# Patient Record
Sex: FEMALE | Race: BLACK OR AFRICAN AMERICAN | Employment: FULL TIME | URBAN - METROPOLITAN AREA
[De-identification: names, ages, dates, MRNs, and addresses within clinical notes are randomized per-mention and may not be internally consistent; named-entity substitution may affect disease eponyms.]

---

## 2017-04-19 ENCOUNTER — HOSPITAL ENCOUNTER (EMERGENCY)
Age: 57
Discharge: HOME | End: 2017-04-19
Attending: EMERGENCY MEDICINE | Admitting: EMERGENCY MEDICINE
Payer: SELF-PAY

## 2017-04-19 VITALS
HEART RATE: 86 BPM | OXYGEN SATURATION: 99 % | DIASTOLIC BLOOD PRESSURE: 80 MMHG | RESPIRATION RATE: 14 BRPM | SYSTOLIC BLOOD PRESSURE: 164 MMHG | TEMPERATURE: 98.2 F | BODY MASS INDEX: 29.37 KG/M2 | WEIGHT: 159.6 LBS | HEIGHT: 62 IN

## 2017-04-19 DIAGNOSIS — L03.012 PARONYCHIA, LEFT: Primary | ICD-10-CM

## 2017-04-19 PROCEDURE — 75810000106 HC EVAC SUBUNGUAL HEMATOMA

## 2017-04-19 PROCEDURE — 99283 EMERGENCY DEPT VISIT LOW MDM: CPT

## 2017-04-19 PROCEDURE — 74011000250 HC RX REV CODE- 250: Performed by: NURSE PRACTITIONER

## 2017-04-19 RX ORDER — HYDROCODONE BITARTRATE AND ACETAMINOPHEN 5; 325 MG/1; MG/1
1 TABLET ORAL
Qty: 12 TAB | Refills: 0 | Status: SHIPPED | OUTPATIENT
Start: 2017-04-19 | End: 2019-03-25

## 2017-04-19 RX ORDER — BACITRACIN 500 UNIT/G
1 PACKET (EA) TOPICAL
Status: COMPLETED | OUTPATIENT
Start: 2017-04-19 | End: 2017-04-19

## 2017-04-19 RX ORDER — LIDOCAINE HYDROCHLORIDE 10 MG/ML
60 INJECTION INFILTRATION; PERINEURAL ONCE
Status: COMPLETED | OUTPATIENT
Start: 2017-04-19 | End: 2017-04-19

## 2017-04-19 RX ORDER — SULFAMETHOXAZOLE AND TRIMETHOPRIM 800; 160 MG/1; MG/1
1 TABLET ORAL 2 TIMES DAILY
Qty: 14 TAB | Refills: 0 | Status: SHIPPED | OUTPATIENT
Start: 2017-04-19 | End: 2017-04-26

## 2017-04-19 RX ADMIN — BACITRACIN 1 PACKET: 500 OINTMENT TOPICAL at 16:13

## 2017-04-19 RX ADMIN — LIDOCAINE HYDROCHLORIDE 6 ML: 10 INJECTION, SOLUTION INFILTRATION; PERINEURAL at 16:12

## 2017-04-19 NOTE — ED PROVIDER NOTES
HPI Comments: The patient is a 49-year-old female who presents ambulatory to the emergency room with complaints of left third digit pain and swelling near the nailbed. Pain began 2 days ago. No known injury. History of diabetes blood sugars are well controlled. History of MRSA remotely. 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 She had acrylic nails up until 2 weeks ago. There are no other acute healthcare concerns at this time. Pt denies fevers, chills, night sweats, chest pain, pressure, SOB, TORRES, PND, orthopnea, abdominal pain, n/v/d, melena, hematuria, dysuria, constipation, HA, dizziness, and syncope. Past Medical History:  No date: Arrhythmia  No date: Arthritis      Comment: GENERALIZED TO [address]. No date: Asthma      Comment: INHALERS  No date: Diabetes ([address]      Comment: USES PILLS TO CONTROLL  No date: GERD (gastroesophageal reflux disease)  No date: Gout  No date: Hypertension      Comment: CONTROLLED BY MEDS. No date: Nausea & vomiting  No date: Obesity    Past Surgical History:  No date: HX CHOLECYSTECTOMY  98,2011: HX HEART CATHETERIZATION      Comment: CARDIAC CATH X2  1961: HX HEENT      Comment: CLEFT PALATE REPAIR.  2011 JULY 18: HX HYSTERECTOMY  No date: HX ORTHOPAEDIC      Comment: carpal tunnel, trigger finger, hand surgery  No date: HX OTHER SURGICAL      Comment: CLeft repair, piloneidal cyst, hand surgery,   1983: HX TUBAL LIGATION    PCP:  Ratna Paredes NP          Patient is a 62 y.o. female presenting with hand injury. The history is provided by the patient. Hand Injury    Pertinent negatives include no back pain and no neck pain. Past Medical History:   Diagnosis Date    Arrhythmia     Arthritis     GENERALIZED TO JOINTS-WRISTS & KNEES.  Asthma     INHALERS    Diabetes ([address]     USES PILLS TO CONTROLL    GERD (gastroesophageal reflux disease)     Gout     Hypertension     CONTROLLED BY MEDS.     Nausea & vomiting     Obesity        Past Surgical History:   Procedure Laterality Date    HX CHOLECYSTECTOMY      HX HEART CATHETERIZATION  98,2011    CARDIAC CATH X2    HX HEENT  1961    CLEFT PALATE REPAIR.    HX HYSTERECTOMY  2011 JULY 18    HX ORTHOPAEDIC      carpal tunnel, trigger finger, hand surgery    HX OTHER SURGICAL      CLeft repair, piloneidal cyst, hand surgery,     HX TUBAL LIGATION  1983         Family History:   Problem Relation Age of Onset    Asthma Mother     Hypertension Mother     Diabetes Father     Heart Disease Father      MI    Stroke Brother      CEREBRAL ANEURYSM       Social History     Social History    Marital status:      Spouse name: N/A    Number of children: N/A    Years of education: N/A     Occupational History    Not on file. Social History Main Topics    Smoking status: Former Smoker     Years: 10.00     Quit date: 7/11/2010    Smokeless tobacco: Never Used    Alcohol use No      Comment: RARE ETOH    Drug use: No    Sexual activity: Yes     Partners: Male     Birth control/ protection: None     Other Topics Concern    Not on file     Social History Narrative         ALLERGIES: Latex; Ampicillin; Betadine antiseptic gauze; Contrast dye [iodine]; Fd and c blue no. 1; and Penicillins    Review of Systems   Constitutional: Negative for activity change, appetite change, chills, diaphoresis, fatigue, fever and unexpected weight change. HENT: Negative for congestion, ear pain, rhinorrhea, sinus pressure, sore throat and tinnitus. Eyes: Negative for photophobia, pain, discharge and visual disturbance. Respiratory: Negative for apnea, cough, choking, chest tightness, shortness of breath, wheezing and stridor. Cardiovascular: Negative for chest pain, palpitations and leg swelling. Gastrointestinal: Negative for abdominal pain, constipation, diarrhea, nausea and vomiting. Endocrine: Negative for polydipsia, polyphagia and polyuria. Genitourinary: Negative for decreased urine volume, dyspareunia, dysuria, enuresis, flank pain, frequency, hematuria and urgency. Musculoskeletal: Negative for arthralgias, back pain, gait problem, myalgias and neck pain. Left third digit pain    Skin: Negative for color change, pallor, rash and wound. Allergic/Immunologic: Negative for immunocompromised state. Neurological: Negative for dizziness, seizures, syncope, weakness, light-headedness and headaches. Hematological: Does not bruise/bleed easily. Psychiatric/Behavioral: Negative for agitation and confusion. The patient is not nervous/anxious. Vitals:    04/19/17 1541   BP: 164/80   Pulse: 86   Resp: 14   Temp: 98.2 °F (36.8 °C)   SpO2: 99%   Weight: 72.4 kg (159 lb 9.6 oz)   Height: 5' 2\" (1.575 m)            Physical Exam   Constitutional: She is oriented to person, place, and time. She appears well-developed and well-nourished. No distress. HENT:   Head: Normocephalic. Right Ear: External ear normal.   Left Ear: External ear normal.   Mouth/Throat: Oropharynx is clear and moist. No oropharyngeal exudate. Eyes: Conjunctivae and EOM are normal. Pupils are equal, round, and reactive to light. Right eye exhibits no discharge. Left eye exhibits no discharge. No scleral icterus. Neck: Normal range of motion. Neck supple. No JVD present. No tracheal deviation present. No thyromegaly present. Cardiovascular: Normal rate, regular rhythm, normal heart sounds and intact distal pulses. Exam reveals no gallop and no friction rub. No murmur heard. Pulmonary/Chest: Effort normal and breath sounds normal. No stridor. No respiratory distress. She has no wheezes. She has no rales. She exhibits no tenderness. Abdominal: Soft. Bowel sounds are normal. She exhibits no distension and no mass. There is no tenderness. There is no rebound and no guarding. Musculoskeletal: Normal range of motion. She exhibits no edema.         Left hand: She exhibits tenderness. She exhibits normal range of motion, no bony tenderness, normal two-point discrimination, normal capillary refill, no deformity, no laceration and no swelling. Normal sensation noted. Decreased sensation is not present in the ulnar distribution, is not present in the medial redistribution and is not present in the radial distribution. Normal strength noted. Hands:  Lymphadenopathy:     She has no cervical adenopathy. Neurological: She is alert and oriented to person, place, and time. She displays normal reflexes. No cranial nerve deficit. Coordination normal. GCS eye subscore is 4. GCS verbal subscore is 5. GCS motor subscore is 6. Skin: Skin is warm and dry. No rash noted. She is not diaphoretic. No erythema. No pallor. Psychiatric: She has a normal mood and affect. Her behavior is normal. Judgment and thought content normal.   Nursing note and vitals reviewed. MDM  Number of Diagnoses or Management Options  Paronychia, left:   Diagnosis management comments:    * I & D    Risk of Complications, Morbidity, and/or Mortality  General comments:    - stable, ambulatory pt in NAD    Patient Progress  Patient progress: stable    ED Course       Nerve Block  Date/Time: 4/19/2017 4:41 PM  Performed by: Bernabe Garcia  Authorized by: Bernabe Garcia     Consent:     Consent obtained:  Verbal    Consent given by:  Patient    Risks discussed:  Bleeding, intravenous injection and pain  Indications:     Indications:  Pain relief  Location:     Laterality:  Left  Pre-procedure details:     Skin preparation:  Alcohol  Skin anesthesia (see MAR for exact dosages):     Skin anesthesia method:  None  Procedure details (see MAR for exact dosages): Block needle gauge:  25 G    Anesthetic injected:  Lidocaine 1% w/o epi    Injection procedure:  Anatomic landmarks identified  Post-procedure details:     Outcome:  Anesthesia achieved    Patient tolerance of procedure:   Tolerated well, no immediate complications  I&D Abcess Simple  Date/Time: 4/19/2017 4:41 PM  Performed by: Geraldine Sparks  Authorized by: Geraldine Sparks     Consent:     Consent obtained:  Verbal    Consent given by:  Patient    Risks discussed:  Bleeding, incomplete drainage, pain and infection    Alternatives discussed:  No treatment  Location:     Type:  Abscess    Size:  Paronychia   Procedure details:     Incision type: trifurcation       1641 PM  Pt has been reevaluated. There are no new complaints, changes, or physical findings at this time. Medications have been reviewed w/ pt and/or family. Pt and/or family's questions have been answered. Pt and/or family expressed good understanding of the dx/tx/rx and is in agreement with plan of care. Pt instructed and agreed to f/u w/ PCP and to return to ED upon further deterioration. Pt is ready for discharge. LABORATORY TESTS:  No results found for this or any previous visit (from the past 12 hour(s)). IMAGING RESULTS:  No orders to display     No results found. MEDICATIONS GIVEN:  Medications   lidocaine (XYLOCAINE) 10 mg/mL (1 %) injection 6 mL (6 mL IntraDERMal Given 4/19/17 1612)   bacitracin 500 unit/gram packet 1 Packet (1 Packet Topical Given 4/19/17 1613)       IMPRESSION:  1. Paronychia, left        PLAN:  1. Discharge Medication List as of 4/19/2017  4:27 PM      START taking these medications    Details   trimethoprim-sulfamethoxazole (BACTRIM DS) 160-800 mg per tablet Take 1 Tab by mouth two (2) times a day for 7 days. , Print, Disp-14 Tab, R-0      HYDROcodone-acetaminophen (NORCO) 5-325 mg per tablet Take 1 Tab by mouth every four (4) hours as needed for Pain. Max Daily Amount: 6 Tabs., Print, Disp-12 Tab, R-0         CONTINUE these medications which have NOT CHANGED    Details   meloxicam (MOBIC) 15 mg tablet Take 1 Tab by mouth daily. With breakfast, Print, Disp-30 Tab, R-0      amLODIPine (NORVASC) 5 mg tablet Take 5 mg by mouth daily. , Historical Med      hydrochlorothiazide (HYDRODIURIL) 25 mg tablet Take 25 mg by mouth daily. , Historical Med      albuterol (PROVENTIL, VENTOLIN) 90 mcg/Actuation inhaler Take 1-2 Puffs by inhalation every four (4) hours as needed for Wheezing., Print, Disp-1 g, R-0      sitagliptin-metformin (JANUMET) 50-1,000 mg per tablet Take 1 Tab by mouth two (2) times daily (with meals). , Historical Med      ranitidine (ZANTAC) 15 mg/mL syrup Take  by mouth as needed., Historical Med      fluticasone-salmeterol (ADVAIR DISKUS) 250-50 mcg/Dose diskus inhaler Take 1 Puff by inhalation every twelve (12) hours. , Historical Med         STOP taking these medications       oxyCODONE-acetaminophen (PERCOCET) 5-325 mg per tablet Comments:   Reason for Stopping:         diazepam (VALIUM) 5 mg tablet Comments:   Reason for Stoppin.   Follow-up Information     Follow up With Details Comments Contact Info    Kiersten Eagle NP In 3 days      Julien Ledbetter MD In 1 week As needed, If symptoms worsen [address] [facility] [address] [phone]      [address], [facility] DEP  As needed, If symptoms worsen [address] [facility]  [phone]        3.  Supportive care     Return to ED if worse         Jeannine Donovan NP  7:23 PM

## 2017-04-19 NOTE — DISCHARGE INSTRUCTIONS
Paronychia: Care Instructions  Your Care Instructions  Paronychia (say \"[id number]-leroy-[id number]\") is an infection of the skin around a fingernail or toenail. It happens when germs enter through a break in the skin. The doctor may have made a small cut in the infected area to drain the pus. Most cases of paronychia improve in a few days. But watch your symptoms and follow your doctor's advice. Though rare, a mild case can turn into something more serious and infect your entire finger or toe. Also, it is possible for an infection to return. Follow-up care is a key part of your treatment and safety. Be sure to make and go to all appointments, and call your doctor if you are having problems. It's also a good idea to know your test results and keep a list of the medicines you take. How can you care for yourself at home? · If your doctor told you how to care for your infected nail, follow the doctor's instructions. If you did not get instructions, follow this general advice:  ¨ Wash the area with clean water 2 times a day. Don't use hydrogen peroxide or alcohol, which can slow healing. ¨ You may cover the area with a thin layer of petroleum jelly, such as Vaseline, and a nonstick bandage. ¨ Apply more petroleum jelly and replace the bandage as needed. · If your doctor prescribed antibiotics, take them as directed. Do not stop taking them just because you feel better. You need to take the full course of antibiotics. · Take an over-the-counter pain medicine, such as acetaminophen (Tylenol), ibuprofen (Advil, Motrin), or naproxen (Aleve). Read and follow all instructions on the label. · Do not take two or more pain medicines at the same time unless the doctor told you to. Many pain medicines have acetaminophen, which is Tylenol. Too much acetaminophen (Tylenol) can be harmful. · Prop up the toe or finger so that it is higher than the level of your heart. This will help with pain and swelling. · Apply heat.  Put a warm water bottle, heating pad set on low, or warm cloth on your finger or toe. Do not go to sleep with a heating pad on your skin. · Soak the area in warm water twice a day for 15 minutes each time. After soaking, dry the area well and apply a thin layer of petroleum jelly, such as Vaseline. Put on a new bandage. When should you call for help? Call your doctor now or seek immediate medical care if:  · You have signs of new or worsening infection, such as:  ¨ Increased pain, swelling, warmth, or redness. ¨ Red streaks leading from the infected skin. ¨ Pus draining from the area. ¨ A fever. Watch closely for changes in your health, and be sure to contact your doctor if:  · You develop joint aches with your infection. · Your infection comes back. · You do not get better as expected. Where can you learn more? Go to http://nikita-saji.info/. Enter C435 in the search box to learn more about \"Paronychia: Care Instructions. \"  Current as of: October 13, 2016  Content Version: 11.2  © 6193-2745 [employer]. Care instructions adapted under license by [employer] (which disclaims liability or warranty for this information). If you have questions about a medical condition or this instruction, always ask your healthcare professional. [address] any warranty or liability for your use of this information. We hope that we have addressed all of your medical concerns. The examination and treatment you received in the Emergency Department were for an emergent problem and were not intended as complete care. It is important that you follow up with your healthcare provider(s) for ongoing care. If your symptoms worsen or do not improve as expected, and you are unable to reach your usual health care provider(s), you should return to the Emergency Department.       Today's healthcare is undergoing tremendous change, and patient satisfaction surveys are one of the many tools to assess the quality of medical care. You may receive a survey from the [employer] regarding your experience in the Emergency Department. I hope that your experience has been completely positive, particularly the medical care that I provided. As such, please participate in the survey; anything less than excellent does not meet my expectations or intentions. [address] [facility] and [address] [place] [address] participate in nationally recognized quality of care measures. If your blood pressure is greater than 120/80, as reported below, we urge that you seek medical care to address the potential of high blood pressure, commonly known as hypertension. Hypertension can be hereditary or can be caused by certain medical conditions, pain, stress, or \"white coat syndrome. \"       Please make an appointment with your health care provider(s) for follow up of your Emergency Department visit. VITALS:   Patient Vitals for the past 8 hrs:   Temp Pulse Resp BP SpO2   04/19/17 1541 98.2 °F (36.8 °C) 86 14 164/80 99 %          Thank you for allowing us to provide you with medical care today. We realize that you have many choices for your emergency care needs. Please choose us in the future for any continued health care needs. Dharmesh Grady, [address] [facility] [address]: [phone]            No results found for this or any previous visit (from the past 24 hour(s)). No results found.

## 2017-04-19 NOTE — LETTER
[address] 
[address] [place] [id number] 
[phone] Work/School Note Date: 4/19/2017 To Whom It May concern: 
 
Mars Alcantara was seen and treated today in the emergency room by the following provider(s): 
Nurse Practitioner: Skylar Appiah NP. Mars Alcantara may return to work on Thursday 4/20/2017 Sincerely, Pedro Dukes RN

## 2017-04-19 NOTE — ED NOTES
Bacitracin applied to left 3rd digit. Covered with a non-stick dressing. Wrapped with sterile guaze. Patient verbalizes understanding of discharge instructions. Ambulatory and in no acute distress at discharge.

## 2017-06-05 ENCOUNTER — HOSPITAL ENCOUNTER (EMERGENCY)
Age: 57
Discharge: HOME | End: 2017-06-06
Attending: EMERGENCY MEDICINE
Payer: SELF-PAY

## 2017-06-05 DIAGNOSIS — M54.31 SCIATICA OF RIGHT SIDE: ICD-10-CM

## 2017-06-05 DIAGNOSIS — S16.1XXA NECK STRAIN, INITIAL ENCOUNTER: Primary | ICD-10-CM

## 2017-06-05 PROCEDURE — 96375 TX/PRO/DX INJ NEW DRUG ADDON: CPT

## 2017-06-05 PROCEDURE — 99284 EMERGENCY DEPT VISIT MOD MDM: CPT

## 2017-06-05 PROCEDURE — 96374 THER/PROPH/DIAG INJ IV PUSH: CPT

## 2017-06-05 NOTE — Clinical Note
Take the Ibuprofen every 8 hours with food. Take the Flexeril every 8 hours as needed for spasms. You can take the Norco every 4 hours if needed for more severe pain. Warm heat several times a day. Follow up with your primary care doctor in 2-3 days i f no improvement, return here if symptoms worsen.

## 2017-06-06 ENCOUNTER — APPOINTMENT (OUTPATIENT)
Dept: GENERAL RADIOLOGY | Age: 57
End: 2017-06-06
Attending: EMERGENCY MEDICINE
Payer: SELF-PAY

## 2017-06-06 VITALS
DIASTOLIC BLOOD PRESSURE: 56 MMHG | RESPIRATION RATE: 17 BRPM | TEMPERATURE: 98 F | HEART RATE: 80 BPM | OXYGEN SATURATION: 98 % | SYSTOLIC BLOOD PRESSURE: 117 MMHG

## 2017-06-06 PROCEDURE — 74011250637 HC RX REV CODE- 250/637: Performed by: EMERGENCY MEDICINE

## 2017-06-06 PROCEDURE — 71020 XR CHEST PA LAT: CPT

## 2017-06-06 PROCEDURE — 74011250636 HC RX REV CODE- 250/636: Performed by: EMERGENCY MEDICINE

## 2017-06-06 PROCEDURE — 72100 X-RAY EXAM L-S SPINE 2/3 VWS: CPT

## 2017-06-06 PROCEDURE — 72050 X-RAY EXAM NECK SPINE 4/5VWS: CPT

## 2017-06-06 RX ORDER — CYCLOBENZAPRINE HCL 10 MG
10 TABLET ORAL
Qty: 15 TAB | Refills: 0 | Status: SHIPPED | OUTPATIENT
Start: 2017-06-06 | End: 2019-03-25

## 2017-06-06 RX ORDER — IBUPROFEN 800 MG/1
800 TABLET ORAL
Qty: 20 TAB | Refills: 0 | Status: SHIPPED | OUTPATIENT
Start: 2017-06-06 | End: 2017-06-13

## 2017-06-06 RX ORDER — ONDANSETRON 4 MG/1
8 TABLET, ORALLY DISINTEGRATING ORAL
Status: COMPLETED | OUTPATIENT
Start: 2017-06-06 | End: 2017-06-06

## 2017-06-06 RX ORDER — LORAZEPAM 2 MG/ML
1 INJECTION INTRAMUSCULAR ONCE
Status: COMPLETED | OUTPATIENT
Start: 2017-06-06 | End: 2017-06-06

## 2017-06-06 RX ORDER — KETOROLAC TROMETHAMINE 30 MG/ML
30 INJECTION, SOLUTION INTRAMUSCULAR; INTRAVENOUS
Status: COMPLETED | OUTPATIENT
Start: 2017-06-06 | End: 2017-06-06

## 2017-06-06 RX ORDER — HYDROCODONE BITARTRATE AND ACETAMINOPHEN 10; 325 MG/1; MG/1
1 TABLET ORAL
Qty: 12 TAB | Refills: 0 | Status: SHIPPED | OUTPATIENT
Start: 2017-06-06 | End: 2019-03-25

## 2017-06-06 RX ORDER — HYDROCODONE BITARTRATE AND ACETAMINOPHEN 10; 325 MG/1; MG/1
1 TABLET ORAL
Status: COMPLETED | OUTPATIENT
Start: 2017-06-06 | End: 2017-06-06

## 2017-06-06 RX ADMIN — LORAZEPAM 1 MG: 2 INJECTION INTRAMUSCULAR; INTRAVENOUS at 00:28

## 2017-06-06 RX ADMIN — ONDANSETRON 8 MG: 4 TABLET, ORALLY DISINTEGRATING ORAL at 02:59

## 2017-06-06 RX ADMIN — KETOROLAC TROMETHAMINE 30 MG: 30 INJECTION, SOLUTION INTRAMUSCULAR at 00:26

## 2017-06-06 RX ADMIN — HYDROCODONE BITARTRATE AND ACETAMINOPHEN 1 TABLET: 10; 325 TABLET ORAL at 02:59

## 2017-06-06 NOTE — DISCHARGE INSTRUCTIONS
Neck Strain: Care Instructions  Your Care Instructions  You have strained the muscles and ligaments in your neck. A sudden, awkward movement can strain the neck. This often occurs with falls or car accidents or during certain sports. Everyday activities like working on a computer or sleeping can also cause neck strain if they force you to hold your neck in an awkward position for a long time. It is common for neck pain to get worse for a day or two after an injury, but it should start to feel better after that. You may have more pain and stiffness for several days before it gets better. This is expected. It may take a few weeks or longer for it to heal completely. Good home treatment can help you get better faster and avoid future neck problems. Follow-up care is a key part of your treatment and safety. Be sure to make and go to all appointments, and call your doctor if you are having problems. It's also a good idea to know your test results and keep a list of the medicines you take. How can you care for yourself at home? · If you were given a neck brace (cervical collar) to limit neck motion, wear it as instructed for as many days as your doctor tells you to. Do not wear it longer than you were told to. Wearing a brace for too long can make neck stiffness worse and weaken the neck muscles. · You can try using heat or ice to see if it helps. ¨ Try using a heating pad on a low or medium setting for 15 to 20 minutes every 2 to 3 hours. Try a warm shower in place of one session with the heating pad. You can also buy single-use heat wraps that last up to 8 hours. ¨ You can also try an ice pack for 10 to 15 minutes every 2 to 3 hours. · Take pain medicines exactly as directed. ¨ If the doctor gave you a prescription medicine for pain, take it as prescribed. ¨ If you are not taking a prescription pain medicine, ask your doctor if you can take an over-the-counter medicine.   · Gently rub the area to relieve pain and help with blood flow. Do not massage the area if it hurts to do so. · Do not do anything that makes the pain worse. Take it easy for a couple of days. You can do your usual activities if they do not hurt your neck or put it at risk for more stress or injury. · Try sleeping on a special neck pillow. Place it under your neck, not under your head. Placing a tightly rolled-up towel under your neck while you sleep will also work. If you use a neck pillow or rolled towel, do not use your regular pillow at the same time. · To prevent future neck pain, do exercises to stretch and strengthen your neck and back. Learn how to use good posture, safe lifting techniques, and proper body mechanics. When should you call for help? Call 911 anytime you think you may need emergency care. For example, call if:  · You are unable to move an arm or a leg at all. Call your doctor now or seek immediate medical care if:  · You have new or worse symptoms in your arms, legs, chest, belly, or buttocks. Symptoms may include:  ¨ Numbness or tingling. ¨ Weakness. ¨ Pain. · You lose bladder or bowel control. Watch closely for changes in your health, and be sure to contact your doctor if:  · You are not getting better as expected. Where can you learn more? Go to http://nikita-saji.info/. Enter M253 in the search box to learn more about \"Neck Strain: Care Instructions. \"  Current as of: May 23, 2016  Content Version: 11.2  © 0618-2977 [employer]. Care instructions adapted under license by [employer] (which disclaims liability or warranty for this information). If you have questions about a medical condition or this instruction, always ask your healthcare professional. [place] [address] any warranty or liability for your use of this information.          Sciatica: Care Instructions  Your Care Instructions    Sciatica (say \"uvp-HL-db-kuh\") is an irritation of one of the sciatic nerves, which come from the spinal cord in the lower back. The sciatic nerves and their branches extend down through the buttock to the foot. Sciatica can develop when an injured disc in the back presses against a spinal nerve root. Its main symptom is pain, numbness, or weakness that is often worse in the leg or foot than in the back. Sciatica often will improve and go away with time. Early treatment usually includes medicines and exercises to relieve pain. Follow-up care is a key part of your treatment and safety. Be sure to make and go to all appointments, and call your doctor if you are having problems. It's also a good idea to know your test results and keep a list of the medicines you take. How can you care for yourself at home? · Take pain medicines exactly as directed. ¨ If the doctor gave you a prescription medicine for pain, take it as prescribed. ¨ If you are not taking a prescription pain medicine, ask your doctor if you can take an over-the-counter medicine. · Use heat or ice to relieve pain. ¨ To apply heat, put a warm water bottle, heating pad set on low, or warm cloth on your back. Do not go to sleep with a heating pad on your skin. ¨ To use ice, put ice or a cold pack on the area for 10 to 20 minutes at a time. Put a thin cloth between the ice and your skin. · Avoid sitting if possible, unless it feels better than standing. · Alternate lying down with short walks. Increase your walking distance as you are able to without making your symptoms worse. · Do not do anything that makes your symptoms worse. When should you call for help? Call 911 anytime you think you may need emergency care. For example, call if:  · You are unable to move a leg at all. Call your doctor now or seek immediate medical care if:  · You have new or worse symptoms in your legs or buttocks. Symptoms may include:  ¨ Numbness or tingling. ¨ Weakness. ¨ Pain. · You lose bladder or bowel control.   Watch closely for changes in your health, and be sure to contact your doctor if:  · You are not getting better as expected. Where can you learn more? Go to http://nikita-saji.info/. Enter [phone] in the search box to learn more about \"Sciatica: Care Instructions. \"  Current as of: May 23, 2016  Content Version: 11.2  © 8879-8198 [employer]. Care instructions adapted under license by [employer] (which disclaims liability or warranty for this information). If you have questions about a medical condition or this instruction, always ask your healthcare professional. [place] [address] any warranty or liability for your use of this information. We hope that we have addressed all of your medical concerns. The examination and treatment you received in the Emergency Department were for an emergent problem and were not intended as complete care. It is important that you follow up with your healthcare provider(s) for ongoing care. If your symptoms worsen or do not improve as expected, and you are unable to reach your usual health care provider(s), you should return to the Emergency Department. Today's healthcare is undergoing tremendous change, and patient satisfaction surveys are one of the many tools to assess the quality of medical care. You may receive a survey from the [employer] regarding your experience in the Emergency Department. I hope that your experience has been completely positive, particularly the medical care that I provided. As such, please participate in the survey; anything less than excellent does not meet my expectations or intentions. [facility] and [facility] participate in nationally recognized quality of care measures.   If your blood pressure is greater than 120/80, as reported below, we urge that you seek medical care to address the potential of high blood pressure, commonly known as hypertension. Hypertension can be hereditary or can be caused by certain medical conditions, pain, stress, or \"white coat syndrome. \"       Please make an appointment with your health care provider(s) for follow up of your Emergency Department visit. VITALS:   Patient Vitals for the past 8 hrs:   Temp Pulse Resp BP SpO2   06/05/17 2350 98.7 °F (37.1 °C) 89 16 160/64 98 %          Thank you for allowing us to provide you with medical care today. We realize that you have many choices for your emergency care needs. Please choose us in the future for any continued health care needs. MD ANGELINA Huntsirisha [address]: [phone]            No results found for this or any previous visit (from the past 24 hour(s)). Xr Chest Pa Lat    Result Date: 6/6/2017  EXAM:  XR CHEST PA LAT INDICATION:  Cough and right neck pain since extended driving recently. COMPARISON: Chest views on 2/8/2015 TECHNIQUE: Supine AP and lateral chest views FINDINGS: The cardiomediastinal and hilar contours are within normal limits. The pulmonary vasculature is within normal limits. The lungs and pleural spaces are clear. Elevated right hemidiaphragm is unchanged. Bones are within normal limits. IMPRESSION: No acute process. No change given difference in technique. Xr Spine Cerv 4 Or 5 V    Result Date: 6/6/2017  EXAM:  XR SPINE CERV 4 OR 5 V INDICATION: Neck pain and limited range of motion since extended driving recently. No injury. COMPARISON: Cervical spine views on 12/27/2008. TECHNIQUE: 6 images of 5 views cervical spine. FINDINGS: The skull base through C6 is imaged. C7 and T1 are inadequately imaged. No evidence of fracture. The prevertebral soft tissues are within normal limits. Degenerative disc disease at C4-5 and C5-6 is partially imaged. The C1-C2 relationship is within normal limits.      IMPRESSION:  No evidence of fracture from the skull base through C6. Xr Spine Lumb 2 Or 3 V    Result Date: 6/6/2017  EXAM:  Lumbar spine 3 views INDICATION:   Low back pain and limited range of motion since extended riding recently. COMPARISON: Lumbar spine views on 7/16/2016 FINDINGS: AP, lateral and spot lateral views of the lumbar spine demonstrate normal alignment. There is no acute fracture or compression deformity. Degenerative disc disease at T11-T12 is increased. Facet arthrosis is unchanged. IMPRESSION:  1. No fracture. 2. Increased degenerative disc disease at T11-T12.

## 2017-06-06 NOTE — ED PROVIDER NOTES
HPI Comments: 62 y.o. female with past medical history significant for HTN, DM, Arthritis, Cardiac cath who presents from home via EMS with chief complaint of neck pain. Pt reports driving to [place] today for a graduation which required significant walking. She notes developing a right sided headache which remained constant throughout the day. Upon driving back, while in traffic, she had right neck pain. The neck pain gradually worsened and is now described as severe limiting ROM. Pt notes \"spasms\" of pain. Neck pain exacerbated with any movement. She has not taken any pain medication for symptoms. She further notes recent diagnosis of Bronchitis last week. She was prescribed cough syrup and ZPak but denies significant relief to cough. Pt denies weakness, numbness, back pain, chest pain, SOB, nausea, vomiting, diarrhea, constipation or fever. There are no other acute medical concerns at this time. PCP: Mauricio Boucher NP    Note written by Catarina Hamilton, as dictated by Zenaida Cruz MD 12:43 AM    The history is provided by the patient. No  was used. Past Medical History:   Diagnosis Date    Arrhythmia     Arthritis     GENERALIZED TO JOINTS-WRISTS & KNEES.  Asthma     INHALERS    Diabetes ([facility] [address]     USES PILLS TO CONTROLL    GERD (gastroesophageal reflux disease)     Gout     Hypertension     CONTROLLED BY MEDS.     Nausea & vomiting     Obesity        Past Surgical History:   Procedure Laterality Date    HX CHOLECYSTECTOMY      HX HEART CATHETERIZATION  98,2011    CARDIAC CATH X2    HX HEENT  1961    CLEFT PALATE REPAIR.    HX HYSTERECTOMY  2011 JULY 18    HX ORTHOPAEDIC      carpal tunnel, trigger finger, hand surgery    HX OTHER SURGICAL      CLeft repair, piloneidal cyst, hand surgery,     HX TUBAL LIGATION  1983         Family History:   Problem Relation Age of Onset   Devan Quigley Asthma Mother     Hypertension Mother     Diabetes Father     Heart Disease Father MI    Stroke Brother      CEREBRAL ANEURYSM       Social History     Social History    Marital status:      Spouse name: N/A    Number of children: N/A    Years of education: N/A     Occupational History    Not on file. Social History Main Topics    Smoking status: Former Smoker     Years: 10.00     Quit date: 7/11/2010    Smokeless tobacco: Never Used    Alcohol use No      Comment: RARE ETOH    Drug use: No    Sexual activity: Yes     Partners: Male     Birth control/ protection: None     Other Topics Concern    Not on file     Social History Narrative         ALLERGIES: Latex; Ampicillin; Betadine antiseptic gauze; Contrast dye [iodine]; Fd and c blue no. 1; and Penicillins    Review of Systems   Constitutional: Negative for fever. Respiratory: Positive for cough. Negative for shortness of breath. Cardiovascular: Negative for chest pain. Gastrointestinal: Negative for abdominal pain, constipation, diarrhea, nausea and vomiting. Genitourinary: Negative for difficulty urinating. Musculoskeletal: Positive for neck pain (right). Negative for back pain. Neurological: Positive for headaches. Negative for weakness and numbness. All other systems reviewed and are negative. Vitals:    06/05/17 2350   BP: 160/64   Pulse: 89   Resp: 16   Temp: 98.7 °F (37.1 °C)   SpO2: 98%       Physical Exam   Constitutional: She is oriented to person, place, and time. She appears well-developed and well-nourished. HENT:   Head: Normocephalic and atraumatic. Nose: Nose normal.   Eyes: Conjunctivae and EOM are normal. Pupils are equal, round, and reactive to light. Neck: Neck supple. tenderness to right paraspinal muscle and right occiput   Cardiovascular: Normal rate, regular rhythm, normal heart sounds and intact distal pulses. Pulmonary/Chest: Effort normal and breath sounds normal.   Abdominal: Soft. Bowel sounds are normal.   Musculoskeletal: Normal range of motion. Neurological: She is oriented to person, place, and time. She has normal reflexes. Skin: Skin is warm and dry. Psychiatric: She has a normal mood and affect. Her behavior is normal.   Nursing note and vitals reviewed. Note written by Catarina Beltran, as dictated by Rony Wright MD 12:45 AM    [facility]  ED Course       Procedures    Pain relieved with meds. xrays show DJD. Will d/c with strain precautions, treat with NSAIDs and flexeril, few norco. Follow up with pcp.

## 2017-06-06 NOTE — ED TRIAGE NOTES
Patient arrives to ED with c/o pain to right side of her neck which radiates into her head after driving back from [place], no deformity or swelling noted, limited ROM noted.  Patient states she had same pain 10 yrs ago

## 2017-09-26 ENCOUNTER — HOSPITAL ENCOUNTER (OUTPATIENT)
Dept: LAB | Age: 57
Discharge: HOME | End: 2017-09-26

## 2017-09-26 PROCEDURE — 82043 UR ALBUMIN QUANTITATIVE: CPT | Performed by: INTERNAL MEDICINE

## 2017-09-27 LAB
CREAT UR-MCNC: 122 MG/DL
MICROALBUMIN UR-MCNC: 0.73 MG/DL
MICROALBUMIN/CREAT UR-RTO: 6 MG/G (ref 0–30)

## 2017-11-27 ENCOUNTER — APPOINTMENT (OUTPATIENT)
Dept: GENERAL RADIOLOGY | Age: 57
End: 2017-11-27
Attending: EMERGENCY MEDICINE
Payer: SELF-PAY

## 2017-11-27 ENCOUNTER — HOSPITAL ENCOUNTER (EMERGENCY)
Age: 57
Discharge: HOME | End: 2017-11-27
Attending: EMERGENCY MEDICINE
Payer: SELF-PAY

## 2017-11-27 VITALS
WEIGHT: 177.25 LBS | RESPIRATION RATE: 22 BRPM | HEIGHT: 62 IN | DIASTOLIC BLOOD PRESSURE: 79 MMHG | OXYGEN SATURATION: 97 % | TEMPERATURE: 98.1 F | SYSTOLIC BLOOD PRESSURE: 150 MMHG | HEART RATE: 97 BPM | BODY MASS INDEX: 32.62 KG/M2

## 2017-11-27 DIAGNOSIS — J06.9 VIRAL URI WITH COUGH: ICD-10-CM

## 2017-11-27 DIAGNOSIS — J45.21 MILD INTERMITTENT ASTHMA WITH ACUTE EXACERBATION: Primary | ICD-10-CM

## 2017-11-27 LAB
ANION GAP SERPL CALC-SCNC: 7 MMOL/L (ref 5–15)
ATRIAL RATE: 88 BPM
BASOPHILS # BLD: 0 K/UL (ref 0–0.1)
BASOPHILS NFR BLD: 0 % (ref 0–1)
BUN SERPL-MCNC: 7 MG/DL (ref 6–20)
BUN/CREAT SERPL: 8 (ref 12–20)
CALCIUM SERPL-MCNC: 8.7 MG/DL (ref 8.5–10.1)
CALCULATED P AXIS, ECG09: 45 DEGREES
CALCULATED R AXIS, ECG10: 20 DEGREES
CALCULATED T AXIS, ECG11: 131 DEGREES
CHLORIDE SERPL-SCNC: 98 MMOL/L (ref 97–108)
CO2 SERPL-SCNC: 29 MMOL/L (ref 21–32)
CREAT SERPL-MCNC: 0.91 MG/DL (ref 0.55–1.02)
DIAGNOSIS, 93000: NORMAL
EOSINOPHIL # BLD: 0.1 K/UL (ref 0–0.4)
EOSINOPHIL NFR BLD: 2 % (ref 0–7)
ERYTHROCYTE [DISTWIDTH] IN BLOOD BY AUTOMATED COUNT: 13 % (ref 11.5–14.5)
FLUAV AG NPH QL IA: NEGATIVE
FLUBV AG NOSE QL IA: NEGATIVE
GLUCOSE SERPL-MCNC: 178 MG/DL (ref 65–100)
HCT VFR BLD AUTO: 32.9 % (ref 35–47)
HGB BLD-MCNC: 11 G/DL (ref 11.5–16)
LYMPHOCYTES # BLD: 1.4 K/UL (ref 0.8–3.5)
LYMPHOCYTES NFR BLD: 26 % (ref 12–49)
MCH RBC QN AUTO: 28.8 PG (ref 26–34)
MCHC RBC AUTO-ENTMCNC: 33.4 G/DL (ref 30–36.5)
MCV RBC AUTO: 86.1 FL (ref 80–99)
MONOCYTES # BLD: 0.6 K/UL (ref 0–1)
MONOCYTES NFR BLD: 11 % (ref 5–13)
NEUTS SEG # BLD: 3.2 K/UL (ref 1.8–8)
NEUTS SEG NFR BLD: 61 % (ref 32–75)
P-R INTERVAL, ECG05: 152 MS
PLATELET # BLD AUTO: 284 K/UL (ref 150–400)
POTASSIUM SERPL-SCNC: 3.3 MMOL/L (ref 3.5–5.1)
Q-T INTERVAL, ECG07: 370 MS
QRS DURATION, ECG06: 70 MS
QTC CALCULATION (BEZET), ECG08: 447 MS
RBC # BLD AUTO: 3.82 M/UL (ref 3.8–5.2)
SODIUM SERPL-SCNC: 134 MMOL/L (ref 136–145)
TROPONIN I SERPL-MCNC: <0.04 NG/ML
VENTRICULAR RATE, ECG03: 88 BPM
WBC # BLD AUTO: 5.3 K/UL (ref 3.6–11)

## 2017-11-27 PROCEDURE — 99283 EMERGENCY DEPT VISIT LOW MDM: CPT

## 2017-11-27 PROCEDURE — 80048 BASIC METABOLIC PNL TOTAL CA: CPT

## 2017-11-27 PROCEDURE — 77030029684 HC NEB SM VOL KT MONA -A

## 2017-11-27 PROCEDURE — 94640 AIRWAY INHALATION TREATMENT: CPT

## 2017-11-27 PROCEDURE — 71020 XR CHEST PA LAT: CPT

## 2017-11-27 PROCEDURE — 74011000250 HC RX REV CODE- 250: Performed by: EMERGENCY MEDICINE

## 2017-11-27 PROCEDURE — 74011250637 HC RX REV CODE- 250/637: Performed by: EMERGENCY MEDICINE

## 2017-11-27 PROCEDURE — 74011636637 HC RX REV CODE- 636/637: Performed by: EMERGENCY MEDICINE

## 2017-11-27 PROCEDURE — 87804 INFLUENZA ASSAY W/OPTIC: CPT

## 2017-11-27 PROCEDURE — 84484 ASSAY OF TROPONIN QUANT: CPT

## 2017-11-27 PROCEDURE — 85025 COMPLETE CBC W/AUTO DIFF WBC: CPT

## 2017-11-27 PROCEDURE — 36415 COLL VENOUS BLD VENIPUNCTURE: CPT

## 2017-11-27 PROCEDURE — 93005 ELECTROCARDIOGRAM TRACING: CPT

## 2017-11-27 RX ORDER — FLUTICASONE PROPIONATE 50 MCG
2 SPRAY, SUSPENSION (ML) NASAL DAILY
Qty: 1 BOTTLE | Refills: 0 | Status: SHIPPED | OUTPATIENT
Start: 2017-11-27 | End: 2019-03-25

## 2017-11-27 RX ORDER — ALBUTEROL SULFATE 90 UG/1
2 AEROSOL, METERED RESPIRATORY (INHALATION)
Qty: 1 INHALER | Refills: 0 | Status: SHIPPED | OUTPATIENT
Start: 2017-11-27 | End: 2020-03-24 | Stop reason: SDUPTHER

## 2017-11-27 RX ORDER — POTASSIUM CHLORIDE 20 MEQ/1
40 TABLET, EXTENDED RELEASE ORAL
Status: COMPLETED | OUTPATIENT
Start: 2017-11-27 | End: 2017-11-27

## 2017-11-27 RX ORDER — PREDNISONE 5 MG/1
TABLET ORAL
Qty: 21 TAB | Refills: 0 | Status: SHIPPED | OUTPATIENT
Start: 2017-11-27 | End: 2019-03-25

## 2017-11-27 RX ORDER — PREDNISONE 20 MG/1
60 TABLET ORAL ONCE
Status: COMPLETED | OUTPATIENT
Start: 2017-11-27 | End: 2017-11-27

## 2017-11-27 RX ORDER — IPRATROPIUM BROMIDE AND ALBUTEROL SULFATE 2.5; .5 MG/3ML; MG/3ML
3 SOLUTION RESPIRATORY (INHALATION)
Status: COMPLETED | OUTPATIENT
Start: 2017-11-27 | End: 2017-11-27

## 2017-11-27 RX ORDER — GUAIFENESIN DEXTROMETHORPHAN HYDROBROMIDE ORAL SOLUTION 10; 100 MG/5ML; MG/5ML
10 SOLUTION ORAL
Qty: 100 ML | Refills: 0 | Status: SHIPPED | OUTPATIENT
Start: 2017-11-27 | End: 2019-03-25

## 2017-11-27 RX ADMIN — IPRATROPIUM BROMIDE AND ALBUTEROL SULFATE 3 ML: .5; 3 SOLUTION RESPIRATORY (INHALATION) at 07:33

## 2017-11-27 RX ADMIN — PREDNISONE 60 MG: 20 TABLET ORAL at 07:27

## 2017-11-27 RX ADMIN — IPRATROPIUM BROMIDE AND ALBUTEROL SULFATE 3 ML: .5; 3 SOLUTION RESPIRATORY (INHALATION) at 07:45

## 2017-11-27 RX ADMIN — POTASSIUM CHLORIDE 40 MEQ: 20 TABLET, EXTENDED RELEASE ORAL at 08:49

## 2017-11-27 RX ADMIN — IPRATROPIUM BROMIDE AND ALBUTEROL SULFATE 3 ML: .5; 3 SOLUTION RESPIRATORY (INHALATION) at 07:26

## 2017-11-27 NOTE — LETTER
NOTIFICATION RETURN TO WORK / SCHOOL 
 
11/27/2017 8:39 AM 
 
Ms. German Ovalle [address] [place] [id number] To Whom It May Concern: 
 
German Ovalle is currently under the care of [facility] EMERGENCY DEPT. She will return to work/school on: 11-30-17 If there are questions or concerns please have the patient contact our office. Sincerely, Wicho Carson MD

## 2017-11-27 NOTE — ED PROVIDER NOTES
[address]  EMERGENCY DEPARTMENT HISTORY AND PHYSICAL EXAM       Date of Service: 11/27/2017   Patient Name: Ericka Tolentino   YOB: 1960  Medical Record Number: [id number]    History of Presenting Illness     Chief Complaint   Patient presents with    Wheezing     with cough worsening since friday        History Provided By:  patient    Additional History:   Ericka Tolentino is a 62 y.o. female with PMhx significant for asthma, HTN, diabetes who presents to the ED with cc of chest tightness, wheezing, coughing. She states this is ongoing for 4 days, it started when she was in her boyfriends car with an air freshner and smoke and has not stopped since. She is coughing some stuff up and states it is thinning out. She is feeling sweaty at times but has not checked a temperature. She has been using her advair and albuterol without much relief. She is c/o chest tightness worse with coughing. She denies recent travel, sick contacts, abd pain, nausea, vomiting, diarrhea. Social Hx: past use, none in last several years Tobacco, denies EtOH, denies Illicit Drugs    There are no other complaints, changes or physical findings at this time. Primary Care Provider: Velma Rodriguez NP       Past History     Past Medical History:   Past Medical History:   Diagnosis Date    Arrhythmia     Arthritis     GENERALIZED TO JOINTS-WRISTS & KNEES.  Asthma     INHALERS    Diabetes ([address]     USES PILLS TO CONTROLL    GERD (gastroesophageal reflux disease)     Gout     Hypertension     CONTROLLED BY MEDS.     Nausea & vomiting     Obesity         Past Surgical History:   Past Surgical History:   Procedure Laterality Date    HX CHOLECYSTECTOMY      HX HEART CATHETERIZATION  98,2011    CARDIAC CATH X2    HX HEENT  1961    CLEFT PALATE REPAIR.    HX HYSTERECTOMY  2011 JULY 18    HX ORTHOPAEDIC      carpal tunnel, trigger finger, hand surgery    HX OTHER SURGICAL CLeft repair, piloneidal cyst, hand surgery,     HX TUBAL LIGATION  1983        Family History:   Family History   Problem Relation Age of Onset    Asthma Mother     Hypertension Mother     Diabetes Father     Heart Disease Father      MI    Stroke Brother      CEREBRAL ANEURYSM        Social History:   Social History   Substance Use Topics    Smoking status: Former Smoker     Years: 10.00     Quit date: 7/11/2010    Smokeless tobacco: Never Used    Alcohol use No      Comment: RARE ETOH        Allergies: Allergies   Allergen Reactions    Latex Rash    Ampicillin Anaphylaxis    Betadine Antiseptic Gauze Hives    Contrast Dye [Iodine] Hives and Swelling     Pt. Reports throat swelling      Fd And C Blue No.1 Anaphylaxis    Penicillins Hives         Review of Systems   Review of Systems   Constitutional: Positive for chills and diaphoresis. HENT: Positive for congestion, postnasal drip, rhinorrhea and sneezing. Eyes: Negative. Respiratory: Positive for cough, chest tightness, shortness of breath and wheezing. Cardiovascular: Positive for chest pain. Negative for palpitations and leg swelling. Gastrointestinal: Negative for abdominal pain, constipation, diarrhea, nausea and vomiting. Endocrine: Negative. Genitourinary: Negative for dysuria and hematuria. Musculoskeletal: Negative for arthralgias and myalgias. Skin: Negative for rash. Neurological: Negative for dizziness, weakness, numbness and headaches. Psychiatric/Behavioral: Negative. Physical Exam  Physical Exam   Constitutional: She is oriented to person, place, and time. She appears well-developed and well-nourished. No distress. HENT:   Head: Normocephalic and atraumatic. Eyes: Conjunctivae are normal. Pupils are equal, round, and reactive to light. Neck: Normal range of motion. Neck supple. Cardiovascular: Normal rate, regular rhythm and normal heart sounds. Exam reveals no gallop and no friction rub. No murmur heard. Pulmonary/Chest: Effort normal. No respiratory distress. She has wheezes (RLL). She has no rales. She exhibits no tenderness. Abdominal: Soft. Bowel sounds are normal. She exhibits no distension. There is no tenderness. There is no guarding. Musculoskeletal: Normal range of motion. She exhibits no edema, tenderness or deformity. Neurological: She is alert and oriented to person, place, and time. No cranial nerve deficit. Skin: Skin is warm and dry. Psychiatric: She has a normal mood and affect. Her behavior is normal.       Medical Decision Making   I am the first provider for this patient. I reviewed the vital signs, available nursing notes, past medical history, past surgical history, family history and social history. Asthma exacerbation, viral URI, pneumonia, angina, atypical chest pain  60yo female with asthma, HTN, Diabetes presenting with cough, congestion, wheezing X 4 days also c/o chest tightness with coughing. On exam she is no resp distress she is coughing throughout with wheezing in RLL, will obtain CXR to eval for PNA, ECG given her HTN, diabetes and family history of heart disease as well as POC troponin, will provide duonebs, steroids for asthma flare, advised to use saline nasal spray and flonase as well as mucinex and will provide scripts if d/c. Old Medical Records: Old medical records. Nursing notes. ED Course:  7:11 AM   Initial assessment performed. The patients presenting problems have been discussed, and they are in agreement with the care plan formulated and outlined with them. I have encouraged them to ask questions as they arise throughout their visit.     8:27 AM  Patient feeling better s/p breathing treatments awaiting CXR results and lab results    8:44 AM  Patients results reviewed, no PNA, labs stable, given KCl 40meq orally and d/c patient agreeable with plan        Diagnostic Study Results   Labs -  Recent Results (from the past 12 hour(s))   EKG, 12 LEAD, INITIAL    Collection Time: 11/27/17  7:30 AM   Result Value Ref Range    Ventricular Rate 88 BPM    Atrial Rate 88 BPM    P-R Interval 152 ms    QRS Duration 70 ms    Q-T Interval 370 ms    QTC Calculation (Bezet) 447 ms    Calculated P Axis 45 degrees    Calculated R Axis 20 degrees    Calculated T Axis 131 degrees    Diagnosis       Normal sinus rhythm  T wave abnormality, consider lateral ischemia  Abnormal ECG  When compared with ECG of 08-FEB-2015 16:21,  No significant change was found     INFLUENZA A & B AG (RAPID TEST)    Collection Time: 11/27/17  7:36 AM   Result Value Ref Range    Influenza A Antigen NEGATIVE  NEG      Influenza B Antigen NEGATIVE  NEG     CBC WITH AUTOMATED DIFF    Collection Time: 11/27/17  7:46 AM   Result Value Ref Range    WBC 5.3 3.6 - 11.0 K/uL    RBC 3.82 3.80 - 5.20 M/uL    HGB 11.0 (L) 11.5 - 16.0 g/dL    HCT 32.9 (L) 35.0 - 47.0 %    MCV 86.1 80.0 - 99.0 FL    MCH 28.8 26.0 - 34.0 PG    MCHC 33.4 30.0 - 36.5 g/dL    RDW 13.0 11.5 - 14.5 %    PLATELET 976 985 - 276 K/uL    NEUTROPHILS 61 32 - 75 %    LYMPHOCYTES 26 12 - 49 %    MONOCYTES 11 5 - 13 %    EOSINOPHILS 2 0 - 7 %    BASOPHILS 0 0 - 1 %    ABS. NEUTROPHILS 3.2 1.8 - 8.0 K/UL    ABS. LYMPHOCYTES 1.4 0.8 - 3.5 K/UL    ABS. MONOCYTES 0.6 0.0 - 1.0 K/UL    ABS. EOSINOPHILS 0.1 0.0 - 0.4 K/UL    ABS.  BASOPHILS 0.0 0.0 - 0.1 K/UL   METABOLIC PANEL, BASIC    Collection Time: 11/27/17  7:46 AM   Result Value Ref Range    Sodium 134 (L) 136 - 145 mmol/L    Potassium 3.3 (L) 3.5 - 5.1 mmol/L    Chloride 98 97 - 108 mmol/L    CO2 29 21 - 32 mmol/L    Anion gap 7 5 - 15 mmol/L    Glucose 178 (H) 65 - 100 mg/dL    BUN 7 6 - 20 MG/DL    Creatinine 0.91 0.55 - 1.02 MG/DL    BUN/Creatinine ratio 8 (L) 12 - 20      GFR est AA >60 >60 ml/min/1.73m2    GFR est non-AA >60 >60 ml/min/1.73m2    Calcium 8.7 8.5 - 10.1 MG/DL   TROPONIN I    Collection Time: 11/27/17  7:46 AM   Result Value Ref Range    Troponin-I, Qt. <0.04 <0.05 ng/mL       Radiologic Studies -  The following have been ordered and reviewed:  CXR Results  (Last 48 hours)               11/27/17 0807  XR CHEST PA LAT Final result    Impression:  IMPRESSION:       No acute process. Stable exam.           Narrative:  EXAM:  XR CHEST PA LAT       INDICATION:  Wheezing and cough since Friday. COMPARISON: 6/6/2017       TECHNIQUE: PA and lateral chest views       FINDINGS: Cardiac monitoring wires overlie the thorax. The cardiomediastinal   contours are stable. The pulmonary vasculature is within normal limits. The lungs and pleural spaces are clear. There is no pneumothorax. There is   stable right hemidiaphragm elevation. The bones are stable. Vital Signs-Reviewed the patient's vital signs. Patient Vitals for the past 12 hrs:   Temp Pulse Resp BP SpO2   11/27/17 0646 98.1 °F (36.7 °C) 95 22 (!) 163/100 95 %       Medications Given in the ED:  Medications   albuterol-ipratropium (DUO-NEB) 2.5 MG-0.5 MG/3 ML (3 mL Nebulization Given 11/27/17 0745)   predniSONE (DELTASONE) tablet 60 mg (60 mg Oral Given 11/27/17 0727)       EKG interpretation: (Preliminary)  Rhythm: normal sinus rhythm; and regular . Rate (approx.): 88; Axis: normal; NM interval: normal; QRS interval: normal ; ST/T wave: T wave inverted I and aVL unchanged from 2/2015 ECG; Other findings: abnormal ekg due to t wave inversions, though unchanged from prior    Diagnosis   Clinical Impression:   Asthma exacerbation  Viral URI    Plan:  1: Advised to continue symptomatic mgmt with below medications and return if needing breathing tx every 30min to every 1hour or if vomiting and unable to keep down fluids or chest pain.   2:   Current Discharge Medication List      START taking these medications    Details   predniSONE (STERAPRED) 5 mg dose pack See administration instruction per 5mg dose pack  Indications: Asthma Exacerbation  Qty: 21 Tab, Refills: 0      fluticasone (FLONASE) 50 mcg/actuation nasal spray 2 Sprays by Both Nostrils route daily. Qty: 1 Bottle, Refills: 0      guaiFENesin-dextromethorphan (TUSSI-ORGANIDIN DM)  mg/5 mL liqd Take 10 mL by mouth every four (4) hours as needed. Qty: 100 mL, Refills: 0      albuterol (PROVENTIL HFA, VENTOLIN HFA, PROAIR HFA) 90 mcg/actuation inhaler Take 2 Puffs by inhalation every four (4) hours as needed for Wheezing or Shortness of Breath (cough). Indications: Acute Asthma Attack  Qty: 1 Inhaler, Refills: 0           Return to ED if Worse    Disposition Note:  Discharge Note:  8:36 AM  The patient is ready for discharge. The patient's signs, symptoms, diagnosis, and discharge instruction have been discussed and the patient has conveyed their understanding. The patient is to follow up as recommended or return to the ER should their symptoms worsen. Plan has been discussed and the patient is in agreement. Written by Luma Navarro ED Scribe, as dictated by Earnestine Veras MD     Attestation: This note is prepared by Jose Eduardo Hernández. Ramon, acting as Scribe for Earnestine Veras MD.      Earnestine Veras MD: The scribe's documentation has been prepared under my direction and personally reviewed by me in its entirety.  I confirm that the note above accurately reflects all work, treatment, procedures, and medical decision making performed by me.   _______________________________

## 2017-11-27 NOTE — ED NOTES
Dr. Hernando De La Vega reviewed discharge instructions with the patient. The patient verbalized understanding. Patient ambulatory out of ED with discharge paperwork in hand.

## 2017-11-27 NOTE — DISCHARGE INSTRUCTIONS
Please use albuterol inhaler 2 puffs every 2-4 hours as needed for cough, difficulty breathing, please return to ED if needing to use every 30min to every 1 hour. You may use the cough syrup every 4 hours as needed for cough relief. Your cough may last for 8-10 weeks. Please also use the steroid nasal spray 2 sprays each nostril daily to help prevent runny nose which worsens cough. You should also buy saline nasal spray to help with nasal congestion. Please return if difficulty breathing, chest pain, vomiting and unable to tolerate liquid. Learning About Asthma Triggers  What are triggers? When you have asthma, certain things can make your symptoms worse. These are called triggers. They include:  · Cigarette smoke or air pollution. · Things you are allergic to, such as:  ¨ Pollen, mold, or dust mites. ¨ Pet hair, skin, or saliva. · Illnesses, like colds, flu, or pneumonia. · Exercise. · Dry, cold air. How do triggers affect asthma? Triggers can make it harder for your lungs to work as they should and can lead to sudden difficulty breathing and other symptoms. When you are around a trigger, an asthma attack is more likely. If your symptoms are severe, you may need emergency treatment or have to go to the hospital for treatment. If you know what your triggers are and can avoid them, you may be able to prevent asthma attacks, reduce how often you have them, and make them less severe. What can you do to avoid triggers? The first thing is to know your triggers. When you are having symptoms, note the things around you that might be causing them. Then look for patterns in what may be triggering your symptoms. Record your triggers on a piece of paper or in an asthma diary. When you have your list of possible triggers, work with your doctor to find ways to avoid them. You also can check how well your lungs are working by measuring your peak expiratory flow (PEF) throughout the day.  Your PEF may drop when you are near things that trigger symptoms. Here are some ways to avoid a few common triggers. · Do not smoke or allow others to smoke around you. If you need help quitting, talk to your doctor about stop-smoking programs and medicines. These can increase your chances of quitting for good. · If there is a lot of pollution, pollen, or dust outside, stay at home and keep your windows closed. Use an air conditioner or air filter in your home. Check your local weather report or newspaper for air quality and pollen reports. · Get a flu shot every year. Talk to your doctor about getting a pneumococcal shot. Wash your hands often to prevent infections. · Avoid exercising outdoors in cold weather. If you are outdoors in cold weather, wear a scarf around your face and breathe through your nose. How can you manage an asthma attack? · If you have an asthma action plan, follow the plan. In general:  ¨ Use your quick-relief inhaler as directed by your doctor. If your symptoms do not get better after you use your medicine, have someone take you to the emergency room. Call an ambulance if needed. ¨ If your doctor has given you other inhaled medicines or steroid pills, take them as directed. Where can you learn more? Go to [employer]  Enter M564 in the search box to learn more about \"Learning About Asthma Triggers. \"   © 0628-5685 [employer]. Care instructions adapted under license by [employer] (which disclaims liability or warranty for this information). This care instruction is for use with your licensed healthcare professional. If you have questions about a medical condition or this instruction, always ask your healthcare professional. [place] [address] any warranty or liability for your use of this information. Content Version: 21.2.159808;  Last Revised: February 23, 2012

## 2017-11-27 NOTE — ED NOTES
Patient drove herself to the ED today with c/o productive cough that started on Friday. Has a history of asthma for which she takes proventil and advair with no relief.   99% on RA,

## 2017-12-05 ENCOUNTER — HOSPITAL ENCOUNTER (OUTPATIENT)
Dept: LAB | Age: 57
Discharge: HOME | End: 2017-12-05

## 2017-12-05 PROCEDURE — 80048 BASIC METABOLIC PNL TOTAL CA: CPT | Performed by: INTERNAL MEDICINE

## 2017-12-06 LAB
ANION GAP SERPL CALC-SCNC: 9 MMOL/L (ref 5–15)
BUN SERPL-MCNC: 15 MG/DL (ref 6–20)
BUN/CREAT SERPL: 16 (ref 12–20)
CALCIUM SERPL-MCNC: 9.2 MG/DL (ref 8.5–10.1)
CHLORIDE SERPL-SCNC: 99 MMOL/L (ref 97–108)
CO2 SERPL-SCNC: 27 MMOL/L (ref 21–32)
CREAT SERPL-MCNC: 0.93 MG/DL (ref 0.55–1.02)
GLUCOSE SERPL-MCNC: 200 MG/DL (ref 65–100)
POTASSIUM SERPL-SCNC: 3.9 MMOL/L (ref 3.5–5.1)
SODIUM SERPL-SCNC: 135 MMOL/L (ref 136–145)

## 2018-03-08 ENCOUNTER — HOSPITAL ENCOUNTER (OUTPATIENT)
Dept: NON INVASIVE DIAGNOSTICS | Age: 58
Discharge: HOME | End: 2018-03-08
Attending: INTERNAL MEDICINE
Payer: SELF-PAY

## 2018-03-08 ENCOUNTER — HOSPITAL ENCOUNTER (OUTPATIENT)
Dept: NUCLEAR MEDICINE | Age: 58
Discharge: HOME | End: 2018-03-08
Attending: INTERNAL MEDICINE
Payer: SELF-PAY

## 2018-03-08 DIAGNOSIS — E11.9 DIABETES MELLITUS (HCC): ICD-10-CM

## 2018-03-08 DIAGNOSIS — R07.9 CHEST PAIN: ICD-10-CM

## 2018-03-08 LAB
ATTENDING PHYSICIAN, CST07: NORMAL
DIAGNOSIS, 93000: NORMAL
DUKE TM SCORE RESULT, CST14: NORMAL
DUKE TREADMILL SCORE, CST13: NORMAL
ECG INTERP BEFORE EX, CST11: NORMAL
ECG INTERP DURING EX, CST12: NORMAL
FUNCTIONAL CAPACITY, CST17: NORMAL
KNOWN CARDIAC CONDITION, CST08: NORMAL
MAX. DIASTOLIC BP, CST04: 72 MMHG
MAX. HEART RATE, CST05: 94 BPM
MAX. SYSTOLIC BP, CST03: 156 MMHG
OVERALL BP RESPONSE TO EXERCISE, CST16: NORMAL
OVERALL HR RESPONSE TO EXERCISE, CST15: NORMAL
PEAK EX METS, CST10: 1 METS
PROTOCOL NAME, CST01: NORMAL
TEST INDICATION, CST09: NORMAL

## 2018-03-08 PROCEDURE — 93017 CV STRESS TEST TRACING ONLY: CPT

## 2018-03-08 PROCEDURE — 74011250636 HC RX REV CODE- 250/636: Performed by: INTERNAL MEDICINE

## 2018-03-08 PROCEDURE — 78452 HT MUSCLE IMAGE SPECT MULT: CPT

## 2018-03-08 RX ORDER — SODIUM CHLORIDE 0.9 % (FLUSH) 0.9 %
20 SYRINGE (ML) INJECTION
Status: COMPLETED | OUTPATIENT
Start: 2018-03-08 | End: 2018-03-08

## 2018-03-08 RX ADMIN — Medication 20 ML: at 10:14

## 2018-03-08 RX ADMIN — REGADENOSON 0.4 MG: 0.08 INJECTION, SOLUTION INTRAVENOUS at 10:14

## 2019-03-06 ENCOUNTER — APPOINTMENT (OUTPATIENT)
Dept: GENERAL RADIOLOGY | Age: 59
End: 2019-03-06
Attending: PHYSICIAN ASSISTANT
Payer: COMMERCIAL

## 2019-03-06 ENCOUNTER — HOSPITAL ENCOUNTER (EMERGENCY)
Age: 59
Discharge: HOME | End: 2019-03-06
Attending: EMERGENCY MEDICINE
Payer: COMMERCIAL

## 2019-03-06 VITALS
RESPIRATION RATE: 18 BRPM | TEMPERATURE: 97.9 F | HEIGHT: 62 IN | HEART RATE: 100 BPM | BODY MASS INDEX: 31.65 KG/M2 | WEIGHT: 172 LBS | OXYGEN SATURATION: 96 % | SYSTOLIC BLOOD PRESSURE: 149 MMHG | DIASTOLIC BLOOD PRESSURE: 75 MMHG

## 2019-03-06 DIAGNOSIS — L25.9 CONTACT DERMATITIS, UNSPECIFIED CONTACT DERMATITIS TYPE, UNSPECIFIED TRIGGER: Primary | ICD-10-CM

## 2019-03-06 LAB
ANION GAP SERPL CALC-SCNC: 11 MMOL/L (ref 5–15)
BASOPHILS # BLD: 0 K/UL (ref 0–0.1)
BASOPHILS NFR BLD: 1 % (ref 0–1)
BUN SERPL-MCNC: 13 MG/DL (ref 6–20)
BUN/CREAT SERPL: 12 (ref 12–20)
CALCIUM SERPL-MCNC: 9.5 MG/DL (ref 8.5–10.1)
CHLORIDE SERPL-SCNC: 101 MMOL/L (ref 97–108)
CO2 SERPL-SCNC: 26 MMOL/L (ref 21–32)
COMMENT, HOLDF: NORMAL
CREAT SERPL-MCNC: 1.13 MG/DL (ref 0.55–1.02)
DIFFERENTIAL METHOD BLD: ABNORMAL
EOSINOPHIL # BLD: 0.2 K/UL (ref 0–0.4)
EOSINOPHIL NFR BLD: 3 % (ref 0–7)
ERYTHROCYTE [DISTWIDTH] IN BLOOD BY AUTOMATED COUNT: 12.7 % (ref 11.5–14.5)
GLUCOSE SERPL-MCNC: 152 MG/DL (ref 65–100)
HCT VFR BLD AUTO: 35.4 % (ref 35–47)
HGB BLD-MCNC: 11.3 G/DL (ref 11.5–16)
IMM GRANULOCYTES # BLD AUTO: 0 K/UL (ref 0–0.04)
IMM GRANULOCYTES NFR BLD AUTO: 0 % (ref 0–0.5)
LYMPHOCYTES # BLD: 2.4 K/UL (ref 0.8–3.5)
LYMPHOCYTES NFR BLD: 31 % (ref 12–49)
MCH RBC QN AUTO: 28.3 PG (ref 26–34)
MCHC RBC AUTO-ENTMCNC: 31.9 G/DL (ref 30–36.5)
MCV RBC AUTO: 88.7 FL (ref 80–99)
MONOCYTES # BLD: 0.5 K/UL (ref 0–1)
MONOCYTES NFR BLD: 6 % (ref 5–13)
NEUTS SEG # BLD: 4.8 K/UL (ref 1.8–8)
NEUTS SEG NFR BLD: 59 % (ref 32–75)
NRBC # BLD: 0 K/UL (ref 0–0.01)
NRBC BLD-RTO: 0 PER 100 WBC
PLATELET # BLD AUTO: 395 K/UL (ref 150–400)
PMV BLD AUTO: 9.5 FL (ref 8.9–12.9)
POTASSIUM SERPL-SCNC: 3.6 MMOL/L (ref 3.5–5.1)
RBC # BLD AUTO: 3.99 M/UL (ref 3.8–5.2)
SAMPLES BEING HELD,HOLD: NORMAL
SODIUM SERPL-SCNC: 138 MMOL/L (ref 136–145)
TROPONIN I SERPL-MCNC: <0.05 NG/ML
WBC # BLD AUTO: 7.9 K/UL (ref 3.6–11)

## 2019-03-06 PROCEDURE — 74011000250 HC RX REV CODE- 250: Performed by: PHYSICIAN ASSISTANT

## 2019-03-06 PROCEDURE — 77030029684 HC NEB SM VOL KT MONA -A

## 2019-03-06 PROCEDURE — 36415 COLL VENOUS BLD VENIPUNCTURE: CPT

## 2019-03-06 PROCEDURE — 99283 EMERGENCY DEPT VISIT LOW MDM: CPT

## 2019-03-06 PROCEDURE — 85025 COMPLETE CBC W/AUTO DIFF WBC: CPT

## 2019-03-06 PROCEDURE — 84484 ASSAY OF TROPONIN QUANT: CPT

## 2019-03-06 PROCEDURE — 80048 BASIC METABOLIC PNL TOTAL CA: CPT

## 2019-03-06 PROCEDURE — 74011250637 HC RX REV CODE- 250/637: Performed by: PHYSICIAN ASSISTANT

## 2019-03-06 PROCEDURE — 94640 AIRWAY INHALATION TREATMENT: CPT

## 2019-03-06 PROCEDURE — 71046 X-RAY EXAM CHEST 2 VIEWS: CPT

## 2019-03-06 PROCEDURE — 93005 ELECTROCARDIOGRAM TRACING: CPT

## 2019-03-06 RX ORDER — FAMOTIDINE 20 MG/1
20 TABLET, FILM COATED ORAL
Status: COMPLETED | OUTPATIENT
Start: 2019-03-06 | End: 2019-03-06

## 2019-03-06 RX ORDER — DEXAMETHASONE SODIUM PHOSPHATE 10 MG/ML
9 INJECTION INTRAMUSCULAR; INTRAVENOUS
Status: COMPLETED | OUTPATIENT
Start: 2019-03-06 | End: 2019-03-06

## 2019-03-06 RX ADMIN — DEXAMETHASONE SODIUM PHOSPHATE 9 MG: 10 INJECTION INTRAMUSCULAR; INTRAVENOUS at 16:01

## 2019-03-06 RX ADMIN — ALBUTEROL SULFATE 1 DOSE: 2.5 SOLUTION RESPIRATORY (INHALATION) at 16:16

## 2019-03-06 RX ADMIN — FAMOTIDINE 20 MG: 20 TABLET ORAL at 16:00

## 2019-03-06 NOTE — DISCHARGE INSTRUCTIONS
Patient Education        Dermatitis: Care Instructions  Your Care Instructions  Dermatitis is the general name used for any rash or inflammation of the skin. Different kinds of dermatitis cause different kinds of rashes. Common causes of a rash include new medicines, plants (such as poison oak or poison ivy), heat, and stress. Certain illnesses can also cause a rash. An allergic reaction to something that touches your skin, such as latex, nickel, or poison ivy, is called contact dermatitis. Contact dermatitis may also be caused by something that irritates the skin, such as bleach, a chemical, or soap. These types of rashes cannot be spread from person to person. How long your rash will last depends on what caused it. Rashes may last a few days or months. Follow-up care is a key part of your treatment and safety. Be sure to make and go to all appointments, and call your doctor if you are having problems. It's also a good idea to know your test results and keep a list of the medicines you take. How can you care for yourself at home? · Do not scratch the rash. Cut your nails short, and file them smooth. Or wear gloves if this helps keep you from scratching. · Wash the area with water only. Pat dry. · Put cold, wet cloths on the rash to reduce itching. · Keep cool, and stay out of the sun. · Leave the rash open to the air as much as possible. · If the rash itches, use hydrocortisone cream. Follow the directions on the label. Calamine lotion may help for plant rashes. · Take an over-the-counter antihistamine, such as diphenhydramine (Benadryl) or loratadine (Claritin), to help calm the itching. Read and follow all instructions on the label. · If your doctor prescribed a cream, use it as directed. If your doctor prescribed medicine, take it exactly as directed. When should you call for help?   Call your doctor now or seek immediate medical care if:    · You have symptoms of infection, such as:  ? Increased pain, swelling, warmth, or redness. ? Red streaks leading from the area. ? Pus draining from the area. ? A fever.     · You have joint pain along with the rash.    Watch closely for changes in your health, and be sure to contact your doctor if:    · Your rash is changing or getting worse.     · You are not getting better as expected. Where can you learn more? Go to http://nikita-saji.info/. Enter (22) [phone] in the search box to learn more about \"Dermatitis: Care Instructions. \"  Current as of: April 17, 2018  Content Version: 11.9  © 5657-5944 [employer]. Care instructions adapted under license by [employer] (which disclaims liability or warranty for this information). If you have questions about a medical condition or this instruction, always ask your healthcare professional. [place] [address] any warranty or liability for your use of this information. We hope that we have addressed all of your medical concerns. The examination and treatment you received in the Emergency Department were for an emergent problem and were not intended as complete care. It is important that you follow up with your healthcare provider(s) for ongoing care. If your symptoms worsen or do not improve as expected, and you are unable to reach your usual health care provider(s), you should return to the Emergency Department. Today's healthcare is undergoing tremendous change, and patient satisfaction surveys are one of the many tools to assess the quality of medical care. You may receive a survey from the [employer] organization regarding your experience in the Emergency Department. I hope that your experience has been completely positive, particularly the medical care that I provided. As such, please participate in the survey; anything less than excellent does not meet my expectations or intentions.         [facility] and [facility] participate in nationally recognized quality of care measures. If your blood pressure is greater than 120/80, as reported below, we urge that you seek medical care to address the potential of high blood pressure, commonly known as hypertension. Hypertension can be hereditary or can be caused by certain medical conditions, pain, stress, or \"white coat syndrome. \"       Please make an appointment with your health care provider(s) for follow up of your Emergency Department visit. VITALS:   Patient Vitals for the past 8 hrs:   Temp Pulse Resp BP SpO2   03/06/19 1615 -- -- -- -- 100 %   03/06/19 1533 98.1 °F (36.7 °C) 96 16 121/90 98 %   03/06/19 1404 -- (!) 104 -- -- 98 %          Thank you for allowing us to provide you with medical care today. We realize that you have many choices for your emergency care needs. Please choose us in the future for any continued health care needs. Duane Griffes Elicia Limb, [email or web address].   Office: [phone]            Recent Results (from the past 24 hour(s))   EKG, 12 LEAD, INITIAL    Collection Time: 03/06/19  3:38 PM   Result Value Ref Range    Ventricular Rate 93 BPM    Atrial Rate 93 BPM    P-R Interval 138 ms    QRS Duration 68 ms    Q-T Interval 364 ms    QTC Calculation (Bezet) 452 ms    Calculated P Axis 54 degrees    Calculated R Axis 19 degrees    Calculated T Axis 137 degrees    Diagnosis       Normal sinus rhythm  T wave abnormality, consider lateral ischemia  When compared with ECG of 27-NOV-2017 07:30,  No significant change was found     CBC WITH AUTOMATED DIFF    Collection Time: 03/06/19  3:49 PM   Result Value Ref Range    WBC 7.9 3.6 - 11.0 K/uL    RBC 3.99 3.80 - 5.20 M/uL    HGB 11.3 (L) 11.5 - 16.0 g/dL    HCT 35.4 35.0 - 47.0 %    MCV 88.7 80.0 - 99.0 FL    MCH 28.3 26.0 - 34.0 PG    MCHC 31.9 30.0 - 36.5 g/dL    RDW 12.7 11.5 - 14.5 %    PLATELET 274 797 - 614 K/uL    MPV 9.5 8.9 - 12.9 FL NRBC 0.0 0  WBC    ABSOLUTE NRBC 0.00 0.00 - 0.01 K/uL    NEUTROPHILS 59 32 - 75 %    LYMPHOCYTES 31 12 - 49 %    MONOCYTES 6 5 - 13 %    EOSINOPHILS 3 0 - 7 %    BASOPHILS 1 0 - 1 %    IMMATURE GRANULOCYTES 0 0.0 - 0.5 %    ABS. NEUTROPHILS 4.8 1.8 - 8.0 K/UL    ABS. LYMPHOCYTES 2.4 0.8 - 3.5 K/UL    ABS. MONOCYTES 0.5 0.0 - 1.0 K/UL    ABS. EOSINOPHILS 0.2 0.0 - 0.4 K/UL    ABS. BASOPHILS 0.0 0.0 - 0.1 K/UL    ABS. IMM. GRANS. 0.0 0.00 - 0.04 K/UL    DF AUTOMATED     METABOLIC PANEL, BASIC    Collection Time: 03/06/19  3:49 PM   Result Value Ref Range    Sodium 138 136 - 145 mmol/L    Potassium 3.6 3.5 - 5.1 mmol/L    Chloride 101 97 - 108 mmol/L    CO2 26 21 - 32 mmol/L    Anion gap 11 5 - 15 mmol/L    Glucose 152 (H) 65 - 100 mg/dL    BUN 13 6 - 20 MG/DL    Creatinine 1.13 (H) 0.55 - 1.02 MG/DL    BUN/Creatinine ratio 12 12 - 20      GFR est AA 60 (L) >60 ml/min/1.73m2    GFR est non-AA 49 (L) >60 ml/min/1.73m2    Calcium 9.5 8.5 - 10.1 MG/DL   TROPONIN I    Collection Time: 03/06/19  3:49 PM   Result Value Ref Range    Troponin-I, Qt. <0.05 <0.05 ng/mL   SAMPLES BEING HELD    Collection Time: 03/06/19  3:49 PM   Result Value Ref Range    SAMPLES BEING HELD 1RED 1BLU     COMMENT        Add-on orders for these samples will be processed based on acceptable specimen integrity and analyte stability, which may vary by analyte. Xr Chest Pa Lat    Result Date: 3/6/2019  Indication:  wheezing Exam: PA and lateral views of the chest. Direct comparison is made to prior CXR dated November 2017. Findings: Cardiomediastinal silhouette is within normal limits. Lungs are clear bilaterally. Pleural spaces are normal. Osseous structures are intact. IMPRESSION: No acute cardiopulmonary disease.

## 2019-03-06 NOTE — ED NOTES
Discharge instructions given to patient by PA and nurse. Pt has been given counseling on follow-up and verbalizes understanding. IV D/C. Pt ambulated off of unit in no signs of distress.

## 2019-03-06 NOTE — LETTER
[address] 
[address] [place] [id number] 
[phone] Work/School Note Date: 3/6/2019 To Whom It May concern: 
 
Sri Ward was seen and treated today in the emergency room by the following provider(s): 
Attending Provider: Solomon Salcedo MD 
Physician Assistant: Matt Martinez PA-C. Sri Ward may return to work on 03/09/19.  
 
Sincerely, 
 
 
 
 
Tabby Hoffman PA-C

## 2019-03-06 NOTE — ED TRIAGE NOTES
Pt states that while cleaning someone's house and states that they had cats and dogs and her asthma flared up. Pt states that she has had a rash to her lower back. Pt states that she developed stabbing pain to her chest  At 11 am when her asthma flared up.

## 2019-03-07 LAB
ATRIAL RATE: 93 BPM
CALCULATED P AXIS, ECG09: 54 DEGREES
CALCULATED R AXIS, ECG10: 19 DEGREES
CALCULATED T AXIS, ECG11: 137 DEGREES
DIAGNOSIS, 93000: NORMAL
P-R INTERVAL, ECG05: 138 MS
Q-T INTERVAL, ECG07: 364 MS
QRS DURATION, ECG06: 68 MS
QTC CALCULATION (BEZET), ECG08: 452 MS
VENTRICULAR RATE, ECG03: 93 BPM

## 2019-03-07 NOTE — ED PROVIDER NOTES
61 y.o. female with past medical history significant for HTN, DM, GERD, and gout presents with complaints of asthma exacerbation. 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 My allergies are acting up. \"  The pt denies trouble breathing, trouble swallowing, fever, chills, or chest pain. There are no other acute medical complaints at this time. PCP: CALVIN Rubio PA-C             Past Medical History:   Diagnosis Date    Arrhythmia     Arthritis     GENERALIZED TO JOINTS-WRISTS & KNEES.  Asthma     INHALERS    Diabetes ([address]     USES PILLS TO CONTROLL    GERD (gastroesophageal reflux disease)     Gout     Hypertension     CONTROLLED BY MEDS.     Nausea & vomiting     Obesity        Past Surgical History:   Procedure Laterality Date    HX CHOLECYSTECTOMY      HX HEART CATHETERIZATION  98,2011    CARDIAC CATH X2    HX HEENT  1961    CLEFT PALATE REPAIR.    HX HYSTERECTOMY  2011 JULY 25    HX ORTHOPAEDIC      carpal tunnel, trigger finger, hand surgery    HX OTHER SURGICAL      CLeft repair, piloneidal cyst, hand surgery,     HX TUBAL LIGATION  1983         Family History:   Problem Relation Age of Onset    Asthma Mother     Hypertension Mother     Diabetes Father     Heart Disease Father         MI    Stroke Brother         CEREBRAL ANEURYSM       Social History     Socioeconomic History    Marital status:      Spouse name: Not on file    Number of children: Not on file    Years of education: Not on file    Highest education level: Not on file   Social Needs    Financial resource strain: Not on file    Food insecurity - worry: Not on file    Food insecurity - inability: Not on file   [place] needs - medical: Not on file   [place] needs - non-medical: Not on file   Occupational History    Not on file   Tobacco Use    Smoking status: Former Smoker     Years: 10.00     Last attempt to quit: 2010     Years since quittin.6    Smokeless tobacco: Never Used   Substance and Sexual Activity    Alcohol use: No     Comment: RARE ETOH    Drug use: No    Sexual activity: Yes     Partners: Male     Birth control/protection: None   Other Topics Concern    Not on file   Social History Narrative    Not on file         ALLERGIES: Latex; Ampicillin; Betadine antiseptic gauze; Contrast dye [iodine]; Fd and c blue no. 1; and Penicillins    Review of Systems   Constitutional: Negative for chills, diaphoresis and fever. HENT: Negative for congestion, postnasal drip, rhinorrhea and sore throat. Eyes: Negative for photophobia, discharge, redness and visual disturbance. Respiratory: Negative for cough, chest tightness, shortness of breath and wheezing. Cardiovascular: Negative for chest pain, palpitations and leg swelling. Gastrointestinal: Negative for abdominal distention, abdominal pain, blood in stool, constipation, diarrhea, nausea and vomiting. Genitourinary: Negative for difficulty urinating, dysuria, frequency, hematuria and urgency. Musculoskeletal: Negative for arthralgias, back pain, joint swelling and myalgias. Skin: Positive for rash. Negative for color change. Neurological: Negative for dizziness, speech difficulty, weakness, light-headedness, numbness and headaches. Psychiatric/Behavioral: Negative for confusion. The patient is not nervous/anxious. All other systems reviewed and are negative. Vitals:    19 1404 19 1533 19 1615 19 1734   BP:  121/90  149/75   Pulse: (!) 104 96  100   Resp:  16  18   Temp:  98.1 °F (36.7 °C)  97.9 °F (36.6 °C)   SpO2: 98% 98% 100% 96%   Weight:  78 kg (172 lb)     Height:  5' 2\" (1.575 m)              Physical Exam   Constitutional: She is oriented to person, place, and time. She appears well-developed and well-nourished. No distress. HENT:   Head: Normocephalic and atraumatic.  Head is without raccoon's eyes, without Abraham's sign and without laceration. Right Ear: Hearing, tympanic membrane, external ear and ear canal normal. No foreign bodies. Tympanic membrane is not bulging. No hemotympanum. Left Ear: Hearing, tympanic membrane, external ear and ear canal normal. No foreign bodies. Tympanic membrane is not bulging. No hemotympanum. Nose: Nose normal. No mucosal edema or rhinorrhea. Right sinus exhibits no maxillary sinus tenderness and no frontal sinus tenderness. Left sinus exhibits no maxillary sinus tenderness and no frontal sinus tenderness. Mouth/Throat: Uvula is midline, oropharynx is clear and moist and mucous membranes are normal. No tonsillar abscesses. Eyes: Conjunctivae and EOM are normal. Pupils are equal, round, and reactive to light. Right eye exhibits no discharge. Left eye exhibits no discharge. Neck: Normal range of motion. Neck supple. Cardiovascular: Normal rate, regular rhythm and normal heart sounds. Exam reveals no gallop and no friction rub. No murmur heard. Regular rate and rhythm. No murmurs, gallops, rubs, or clicks. Pulmonary/Chest: Effort normal and breath sounds normal. No respiratory distress. She has no wheezes. She has no rales. No stridor or wheezes. No accessory muscle usage. No nasal flaring. Breath Sounds equal bilaterally. Abdominal: Soft. Bowel sounds are normal. She exhibits no distension. There is no tenderness. There is no rebound and no guarding. No abdominal Bruits. No pulsatile mass. No abdominal scars. Active bowel sounds. Musculoskeletal: Normal range of motion. She exhibits no edema, tenderness or deformity. Neurological: She is alert and oriented to person, place, and time. Skin: She is not diaphoretic. Nursing note and vitals reviewed.        MDM  Number of Diagnoses or Management Options  Contact dermatitis, unspecified contact dermatitis type, unspecified trigger:   Diagnosis management comments: Pt afebrile and nontoxic appearing. Vitals, labs, physical exam, and imaging studies all unremarkable. No signs of PE, PTX, ACS, esophageal rupture, dissection, pancreatitis, appendicitis, cholecystitis/cholagnitis, bowel obstruction/perforation, sepsis or any other acute life threatening condition. Will treat symptomatically and advise close follow up with family doctor.   Vic Adorno PA-C         Procedures

## 2019-03-15 RX ORDER — AMLODIPINE BESYLATE 10 MG/1
TABLET ORAL DAILY
COMMUNITY
End: 2019-09-23 | Stop reason: SDUPTHER

## 2019-03-15 RX ORDER — TRIAMTERENE/HYDROCHLOROTHIAZID 37.5-25 MG
TABLET ORAL DAILY
COMMUNITY
End: 2019-08-28 | Stop reason: SDUPTHER

## 2019-03-25 ENCOUNTER — OFFICE VISIT (OUTPATIENT)
Dept: INTERNAL MEDICINE CLINIC | Facility: CLINIC | Age: 59
End: 2019-03-25

## 2019-03-25 VITALS
RESPIRATION RATE: 18 BRPM | HEART RATE: 92 BPM | SYSTOLIC BLOOD PRESSURE: 116 MMHG | DIASTOLIC BLOOD PRESSURE: 69 MMHG | WEIGHT: 171 LBS | BODY MASS INDEX: 31.47 KG/M2 | HEIGHT: 62 IN | TEMPERATURE: 98 F | OXYGEN SATURATION: 98 %

## 2019-03-25 DIAGNOSIS — L85.3 DRY SKIN: ICD-10-CM

## 2019-03-25 DIAGNOSIS — E66.9 OBESITY (BMI 30-39.9): ICD-10-CM

## 2019-03-25 DIAGNOSIS — L03.221 CELLULITIS OF NECK: ICD-10-CM

## 2019-03-25 DIAGNOSIS — Z76.89 ENCOUNTER TO ESTABLISH CARE: Primary | ICD-10-CM

## 2019-03-25 DIAGNOSIS — M17.0 PRIMARY OSTEOARTHRITIS OF BOTH KNEES: ICD-10-CM

## 2019-03-25 DIAGNOSIS — E55.9 VITAMIN D DEFICIENCY: ICD-10-CM

## 2019-03-25 DIAGNOSIS — J45.40 MODERATE PERSISTENT CHRONIC ASTHMA WITHOUT COMPLICATION: ICD-10-CM

## 2019-03-25 DIAGNOSIS — Z11.59 NEED FOR HEPATITIS C SCREENING TEST: ICD-10-CM

## 2019-03-25 DIAGNOSIS — I10 ESSENTIAL HYPERTENSION: ICD-10-CM

## 2019-03-25 DIAGNOSIS — E11.42 TYPE 2 DIABETES MELLITUS WITH DIABETIC POLYNEUROPATHY, WITHOUT LONG-TERM CURRENT USE OF INSULIN (HCC): ICD-10-CM

## 2019-03-25 DIAGNOSIS — Z13.220 SCREENING, LIPID: ICD-10-CM

## 2019-03-25 DIAGNOSIS — L23.9 ALLERGIC DERMATITIS: ICD-10-CM

## 2019-03-25 DIAGNOSIS — Z12.11 SCREENING FOR COLON CANCER: ICD-10-CM

## 2019-03-25 PROBLEM — E11.21 TYPE 2 DIABETES WITH NEPHROPATHY (HCC): Status: ACTIVE | Noted: 2019-03-25

## 2019-03-25 RX ORDER — INSULIN PUMP SYRINGE, 3 ML
EACH MISCELLANEOUS
Qty: 1 KIT | Refills: 0 | Status: SHIPPED | OUTPATIENT
Start: 2019-03-25 | End: 2020-01-09

## 2019-03-25 RX ORDER — ASPIRIN 81 MG/1
TABLET ORAL DAILY
COMMUNITY

## 2019-03-25 RX ORDER — LANCETS
EACH MISCELLANEOUS
Qty: 30 EACH | Refills: 11 | Status: SHIPPED | OUTPATIENT
Start: 2019-03-25 | End: 2021-01-08

## 2019-03-25 RX ORDER — ISOPROPYL ALCOHOL 70 ML/100ML
SWAB TOPICAL
Qty: 40 PAD | Refills: 11 | Status: SHIPPED | OUTPATIENT
Start: 2019-03-25 | End: 2021-02-22

## 2019-03-25 NOTE — PROGRESS NOTES
CC: 
Chief Complaint Patient presents with [facility] Establish Care [address] Other  
  neck/burn from perm HISTORY OF PRESENT ILLNESS Milton Jhaveri is a 61 y.o. female. Presents to establish care and for hospital follow up. Was following at the [facility] previously. She has type 2 DM, HTN, and asthma. Was seen at [facility] ED on 3/13/19 with a rash that occurred after having a chemical burn from a perm and trimming of the back hairline. Was previously seen at same ED on 3/11/19 and started on Keflex and a topical antibiotic. Diagnosed with cellulitis and allergic reaction. Had oozing bumps. Went to ED for a second time due to new chills and fatigue. CBC showed elevated WBC count at 12.0 K with left shift. Was told she should not have received Keflex since she is allergy to PCN. Received IV clindamycin and prednisone 40 mg orally in the ED. Discharged on clindamycin and Medrol dose pack. Instructed to stop topical bacitracin due to possible allergy to it. Today she reports that the rash at the back of her neck is feeling a little better, but yesterday she put on a necklace that irritated the area. Had itching, using Benadryl cream that is helping. Also still having diffuse itching since stopping Keflex. Has allergies to several topical agents, including neosporin, betadine, and latex. Also allergic to contrast dye. Last saw an allergist when she was a teenager. Has chronic dry skin; uses only Dove soap. Diagnosed with type 2 DM in 1982. Does not have a glucometer. Last A1c was 6.7% about 4 months ago. Denies polydipsia, polyuria, or hypoglycemia. Reports compliance with Janumet and diabetic diet. Also reports compliance to HTN medications and low-salt diet. Last asthma exacerbation was about 1 month ago. Soc Hx . She has 2 biological sons and 3 step-daughters. .  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 Former smoker; quit in 2010. Denies alcohol or recreational drug use. Does not get regular exercise outside of work. Health Maintenance Flu vaccine: 11/7/18 Pneumonia vaccine: 2017 Tetanus vaccine: 11/15/13 Zoster vaccine: never had, declined Pap smear: had TORO 6 yrs ago for fibroids. No longer indicated. Colonoscopy: due for this Mammogram: July 2018, [facility] 
Eye exam: 12/18, [facility] Foot exam: 3/25/19 Lipids: will check today A1c: will check today Advanced Directives: given information End of Life: given information ROS A complete review of systems was performed and is negative except for those mentioned in the HPI. Patient Active Problem List  
Diagnosis Code  DM (diabetes mellitus) (HCC) E11.9  
 HTN (hypertension) I10  
 Asthma, chronic J45.909  Arthritis M19.90  GERD (gastroesophageal reflux disease) K21.9  Pelvic mass in female R19.00  Pelvic pain in female R10.2 Past Medical History:  
Diagnosis Date  Arrhythmia  Arthritis GENERALIZED TO [facility].  Asthma INHALERS  
 Diabetes ([facility] [address] USES PILLS TO CONTROLL  
 GERD (gastroesophageal reflux disease)  Gout  Hypertension CONTROLLED BY MEDS.  Nausea & vomiting  Obesity Past Surgical History:  
Procedure Laterality Date  HX CHOLECYSTECTOMY [address] CARDIAC CATH X2  
 HX HEENT  1961 CLEFT PALATE REPAIR.  
 HX HYSTERECTOMY  2011 JULY 18  
 HX ORTHOPAEDIC    
 carpal tunnel, trigger finger, hand surgery  HX OTHER SURGICAL CLeft repair, piloneidal cyst, hand surgery,   
[address] Social History Socioeconomic History  Marital status:  Spouse name: Not on file  Number of children: Not on file  Years of education: Not on file  Highest education level: Not on file Tobacco Use  
  Smoking status: Former Smoker Years: 10.00 Last attempt to quit: 2010 Years since quittin.7  Smokeless tobacco: Never Used Substance and Sexual Activity  Alcohol use: No  
  Comment: RARE ETOH  Drug use: No  
 Sexual activity: Yes  
  Partners: Male Birth control/protection: None Family History Problem Relation Age of Onset  Asthma Mother  Hypertension Mother  Diabetes Father  Heart Disease Father MI  
 Stroke Brother CEREBRAL ANEURYSM  
 Heart Attack Sister  Hypertension Sister  No Known Problems Sister Allergies Allergen Reactions  Latex Rash Severe rash  Ampicillin Anaphylaxis  Betadine Antiseptic Gauze Hives  Contrast Dye [Iodine] Hives and Swelling Pt. Reports throat swelling  Other Medication Rash Betadine soap/blisters  Fd And C Blue No.1 Anaphylaxis  Penicillins Hives Current Outpatient Medications Medication Sig Dispense Refill  aspirin delayed-release 81 mg tablet Take  by mouth daily.  SITagliptin-metFORMIN (JANUMET) 50-1,000 mg per tablet Take 1 Tab by mouth daily (with breakfast). 30 Tab 3  Blood-Glucose Meter monitoring kit Use once daily to test blood sugar 1 Kit 0  
 lancets misc Test blood sugar once a day 30 Each 11  
 glucose blood VI test strips (BLOOD GLUCOSE TEST) strip Use to test blood sugar once a day 30 Strip 11  
 alcohol swabs (ALCOHOL PREP PADS) padm Use to test blood sugar once a day 40 Pad 11  
 triamterene-hydroCHLOROthiazide (MAXZIDE) 37.5-25 mg per tablet Take  by mouth daily.  amLODIPine (NORVASC) 10 mg tablet Take  by mouth daily.  albuterol (PROVENTIL HFA, VENTOLIN HFA, PROAIR HFA) 90 mcg/actuation inhaler Take 2 Puffs by inhalation every four (4) hours as needed for Wheezing or Shortness of Breath (cough).  Indications: Acute Asthma Attack 1 Inhaler 0  
 fluticasone-salmeterol (ADVAIR DISKUS) 250-50 mcg/Dose diskus inhaler Take 1 Puff by inhalation every twelve (12) hours. PHYSICAL EXAM 
Visit Vitals /69 (BP 1 Location: Right arm, BP Patient Position: Sitting) Pulse 92 Temp 98 °F (36.7 °C) (Oral) Resp 18 Ht 5' 2\" (1.575 m) Wt 171 lb (77.6 kg) LMP 06/19/2011 SpO2 98% BMI 31.28 kg/m² General: Obese, no distress. HEENT:  Head normocephalic/atraumatic, no scleral icterus. PERRLA, EOMI, oropharynx benign. Neck: Supple. No carotid bruits, JVD, lymphadenopathy, or thyromegaly. Lungs:  Clear to ausculation bilaterally. Good air movement. Heart:  Regular rate and rhythm, normal S1 and S2, no murmur, gallop, or rub; non-displaced PMI. Abdomen: Soft, non-distended, normal bowel sounds, no tenderness, no guarding, masses, rebound tenderness, or HSM. Extremities: No clubbing, cyanosis, or edema. Normal ROM with mild crepitus at both knees. 2+ pedal pulses. Skin: Hyperpigmentation with lichenification at posterior neck; no active papules. Neurological: Alert and oriented. Non-focal exam. 
Psychiatric: Normal mood and affect. Behavior is normal.  
Diabetic foot exam:  
  
Left Foot: 
 Visual Exam: normal  
 Pulse DP: 2+ (normal) Filament test: reduced sensation Vibratory sensation: normal 
   
Right Foot: 
 Visual Exam: normal  
 Pulse DP: 2+ (normal) Filament test: reduced sensation Vibratory sensation: normal 
 
   
 
ASSESSMENT AND PLAN 
  ICD-10-CM ICD-9-CM 1. Encounter to establish care Z76.89 V65.8 2. Type 2 diabetes mellitus with diabetic polyneuropathy, without long-term current use of insulin ([facility]) E11.42 250.60 MICROALBUMIN, UR, RAND W/ MICROALB/CREAT RATIO  
  357.2 HEMOGLOBIN A1C WITH EAG  
   TSH RFX ON ABNORMAL TO FREE T4  
   HM DIABETES FOOT EXAM  
   SITagliptin-metFORMIN (JANUMET) 50-1,000 mg per tablet Blood-Glucose Meter monitoring kit  
   lancets misc  
   glucose blood VI test strips (BLOOD GLUCOSE TEST) strip  
   alcohol swabs (ALCOHOL PREP PADS) padm 3. Essential hypertension Z43 287.1 METABOLIC PANEL, COMPREHENSIVE  
   CBC WITH AUTOMATED DIFF 4. Moderate persistent chronic asthma without complication Q99.96 238.33 5. Primary osteoarthritis of both knees M17.0 715.16   
6. Obesity (BMI 30-39. 9) E66.9 278.00   
7. Cellulitis of neck L03.221 682.1 8. Allergic dermatitis L23.9 692.9 9. Dry skin L85.3 701.1 10. Vitamin D deficiency E55.9 268.9 VITAMIN D, 25 HYDROXY 11. Screening, lipid Z13.220 V77.91 LIPID PANEL 12. Need for hepatitis C screening test Z11.59 V73.89 HEPATITIS C AB  
13. Screening for colon cancer Z12.11 V76.51 REFERRAL TO GASTROENTEROLOGY Diagnoses and all orders for this visit: 1. Encounter to establish care 2. Type 2 diabetes mellitus with diabetic polyneuropathy, without long-term current use of insulin ([address] Asymptomatic. Continue present dose of Janumet pending lab results. -     MICROALBUMIN, UR, RAND W/ MICROALB/CREAT RATIO 
-     HEMOGLOBIN A1C WITH EAG 
-     TSH RFX ON ABNORMAL TO FREE T4 
-     HM DIABETES FOOT EXAM 
-     Refill SITagliptin-metFORMIN (JANUMET) 50-1,000 mg per tablet; Take 1 Tab by mouth daily (with breakfast). -     Blood-Glucose Meter monitoring kit; Use once daily to test blood sugar 
-     lancets misc; Test blood sugar once a day 
-     glucose blood VI test strips (BLOOD GLUCOSE TEST) strip; Use to test blood sugar once a day 
-     alcohol swabs (ALCOHOL PREP PADS) padm; Use to test blood sugar once a day 3. Essential hypertension -     METABOLIC PANEL, COMPREHENSIVE 
-     CBC WITH AUTOMATED DIFF 4. Moderate persistent chronic asthma without complication Controlled on present management. 5. Primary osteoarthritis of both knees Controlled. 6. Obesity (BMI 30-39. 9) Discussed the patient's BMI with her.   The BMI follow up plan is as follows: dietary management education, guidance, and counseling; encourage exercise; monitor weight; prescribed dietary intake. Follow up BMI in 3 months. 7. Cellulitis of neck Improving. 8. Allergic dermatitis Plan to refer to an allergist at next clinic visit. 9. Dry skin 10. Vitamin D deficiency 
-     VITAMIN D, 25 HYDROXY 11. Screening, lipid -     LIPID PANEL 12. Need for hepatitis C screening test 
-     HEPATITIS C AB 
 
13. Screening for colon cancer 
-     REFERRAL TO GASTROENTEROLOGY Greater than 60 mins direct face-to-face time spent with patient. Greater than 50% of time spent on counseling and coordination of care. Follow-up and Dispositions · Return in about 3 months (around 6/25/2019), or if symptoms worsen or fail to improve, for DM, asthma, allergies. I have discussed the diagnosis with the patient and the intended plan as seen in the above orders. Patient is in agreement. The patient has received an after-visit summary and questions were answered concerning future plans. I have discussed medication side effects and warnings with the patient as well.

## 2019-03-25 NOTE — PROGRESS NOTES
Amy Ayala  Identified pt with two pt identifiers(name and ). Chief Complaint Patient presents with [address] [place] [address] [address] Other  
  neck/burn from perm 1. Have you been to the ER, urgent care clinic since your last visit? Hospitalized since your last visit? [id number] Have you seen or consulted any other health care providers outside of the [address] [place] [address] since your last visit? Include any pap smears or colon screening. NO Today's provider has been notified of reason for visit, vitals and flowsheets obtained on patients. Patient received paperwork for advance directives today. Reviewed record In preparation for visit, huddled with provider and have obtained necessary documentation Health Maintenance Due Topic  Hepatitis C Screening  Pneumococcal 0-64 years (1 of 1 - PPSV23)  FOOT EXAM Q1   
 EYE EXAM RETINAL OR DILATED  Shingrix Vaccine Age 50> (1 of 2)  BREAST CANCER SCRN MAMMOGRAM   
 FOBT Q 1 YEAR AGE 50-75  HEMOGLOBIN A1C Q6M   
 PAP AKA CERVICAL CYTOLOGY  LIPID PANEL Q1   
 MICROALBUMIN Q1 Wt Readings from Last 3 Encounters:  
19 171 lb (77.6 kg) 19 172 lb (78 kg)  
17 177 lb 4 oz (80.4 kg) Temp Readings from Last 3 Encounters:  
19 98 °F (36.7 °C) (Oral) 19 97.9 °F (36.6 °C)  
17 98.1 °F (36.7 °C) BP Readings from Last 3 Encounters:  
19 116/69  
19 149/75  
17 150/79 Pulse Readings from Last 3 Encounters:  
19 92  
19 100  
17 97 Vitals:  
 19 1055 BP: 116/69 Pulse: 92 Resp: 18 Temp: 98 °F (36.7 °C) TempSrc: Oral  
SpO2: 98% Weight: 171 lb (77.6 kg) Height: 5' 2\" (1.575 m) PainSc:   5 PainLoc: Neck LMP: 2011 Learning Assessment: 
:  
 
Learning Assessment 3/25/2019 PRIMARY LEARNER Patient PRIMARY LANGUAGE ENGLISH  
LEARNER PREFERENCE PRIMARY READING  
 ANSWERED BY patient RELATIONSHIP SELF Depression Screening: 
:  
 
3 most recent PHQ Screens 3/25/2019 Little interest or pleasure in doing things Not at all Feeling down, depressed, irritable, or hopeless Several days Total Score PHQ 2 1 Fall Risk Assessment: 
:  
 
Fall Risk Assessment, last 12 mths 3/25/2019 Able to walk? Yes Fall in past 12 months? No  
 
 
Abuse Screening: 
:  
 
Abuse Screening Questionnaire 3/25/2019 Do you ever feel afraid of your partner? [place] Aid Are you in a relationship with someone who physically or mentally threatens you? [place] Aid Is it safe for you to go home? Y  
 
 
ADL Screening: 
:  
 
ADL Assessment 3/25/2019 Feeding yourself No Help Needed Getting from bed to chair No Help Needed Getting dressed No Help Needed Bathing or showering No Help Needed Walk across the room (includes cane/walker) No Help Needed Using the telphone No Help Needed Taking your medications No Help Needed Preparing meals No Help Needed Managing money (expenses/bills) No Help Needed Moderately strenuous housework (laundry) No Help Needed Shopping for personal items (toiletries/medicines) No Help Needed Shopping for groceries No Help Needed Driving No Help Needed Climbing a flight of stairs No Help Needed Getting to places beyond walking distances No Help Needed Medication reconciliation up to date and corrected with patient at this time.

## 2019-03-25 NOTE — PATIENT INSTRUCTIONS
Learning About Tests When You Have Diabetes Why do you need regular diabetes tests? Diabetes can be hard on your body if it's not well controlled. But having tests on a regular schedule can help you and your doctor find problems early, when it's easier to start managing them. What tests do you need? The tests you may have, how often you should have them, and the goals of the tests are: 
A1c blood test. This test shows the average level of blood sugar over the past 2 to 3 months. It helps your doctor see whether blood sugar levels have been staying within your target range. · How often: Every 3 to 6 months · Goal: A blood sugar level in your target range Blood pressure test: This test measures the pressure of blood flow in the arteries. Controlling blood pressure can help prevent damage to nerves and blood vessels. · How often: Every 3 to 6 months · Goal: A blood pressure level in your target range Cholesterol test: This test measures the amount of a type of fat in the blood. It is common for people with diabetes to also have high cholesterol. Too much cholesterol in the blood can build up inside the blood vessels and raise the risk for heart attack and stroke. · How often: At the time of your diabetes diagnosis, and as often as your doctor recommends after that · Goal: A cholesterol level in your target range Albumin-creatinine ratio test: This test checks for kidney damage by looking for the protein albumin (say \"al-BYOO-olya\") in the urine. Albumin is normally found in the blood. Kidney damage can let small amounts of it (microalbumin) leak into the urine. · How often: Once a year · Goal: No protein in the urine Blood creatinine test/estimated glomerular filtration (eGFR): The blood creatinine (say \"tvir-DY-ko-neen\") level shows how well your kidneys are working. Creatinine is a waste product that muscles release into the blood.  Blood creatinine is used to estimate the glomerular filtration rate. A high level of creatinine and/or a low eGFR may mean your kidneys are not working as well as they should. · How often: Once a year · Goal: Normal level of creatinine in the blood. The eGFR goal is greater than 60 mL/min/1.73 m². Complete foot exam: The doctor checks for foot sores and whether any sensation has been lost. 
· How often: Once a year · Goal: Healthy feet with no foot ulcers or loss of feeling Dental exam and cleaning: The dentist checks for gum disease and tooth decay. People with high blood sugar are more likely to have these problems. · How often: Every 6 months · Goal: Healthy teeth and gums Complete eye exam: High blood sugar levels can damage the eyes. This exam is done by an ophthalmologist or optometrist. It includes a dilated eye exam. The exam shows whether there's damage to the back of the eye (diabetic retinopathy). · How often: Once a year. If you don't have any signs of diabetic retinopathy, your doctor may recommend an exam every 2 years. · Goal: No damage to the back of the eye Thyroid-stimulating hormone (TSH) blood test: This test checks for thyroid disease. Too little thyroid hormone can cause some medicines (like insulin) to stay in the body longer. This can cause low blood sugar. You may be tested if you have high cholesterol or are a woman over 48years old. · How often: As part of your diabetes diagnosis, and as often as your doctor recommends after that · Goal: Normal level of TSH in the blood Follow-up care is a key part of your treatment and safety. Be sure to make and go to all appointments, and call your doctor if you are having problems. It's also a good idea to know your test results and keep a list of the medicines you take. Where can you learn more? Go to http://nikita-saji.info/. Enter [id number] in the search box to learn more about \"Learning About Tests When You Have Diabetes. \" Current as of: July 25, 2018 Content Version: 11.9 © 5703-2415 [employer]. Care instructions adapted under license by [employer] (which disclaims liability or warranty for this information). If you have questions about a medical condition or this instruction, always ask your healthcare professional. [address] any warranty or liability for your use of this information.

## 2019-03-26 LAB
25(OH)D3+25(OH)D2 SERPL-MCNC: 13.1 NG/ML (ref 30–100)
ALBUMIN SERPL-MCNC: 4 G/DL (ref 3.5–5.5)
ALBUMIN/CREAT UR: <2.3 MG/G CREAT (ref 0–30)
ALBUMIN/GLOB SERPL: 1.3 {RATIO} (ref 1.2–2.2)
ALP SERPL-CCNC: 86 IU/L (ref 39–117)
ALT SERPL-CCNC: 10 IU/L (ref 0–32)
AST SERPL-CCNC: 12 IU/L (ref 0–40)
BASOPHILS # BLD AUTO: 0 X10E3/UL (ref 0–0.2)
BASOPHILS NFR BLD AUTO: 0 %
BILIRUB SERPL-MCNC: 0.3 MG/DL (ref 0–1.2)
BUN SERPL-MCNC: 8 MG/DL (ref 6–24)
BUN/CREAT SERPL: 10 (ref 9–23)
CALCIUM SERPL-MCNC: 9.5 MG/DL (ref 8.7–10.2)
CHLORIDE SERPL-SCNC: 99 MMOL/L (ref 96–106)
CHOLEST SERPL-MCNC: 189 MG/DL (ref 100–199)
CO2 SERPL-SCNC: 24 MMOL/L (ref 20–29)
CREAT SERPL-MCNC: 0.82 MG/DL (ref 0.57–1)
CREAT UR-MCNC: 130.7 MG/DL
EOSINOPHIL # BLD AUTO: 0.2 X10E3/UL (ref 0–0.4)
EOSINOPHIL NFR BLD AUTO: 3 %
ERYTHROCYTE [DISTWIDTH] IN BLOOD BY AUTOMATED COUNT: 13.5 % (ref 12.3–15.4)
EST. AVERAGE GLUCOSE BLD GHB EST-MCNC: 174 MG/DL
GLOBULIN SER CALC-MCNC: 3.2 G/DL (ref 1.5–4.5)
GLUCOSE SERPL-MCNC: 205 MG/DL (ref 65–99)
HBA1C MFR BLD: 7.7 % (ref 4.8–5.6)
HCT VFR BLD AUTO: 34.8 % (ref 34–46.6)
HCV AB S/CO SERPL IA: <0.1 S/CO RATIO (ref 0–0.9)
HDLC SERPL-MCNC: 56 MG/DL
HGB BLD-MCNC: 11 G/DL (ref 11.1–15.9)
IMM GRANULOCYTES # BLD AUTO: 0 X10E3/UL (ref 0–0.1)
IMM GRANULOCYTES NFR BLD AUTO: 0 %
LDLC SERPL CALC-MCNC: 98 MG/DL (ref 0–99)
LYMPHOCYTES # BLD AUTO: 1.9 X10E3/UL (ref 0.7–3.1)
LYMPHOCYTES NFR BLD AUTO: 29 %
MCH RBC QN AUTO: 27.8 PG (ref 26.6–33)
MCHC RBC AUTO-ENTMCNC: 31.6 G/DL (ref 31.5–35.7)
MCV RBC AUTO: 88 FL (ref 79–97)
MICROALBUMIN UR-MCNC: <3 UG/ML
MONOCYTES # BLD AUTO: 0.4 X10E3/UL (ref 0.1–0.9)
MONOCYTES NFR BLD AUTO: 6 %
NEUTROPHILS # BLD AUTO: 4.2 X10E3/UL (ref 1.4–7)
NEUTROPHILS NFR BLD AUTO: 62 %
PLATELET # BLD AUTO: 371 X10E3/UL (ref 150–379)
POTASSIUM SERPL-SCNC: 4.3 MMOL/L (ref 3.5–5.2)
PROT SERPL-MCNC: 7.2 G/DL (ref 6–8.5)
RBC # BLD AUTO: 3.96 X10E6/UL (ref 3.77–5.28)
SODIUM SERPL-SCNC: 140 MMOL/L (ref 134–144)
TRIGL SERPL-MCNC: 174 MG/DL (ref 0–149)
TSH SERPL DL<=0.005 MIU/L-ACNC: 1.19 UIU/ML (ref 0.45–4.5)
VLDLC SERPL CALC-MCNC: 35 MG/DL (ref 5–40)
WBC # BLD AUTO: 6.7 X10E3/UL (ref 3.4–10.8)

## 2019-03-27 DIAGNOSIS — E11.42 TYPE 2 DIABETES MELLITUS WITH DIABETIC POLYNEUROPATHY, WITHOUT LONG-TERM CURRENT USE OF INSULIN (HCC): ICD-10-CM

## 2019-03-27 DIAGNOSIS — E55.9 VITAMIN D DEFICIENCY: Primary | ICD-10-CM

## 2019-03-27 PROBLEM — E11.21 TYPE 2 DIABETES WITH NEPHROPATHY (HCC): Status: RESOLVED | Noted: 2019-03-25 | Resolved: 2019-03-27

## 2019-03-27 RX ORDER — ERGOCALCIFEROL 1.25 MG/1
50000 CAPSULE ORAL
Qty: 12 CAP | Refills: 2 | Status: SHIPPED | OUTPATIENT
Start: 2019-03-27 | End: 2019-03-29 | Stop reason: SDUPTHER

## 2019-03-27 NOTE — PROGRESS NOTES
Your recent labs showed normal kidney and liver tests, thyroid, cholesterol, urine protein test, and hepatitis C screening test. Your blood counts showed mild anemia. Your BS was high at 205. Your A1c was also high at 7.7%, meaning your average BS over the past 3 months was 174. A1c should be less than 7.0%. Dr. Rudi Fung recommends you increase Janumet from 1 tab a day to 1 tab twice a day. Let us know when you start getting low and a new prescription will be sent to your pharmacy. Lastly, your vitamin D level was very low. Vitamin D is important for the bones, muscles, and heart. Dr. Rudi Fung has sent a prescription to your pharmacy for once weekly vitamin D tablets.

## 2019-03-29 DIAGNOSIS — E55.9 VITAMIN D DEFICIENCY: ICD-10-CM

## 2019-03-29 NOTE — PROGRESS NOTES
Spoke with patient and after verifying name and date of birth of patient gave patient test results and any instructions in note per provider. Patient stated understanding. She wants to use [facility] pharmacy.

## 2019-03-31 RX ORDER — ERGOCALCIFEROL 1.25 MG/1
50000 CAPSULE ORAL
Qty: 12 CAP | Refills: 3 | Status: SHIPPED | OUTPATIENT
Start: 2019-03-31 | End: 2020-03-16

## 2019-04-04 ENCOUNTER — TELEPHONE (OUTPATIENT)
Dept: INTERNAL MEDICINE CLINIC | Facility: CLINIC | Age: 59
End: 2019-04-04

## 2019-04-04 NOTE — TELEPHONE ENCOUNTER
Patient called stating her blood sugar was 244 when she checked it and she wanted to know if she should take her second janumet. Advised she should take janumet at breakfast and at dinner meal. Also advised her to keep track of blood sugars and call on 4/8/19. Patient stated understanding.

## 2019-04-18 ENCOUNTER — HOSPITAL ENCOUNTER (EMERGENCY)
Age: 59
Discharge: HOME | End: 2019-04-18
Attending: EMERGENCY MEDICINE | Admitting: EMERGENCY MEDICINE
Payer: COMMERCIAL

## 2019-04-18 ENCOUNTER — APPOINTMENT (OUTPATIENT)
Dept: GENERAL RADIOLOGY | Age: 59
End: 2019-04-18
Attending: EMERGENCY MEDICINE
Payer: COMMERCIAL

## 2019-04-18 VITALS
HEART RATE: 99 BPM | WEIGHT: 168.87 LBS | RESPIRATION RATE: 16 BRPM | BODY MASS INDEX: 30.89 KG/M2 | TEMPERATURE: 98.8 F | OXYGEN SATURATION: 98 % | SYSTOLIC BLOOD PRESSURE: 159 MMHG | DIASTOLIC BLOOD PRESSURE: 78 MMHG

## 2019-04-18 DIAGNOSIS — M54.50 ACUTE BILATERAL LOW BACK PAIN WITHOUT SCIATICA: Primary | ICD-10-CM

## 2019-04-18 PROCEDURE — 99283 EMERGENCY DEPT VISIT LOW MDM: CPT

## 2019-04-18 PROCEDURE — 96372 THER/PROPH/DIAG INJ SC/IM: CPT

## 2019-04-18 PROCEDURE — 74011250636 HC RX REV CODE- 250/636: Performed by: EMERGENCY MEDICINE

## 2019-04-18 PROCEDURE — 74011250637 HC RX REV CODE- 250/637: Performed by: EMERGENCY MEDICINE

## 2019-04-18 PROCEDURE — 72100 X-RAY EXAM L-S SPINE 2/3 VWS: CPT

## 2019-04-18 RX ORDER — DICLOFENAC SODIUM 75 MG/1
75 TABLET, DELAYED RELEASE ORAL 2 TIMES DAILY
Qty: 20 TAB | Refills: 0 | Status: SHIPPED | OUTPATIENT
Start: 2019-04-18 | End: 2019-04-28

## 2019-04-18 RX ORDER — KETOROLAC TROMETHAMINE 30 MG/ML
60 INJECTION, SOLUTION INTRAMUSCULAR; INTRAVENOUS
Status: COMPLETED | OUTPATIENT
Start: 2019-04-18 | End: 2019-04-18

## 2019-04-18 RX ORDER — HYDROCODONE BITARTRATE AND ACETAMINOPHEN 5; 325 MG/1; MG/1
1 TABLET ORAL
Qty: 10 TAB | Refills: 0 | Status: SHIPPED | OUTPATIENT
Start: 2019-04-18 | End: 2019-04-23

## 2019-04-18 RX ORDER — CYCLOBENZAPRINE HCL 10 MG
10 TABLET ORAL
Qty: 20 TAB | Refills: 0 | Status: SHIPPED | OUTPATIENT
Start: 2019-04-18 | End: 2019-05-08

## 2019-04-18 RX ORDER — HYDROCODONE BITARTRATE AND ACETAMINOPHEN 5; 325 MG/1; MG/1
1 TABLET ORAL
Status: COMPLETED | OUTPATIENT
Start: 2019-04-18 | End: 2019-04-18

## 2019-04-18 RX ADMIN — KETOROLAC TROMETHAMINE 60 MG: 30 INJECTION, SOLUTION INTRAMUSCULAR at 11:30

## 2019-04-18 RX ADMIN — HYDROCODONE BITARTRATE AND ACETAMINOPHEN 1 TABLET: 5; 325 TABLET ORAL at 11:30

## 2019-04-18 NOTE — ED NOTES
Patient reports being seen recently for sciatica and given robaxin. Reports medication made her sick and is not working. Reports pain to lower back and states her \"spine is hurting\"

## 2019-04-18 NOTE — ED TRIAGE NOTES
Patient reports she has chronic sciatica pain on the right side, but last night the pain moved to the middle. Patient reports she feels like \"something is moving in my spine down low\". Patient reports she sat on bleachers at a baseball game Saturday and she thinks that might have aggravated it.

## 2019-04-18 NOTE — DISCHARGE INSTRUCTIONS
We hope that we have addressed all of your medical concerns. The examination and treatment you received in the Emergency Department were for an emergent problem and were not intended as complete care. It is important that you follow up with your healthcare provider(s) for ongoing care. If your symptoms worsen or do not improve as expected, and you are unable to reach your usual health care provider(s), you should return to the Emergency Department. Today's healthcare is undergoing tremendous change, and patient satisfaction surveys are one of the many tools to assess the quality of medical care. You may receive a survey from the [facility] regarding your experience in the Emergency Department. I hope that your experience has been completely positive, particularly the medical care that I provided. As such, please participate in the survey; anything less than excellent does not meet my expectations or intentions. Former[facility], [facility] and [facility] participate in nationally recognized quality of care measures. If your blood pressure is greater than 120/80, as reported below, we urge that you seek medical care to address the potential of high blood pressure, commonly known as hypertension. Hypertension can be hereditary or can be caused by certain medical conditions, pain, stress, or \"white coat syndrome. \"       Please make an appointment with your health care provider(s) for follow up of your Emergency Department visit. VITALS:   Patient Vitals for the past 8 hrs:   Temp Pulse Resp BP SpO2   04/18/19 1035 -- -- -- -- 98 %   04/18/19 1028 98.8 °F (37.1 °C) 99 16 159/78 99 %   04/18/19 1010 -- 95 -- -- 99 %          Thank you for allowing us to provide you with medical care today. We realize that you have many choices for your emergency care needs. Please choose us in the future for any continued health care needs.       Regards, Rosalia Lewis [facility] 12 Shani Kirk: [phone]            No results found for this or any previous visit (from the past 24 hour(s)). Xr Spine Lumb 2 Or 3 V    Result Date: 4/18/2019  INDICATION:  back pain Exam: AP, lateral and cone-down views of the lumbar spine. FINDINGS: There is no acute fracture. There is a 2 mm grade 1 anterolisthesis of L5 with respect to S1. Alignment is otherwise normal. Bone is well mineralized. Bilateral facet hypertrophy is noted at the L4-L5 and L5-S1 levels. IMPRESSION:  2 mm grade 1 anterolisthesis of L5 with respect to S1. Patient Education        Back Pain: Care Instructions  Your Care Instructions    Back pain has many possible causes. It is often related to problems with muscles and ligaments of the back. It may also be related to problems with the nerves, discs, or bones of the back. Moving, lifting, standing, sitting, or sleeping in an awkward way can strain the back. Sometimes you don't notice the injury until later. Arthritis is another common cause of back pain. Although it may hurt a lot, back pain usually improves on its own within several weeks. Most people recover in 12 weeks or less. Using good home treatment and being careful not to stress your back can help you feel better sooner. Follow-up care is a key part of your treatment and safety. Be sure to make and go to all appointments, and call your doctor if you are having problems. It's also a good idea to know your test results and keep a list of the medicines you take. How can you care for yourself at home? · Sit or lie in positions that are most comfortable and reduce your pain. Try one of these positions when you lie down:  ? Lie on your back with your knees bent and supported by large pillows. ? Lie on the floor with your legs on the seat of a sofa or chair. ? Lie on your side with your knees and hips bent and a pillow between your legs. ?  Sandra yL on your stomach if it does not make pain worse. · Do not sit up in bed, and avoid soft couches and twisted positions. Bed rest can help relieve pain at first, but it delays healing. Avoid bed rest after the first day of back pain. · Change positions every 30 minutes. If you must sit for long periods of time, take breaks from sitting. Get up and walk around, or lie in a comfortable position. · Try using a heating pad on a low or medium setting for 15 to 20 minutes every 2 or 3 hours. Try a warm shower in place of one session with the heating pad. · You can also try an ice pack for 10 to 15 minutes every 2 to 3 hours. Put a thin cloth between the ice pack and your skin. · Take pain medicines exactly as directed. ? If the doctor gave you a prescription medicine for pain, take it as prescribed. ? If you are not taking a prescription pain medicine, ask your doctor if you can take an over-the-counter medicine. · Take short walks several times a day. You can start with 5 to 10 minutes, 3 or 4 times a day, and work up to longer walks. Walk on level surfaces and avoid hills and stairs until your back is better. · Return to work and other activities as soon as you can. Continued rest without activity is usually not good for your back. · To prevent future back pain, do exercises to stretch and strengthen your back and stomach. Learn how to use good posture, safe lifting techniques, and proper body mechanics. When should you call for help? Call your doctor now or seek immediate medical care if:    · You have new or worsening numbness in your legs.     · You have new or worsening weakness in your legs. (This could make it hard to stand up.)     · You lose control of your bladder or bowels.    Watch closely for changes in your health, and be sure to contact your doctor if:    · You have a fever, lose weight, or don't feel well.     · You do not get better as expected. Where can you learn more?   Go to http://nikita-saji.info/. Enter T878 in the search box to learn more about \"Back Pain: Care Instructions. \"  Current as of: September 20, 2018  Content Version: 11.9  © 5958-0477 [employer]. Care instructions adapted under license by [employer] (which disclaims liability or warranty for this information). If you have questions about a medical condition or this instruction, always ask your healthcare professional. [place] [address] any warranty or liability for your use of this information. Patient Education        Learning About Relief for Back Pain  What is back tension and strain? Back strain happens when you overstretch, or pull, a muscle in your back. You may hurt your back in an accident or when you exercise or lift something. Most back pain will get better with rest and time. You can take care of yourself at home to help your back heal.  What can you do first to relieve back pain? When you first feel back pain, try these steps:  · Walk. Take a short walk (10 to 20 minutes) on a level surface (no slopes, hills, or stairs) every 2 to 3 hours. Walk only distances you can manage without pain, especially leg pain. · Relax. Find a comfortable position for rest. Some people are comfortable on the floor or a medium-firm bed with a small pillow under their head and another under their knees. Some people prefer to lie on their side with a pillow between their knees. Don't stay in one position for too long. · Try heat or ice. Try using a heating pad on a low or medium setting, or take a warm shower, for 15 to 20 minutes every 2 to 3 hours. Or you can buy single-use heat wraps that last up to 8 hours. You can also try an ice pack for 10 to 15 minutes every 2 to 3 hours. You can use an ice pack or a bag of frozen vegetables wrapped in a thin towel.  There is not strong evidence that either heat or ice will help, but you can try them to see if they help. You may also want to try switching between heat and cold. · Take pain medicine exactly as directed. ¨ If the doctor gave you a prescription medicine for pain, take it as prescribed. ¨ If you are not taking a prescription pain medicine, ask your doctor if you can take an over-the-counter medicine. What else can you do? · Stretch and exercise. Exercises that increase flexibility may relieve your pain and make it easier for your muscles to keep your spine in a good, neutral position. And don't forget to keep walking. · Do self-massage. You can use self-massage to unwind after work or school or to energize yourself in the morning. You can easily massage your feet, hands, or neck. Self-massage works best if you are in comfortable clothes and are sitting or lying in a comfortable position. Use oil or lotion to massage bare skin. · Reduce stress. Back pain can lead to a vicious [place]: Distress about the pain tenses the muscles in your back, which in turn causes more pain. Learn how to relax your mind and your muscles to lower your stress. Where can you learn more? Go to http://nikita-saji.info/. Enter V759 in the search box to learn more about \"Learning About Relief for Back Pain. \"  Current as of: March 21, 2017  Content Version: 11.5  © 5210-8916 [employer]. Care instructions adapted under license by [employer] (which disclaims liability or warranty for this information). If you have questions about a medical condition or this instruction, always ask your healthcare professional. [place] [address] any warranty or liability for your use of this information. Patient Education        Low Back Pain: Exercises  Your Care Instructions  Here are some examples of typical rehabilitation exercises for your condition. Start each exercise slowly. Ease off the exercise if you start to have pain.   Your doctor or physical therapist will tell you when you can start these exercises and which ones will work best for you. How to do the exercises  Press-up    1. Lie on your stomach, supporting your body with your forearms. 2. Press your elbows down into the floor to raise your upper back. As you do this, relax your stomach muscles and allow your back to arch without using your back muscles. As your press up, do not let your hips or pelvis come off the floor. 3. Hold for 15 to 30 seconds, then relax. 4. Repeat 2 to 4 times. Alternate arm and leg (bird dog) exercise    1. Start on the floor, on your hands and knees. 2. Tighten your belly muscles. 3. Raise one leg off the floor, and hold it straight out behind you. Be careful not to let your hip drop down, because that will twist your trunk. 4. Hold for about 6 seconds, then lower your leg and switch to the other leg. 5. Repeat 8 to 12 times on each leg. 6. Over time, work up to holding for 10 to 30 seconds each time. 7. If you feel stable and secure with your leg raised, try raising the opposite arm straight out in front of you at the same time. Knee-to-chest exercise    1. Lie on your back with your knees bent and your feet flat on the floor. 2. Bring one knee to your chest, keeping the other foot flat on the floor (or keeping the other leg straight, whichever feels better on your lower back). 3. Keep your lower back pressed to the floor. Hold for at least 15 to 30 seconds. 4. Relax, and lower the knee to the starting position. 5. Repeat with the other leg. Repeat 2 to 4 times with each leg. 6. To get more stretch, put your other leg flat on the floor while pulling your knee to your chest.    Curl-ups    1. Lie on the floor on your back with your knees bent at a 90-degree angle. Your feet should be flat on the floor, about 12 inches from your buttocks.   2. Cross your arms over your chest. If this bothers your neck, try putting your hands behind your neck (not your head), with your elbows spread apart.  3. Slowly tighten your belly muscles and raise your shoulder blades off the floor. 4. Keep your head in line with your body, and do not press your chin to your chest.  5. Hold this position for 1 or 2 seconds, then slowly lower yourself back down to the floor. 6. Repeat 8 to 12 times. Pelvic tilt exercise    1. Lie on your back with your knees bent. 2. \"Brace\" your stomach. This means to tighten your muscles by pulling in and imagining your belly button moving toward your spine. You should feel like your back is pressing to the floor and your hips and pelvis are rocking back. 3. Hold for about 6 seconds while you breathe smoothly. 4. Repeat 8 to 12 times. Heel dig bridging    1. Lie on your back with both knees bent and your ankles bent so that only your heels are digging into the floor. Your knees should be bent about 90 degrees. 2. Then push your heels into the floor, squeeze your buttocks, and lift your hips off the floor until your shoulders, hips, and knees are all in a straight line. 3. Hold for about 6 seconds as you continue to breathe normally, and then slowly lower your hips back down to the floor and rest for up to 10 seconds. 4. Do 8 to 12 repetitions. Hamstring stretch in doorway    1. Lie on your back in a doorway, with one leg through the open door. 2. Slide your leg up the wall to straighten your knee. You should feel a gentle stretch down the back of your leg. 3. Hold the stretch for at least 15 to 30 seconds. Do not arch your back, point your toes, or bend either knee. Keep one heel touching the floor and the other heel touching the wall. 4. Repeat with your other leg. 5. Do 2 to 4 times for each leg. Hip flexor stretch    1. Kneel on the floor with one knee bent and one leg behind you. Place your forward knee over your foot. Keep your other knee touching the floor.   2. Slowly push your hips forward until you feel a stretch in the upper thigh of your rear leg.  3. Hold the stretch for at least 15 to 30 seconds. Repeat with your other leg. 4. Do 2 to 4 times on each side. Wall sit    1. Stand with your back 10 to 12 inches away from a wall. 2. Lean into the wall until your back is flat against it. 3. Slowly slide down until your knees are slightly bent, pressing your lower back into the wall. 4. Hold for about 6 seconds, then slide back up the wall. 5. Repeat 8 to 12 times. Follow-up care is a key part of your treatment and safety. Be sure to make and go to all appointments, and call your doctor if you are having problems. It's also a good idea to know your test results and keep a list of the medicines you take. Where can you learn more? Go to http://nikita-saji.info/. Enter J391 in the search box to learn more about \"Low Back Pain: Exercises. \"  Current as of: September 20, 2018  Content Version: 11.9  © 7676-4527 [employer]. Care instructions adapted under license by [employer] (which disclaims liability or warranty for this information). If you have questions about a medical condition or this instruction, always ask your healthcare professional. [address] any warranty or liability for your use of this information.

## 2019-04-18 NOTE — ED PROVIDER NOTES
61 y.o. female with past medical history significant for HTN, diabetes, asthma, and GERD who presents from home via private vehicle with chief complaint of back pain. Patient reports 4 days of diffuse lower back pain. She states 3 days ago, she had some radiation down her LLE, was seen at an outside facility, and given robaxin and tramadol. She states she tried taking the robaxin, but it made her very nauseous. She presents today with continued pain. She states the pain is more localized to her lower back now, without radiation down her legs. Patient reports a h/o DM and HTN. Patient denies any weakness or numbness. There are no other acute medical concerns at this time. Old Chart Review: Per Young Zarco, the patient has 5 local ED visits in the last 12 months. Patient was seen 3 days ago at [facility]. Social hx: Former smoker (Quit 2010); Rare EtOH use PCP: Radha Brown MD 
 
Note written by Catarina Lyn, as dictated by Ny Olvera, DO 10:41 AM 
 
The history is provided by the patient and medical records. No  was used. Past Medical History:  
Diagnosis Date  Arrhythmia  Arthritis GENERALIZED TO [facility].  Asthma INHALERS  Asthma, chronic 10/8/2009  Diabetes ([address] USES PILLS TO CONTROLL  
 GERD (gastroesophageal reflux disease)  Gout  Hypertension CONTROLLED BY MEDS.  Nausea & vomiting  Obesity Past Surgical History:  
Procedure Laterality Date  HX CHOLECYSTECTOMY [address] CARDIAC CATH X2  
 HX HEENT  1961 CLEFT PALATE REPAIR.  
 HX HYSTERECTOMY  2011 JULY 18  
 HX ORTHOPAEDIC    
 carpal tunnel, trigger finger, hand surgery  HX OTHER SURGICAL CLeft repair, piloneidal cyst, hand surgery,   
[address] Family History:  
Problem Relation Age of Onset  Asthma Mother  Hypertension Mother  Diabetes Father  Heart Disease Father MI  
 Stroke Brother CEREBRAL ANEURYSM  
 Heart Attack Sister  Hypertension Sister  No Known Problems Sister Social History Socioeconomic History  Marital status:  Spouse name: Not on file  Number of children: Not on file  Years of education: Not on file  Highest education level: Not on file Occupational History  Not on file Social Needs  Financial resource strain: Not on file  Food insecurity:  
  Worry: Not on file Inability: Not on file  Transportation needs:  
  Medical: Not on file Non-medical: Not on file Tobacco Use  Smoking status: Former Smoker Years: 10.00 Last attempt to quit: 2010 Years since quittin.7  Smokeless tobacco: Never Used Substance and Sexual Activity  Alcohol use: No  
  Comment: RARE ETOH  Drug use: No  
 Sexual activity: Yes  
  Partners: Male Birth control/protection: None Lifestyle  Physical activity:  
  Days per week: Not on file Minutes per session: Not on file  Stress: Not on file Relationships  Social connections:  
  Talks on phone: Not on file Gets together: Not on file Attends Congregational service: Not on file Active member of club or organization: Not on file Attends meetings of clubs or organizations: Not on file Relationship status: Not on file  Intimate partner violence:  
  Fear of current or ex partner: Not on file Emotionally abused: Not on file Physically abused: Not on file Forced sexual activity: Not on file Other Topics Concern  Not on file Social History Narrative  Not on file ALLERGIES: Latex; Ampicillin; Betadine antiseptic gauze; Contrast dye [iodine]; Other medication; Fd and c blue no. 1; and Penicillins Review of Systems Constitutional: Negative for appetite change, chills, fever and unexpected weight change. HENT: Negative for ear pain, hearing loss, rhinorrhea and trouble swallowing. Eyes: Negative for pain and visual disturbance. Respiratory: Negative for cough, chest tightness and shortness of breath. Cardiovascular: Negative for chest pain and palpitations. Gastrointestinal: Negative for abdominal distention, abdominal pain, blood in stool and vomiting. Genitourinary: Negative for dysuria, hematuria and urgency. Musculoskeletal: Positive for back pain. Negative for arthralgias and myalgias. Skin: Negative for rash. Neurological: Negative for dizziness, syncope, weakness and numbness. Psychiatric/Behavioral: Negative for confusion and suicidal ideas. All other systems reviewed and are negative. Vitals:  
 04/18/19 1010 Pulse: 95 SpO2: 99% Physical Exam  
Constitutional: She is oriented to person, place, and time. She appears well-developed and well-nourished. No distress. HENT:  
Head: Normocephalic and atraumatic. Right Ear: External ear normal.  
Left Ear: External ear normal.  
Nose: Nose normal.  
Mouth/Throat: Oropharynx is clear and moist. No oropharyngeal exudate. Eyes: Pupils are equal, round, and reactive to light. Conjunctivae and EOM are normal. Right eye exhibits no discharge. Left eye exhibits no discharge. No scleral icterus. Neck: Normal range of motion. Neck supple. No JVD present. No tracheal deviation present. Cardiovascular: Normal rate, regular rhythm, normal heart sounds and intact distal pulses. Exam reveals no gallop and no friction rub. No murmur heard. Pulmonary/Chest: Effort normal and breath sounds normal. No stridor. No respiratory distress. She has no decreased breath sounds. She has no wheezes. She has no rhonchi. She has no rales. She exhibits no tenderness. Abdominal: Soft. Bowel sounds are normal. She exhibits no distension. There is no tenderness. There is no rebound and no guarding. Musculoskeletal: She exhibits no edema. Lumbar back: She exhibits decreased range of motion and tenderness. Diffuse lumbar spinal tenderness. NVI distally. FROM lower extremities. Neurological: She is alert and oriented to person, place, and time. She has normal strength and normal reflexes. She displays normal reflexes. No cranial nerve deficit or sensory deficit. She exhibits normal muscle tone. Coordination normal. GCS eye subscore is 4. GCS verbal subscore is 5. GCS motor subscore is 6. Skin: Skin is warm and dry. No rash noted. She is not diaphoretic. No erythema. No pallor. Psychiatric: She has a normal mood and affect. Her behavior is normal. Judgment and thought content normal.  
Nursing note and vitals reviewed. Note written by Catarina Almodovar, as dictated by Teena Navarro,  10:41 AM 
 
MDM Procedures 11:31 AM 
Patient instructed to stop her robaxin and tramadol, will switch to flexeril. Discussed results with the patient, No acute fx on X-Ray, who is agreeable for discharge. Chief Complaint Patient presents with  Back Pain The patient's presenting problems have been discussed, and they are in agreement with the care plan formulated and outlined with them. I have encouraged them to ask questions as they arise throughout their visit. MEDICATIONS GIVEN: 
Medications  
ketorolac (TORADOL) injection 60 mg (60 mg IntraMUSCular Given 4/18/19 1130) HYDROcodone-acetaminophen (NORCO) 5-325 mg per tablet 1 Tab (1 Tab Oral Given 4/18/19 1130) LABS REVIEWED: 
No results found for this or any previous visit (from the past 24 hour(s)). VITAL SIGNS: 
No data found. RADIOLOGY RESULTS: 
The following have been ordered and reviewed: 
No results found. PROGRESS NOTES: 
Discussed results and plan with patient. Patient will be discharged home with PCP follow up. Patient instructed to return to the emergency room for any worsening symptoms or any other concerns.  
 
DIAGNOSIS: 
 
 1. Acute bilateral low back pain without sciatica PLAN: 
Follow-up Information Follow up With Specialties Details Why Contact Info Caryl Rowland MD Internal Medicine Schedule an appointment as soon as possible for a visit  [address] [address] [phone] [facility] EMERGENCY DEP Emergency Medicine  If symptoms worsen [address] [facility] [address] 
[phone] Discharge Medication List as of 4/18/2019 11:32 AM  
  
START taking these medications Details  
cyclobenzaprine (FLEXERIL) 10 mg tablet Take 1 Tab by mouth three (3) times daily as needed for Muscle Spasm(s). , Print, Disp-20 Tab, R-0  
  
diclofenac EC (VOLTAREN) 75 mg EC tablet Take 1 Tab by mouth two (2) times a day for 10 days. , Print, Disp-20 Tab, R-0  
  
HYDROcodone-acetaminophen (NORCO) 5-325 mg per tablet Take 1 Tab by mouth every six (6) hours as needed for Pain for up to 5 days. Max Daily Amount: 4 Tabs., Print, Disp-10 Tab, R-0  
  
  
CONTINUE these medications which have NOT CHANGED Details SITagliptin-metFORMIN (JANUMET) 50-1,000 mg per tablet Take 1 Tab by mouth two (2) times daily (with meals). Dx: E11.42, Normal, Disp-180 Tab, R-3  
  
glucose blood VI test strips (BLOOD GLUCOSE TEST) strip Use to test blood sugar once a day. Dx: E11.42, Normal, Disp-90 Strip, R-3  
  
ergocalciferol (ERGOCALCIFEROL) 50,000 unit capsule Take 1 Cap by mouth every seven (7) days. Indications: vitamin D deficiency, Normal, Disp-12 Cap, R-3  
  
aspirin delayed-release 81 mg tablet Take  by mouth daily. , Historical Med Blood-Glucose Meter monitoring kit Use once daily to test blood sugar, Normal, Disp-1 Kit, R-0  
  
lancets misc Test blood sugar once a day, Normal, Disp-30 Each, R-11  
  
alcohol swabs (ALCOHOL PREP PADS) padm Use to test blood sugar once a day, Normal, Disp-40 Pad, R-11  
  
triamterene-hydroCHLOROthiazide (MAXZIDE) 37.5-25 mg per tablet Take  by mouth daily. , Historical Med  
  
 amLODIPine (NORVASC) 10 mg tablet Take  by mouth daily. , Historical Med  
  
albuterol (PROVENTIL HFA, VENTOLIN HFA, PROAIR HFA) 90 mcg/actuation inhaler Take 2 Puffs by inhalation every four (4) hours as needed for Wheezing or Shortness of Breath (cough). Indications: Acute Asthma Attack, Print, Disp-1 Inhaler, R-0  
  
fluticasone-salmeterol (ADVAIR DISKUS) 250-50 mcg/Dose diskus inhaler Take 1 Puff by inhalation every twelve (12) hours. , Historical Med  
  
  
 
 
ED COURSE: The patient's hospital course has been uncomplicated.

## 2019-04-18 NOTE — ED NOTES
Discharge instructions given to patient by MD and nurse. Pt has been given counseling on medication use and verbalizes understanding Pt ambulated off of unit in no signs of distress.

## 2019-04-19 ENCOUNTER — OFFICE VISIT (OUTPATIENT)
Dept: INTERNAL MEDICINE CLINIC | Facility: CLINIC | Age: 59
End: 2019-04-19

## 2019-04-19 VITALS
BODY MASS INDEX: 31.1 KG/M2 | HEIGHT: 62 IN | RESPIRATION RATE: 18 BRPM | TEMPERATURE: 98.2 F | HEART RATE: 91 BPM | OXYGEN SATURATION: 99 % | WEIGHT: 169 LBS | DIASTOLIC BLOOD PRESSURE: 74 MMHG | SYSTOLIC BLOOD PRESSURE: 125 MMHG

## 2019-04-19 DIAGNOSIS — M54.50 ACUTE BILATERAL LOW BACK PAIN WITHOUT SCIATICA: Primary | ICD-10-CM

## 2019-04-19 DIAGNOSIS — R11.0 NAUSEA: ICD-10-CM

## 2019-04-19 RX ORDER — PROMETHAZINE HYDROCHLORIDE 25 MG/1
25 TABLET ORAL
Qty: 20 TAB | Refills: 0 | Status: SHIPPED | OUTPATIENT
Start: 2019-04-19 | End: 2019-06-24

## 2019-04-19 RX ORDER — METHOCARBAMOL 750 MG/1
TABLET, FILM COATED ORAL
Refills: 0 | COMMUNITY
Start: 2019-04-15 | End: 2019-04-19

## 2019-04-19 RX ORDER — KETOROLAC TROMETHAMINE 30 MG/ML
60 INJECTION, SOLUTION INTRAMUSCULAR; INTRAVENOUS ONCE
Qty: 1 VIAL | Refills: 0
Start: 2019-04-19 | End: 2019-04-19

## 2019-04-19 NOTE — PROGRESS NOTES
After obtaining consent, and per orders of Dr. Katalina Bentley, injection of toradol 60 mg/2 ml given right gluteal IM. Post injection pain score:8.

## 2019-04-19 NOTE — LETTER
NOTIFICATION RETURN TO WORK / SCHOOL 
 
4/19/2019 2:52 PM 
 
Ms. Jose Rodriguez [address] [place] [address] To Whom It May Concern: 
 
Jose Rodriguez is currently under the care of [address] [place] [address]. She will return to work/school on: Sunday, 4/21/19. If there are questions or concerns please have the patient contact our office. Sincerely, Peyman Bryant MD

## 2019-04-19 NOTE — PATIENT INSTRUCTIONS
Patient Instructions  1. Take diclofenac 75 mg twice a day with food for back pain. Start tonight. 2.  Take cyclobenzaprine 10 mg once daily at bedtime. This is a muscle relaxant. It can make you sleepy. If you are at home all day tomorrow and do not mind sleeping, take cyclobenzaprine 10 mg 3 times a day tomorrow. 3.  It is okay to take diclofenac and cyclobenzaprine at the same time. 4.  Use a heating pad as needed for your back pain.

## 2019-04-19 NOTE — PROGRESS NOTES
CC:   Chief Complaint   Patient presents with    Nausea     last 2 days with vomiting    Hip Pain     left       HISTORY OF PRESENT ILLNESS  Miguel Denise is a 61 y.o. female. Patient complains of 2 day history of nausea and vomiting. Was seen at [facility] ED on 4/15/19 for right low back pain. Was discharged on Tramadol and methocarbamol 750 mg 4 times a day. Nausea and vomiting started after taking methocarbamol. Was seen at [facility] ED on 4/18/19 with acute bilateral low back pain without sciatica. Was given Toradol 60  Mg IM and one hydrocodone-APAP 5-325 mg tab in ED. Pain improved and was discharged on cyclobenzaprine 10 mg TID, diclofenac 75 mg BID, and hydrocodone-APAP 5-325 mg every 6 hrs prn pain (#10, NR). Took hydrocodone-APAP last night. Helped her sleep but awoke with nausea, vomiting, and upset stomach. Low back pain is 10/10 presently. Is confused about which she medications she should take for back pain. She has type 2 DM, HTN, asthma, chronic low back pain, and OA of both knees. 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 Works for [employer] Patient Active Problem List   Diagnosis Code    Type 2 diabetes mellitus without complication, without long-term current use of insulin ([facility]) E11.9    HTN (hypertension) I10    Arthritis M19.90    GERD (gastroesophageal reflux disease) K21.9    Vitamin D deficiency E55.9     Past Medical History:   Diagnosis Date    Arrhythmia     Arthritis     GENERALIZED TO JOINTS-WRISTS & KNEES.  Asthma     INHALERS    Asthma, chronic 10/8/2009    Diabetes ([facility] [address]     USES PILLS TO CONTROLL    GERD (gastroesophageal reflux disease)     Gout     Hypertension     CONTROLLED BY MEDS.     Nausea & vomiting     Obesity      Allergies   Allergen Reactions    Latex Rash     Severe rash    Ampicillin Anaphylaxis    Betadine Antiseptic Gauze Hives    Contrast Dye [Iodine] Hives and Swelling Pt. Reports throat swelling      Other Medication Rash     Betadine soap/blisters    Fd And C Blue No.1 Anaphylaxis    Penicillins Hives       Current Outpatient Medications   Medication Sig Dispense Refill    cyclobenzaprine (FLEXERIL) 10 mg tablet Take 1 Tab by mouth three (3) times daily as needed for Muscle Spasm(s). 20 Tab 0    diclofenac EC (VOLTAREN) 75 mg EC tablet Take 1 Tab by mouth two (2) times a day for 10 days. 20 Tab 0    HYDROcodone-acetaminophen (NORCO) 5-325 mg per tablet Take 1 Tab by mouth every six (6) hours as needed for Pain for up to 5 days. Max Daily Amount: 4 Tabs. 10 Tab 0    SITagliptin-metFORMIN (JANUMET) 50-1,000 mg per tablet Take 1 Tab by mouth two (2) times daily (with meals). Dx: E11.42 180 Tab 3    glucose blood VI test strips (BLOOD GLUCOSE TEST) strip Use to test blood sugar once a day. Dx: E11.42 90 Strip 3    ergocalciferol (ERGOCALCIFEROL) 50,000 unit capsule Take 1 Cap by mouth every seven (7) days. Indications: vitamin D deficiency 12 Cap 3    aspirin delayed-release 81 mg tablet Take  by mouth daily.  Blood-Glucose Meter monitoring kit Use once daily to test blood sugar 1 Kit 0    lancets misc Test blood sugar once a day 30 Each 11    alcohol swabs (ALCOHOL PREP PADS) padm Use to test blood sugar once a day 40 Pad 11    triamterene-hydroCHLOROthiazide (MAXZIDE) 37.5-25 mg per tablet Take  by mouth daily.  amLODIPine (NORVASC) 10 mg tablet Take  by mouth daily.  albuterol (PROVENTIL HFA, VENTOLIN HFA, PROAIR HFA) 90 mcg/actuation inhaler Take 2 Puffs by inhalation every four (4) hours as needed for Wheezing or Shortness of Breath (cough). Indications: Acute Asthma Attack 1 Inhaler 0    fluticasone-salmeterol (ADVAIR DISKUS) 250-50 mcg/Dose diskus inhaler Take 1 Puff by inhalation every twelve (12) hours.            PHYSICAL EXAM  Visit Vitals  /74 (BP 1 Location: Left arm, BP Patient Position: Sitting)   Pulse 91   Temp 98.2 °F (36.8 °C) (Oral)   Resp 18   Ht 5' 2\" (1.575 m)   Wt 169 lb (76.7 kg)   LMP 06/19/2011   SpO2 99%   BMI 30.91 kg/m²       General: Obese, no distress. HEENT:  Head normocephalic/atraumatic, no scleral icterus  Lungs:  Clear to ausculation bilaterally. Good air movement. Heart:  Regular rate and rhythm, normal S1 and S2, no murmur, gallop, or rub  Back: Normal ROM. Tenderness with moderate pressure at lumbar vertebral and paravertebral muscles. Extremities: No clubbing, cyanosis, or edema. Neurological: Alert and oriented. Psychiatric: Normal mood and affect. Behavior is normal.         ASSESSMENT AND PLAN    ICD-10-CM ICD-9-CM    1. Acute bilateral low back pain without sciatica M54.5 724.2 ketorolac tromethamine (TORADOL) 60 mg/2 mL soln     338.19 KETOROLAC TROMETHAMINE INJ      CT THER/PROPH/DIAG INJECTION, SUBCUT/IM   2. Nausea R11.0 787.02 promethazine (PHENERGAN) 25 mg tablet     Diagnoses and all orders for this visit:    1. Acute bilateral low back pain without sciatica  Recommended she take Diclofenac 75 mg BID and cyclobenzaprine 10 mg hs.        -     Given Toradol 60 mg IM in clinic:   -     ketorolac tromethamine (TORADOL) 60 mg/2 mL soln; 2 mL by IntraMUSCular route once for 1 dose. -     KETOROLAC TROMETHAMINE INJ   -     CT THER/PROPH/DIAG INJECTION, SUBCUT/IM  Patient Instructions  1. Take diclofenac 75 mg twice a day with food for back pain. Start tonight. 2.  Take cyclobenzaprine 10 mg once daily at bedtime. This is a muscle relaxant. It can make you sleepy. If you are at home all day tomorrow and do not mind sleeping, take cyclobenzaprine 10 mg 3 times a day tomorrow. 3.  It is okay to take diclofenac and cyclobenzaprine at the same time. 4.  Use a heating pad as needed for your back pain. 2. Nausea  -     Start promethazine (PHENERGAN) 25 mg tablet; Take 1 Tab by mouth every six (6) hours as needed for Nausea.       Follow-up and Dispositions    · Return if symptoms worsen or fail to improve, for Scheduled appointment on 5/8/19. I have discussed the diagnosis with the patient and the intended plan as seen in the above orders. Patient is in agreement. The patient has received an after-visit summary and questions were answered concerning future plans. I have discussed medication side effects and warnings with the patient as well.

## 2019-04-19 NOTE — PROGRESS NOTES
Jes Carlitos  Identified pt with two pt identifiers(name and ). Chief Complaint   Patient presents with    Nausea    Hip Pain   Has colonoscopy scheduled on May 31st.  Had eye exam last at [facility]. 1. Have you been to the ER, urgent care clinic since your last visit? Hospitalized since your last visit? [address] on Monday and [facility] yesterday    2. Have you seen or consulted any other health care providers outside of the [address] [place] [address] since your last visit? Include any pap smears or colon screening. NO    Today's provider has been notified of reason for visit, vitals and flowsheets obtained on patients. Patient received paperwork for advance directive during previous visit but has not completed at this time     Reviewed record In preparation for visit, huddled with provider and have obtained necessary documentation      Health Maintenance Due   Topic    Pneumococcal 0-64 years (1 of 1 - PPSV23)    EYE EXAM RETINAL OR DILATED     BREAST CANCER SCRN MAMMOGRAM     FOBT Q 1 YEAR AGE 50-75        Wt Readings from Last 3 Encounters:   19 168 lb 14 oz (76.6 kg)   19 171 lb (77.6 kg)   19 172 lb (78 kg)     Temp Readings from Last 3 Encounters:   19 98.8 °F (37.1 °C)   19 98 °F (36.7 °C) (Oral)   19 97.9 °F (36.6 °C)     BP Readings from Last 3 Encounters:   19 159/78   19 116/69   19 149/75     Pulse Readings from Last 3 Encounters:   19 99   19 92   19 100     There were no vitals filed for this visit.       Learning Assessment:  :     Learning Assessment 3/25/2019   PRIMARY LEARNER Patient   PRIMARY LANGUAGE ENGLISH   LEARNER PREFERENCE PRIMARY READING   ANSWERED BY patient   RELATIONSHIP SELF       Depression Screening:  :     3 most recent PHQ Screens 3/25/2019   Little interest or pleasure in doing things Not at all   Feeling down, depressed, irritable, or hopeless Several days   Total Score PHQ 2 1       Fall Risk Assessment:  :     Fall Risk Assessment, last 12 mths 3/25/2019   Able to walk? Yes   Fall in past 12 months? No       Abuse Screening:  :     Abuse Screening Questionnaire 3/25/2019   Do you ever feel afraid of your partner? N   Are you in a relationship with someone who physically or mentally threatens you? N   Is it safe for you to go home? Y       ADL Screening:  :     ADL Assessment 3/25/2019   Feeding yourself No Help Needed   Getting from bed to chair No Help Needed   Getting dressed No Help Needed   Bathing or showering No Help Needed   Walk across the room (includes cane/walker) No Help Needed   Using the telphone No Help Needed   Taking your medications No Help Needed   Preparing meals No Help Needed   Managing money (expenses/bills) No Help Needed   Moderately strenuous housework (laundry) No Help Needed   Shopping for personal items (toiletries/medicines) No Help Needed   Shopping for groceries No Help Needed   Driving No Help Needed   Climbing a flight of stairs No Help Needed   Getting to places beyond walking distances No Help Needed                 Medication reconciliation up to date and corrected with patient at this time.

## 2019-05-08 ENCOUNTER — OFFICE VISIT (OUTPATIENT)
Dept: INTERNAL MEDICINE CLINIC | Facility: CLINIC | Age: 59
End: 2019-05-08

## 2019-05-08 VITALS
OXYGEN SATURATION: 99 % | BODY MASS INDEX: 31.47 KG/M2 | RESPIRATION RATE: 18 BRPM | HEIGHT: 62 IN | DIASTOLIC BLOOD PRESSURE: 80 MMHG | HEART RATE: 90 BPM | WEIGHT: 171 LBS | SYSTOLIC BLOOD PRESSURE: 128 MMHG | TEMPERATURE: 98 F

## 2019-05-08 DIAGNOSIS — M54.50 ACUTE BILATERAL LOW BACK PAIN WITHOUT SCIATICA: Primary | ICD-10-CM

## 2019-05-08 DIAGNOSIS — E11.42 TYPE 2 DIABETES MELLITUS WITH DIABETIC POLYNEUROPATHY, WITHOUT LONG-TERM CURRENT USE OF INSULIN (HCC): ICD-10-CM

## 2019-05-08 DIAGNOSIS — E55.9 VITAMIN D DEFICIENCY: ICD-10-CM

## 2019-05-08 DIAGNOSIS — Z12.39 SCREENING FOR BREAST CANCER: ICD-10-CM

## 2019-05-08 DIAGNOSIS — I10 ESSENTIAL HYPERTENSION: ICD-10-CM

## 2019-05-08 RX ORDER — DICLOFENAC SODIUM 75 MG/1
TABLET, DELAYED RELEASE ORAL
Refills: 1 | COMMUNITY
Start: 2019-05-02 | End: 2020-03-31 | Stop reason: SDUPTHER

## 2019-05-08 RX ORDER — CYCLOBENZAPRINE HCL 5 MG
TABLET ORAL
Refills: 1 | COMMUNITY
Start: 2019-05-02 | End: 2020-12-29 | Stop reason: ALTCHOICE

## 2019-05-08 NOTE — PROGRESS NOTES
Cathleen Heredia  Identified pt with two pt identifiers(name and ). Chief Complaint   Patient presents with    Back Pain     follow up    Diabetes    Hypertension       1. Have you been to the ER, urgent care clinic since your last visit? Hospitalized since your last visit? NO    2. Have you seen or consulted any other health care providers outside of the [address] [place] [address] since your last visit? Include any pap smears or colon screening. Dr Hoang brown    Today's provider has been notified of reason for visit, vitals and flowsheets obtained on patients.      Patient received paperwork for advance directive during previous visit but has not completed at this time     Reviewed record In preparation for visit, huddled with provider and have obtained necessary documentation      Health Maintenance Due   Topic    Pneumococcal 0-64 years (1 of 1 - PPSV23)    EYE EXAM RETINAL OR DILATED     BREAST CANCER SCRN MAMMOGRAM     FOBT Q 1 YEAR AGE 50-75        Wt Readings from Last 3 Encounters:   19 171 lb (77.6 kg)   19 169 lb (76.7 kg)   19 168 lb 14 oz (76.6 kg)     Temp Readings from Last 3 Encounters:   19 98 °F (36.7 °C) (Oral)   19 98.2 °F (36.8 °C) (Oral)   19 98.8 °F (37.1 °C)     BP Readings from Last 3 Encounters:   19 128/80   19 125/74   19 159/78     Pulse Readings from Last 3 Encounters:   19 90   19 91   19 99     Vitals:    19 0851   BP: 128/80   Pulse: 90   Resp: 18   Temp: 98 °F (36.7 °C)   TempSrc: Oral   SpO2: 99%   Weight: 171 lb (77.6 kg)   Height: 5' 2\" (1.575 m)   PainSc:   3   PainLoc: Back   LMP: 2011         Learning Assessment:  :     Learning Assessment 3/25/2019   PRIMARY LEARNER Patient   PRIMARY LANGUAGE ENGLISH   LEARNER PREFERENCE PRIMARY READING   ANSWERED BY patient   RELATIONSHIP SELF       Depression Screening:  :     3 most recent PHQ Screens 3/25/2019   Little interest or pleasure in doing things Not at all   Feeling down, depressed, irritable, or hopeless Several days   Total Score PHQ 2 1       Fall Risk Assessment:  :     Fall Risk Assessment, last 12 mths 3/25/2019   Able to walk? Yes   Fall in past 12 months? No       Abuse Screening:  :     Abuse Screening Questionnaire 3/25/2019   Do you ever feel afraid of your partner? N   Are you in a relationship with someone who physically or mentally threatens you? N   Is it safe for you to go home? Y       ADL Screening:  :     ADL Assessment 3/25/2019   Feeding yourself No Help Needed   Getting from bed to chair No Help Needed   Getting dressed No Help Needed   Bathing or showering No Help Needed   Walk across the room (includes cane/walker) No Help Needed   Using the telphone No Help Needed   Taking your medications No Help Needed   Preparing meals No Help Needed   Managing money (expenses/bills) No Help Needed   Moderately strenuous housework (laundry) No Help Needed   Shopping for personal items (toiletries/medicines) No Help Needed   Shopping for groceries No Help Needed   Driving No Help Needed   Climbing a flight of stairs No Help Needed   Getting to places beyond walking distances No Help Needed                 Medication reconciliation up to date and corrected with patient at this time.

## 2019-05-08 NOTE — PROGRESS NOTES
CC:   Chief Complaint   Patient presents with    Back Pain     follow up    Diabetes    Hypertension       HISTORY OF PRESENT ILLNESS  Judy Bruno is a 61 y.o. female. Presents for 1 week follow up evaluation. She has type 2 DM, HTN, and asthma. At last clinic visit, was seen with severe acute bilateral low back pain without sciatica. Saw an orthopedist, Dr. Real Fired who [facility] Plan is for her to have physical therapy at [address] [place] [address] twice a week. Today she reports that she still has the back pain but it is better.     Endocrine Review  She is seen for diabetes. Since last visit she reports no polyuria or polydipsia, no hypoglycemia. Home glucose monitoring: is performed regularly. Home -151 over past few days. She reports medication compliance: compliant all of the time. Medication side effects: none. Diabetic diet compliance: compliant most of the time. Lab review: last A1c today was 7.7% on 3/25/19. Eye exam: overdue.       Cardiovascular Review  The patient has hypertension and hyperlipidemia. She reports taking medications as instructed, no medication side effects noted. Diet and Lifestyle: generally follows a low fat low cholesterol diet, generally follows a low sodium diet, no formal exercise but active during the day. Lab review: labs reviewed and discussed with patient. Soc Hx  . She has 2 biological sons and 3 step-daughters. 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 Former smoker; quit in 2010. Denies alcohol or recreational drug use. Does not get regular exercise outside of work.     Health Maintenance  Flu vaccine: 11/7/18      Pneumonia vaccine: 2017                                            Tetanus vaccine: 11/15/13                               Zoster vaccine: never had, declined  Pap smear: had TORO 6 yrs ago for fibroids. No longer indicated.   Colonoscopy: due for this  Mammogram: July 2018, [facility] (unable to get report)  Eye exam: 12/18, [facility] (unable to get report)  Foot exam: 3/25/19  Lipids: 3/25/19 (tot chol 189, LDL 98)  A1c:3/25/19 (7.7%)  Advanced Directives: given information  End of Life: given information                 ROS  A complete review of systems was performed and is negative except for those mentioned in the HPI.      Patient Active Problem List   Diagnosis Code    Type 2 diabetes mellitus without complication, without long-term current use of insulin ([facility] [address] E11.9    HTN (hypertension) I10    Arthritis M19.90    GERD (gastroesophageal reflux disease) K21.9    Vitamin D deficiency E55.9     Past Medical History:   Diagnosis Date    Arrhythmia     Arthritis     GENERALIZED TO JOINTS-WRISTS & KNEES.  Asthma     INHALERS    Asthma, chronic 10/8/2009    Diabetes ([facility] [address]     USES PILLS TO CONTROLL    GERD (gastroesophageal reflux disease)     Gout     Hypertension     CONTROLLED BY MEDS.  Nausea & vomiting     Obesity      Allergies   Allergen Reactions    Latex Rash     Severe rash    Ampicillin Anaphylaxis    Betadine Antiseptic Gauze Hives    Contrast Dye [Iodine] Hives and Swelling     Pt. Reports throat swelling      Other Medication Rash     Betadine soap/blisters    Fd And C Blue No.1 Anaphylaxis    Penicillins Hives       Current Outpatient Medications   Medication Sig Dispense Refill    cyclobenzaprine (FLEXERIL) 5 mg tablet TAKE 1-2 TABLETS BY MOUTH AT BEDTIME  1    diclofenac EC (VOLTAREN) 75 mg EC tablet TAKE 1 TABLET BY MOUTH TWICE A DAY  1    promethazine (PHENERGAN) 25 mg tablet Take 1 Tab by mouth every six (6) hours as needed for Nausea. 20 Tab 0    SITagliptin-metFORMIN (JANUMET) 50-1,000 mg per tablet Take 1 Tab by mouth two (2) times daily (with meals). Dx: E11.42 180 Tab 3    glucose blood VI test strips (BLOOD GLUCOSE TEST) strip Use to test blood sugar once a day.  Dx: E11.42 90 Strip 3    ergocalciferol (ERGOCALCIFEROL) 50,000 unit capsule Take 1 Cap by mouth every seven (7) days. Indications: vitamin D deficiency 12 Cap 3    aspirin delayed-release 81 mg tablet Take  by mouth daily.  Blood-Glucose Meter monitoring kit Use once daily to test blood sugar 1 Kit 0    lancets misc Test blood sugar once a day 30 Each 11    alcohol swabs (ALCOHOL PREP PADS) padm Use to test blood sugar once a day 40 Pad 11    triamterene-hydroCHLOROthiazide (MAXZIDE) 37.5-25 mg per tablet Take  by mouth daily.  amLODIPine (NORVASC) 10 mg tablet Take  by mouth daily.  albuterol (PROVENTIL HFA, VENTOLIN HFA, PROAIR HFA) 90 mcg/actuation inhaler Take 2 Puffs by inhalation every four (4) hours as needed for Wheezing or Shortness of Breath (cough). Indications: Acute Asthma Attack 1 Inhaler 0    fluticasone-salmeterol (ADVAIR DISKUS) 250-50 mcg/Dose diskus inhaler Take 1 Puff by inhalation every twelve (12) hours. PHYSICAL EXAM  Visit Vitals  /80 (BP 1 Location: Left arm, BP Patient Position: Sitting)   Pulse 90   Temp 98 °F (36.7 °C) (Oral)   Resp 18   Ht 5' 2\" (1.575 m)   Wt 171 lb (77.6 kg)   LMP 06/19/2011   SpO2 99%   BMI 31.28 kg/m²       General: Obese, no distress. HEENT:  Head normocephalic/atraumatic, no scleral icterus  Lungs:  Clear to ausculation bilaterally. Good air movement. Heart:  Regular rate and rhythm, normal S1 and S2, no murmur, gallop, or rub  Back: Normal ROM. Mild tenderness at lumbar vertebral and paravertebral muscles. Extremities: No clubbing, cyanosis, or edema. Neurological: Alert and oriented. Psychiatric: Normal mood and affect.  Behavior is normal.     Results for orders placed or performed in visit on 03/25/19   LIPID PANEL   Result Value Ref Range    Cholesterol, total 189 100 - 199 mg/dL    Triglyceride 174 (H) 0 - 149 mg/dL    HDL Cholesterol 56 >39 mg/dL    VLDL, calculated 35 5 - 40 mg/dL    LDL, calculated 98 0 - 99 mg/dL   METABOLIC PANEL, COMPREHENSIVE   Result Value Ref Range Glucose 205 (H) 65 - 99 mg/dL    BUN 8 6 - 24 mg/dL    Creatinine 0.82 0.57 - 1.00 mg/dL    GFR est non-AA 79 >59 mL/min/1.73    GFR est AA 91 >59 mL/min/1.73    BUN/Creatinine ratio 10 9 - 23    Sodium 140 134 - 144 mmol/L    Potassium 4.3 3.5 - 5.2 mmol/L    Chloride 99 96 - 106 mmol/L    CO2 24 20 - 29 mmol/L    Calcium 9.5 8.7 - 10.2 mg/dL    Protein, total 7.2 6.0 - 8.5 g/dL    Albumin 4.0 3.5 - 5.5 g/dL    GLOBULIN, TOTAL 3.2 1.5 - 4.5 g/dL    A-G Ratio 1.3 1.2 - 2.2    Bilirubin, total 0.3 0.0 - 1.2 mg/dL    Alk. phosphatase 86 39 - 117 IU/L    AST (SGOT) 12 0 - 40 IU/L    ALT (SGPT) 10 0 - 32 IU/L   MICROALBUMIN, UR, RAND W/ MICROALB/CREAT RATIO   Result Value Ref Range    Creatinine, urine 130.7 Not Estab. mg/dL    Microalbumin, urine <3.0 Not Estab. ug/mL    Microalb/Creat ratio (ug/mg creat.) <2.3 0.0 - 30.0 mg/g creat   HEPATITIS C AB   Result Value Ref Range    Hep C Virus Ab <0.1 0.0 - 0.9 s/co ratio   HEMOGLOBIN A1C WITH EAG   Result Value Ref Range    Hemoglobin A1c 7.7 (H) 4.8 - 5.6 %    Estimated average glucose 174 mg/dL   TSH RFX ON ABNORMAL TO FREE T4   Result Value Ref Range    TSH 1.190 0.450 - 4.500 uIU/mL   VITAMIN D, 25 HYDROXY   Result Value Ref Range    VITAMIN D, 25-HYDROXY 13.1 (L) 30.0 - 100.0 ng/mL   CBC WITH AUTOMATED DIFF   Result Value Ref Range    WBC 6.7 3.4 - 10.8 x10E3/uL    RBC 3.96 3.77 - 5.28 x10E6/uL    HGB 11.0 (L) 11.1 - 15.9 g/dL    HCT 34.8 34.0 - 46.6 %    MCV 88 79 - 97 fL    MCH 27.8 26.6 - 33.0 pg    MCHC 31.6 31.5 - 35.7 g/dL    RDW 13.5 12.3 - 15.4 %    PLATELET 671 717 - 784 x10E3/uL    NEUTROPHILS 62 Not Estab. %    Lymphocytes 29 Not Estab. %    MONOCYTES 6 Not Estab. %    EOSINOPHILS 3 Not Estab. %    BASOPHILS 0 Not Estab. %    ABS. NEUTROPHILS 4.2 1.4 - 7.0 x10E3/uL    Abs Lymphocytes 1.9 0.7 - 3.1 x10E3/uL    ABS. MONOCYTES 0.4 0.1 - 0.9 x10E3/uL    ABS. EOSINOPHILS 0.2 0.0 - 0.4 x10E3/uL    ABS.  BASOPHILS 0.0 0.0 - 0.2 x10E3/uL    IMMATURE GRANULOCYTES 0 Not Estab. %    ABS. IMM. GRANS. 0.0 0.0 - 0.1 x10E3/uL         ASSESSMENT AND PLAN    ICD-10-CM ICD-9-CM    1. Acute bilateral low back pain without sciatica M54.5 724.2      338.19    2. Type 2 diabetes mellitus with diabetic polyneuropathy, without long-term current use of insulin (HCC) E11.42 250.60 REFERRAL TO OPHTHALMOLOGY     357.2    3. Essential hypertension I10 401.9    4. Vitamin D deficiency E55.9 268.9    5. Screening for breast cancer Z12.31 V76.10 [facility] MAMMO BI SCREENING INCL CAD     Diagnoses and all orders for this visit:    1. Acute bilateral low back pain without sciatica  Continue diclofenac 75 mg BID and cyclobenzaprine 5 mg 1-2 tabs TID. To have PT. Follow up with Dr. Bharti Kramer. 2. Type 2 diabetes mellitus with diabetic polyneuropathy, without long-term current use of insulin (HCC)  Last A1c 7.7.% in 3/19. Not at goal of less than 7.0%. Continue Janumet  BID and recheck at next clinic visit. -     REFERRAL TO OPHTHALMOLOGY (Dr. Alexy Ruvalcaba)    3. Essential hypertension  Controlled. Continue amlodipine and triamterene-HCTZ 37.5-25 mg daily. 4. Vitamin D deficiency  Continue ergocalciferol 50,000 units once weekly. 5. Screening for breast cancer  -     [facility] MAMMO BI SCREENING INCL CAD; Future      Follow-up and Dispositions    · Return if symptoms worsen or fail to improve, for Scheduled appointment on 6/24/19. I have discussed the diagnosis with the patient and the intended plan as seen in the above orders. Patient is in agreement. The patient has received an after-visit summary and questions were answered concerning future plans. I have discussed medication side effects and warnings with the patient as well.

## 2019-05-20 ENCOUNTER — HOSPITAL ENCOUNTER (OUTPATIENT)
Dept: MAMMOGRAPHY | Age: 59
Discharge: HOME | End: 2019-05-20
Attending: INTERNAL MEDICINE
Payer: COMMERCIAL

## 2019-05-20 DIAGNOSIS — Z12.39 SCREENING FOR BREAST CANCER: ICD-10-CM

## 2019-05-20 PROCEDURE — 77067 SCR MAMMO BI INCL CAD: CPT

## 2019-05-31 ENCOUNTER — ANESTHESIA (OUTPATIENT)
Dept: ENDOSCOPY | Age: 59
End: 2019-05-31
Payer: COMMERCIAL

## 2019-05-31 ENCOUNTER — ANESTHESIA EVENT (OUTPATIENT)
Dept: ENDOSCOPY | Age: 59
End: 2019-05-31
Payer: COMMERCIAL

## 2019-05-31 ENCOUNTER — HOSPITAL ENCOUNTER (OUTPATIENT)
Age: 59
Setting detail: OUTPATIENT SURGERY
Discharge: HOME | End: 2019-05-31
Attending: INTERNAL MEDICINE | Admitting: INTERNAL MEDICINE
Payer: COMMERCIAL

## 2019-05-31 VITALS
BODY MASS INDEX: 30.73 KG/M2 | HEIGHT: 62 IN | SYSTOLIC BLOOD PRESSURE: 142 MMHG | OXYGEN SATURATION: 99 % | RESPIRATION RATE: 20 BRPM | TEMPERATURE: 98 F | HEART RATE: 86 BPM | DIASTOLIC BLOOD PRESSURE: 72 MMHG | WEIGHT: 167 LBS

## 2019-05-31 PROCEDURE — 88305 TISSUE EXAM BY PATHOLOGIST: CPT

## 2019-05-31 PROCEDURE — 74011250636 HC RX REV CODE- 250/636: Performed by: INTERNAL MEDICINE

## 2019-05-31 PROCEDURE — 76040000007: Performed by: INTERNAL MEDICINE

## 2019-05-31 PROCEDURE — 77030013992 HC SNR POLYP ENDOSC BSC -B: Performed by: INTERNAL MEDICINE

## 2019-05-31 PROCEDURE — 74011250636 HC RX REV CODE- 250/636

## 2019-05-31 PROCEDURE — 76060000032 HC ANESTHESIA 0.5 TO 1 HR: Performed by: INTERNAL MEDICINE

## 2019-05-31 RX ORDER — SODIUM CHLORIDE 0.9 % (FLUSH) 0.9 %
5-40 SYRINGE (ML) INJECTION EVERY 8 HOURS
Status: CANCELLED | OUTPATIENT
Start: 2019-05-31

## 2019-05-31 RX ORDER — SODIUM CHLORIDE 9 MG/ML
75 INJECTION, SOLUTION INTRAVENOUS CONTINUOUS
Status: DISCONTINUED | OUTPATIENT
Start: 2019-05-31 | End: 2019-05-31 | Stop reason: HOSPADM

## 2019-05-31 RX ORDER — SODIUM CHLORIDE 0.9 % (FLUSH) 0.9 %
5-40 SYRINGE (ML) INJECTION EVERY 8 HOURS
Status: DISCONTINUED | OUTPATIENT
Start: 2019-05-31 | End: 2019-06-05 | Stop reason: HOSPADM

## 2019-05-31 RX ORDER — LIDOCAINE HYDROCHLORIDE 20 MG/ML
INJECTION, SOLUTION EPIDURAL; INFILTRATION; INTRACAUDAL; PERINEURAL AS NEEDED
Status: DISCONTINUED | OUTPATIENT
Start: 2019-05-31 | End: 2019-05-31 | Stop reason: HOSPADM

## 2019-05-31 RX ORDER — DEXTROMETHORPHAN/PSEUDOEPHED 2.5-7.5/.8
1.2 DROPS ORAL
Status: DISCONTINUED | OUTPATIENT
Start: 2019-05-31 | End: 2019-06-05 | Stop reason: HOSPADM

## 2019-05-31 RX ORDER — PROPOFOL 10 MG/ML
INJECTION, EMULSION INTRAVENOUS AS NEEDED
Status: DISCONTINUED | OUTPATIENT
Start: 2019-05-31 | End: 2019-05-31 | Stop reason: HOSPADM

## 2019-05-31 RX ORDER — EPINEPHRINE 0.1 MG/ML
1 INJECTION INTRACARDIAC; INTRAVENOUS
Status: DISCONTINUED | OUTPATIENT
Start: 2019-05-31 | End: 2019-06-01 | Stop reason: HOSPADM

## 2019-05-31 RX ORDER — SODIUM CHLORIDE 0.9 % (FLUSH) 0.9 %
5-40 SYRINGE (ML) INJECTION AS NEEDED
Status: CANCELLED | OUTPATIENT
Start: 2019-05-31

## 2019-05-31 RX ORDER — SODIUM CHLORIDE 0.9 % (FLUSH) 0.9 %
5-40 SYRINGE (ML) INJECTION AS NEEDED
Status: DISCONTINUED | OUTPATIENT
Start: 2019-05-31 | End: 2019-06-05 | Stop reason: HOSPADM

## 2019-05-31 RX ORDER — NALOXONE HYDROCHLORIDE 0.4 MG/ML
0.4 INJECTION, SOLUTION INTRAMUSCULAR; INTRAVENOUS; SUBCUTANEOUS
Status: CANCELLED | OUTPATIENT
Start: 2019-05-31 | End: 2019-05-31

## 2019-05-31 RX ORDER — NALOXONE HYDROCHLORIDE 0.4 MG/ML
0.4 INJECTION, SOLUTION INTRAMUSCULAR; INTRAVENOUS; SUBCUTANEOUS
Status: DISCONTINUED | OUTPATIENT
Start: 2019-05-31 | End: 2019-05-31 | Stop reason: HOSPADM

## 2019-05-31 RX ORDER — ATROPINE SULFATE 0.1 MG/ML
0.5 INJECTION INTRAVENOUS
Status: DISCONTINUED | OUTPATIENT
Start: 2019-05-31 | End: 2019-06-01 | Stop reason: HOSPADM

## 2019-05-31 RX ORDER — FLUMAZENIL 0.1 MG/ML
0.2 INJECTION INTRAVENOUS
Status: DISCONTINUED | OUTPATIENT
Start: 2019-05-31 | End: 2019-05-31 | Stop reason: HOSPADM

## 2019-05-31 RX ORDER — MIDAZOLAM HYDROCHLORIDE 1 MG/ML
.25-5 INJECTION, SOLUTION INTRAMUSCULAR; INTRAVENOUS
Status: DISCONTINUED | OUTPATIENT
Start: 2019-05-31 | End: 2019-05-31 | Stop reason: HOSPADM

## 2019-05-31 RX ADMIN — SODIUM CHLORIDE 75 ML/HR: 900 INJECTION, SOLUTION INTRAVENOUS at 15:27

## 2019-05-31 RX ADMIN — PROPOFOL 200 MG: 10 INJECTION, EMULSION INTRAVENOUS at 15:46

## 2019-05-31 RX ADMIN — LIDOCAINE HYDROCHLORIDE 40 MG: 20 INJECTION, SOLUTION EPIDURAL; INFILTRATION; INTRACAUDAL; PERINEURAL at 15:34

## 2019-05-31 NOTE — PROGRESS NOTES
Anesthesia reports 200mg Propofol, 40mg Lidocaine and 250mL NS given during procedure. Received report from anesthesia staff on vital signs and status of patient.     Endoscope was pre-cleaned at the bedside immediately following procedure by Gerald Browning

## 2019-05-31 NOTE — PROCEDURES
Colonoscopy Procedure Note    Jacqueline Shi  1960  [id number]    Indications:  Please see below. Pre-operative Diagnosis: FAM HX COLON POLYPS    Post-operative Diagnosis: Colon polyp, internal hemorrhoids, Diverticulosis      : Christopher Arauz MD    Referring Provider: Fito Minaya MD    Sedation:  MAC anesthesia Propofol        Procedure Details:    After detailed informed consent was obtained with all risks and benefits of procedure explained and preoperative exam completed, the patient was taken to the endoscopy suite and placed in the left lateral decubitus position. Upon sequential sedation as per above, a digital rectal exam was performed  And was normal.  The Olympus videocolonoscope  was inserted in the rectum and carefully advanced to the cecum, which was identified by the ileocecal valve. The quality of preparation was inadequate. The colonoscope was slowly withdrawn with careful evaluation between folds. Retroflexion in the rectum was performed. Findings:   · A single, 8 mm, sessile sigmoid colon polyp was removed with a cold snare. · There is poor cecal and left colon prep which cannot be cleared up entirely. · Mild sigmoid diverticulosis. · Medium internal hemorrhoids. Therapies:  1 complete polypectomy was performed using cold snare  and the polyp was retrieved    Specimen:   Specimen were collected as described above and sent to pathology. Complications: None were encountered during the procedure. EBL:  None. Recommendations:    -Await pathology. -Repeat colonoscopy in 6 months with a better prep.    -Naturally, for new bleeding, unexplained weight loss,change in bowel habits and anemia, an earlier colonoscopy should be considered. Adam Arauz MD  5/31/2019  3:47 PM

## 2019-05-31 NOTE — H&P
Pre-endoscopy H and P    The patient was seen and examined in the room/pre-op holding area. The airway was assessed and documented. The problem list, past medical history, and medications were reviewed.      Patient Active Problem List   Diagnosis Code    Type 2 diabetes mellitus without complication, without long-term current use of insulin (HCC) E11.9    HTN (hypertension) I10    Arthritis M19.90    GERD (gastroesophageal reflux disease) K21.9    Vitamin D deficiency E55.9     Social History     Socioeconomic History    Marital status:      Spouse name: Not on file    Number of children: Not on file    Years of education: Not on file    Highest education level: Not on file   Occupational History    Not on file   Social Needs    Financial resource strain: Not on file    Food insecurity:     Worry: Not on file     Inability: Not on file    Transportation needs:     Medical: Not on file     Non-medical: Not on file   Tobacco Use    Smoking status: Former Smoker     Years: 10.00     Last attempt to quit: 2010     Years since quittin.8    Smokeless tobacco: Never Used   Substance and Sexual Activity    Alcohol use: No    Drug use: No    Sexual activity: Yes     Partners: Male     Birth control/protection: None   Lifestyle    Physical activity:     Days per week: Not on file     Minutes per session: Not on file    Stress: Not on file   Relationships    Social connections:     Talks on phone: Not on file     Gets together: Not on file     Attends Islam service: Not on file     Active member of club or organization: Not on file     Attends meetings of clubs or organizations: Not on file     Relationship status: Not on file    Intimate partner violence:     Fear of current or ex partner: Not on file     Emotionally abused: Not on file     Physically abused: Not on file     Forced sexual activity: Not on file   Other Topics Concern    Not on file   Social History Narrative    Not on file     Past Medical History:   Diagnosis Date    Arrhythmia     Arthritis     GENERALIZED TO JOINTS-WRISTS & KNEES.  Asthma     INHALERS    Asthma, chronic 10/8/2009    Diabetes ([address]     USES PILLS TO CONTROLL    GERD (gastroesophageal reflux disease)     Gout     Hypertension     CONTROLLED BY MEDS.  Nausea & vomiting     Obesity          Prior to Admission Medications   Prescriptions Last Dose Informant Patient Reported? Taking? Blood-Glucose Meter monitoring kit 5/30/2019 at Unknown time  No Yes   Sig: Use once daily to test blood sugar   SITagliptin-metFORMIN (JANUMET) 50-1,000 mg per tablet 5/30/2019 at Unknown time  No Yes   Sig: Take 1 Tab by mouth two (2) times daily (with meals). Dx: E11.42   albuterol (PROVENTIL HFA, VENTOLIN HFA, PROAIR HFA) 90 mcg/actuation inhaler 5/30/2019 at Unknown time  No Yes   Sig: Take 2 Puffs by inhalation every four (4) hours as needed for Wheezing or Shortness of Breath (cough). Indications: Acute Asthma Attack   alcohol swabs (ALCOHOL PREP PADS) padm 5/30/2019 at Unknown time  No Yes   Sig: Use to test blood sugar once a day   amLODIPine (NORVASC) 10 mg tablet   Yes No   Sig: Take  by mouth daily. aspirin delayed-release 81 mg tablet 5/29/2019  Yes No   Sig: Take  by mouth daily. cyclobenzaprine (FLEXERIL) 5 mg tablet Unknown at Unknown time  Yes No   Sig: TAKE 1-2 TABLETS BY MOUTH AT BEDTIME   diclofenac EC (VOLTAREN) 75 mg EC tablet 5/28/2019  Yes No   Sig: TAKE 1 TABLET BY MOUTH TWICE A DAY   ergocalciferol (ERGOCALCIFEROL) 50,000 unit capsule 5/29/2019  No No   Sig: Take 1 Cap by mouth every seven (7) days. Indications: vitamin D deficiency   fluticasone-salmeterol (ADVAIR DISKUS) 250-50 mcg/Dose diskus inhaler 5/30/2019 at Unknown time  Yes Yes   Sig: Take 1 Puff by inhalation every twelve (12) hours. glucose blood VI test strips (BLOOD GLUCOSE TEST) strip 5/30/2019 at Unknown time  No Yes   Sig: Use to test blood sugar once a day.  Dx: H24.61 lancets misc 5/30/2019 at Unknown time  No Yes   Sig: Test blood sugar once a day   promethazine (PHENERGAN) 25 mg tablet 5/24/2019 at Unknown time  No Yes   Sig: Take 1 Tab by mouth every six (6) hours as needed for Nausea. triamterene-hydroCHLOROthiazide (MAXZIDE) 37.5-25 mg per tablet 5/30/2019 at Unknown time  Yes Yes   Sig: Take  by mouth daily. Facility-Administered Medications: None           The review of systems is:  Negative  for shortness of breath or chest pain      The heart, lungs, and mental status were satisfactory for the administration of deep sedation and for the procedure. I discussed with the patient the objectives, risks, consequences and alternatives to the procedure.       Valeria Uribe MD  5/31/2019  3:29 PM

## 2019-05-31 NOTE — ANESTHESIA PREPROCEDURE EVALUATION
Relevant Problems   No relevant active problems       Anesthetic History     PONV          Review of Systems / Medical History  Patient summary reviewed, nursing notes reviewed and pertinent labs reviewed    Pulmonary          Smoker (EX 10 yr smoker, quit in 2010)  Asthma        Neuro/Psych              Cardiovascular    Hypertension        Dysrhythmias       Exercise tolerance: >4 METS  Comments: 3-2019 EKG: NSR, T wave abnormality ( also noted from 2017)    3-2018 NORMAL Cardiac Stress Test,  68% EF   GI/Hepatic/Renal     GERD          Comments: fam hx of colon polyps Endo/Other    Diabetes: type 2    Obesity and arthritis     Other Findings   Comments: Gout    Vit D defic           Physical Exam    Airway  Mallampati: II  TM Distance: > 6 cm  Neck ROM: normal range of motion   Mouth opening: Normal     Cardiovascular  Regular rate and rhythm,  S1 and S2 normal,  no murmur, click, rub, or gallop             Dental    Dentition: Full upper dentures     Pulmonary  Breath sounds clear to auscultation               Abdominal  GI exam deferred       Other Findings            Anesthetic Plan    ASA: 3  Anesthesia type: total IV anesthesia          Induction: Intravenous  Anesthetic plan and risks discussed with: Patient

## 2019-05-31 NOTE — ANESTHESIA POSTPROCEDURE EVALUATION
Procedure(s):  COLONOSCOPY  ENDOSCOPIC POLYPECTOMY. total IV anesthesia    Anesthesia Post Evaluation        Patient location during evaluation: PACU  Note status: Adequate. Level of consciousness: responsive to verbal stimuli and sleepy but conscious  Pain management: satisfactory to patient  Airway patency: patent  Anesthetic complications: no  Cardiovascular status: acceptable  Respiratory status: acceptable  Hydration status: acceptable  Comments: +Post-Anesthesia Evaluation and Assessment    Patient: Richy Mosley MRN: [id number]  SSN: [id number]   YOB: 1960  Age: 61 y.o. Sex: female      Cardiovascular Function/Vital Signs    /74   Pulse 96   Temp 36.7 °C (98 °F)   Resp 21   Ht 5' 2\" (1.575 m)   Wt 75.8 kg (167 lb)   SpO2 99%   Breastfeeding? No   BMI 30.54 kg/m²     Patient is status post Procedure(s):  COLONOSCOPY  ENDOSCOPIC POLYPECTOMY. Nausea/Vomiting: Controlled. Postoperative hydration reviewed and adequate. Pain:  Pain Scale 1: Numeric (0 - 10) (05/31/19 1555)  Pain Intensity 1: 0 (05/31/19 1555)   Managed. Neurological Status: At baseline. Mental Status and Level of Consciousness: Arousable. Pulmonary Status:   O2 Device: Room air (05/31/19 1555)   Adequate oxygenation and airway patent. Complications related to anesthesia: None    Post-anesthesia assessment completed. No concerns. Signed By: Michael Patterson DO    5/31/2019  Post anesthesia nausea and vomiting:  controlled      No vitals data found for the desired time range.

## 2019-06-05 ENCOUNTER — OFFICE VISIT (OUTPATIENT)
Dept: INTERNAL MEDICINE CLINIC | Facility: CLINIC | Age: 59
End: 2019-06-05

## 2019-06-05 VITALS
OXYGEN SATURATION: 99 % | SYSTOLIC BLOOD PRESSURE: 122 MMHG | HEART RATE: 102 BPM | BODY MASS INDEX: 30.91 KG/M2 | TEMPERATURE: 98.3 F | WEIGHT: 168 LBS | RESPIRATION RATE: 16 BRPM | HEIGHT: 62 IN | DIASTOLIC BLOOD PRESSURE: 77 MMHG

## 2019-06-05 DIAGNOSIS — M79.671 RIGHT FOOT PAIN: Primary | ICD-10-CM

## 2019-06-05 RX ORDER — METHYLPREDNISOLONE 4 MG/1
TABLET ORAL
Qty: 1 DOSE PACK | Refills: 0 | Status: SHIPPED | OUTPATIENT
Start: 2019-06-05 | End: 2019-06-24

## 2019-06-05 NOTE — PROGRESS NOTES
Miguel Denise  Identified pt with two pt identifiers(name and ). Chief Complaint   Patient presents with    Foot Pain     top of right foot/foot injury from about a month ago   Saw Dr Judith Jewell for colonoscopy and has eye appt scheduled. 1. Have you been to the ER, urgent care clinic since your last visit? Hospitalized since your last visit? NO    2. Have you seen or consulted any other health care providers outside of the [address] [place] [address] since your last visit? Include any pap smears or colon screening. Therapy at [facility] provider has been notified of reason for visit, vitals and flowsheets obtained on patients.      Patient received paperwork for advance directive during previous visit but has not completed at this time     Reviewed record In preparation for visit, huddled with provider and have obtained necessary documentation      Health Maintenance Due   Topic    Pneumococcal 0-64 years (1 of 1 - PPSV23)    EYE EXAM RETINAL OR DILATED     FOBT Q 1 YEAR AGE 50-75        Wt Readings from Last 3 Encounters:   19 168 lb (76.2 kg)   19 167 lb (75.8 kg)   19 171 lb (77.6 kg)     Temp Readings from Last 3 Encounters:   19 98.3 °F (36.8 °C) (Oral)   19 98 °F (36.7 °C)   19 98 °F (36.7 °C) (Oral)     BP Readings from Last 3 Encounters:   19 122/77   19 142/72   19 128/80     Pulse Readings from Last 3 Encounters:   19 (!) 102   19 86   19 90     Vitals:    19 1506   BP: 122/77   Pulse: (!) 102   Resp: 16   Temp: 98.3 °F (36.8 °C)   TempSrc: Oral   SpO2: 99%   Weight: 168 lb (76.2 kg)   Height: 5' 2\" (1.575 m)   PainSc:   9   PainLoc: Foot   LMP: 2011         Learning Assessment:  :     Learning Assessment 3/25/2019   PRIMARY LEARNER Patient   PRIMARY LANGUAGE ENGLISH   LEARNER PREFERENCE PRIMARY READING   ANSWERED BY patient   RELATIONSHIP SELF       Depression Screening:  :     3 most recent PHQ Screens 3/25/2019   Little interest or pleasure in doing things Not at all   Feeling down, depressed, irritable, or hopeless Several days   Total Score PHQ 2 1       Fall Risk Assessment:  :     Fall Risk Assessment, last 12 mths 3/25/2019   Able to walk? Yes   Fall in past 12 months? No       Abuse Screening:  :     Abuse Screening Questionnaire 3/25/2019   Do you ever feel afraid of your partner? N   Are you in a relationship with someone who physically or mentally threatens you? N   Is it safe for you to go home? Y       ADL Screening:  :     ADL Assessment 3/25/2019   Feeding yourself No Help Needed   Getting from bed to chair No Help Needed   Getting dressed No Help Needed   Bathing or showering No Help Needed   Walk across the room (includes cane/walker) No Help Needed   Using the telphone No Help Needed   Taking your medications No Help Needed   Preparing meals No Help Needed   Managing money (expenses/bills) No Help Needed   Moderately strenuous housework (laundry) No Help Needed   Shopping for personal items (toiletries/medicines) No Help Needed   Shopping for groceries No Help Needed   Driving No Help Needed   Climbing a flight of stairs No Help Needed   Getting to places beyond walking distances No Help Needed                 Medication reconciliation up to date and corrected with patient at this time.

## 2019-06-05 NOTE — PATIENT INSTRUCTIONS
Gout: Care Instructions  Your Care Instructions    Gout is a form of arthritis caused by a buildup of uric acid crystals in a joint. It causes sudden attacks of pain, swelling, redness, and stiffness, usually in one joint, especially the big toe. Gout usually comes on without a cause. But it can be brought on by drinking alcohol (especially beer) or eating seafood and red meat. Taking certain medicines, such as diuretics or aspirin, also can bring on an attack of gout. Taking your medicines as prescribed and following up with your doctor regularly can help you avoid gout attacks in the future. Follow-up care is a key part of your treatment and safety. Be sure to make and go to all appointments, and call your doctor if you are having problems. It's also a good idea to know your test results and keep a list of the medicines you take. How can you care for yourself at home? · If the joint is swollen, put ice or a cold pack on the area for 10 to 20 minutes at a time. Put a thin cloth between the ice and your skin. · Prop up the sore limb on a pillow when you ice it or anytime you sit or lie down during the next 3 days. Try to keep it above the level of your heart. This will help reduce swelling. · Rest sore joints. Avoid activities that put weight or strain on the joints for a few days. Take short rest breaks from your regular activities during the day. · Take your medicines exactly as prescribed. Call your doctor if you think you are having a problem with your medicine. · Take pain medicines exactly as directed. ? If the doctor gave you a prescription medicine for pain, take it as prescribed. ? If you are not taking a prescription pain medicine, ask your doctor if you can take an over-the-counter medicine. · Eat less seafood and red meat. · Check with your doctor before drinking alcohol. · Losing weight, if you are overweight, may help reduce attacks of gout. But do not go on a [employer]. \" Losing a lot of weight in a short amount of time can cause a gout attack. When should you call for help? Call your doctor now or seek immediate medical care if:    · You have a fever.     · The joint is so painful you cannot use it.     · You have sudden, unexplained swelling, redness, warmth, or severe pain in one or more joints.    Watch closely for changes in your health, and be sure to contact your doctor if:    · You have joint pain.     · Your symptoms get worse or are not improving after 2 or 3 days. Where can you learn more? Go to http://nikita-saji.info/. Enter I674 in the search box to learn more about \"Gout: Care Instructions. \"  Current as of: Geovanna 10, 2018  Content Version: 11.9  © 4100-9707 [employer]. Care instructions adapted under license by [employer] (which disclaims liability or warranty for this information). If you have questions about a medical condition or this instruction, always ask your healthcare professional. [place] [address] any warranty or liability for your use of this information.

## 2019-06-05 NOTE — PROGRESS NOTES
CC:   Chief Complaint   Patient presents with    Foot Pain     top of right foot/foot injury from about a month ago    Other     Saw Dr Ketan Fierro for colonoscopy and has eye appt scheduled. HISTORY OF PRESENT ILLNESS  Fernanda Arceo is a 61 y.o. female. Presents for evaluation of right foot pain. She has type 2 DM, HTN, and asthma. She complains of 1 month history of 7/10, constant, achy pain located that the latero-dorsal aspect of right foot. Exacerbating factor: walking, especially if walking on concrete. Alleviating factor: resting the foot. Believes foot pain started after injuring her foot when she used it to push a heavy cart holding cleaning products from the wall. Denies problems walking or weight bearing. Patient Active Problem List   Diagnosis Code    Type 2 diabetes mellitus without complication, without long-term current use of insulin (HCC) E11.9    HTN (hypertension) I10    Arthritis M19.90    GERD (gastroesophageal reflux disease) K21.9    Vitamin D deficiency E55.9     Past Medical History:   Diagnosis Date    Arrhythmia     Arthritis     GENERALIZED TO JOINTS-WRISTS & KNEES.  Asthma     INHALERS    Asthma, chronic 10/8/2009    Diabetes ([address]     USES PILLS TO CONTROLL    GERD (gastroesophageal reflux disease)     Gout     Hypertension     CONTROLLED BY MEDS.  Nausea & vomiting     Obesity      Allergies   Allergen Reactions    Latex Rash     Severe rash    Ampicillin Anaphylaxis    Betadine Antiseptic Gauze Hives    Contrast Dye [Iodine] Hives and Swelling     Pt.  Reports throat swelling      Other Medication Rash     Betadine soap/blisters    Fd And C Blue No.1 Anaphylaxis    Penicillins Hives       Current Outpatient Medications   Medication Sig Dispense Refill    cyclobenzaprine (FLEXERIL) 5 mg tablet TAKE 1-2 TABLETS BY MOUTH AT BEDTIME  1    diclofenac EC (VOLTAREN) 75 mg EC tablet TAKE 1 TABLET BY MOUTH TWICE A DAY  1    promethazine (PHENERGAN) 25 mg tablet Take 1 Tab by mouth every six (6) hours as needed for Nausea. 20 Tab 0    SITagliptin-metFORMIN (JANUMET) 50-1,000 mg per tablet Take 1 Tab by mouth two (2) times daily (with meals). Dx: E11.42 180 Tab 3    glucose blood VI test strips (BLOOD GLUCOSE TEST) strip Use to test blood sugar once a day. Dx: E11.42 90 Strip 3    ergocalciferol (ERGOCALCIFEROL) 50,000 unit capsule Take 1 Cap by mouth every seven (7) days. Indications: vitamin D deficiency 12 Cap 3    aspirin delayed-release 81 mg tablet Take  by mouth daily.  Blood-Glucose Meter monitoring kit Use once daily to test blood sugar 1 Kit 0    lancets misc Test blood sugar once a day 30 Each 11    alcohol swabs (ALCOHOL PREP PADS) padm Use to test blood sugar once a day 40 Pad 11    triamterene-hydroCHLOROthiazide (MAXZIDE) 37.5-25 mg per tablet Take  by mouth daily.  amLODIPine (NORVASC) 10 mg tablet Take  by mouth daily.  albuterol (PROVENTIL HFA, VENTOLIN HFA, PROAIR HFA) 90 mcg/actuation inhaler Take 2 Puffs by inhalation every four (4) hours as needed for Wheezing or Shortness of Breath (cough). Indications: Acute Asthma Attack 1 Inhaler 0    fluticasone-salmeterol (ADVAIR DISKUS) 250-50 mcg/Dose diskus inhaler Take 1 Puff by inhalation every twelve (12) hours. PHYSICAL EXAM  Visit Vitals  /77 (BP 1 Location: Left arm, BP Patient Position: Sitting)   Pulse (!) 102   Temp 98.3 °F (36.8 °C) (Oral)   Resp 16   Ht 5' 2\" (1.575 m)   Wt 168 lb (76.2 kg)   LMP 06/19/2011   SpO2 99%   BMI 30.73 kg/m²       General: Obese, no distress. HEENT:  Head normocephalic/atraumatic, no scleral icterus  Lungs:  Clear to ausculation bilaterally. Good air movement. Heart: Tachycardic, regular rhythm, normal S1 and S2, no murmur, gallop, or rub  Extremities: No clubbing, cyanosis, or edema. Tenderness and mild warmth at mid and lateral dorsum of right foot        ASSESSMENT AND PLAN    ICD-10-CM ICD-9-CM    1. Right foot pain M79.671 729.5 methylPREDNISolone (MEDROL DOSEPACK) 4 mg tablet      XR FOOT RT AP/LAT     Diagnoses and all orders for this visit:    1. Right foot pain  Most likely due to gout or pseudogout. Has had gout before in the past.    -     Start methylPREDNISolone (MEDROL DOSEPACK) 4 mg tablet; Use following package instructions. -     XR FOOT RT AP/LAT; Future, rule out fracture      Follow-up and Dispositions    · Return if symptoms worsen or fail to improve, for Sc heduled appointment on 6/24/19. I have discussed the diagnosis with the patient and the intended plan as seen in the above orders. Patient is in agreement. The patient has received an after-visit summary and questions were answered concerning future plans. I have discussed medication side effects and warnings with the patient as well.

## 2019-06-05 NOTE — LETTER
NOTIFICATION RETURN TO WORK / SCHOOL 
 
6/5/2019 3:42 PM 
 
Ms. Lance Torres [address] [place] [address] To Whom It May Concern: 
 
Lance Torres is currently under the care of [address] [place] [address]. Please excuse her for missing work from 6/3 - 6/7/19. She will return to work/school on: 6/8/19. If there are questions or concerns please have the patient contact our office. Sincerely, Miranda Solis MD

## 2019-06-17 PROBLEM — D24.1 FIBROADENOMA OF RIGHT BREAST: Status: ACTIVE | Noted: 2019-06-17

## 2019-06-24 ENCOUNTER — OFFICE VISIT (OUTPATIENT)
Dept: INTERNAL MEDICINE CLINIC | Facility: CLINIC | Age: 59
End: 2019-06-24

## 2019-06-24 VITALS
HEART RATE: 97 BPM | DIASTOLIC BLOOD PRESSURE: 79 MMHG | SYSTOLIC BLOOD PRESSURE: 133 MMHG | TEMPERATURE: 98.1 F | RESPIRATION RATE: 16 BRPM | HEIGHT: 62 IN | WEIGHT: 169 LBS | OXYGEN SATURATION: 97 % | BODY MASS INDEX: 31.1 KG/M2

## 2019-06-24 DIAGNOSIS — E11.42 DIABETIC POLYNEUROPATHY ASSOCIATED WITH TYPE 2 DIABETES MELLITUS (HCC): ICD-10-CM

## 2019-06-24 DIAGNOSIS — E11.3293 TYPE 2 DIABETES MELLITUS WITH BOTH EYES AFFECTED BY MILD NONPROLIFERATIVE RETINOPATHY WITHOUT MACULAR EDEMA, WITHOUT LONG-TERM CURRENT USE OF INSULIN (HCC): Primary | ICD-10-CM

## 2019-06-24 DIAGNOSIS — I10 ESSENTIAL HYPERTENSION: ICD-10-CM

## 2019-06-24 DIAGNOSIS — M79.671 RIGHT FOOT PAIN: ICD-10-CM

## 2019-06-24 LAB — HBA1C MFR BLD HPLC: 7 %

## 2019-06-24 NOTE — PATIENT INSTRUCTIONS

## 2019-06-24 NOTE — PROGRESS NOTES
CC:   Chief Complaint   Patient presents with    Diabetes    Asthma       HISTORY OF PRESENT ILLNESS  Cathleen Heredia is a 61 y.o. female. Presents for 3 month follow up evaluation. She has type 2 DM, HTN, and asthma. Reports back pain is a little better. Right foot pain has improved but still gets occasional stabbing pain at right foot. Endocrine Review  She is seen for diabetes.  Since last visit she reports no polyuria or polydipsia, no hypoglycemia.  Home glucose monitoring: is performed regularly.  She reports medication compliance: compliant all of the time.  Medication side effects: none.  Diabetic diet compliance: compliant most of the time.  Lab review: A1c today is 7.0%, was 7.7% on 3/25/19.  Eye exam: overdue.       Cardiovascular Review  The patient has hypertension and hyperlipidemia.  She reports taking medications as instructed, no medication side effects noted.  Diet and Lifestyle: generally follows a low fat low cholesterol diet, generally follows a low sodium diet, no formal exercise but active during the day.  Lab review: labs reviewed and discussed with patient.       Soc Hx  . She has 2 biological sons and 3 step-daughters. Works doing [employer] and [employer].  Former smoker; quit in 2010. Denies alcohol or recreational drug use. Does not get regular exercise outside of work. Recently completed a diploma program at the [employer] in [place]; plans to continue to get an associates degree.     Health Maintenance  Flu vaccine: 11/7/18      Pneumonia vaccine: 2017                                            Tetanus vaccine: 11/15/13                               Zoster vaccine: never had, declined  Pap smear: had TORO 6 yrs ago for fibroids. No longer indicated.   Colonoscopy: due for this (has FIT at home)  Mammogram: July 2018, [facility] (unable to get report)  Eye exam: 12/18, [facility] (unable to get report)  Foot exam: 3/25/19  Lipids: 3/25/19 (tot chol 189, LDL 98)  A1c: 6/24/19 (7.0%)  Advanced Directives: given information  End of Life: given information                  ROS  A complete review of systems was performed and is negative except for those mentioned in the HPI. Patient Active Problem List   Diagnosis Code    Type 2 diabetes mellitus without complication, without long-term current use of insulin (HCC) E11.9    HTN (hypertension) I10    Arthritis M19.90    GERD (gastroesophageal reflux disease) K21.9    Vitamin D deficiency E55.9    Fibroadenoma of right breast D24.1     Past Medical History:   Diagnosis Date    Arrhythmia     Arthritis     GENERALIZED TO JOINTS-WRISTS & KNEES.  Asthma     INHALERS    Asthma, chronic 10/8/2009    Diabetes ([address]     USES PILLS TO CONTROLL    GERD (gastroesophageal reflux disease)     Gout     Hypertension     CONTROLLED BY MEDS.  Nausea & vomiting     Obesity      Allergies   Allergen Reactions    Latex Rash     Severe rash    Ampicillin Anaphylaxis    Betadine Antiseptic Gauze Hives    Contrast Dye [Iodine] Hives and Swelling     Pt. Reports throat swelling      Other Medication Rash     Betadine soap/blisters    Fd And C Blue No.1 Anaphylaxis    Penicillins Hives       Current Outpatient Medications   Medication Sig Dispense Refill    cyclobenzaprine (FLEXERIL) 5 mg tablet TAKE 1-2 TABLETS BY MOUTH AT BEDTIME  1    diclofenac EC (VOLTAREN) 75 mg EC tablet TAKE 1 TABLET BY MOUTH TWICE A DAY  1    SITagliptin-metFORMIN (JANUMET) 50-1,000 mg per tablet Take 1 Tab by mouth two (2) times daily (with meals). Dx: E11.42 180 Tab 3    glucose blood VI test strips (BLOOD GLUCOSE TEST) strip Use to test blood sugar once a day. Dx: E11.42 90 Strip 3    ergocalciferol (ERGOCALCIFEROL) 50,000 unit capsule Take 1 Cap by mouth every seven (7) days. Indications: vitamin D deficiency 12 Cap 3    aspirin delayed-release 81 mg tablet Take  by mouth daily.       Blood-Glucose Meter monitoring kit Use once daily to test blood sugar 1 Kit 0    lancets misc Test blood sugar once a day 30 Each 11    alcohol swabs (ALCOHOL PREP PADS) padm Use to test blood sugar once a day 40 Pad 11    triamterene-hydroCHLOROthiazide (MAXZIDE) 37.5-25 mg per tablet Take  by mouth daily.  amLODIPine (NORVASC) 10 mg tablet Take  by mouth daily.  albuterol (PROVENTIL HFA, VENTOLIN HFA, PROAIR HFA) 90 mcg/actuation inhaler Take 2 Puffs by inhalation every four (4) hours as needed for Wheezing or Shortness of Breath (cough). Indications: Acute Asthma Attack 1 Inhaler 0    fluticasone-salmeterol (ADVAIR DISKUS) 250-50 mcg/Dose diskus inhaler Take 1 Puff by inhalation every twelve (12) hours. PHYSICAL EXAM  Visit Vitals  /79 (BP 1 Location: Left arm, BP Patient Position: Sitting)   Pulse 97   Temp 98.1 °F (36.7 °C) (Oral)   Resp 16   Ht 5' 2\" (1.575 m)   Wt 169 lb (76.7 kg)   LMP 06/19/2011   SpO2 97%   BMI 30.91 kg/m²   2 lb weight loss over past 3 months    General: Obese, no distress. HEENT:  Head normocephalic/atraumatic, no scleral icterus  Neck: Supple. No carotid bruits, JVD, lymphadenopathy, or thyromegaly. Lungs:  Clear to ausculation bilaterally. Good air movement. Heart:  Regular rate and rhythm, normal S1 and S2, no murmur, gallop, or rub  Extremities: No clubbing, cyanosis, or edema. Neurological: Alert and oriented. Psychiatric: Normal mood and affect. Behavior is normal.     Results for orders placed or performed in visit on 06/24/19   AMB POC HEMOGLOBIN A1C   Result Value Ref Range    Hemoglobin A1c (POC) 7.0 %         ASSESSMENT AND PLAN    ICD-10-CM ICD-9-CM    1. Type 2 diabetes mellitus with both eyes affected by mild nonproliferative retinopathy without macular edema, without long-term current use of insulin ([facility]) E11.3293 250.50 AMB POC HEMOGLOBIN A1C     362.04    2. Essential hypertension I10 401.9    3.  Diabetic polyneuropathy associated with type 2 diabetes mellitus (HCC) E11.42 250.60      357.2    4. Right foot pain M79.671 729.5      Diagnoses and all orders for this visit:    1. Type 2 diabetes mellitus with both eyes affected by mild nonproliferative retinopathy without macular edema, without long-term current use of insulin (HCC)  Improved control. A1c 7.0% today. Continue present management with Janumet. Recently found to have new mild NPDR by Dr. Janey Shni. He strongly recommended she undergo diabetic diet education.  -     AMB POC HEMOGLOBIN A1C    2. Essential hypertension  Controlled. Continue present management. 3. Diabetic polyneuropathy associated with type 2 diabetes mellitus ([facility]    4. Right foot pain  Likely due to #3. Follow-up and Dispositions    · Return in about 3 months (around 9/24/2019), or if symptoms worsen or fail to improve, for DM, HTN. I have discussed the diagnosis with the patient and the intended plan as seen in the above orders. Patient is in agreement. The patient has received an after-visit summary and questions were answered concerning future plans. I have discussed medication side effects and warnings with the patient as well.

## 2019-06-24 NOTE — PROGRESS NOTES
Jose Michael  Identified pt with two pt identifiers(name and ). Chief Complaint   Patient presents with    Diabetes    Asthma       1. Have you been to the ER, urgent care clinic since your last visit? Hospitalized since your last visit? NO    2. Have you seen or consulted any other health care providers outside of the [address] [place] [address] since your last visit? Include any pap smears or colon screening. NO    Today's provider has been notified of reason for visit, vitals and flowsheets obtained on patients.      Patient received paperwork for advance directive during previous visit but has not completed at this time     Reviewed record In preparation for visit, huddled with provider and have obtained necessary documentation      Health Maintenance Due   Topic    Pneumococcal 0-64 years (1 of 1 - PPSV23)       Wt Readings from Last 3 Encounters:   19 169 lb (76.7 kg)   19 168 lb (76.2 kg)   19 167 lb (75.8 kg)     Temp Readings from Last 3 Encounters:   19 98.1 °F (36.7 °C) (Oral)   19 98.3 °F (36.8 °C) (Oral)   19 98 °F (36.7 °C)     BP Readings from Last 3 Encounters:   19 133/79   19 122/77   19 142/72     Pulse Readings from Last 3 Encounters:   19 97   19 (!) 102   19 86     Vitals:    19 1440   BP: 133/79   Pulse: 97   Resp: 16   Temp: 98.1 °F (36.7 °C)   TempSrc: Oral   SpO2: 97%   Weight: 169 lb (76.7 kg)   Height: 5' 2\" (1.575 m)   PainSc:   0 - No pain   LMP: 2011         Learning Assessment:  :     Learning Assessment 3/25/2019   PRIMARY LEARNER Patient   PRIMARY LANGUAGE ENGLISH   LEARNER PREFERENCE PRIMARY READING   ANSWERED BY patient   RELATIONSHIP SELF       Depression Screening:  :     3 most recent PHQ Screens 3/25/2019   Little interest or pleasure in doing things Not at all   Feeling down, depressed, irritable, or hopeless Several days   Total Score PHQ 2 1       Fall Risk Assessment:  :     Fall Risk Assessment, last 12 mths 3/25/2019   Able to walk? Yes   Fall in past 12 months? No       Abuse Screening:  :     Abuse Screening Questionnaire 3/25/2019   Do you ever feel afraid of your partner? N   Are you in a relationship with someone who physically or mentally threatens you? N   Is it safe for you to go home? Y       ADL Screening:  :     ADL Assessment 3/25/2019   Feeding yourself No Help Needed   Getting from bed to chair No Help Needed   Getting dressed No Help Needed   Bathing or showering No Help Needed   Walk across the room (includes cane/walker) No Help Needed   Using the telphone No Help Needed   Taking your medications No Help Needed   Preparing meals No Help Needed   Managing money (expenses/bills) No Help Needed   Moderately strenuous housework (laundry) No Help Needed   Shopping for personal items (toiletries/medicines) No Help Needed   Shopping for groceries No Help Needed   Driving No Help Needed   Climbing a flight of stairs No Help Needed   Getting to places beyond walking distances No Help Needed                 Medication reconciliation up to date and corrected with patient at this time.

## 2019-07-07 ENCOUNTER — APPOINTMENT (OUTPATIENT)
Dept: GENERAL RADIOLOGY | Age: 59
End: 2019-07-07
Attending: EMERGENCY MEDICINE
Payer: COMMERCIAL

## 2019-07-07 ENCOUNTER — HOSPITAL ENCOUNTER (EMERGENCY)
Age: 59
Discharge: HOME | End: 2019-07-07
Attending: EMERGENCY MEDICINE
Payer: COMMERCIAL

## 2019-07-07 VITALS
SYSTOLIC BLOOD PRESSURE: 148 MMHG | RESPIRATION RATE: 15 BRPM | HEIGHT: 62 IN | TEMPERATURE: 98.2 F | DIASTOLIC BLOOD PRESSURE: 79 MMHG | HEART RATE: 94 BPM | BODY MASS INDEX: 31.08 KG/M2 | WEIGHT: 168.87 LBS | OXYGEN SATURATION: 99 %

## 2019-07-07 DIAGNOSIS — J45.41 MODERATE PERSISTENT ASTHMA WITH ACUTE EXACERBATION: Primary | ICD-10-CM

## 2019-07-07 DIAGNOSIS — J20.9 ACUTE BRONCHITIS, UNSPECIFIED ORGANISM: ICD-10-CM

## 2019-07-07 DIAGNOSIS — E87.1 HYPONATREMIA: ICD-10-CM

## 2019-07-07 LAB
ALBUMIN SERPL-MCNC: 4 G/DL (ref 3.5–5)
ALBUMIN/GLOB SERPL: 1 {RATIO} (ref 1.1–2.2)
ALP SERPL-CCNC: 101 U/L (ref 45–117)
ALT SERPL-CCNC: 15 U/L (ref 12–78)
ANION GAP SERPL CALC-SCNC: 6 MMOL/L (ref 5–15)
AST SERPL-CCNC: 5 U/L (ref 15–37)
BASOPHILS # BLD: 0 K/UL (ref 0–0.1)
BASOPHILS NFR BLD: 0 % (ref 0–1)
BILIRUB SERPL-MCNC: 0.2 MG/DL (ref 0.2–1)
BUN SERPL-MCNC: 16 MG/DL (ref 6–20)
BUN/CREAT SERPL: 15 (ref 12–20)
CALCIUM SERPL-MCNC: 10.1 MG/DL (ref 8.5–10.1)
CHLORIDE SERPL-SCNC: 94 MMOL/L (ref 97–108)
CK SERPL-CCNC: 46 U/L (ref 26–192)
CO2 SERPL-SCNC: 29 MMOL/L (ref 21–32)
CREAT SERPL-MCNC: 1.04 MG/DL (ref 0.55–1.02)
DIFFERENTIAL METHOD BLD: ABNORMAL
EOSINOPHIL # BLD: 0 K/UL (ref 0–0.4)
EOSINOPHIL NFR BLD: 0 % (ref 0–7)
ERYTHROCYTE [DISTWIDTH] IN BLOOD BY AUTOMATED COUNT: 12.7 % (ref 11.5–14.5)
GLOBULIN SER CALC-MCNC: 4 G/DL (ref 2–4)
GLUCOSE SERPL-MCNC: 244 MG/DL (ref 65–100)
HCT VFR BLD AUTO: 37.5 % (ref 35–47)
HGB BLD-MCNC: 12.3 G/DL (ref 11.5–16)
IMM GRANULOCYTES # BLD AUTO: 0.2 K/UL (ref 0–0.04)
IMM GRANULOCYTES NFR BLD AUTO: 2 % (ref 0–0.5)
LYMPHOCYTES # BLD: 2.1 K/UL (ref 0.8–3.5)
LYMPHOCYTES NFR BLD: 19 % (ref 12–49)
MCH RBC QN AUTO: 28 PG (ref 26–34)
MCHC RBC AUTO-ENTMCNC: 32.8 G/DL (ref 30–36.5)
MCV RBC AUTO: 85.2 FL (ref 80–99)
MONOCYTES # BLD: 0.6 K/UL (ref 0–1)
MONOCYTES NFR BLD: 5 % (ref 5–13)
NEUTS SEG # BLD: 8.3 K/UL (ref 1.8–8)
NEUTS SEG NFR BLD: 74 % (ref 32–75)
NRBC # BLD: 0 K/UL (ref 0–0.01)
NRBC BLD-RTO: 0 PER 100 WBC
PLATELET # BLD AUTO: 476 K/UL (ref 150–400)
PMV BLD AUTO: 9.5 FL (ref 8.9–12.9)
POTASSIUM SERPL-SCNC: 4.3 MMOL/L (ref 3.5–5.1)
PROT SERPL-MCNC: 8 G/DL (ref 6.4–8.2)
RBC # BLD AUTO: 4.4 M/UL (ref 3.8–5.2)
RBC MORPH BLD: ABNORMAL
SODIUM SERPL-SCNC: 129 MMOL/L (ref 136–145)
TROPONIN I BLD-MCNC: <0.04 NG/ML (ref 0–0.08)
TROPONIN I SERPL-MCNC: <0.05 NG/ML
WBC # BLD AUTO: 11.2 K/UL (ref 3.6–11)

## 2019-07-07 PROCEDURE — 93005 ELECTROCARDIOGRAM TRACING: CPT

## 2019-07-07 PROCEDURE — 96361 HYDRATE IV INFUSION ADD-ON: CPT

## 2019-07-07 PROCEDURE — 77030029684 HC NEB SM VOL KT MONA -A

## 2019-07-07 PROCEDURE — 85025 COMPLETE CBC W/AUTO DIFF WBC: CPT

## 2019-07-07 PROCEDURE — 82550 ASSAY OF CK (CPK): CPT

## 2019-07-07 PROCEDURE — 74011000250 HC RX REV CODE- 250: Performed by: EMERGENCY MEDICINE

## 2019-07-07 PROCEDURE — 94640 AIRWAY INHALATION TREATMENT: CPT

## 2019-07-07 PROCEDURE — 84484 ASSAY OF TROPONIN QUANT: CPT

## 2019-07-07 PROCEDURE — 36415 COLL VENOUS BLD VENIPUNCTURE: CPT

## 2019-07-07 PROCEDURE — 74011250636 HC RX REV CODE- 250/636: Performed by: EMERGENCY MEDICINE

## 2019-07-07 PROCEDURE — 71046 X-RAY EXAM CHEST 2 VIEWS: CPT

## 2019-07-07 PROCEDURE — 99285 EMERGENCY DEPT VISIT HI MDM: CPT

## 2019-07-07 PROCEDURE — 80053 COMPREHEN METABOLIC PANEL: CPT

## 2019-07-07 PROCEDURE — 96374 THER/PROPH/DIAG INJ IV PUSH: CPT

## 2019-07-07 RX ORDER — IPRATROPIUM BROMIDE AND ALBUTEROL SULFATE 2.5; .5 MG/3ML; MG/3ML
9 SOLUTION RESPIRATORY (INHALATION)
Status: COMPLETED | OUTPATIENT
Start: 2019-07-07 | End: 2019-07-07

## 2019-07-07 RX ORDER — AZITHROMYCIN 250 MG/1
TABLET, FILM COATED ORAL
Qty: 6 TAB | Refills: 0 | Status: SHIPPED | OUTPATIENT
Start: 2019-07-07 | End: 2019-07-12

## 2019-07-07 RX ADMIN — IPRATROPIUM BROMIDE AND ALBUTEROL SULFATE 9 ML: .5; 3 SOLUTION RESPIRATORY (INHALATION) at 14:19

## 2019-07-07 RX ADMIN — SODIUM CHLORIDE 500 ML: 900 INJECTION, SOLUTION INTRAVENOUS at 14:20

## 2019-07-07 RX ADMIN — METHYLPREDNISOLONE SODIUM SUCCINATE 125 MG: 125 INJECTION, POWDER, FOR SOLUTION INTRAMUSCULAR; INTRAVENOUS at 14:19

## 2019-07-07 NOTE — ED PROVIDER NOTES
EMERGENCY DEPARTMENT HISTORY AND PHYSICAL EXAM 
 
 
Date: 7/7/2019 Patient Name: Santana Velázquez History of Presenting Illness Chief Complaint Patient presents with  Shortness of Breath  
  started 7/4/19. seen at [address] [place] [address] given rx but no lab work or imaging. no relief with meds or inhaler and chest is very tight History Provided By: Patient HPI: Santana Velázquez, 61 y.o. female with PMHx significant for asthma, hypertension, diabetes, GERD, presents to the ED with cc of moderate to severe persistent cough and shortness of breath over the last 4 days. 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 She believes that dust may have been kicked up and made her feel worse. She was seen at a local ER recently and started on prednisone and Tylenol with codeine. She feels the symptoms have not helped her symptoms significantly and she is still coughing. She is taking her Advair and albuterol as prescribed by her primary care doctor. She denies any fever, productive cough, or significant chest pain, lower extremity edema, recent change in medications. She has no other associated symptoms. No other exacerbating or ameliorating factors. She reports it is been many months since she has had an asthma exacerbation in the past.  She has never been intubated or admitted to the ICU for asthma. There are no other complaints, changes, or physical findings at this time. PCP: Natalie Nolan MD 
 
No current facility-administered medications on file prior to encounter. Current Outpatient Medications on File Prior to Encounter Medication Sig Dispense Refill  cyclobenzaprine (FLEXERIL) 5 mg tablet TAKE 1-2 TABLETS BY MOUTH AT BEDTIME  1  
 diclofenac EC (VOLTAREN) 75 mg EC tablet TAKE 1 TABLET BY MOUTH TWICE A DAY  1  
 SITagliptin-metFORMIN (JANUMET) 50-1,000 mg per tablet Take 1 Tab by mouth two (2) times daily (with meals). Dx: E11.42 180 Tab 3  
 glucose blood VI test strips (BLOOD GLUCOSE TEST) strip Use to test blood sugar once a day. Dx: E11.42 90 Strip 3  ergocalciferol (ERGOCALCIFEROL) 50,000 unit capsule Take 1 Cap by mouth every seven (7) days. Indications: vitamin D deficiency 12 Cap 3  
 aspirin delayed-release 81 mg tablet Take  by mouth daily.  Blood-Glucose Meter monitoring kit Use once daily to test blood sugar 1 Kit 0  
 lancets misc Test blood sugar once a day 30 Each 11  
 alcohol swabs (ALCOHOL PREP PADS) padm Use to test blood sugar once a day 40 Pad 11  
 triamterene-hydroCHLOROthiazide (MAXZIDE) 37.5-25 mg per tablet Take  by mouth daily.  amLODIPine (NORVASC) 10 mg tablet Take  by mouth daily.  albuterol (PROVENTIL HFA, VENTOLIN HFA, PROAIR HFA) 90 mcg/actuation inhaler Take 2 Puffs by inhalation every four (4) hours as needed for Wheezing or Shortness of Breath (cough). Indications: Acute Asthma Attack 1 Inhaler 0  
 fluticasone-salmeterol (ADVAIR DISKUS) 250-50 mcg/Dose diskus inhaler Take 1 Puff by inhalation every twelve (12) hours. Past History Past Medical History: 
Past Medical History:  
Diagnosis Date  Arrhythmia  Arthritis GENERALIZED TO [facility].  Asthma INHALERS  Asthma, chronic 10/8/2009  Diabetes ([facility] [address] USES PILLS TO CONTROLL  
 GERD (gastroesophageal reflux disease)  Gout  Hypertension CONTROLLED BY MEDS.  Nausea & vomiting  Obesity Past Surgical History: 
Past Surgical History:  
Procedure Laterality Date  COLONOSCOPY N/A 5/31/2019 COLONOSCOPY performed by Filemon Mcnair MD at [facility] ENDOSCOPY. 6 mm tubular adenoma in sigmoid polyp. REpeat in 6 months due to poor prep  HX BREAST BIOPSY Right   
 benign  HX CHOLECYSTECTOMY [facility] CARDIAC CATH X2  
 HX HEENT  1961 CLEFT PALATE REPAIR.  HX HYSTERECTOMY  2011  
 HX ORTHOPAEDIC    
 carpal tunnel, trigger finger, hand surgery left arm  HX OTHER SURGICAL CLeft repair, piloneidal cyst, hand surgery,   
[id number] [facility] Family History: 
Family History Problem Relation Age of Onset  Asthma Mother  Hypertension Mother  Diabetes Father  Heart Disease Father MI  
 Stroke Brother CEREBRAL ANEURYSM  
 Heart Attack Sister  Hypertension Sister  No Known Problems Sister Social History: 
Social History Tobacco Use  Smoking status: Former Smoker Years: 10.00 Last attempt to quit: 2010 Years since quittin.9  Smokeless tobacco: Never Used Substance Use Topics  Alcohol use: No  
 Drug use: No  
 
 
Allergies: Allergies Allergen Reactions  Latex Rash Severe rash  Ampicillin Anaphylaxis  Betadine Antiseptic Gauze Hives  Contrast Dye [Iodine] Hives and Swelling Pt. Reports throat swelling  Other Medication Rash Betadine soap/blisters  Fd And C Blue No.1 Anaphylaxis  Penicillins Hives Review of Systems Review of Systems Constitutional: Negative for chills, diaphoresis, fatigue and fever. HENT: Negative for ear pain and sore throat. Eyes: Negative for pain and redness. Respiratory: Positive for cough. Cardiovascular: Negative for chest pain and leg swelling. Gastrointestinal: Negative for abdominal pain, diarrhea, nausea and vomiting. Endocrine: Negative for cold intolerance and heat intolerance. Genitourinary: Negative for flank pain and hematuria. Musculoskeletal: Negative for back pain and neck stiffness. Skin: Negative for rash and wound. Neurological: Negative for dizziness, syncope and headaches. All other systems reviewed and are negative. Physical Exam  
Physical Exam  
Constitutional: She is oriented to person, place, and time.  She appears well-developed and well-nourished. Patient is a healthy-appearing female who appears in no acute distress. She is speaking in full sentences. Intermittent cough. HENT:  
Head: Normocephalic and atraumatic. Mouth/Throat: Oropharynx is clear and moist. No oropharyngeal exudate. Eyes: Pupils are equal, round, and reactive to light. Conjunctivae and EOM are normal.  
Neck: Normal range of motion. Cardiovascular: Normal rate and regular rhythm. No murmur heard. Pulmonary/Chest: Effort normal and breath sounds normal. No respiratory distress. She has no wheezes. Abdominal: Soft. Bowel sounds are normal. She exhibits no distension. There is no tenderness. Musculoskeletal: Normal range of motion. She exhibits no edema or deformity. Neurological: She is alert and oriented to person, place, and time. Coordination normal.  
Skin: Skin is warm and dry. No rash noted. Psychiatric: She has a normal mood and affect. Her behavior is normal.  
Nursing note and vitals reviewed. Diagnostic Study Results Labs - Recent Results (from the past 24 hour(s)) EKG, 12 LEAD, INITIAL Collection Time: 07/07/19 12:55 PM  
Result Value Ref Range Ventricular Rate 102 BPM  
 Atrial Rate 102 BPM  
 P-R Interval 116 ms  
 QRS Duration 68 ms Q-T Interval 338 ms QTC Calculation (Bezet) 440 ms Calculated P Axis 60 degrees Calculated R Axis 18 degrees Calculated T Axis 106 degrees Diagnosis Sinus tachycardia T wave abnormality, consider lateral ischemia When compared with ECG of 06-MAR-2019 15:38, Nonspecific T wave abnormality no longer evident in Inferior leads CBC WITH AUTOMATED DIFF Collection Time: 07/07/19  1:15 PM  
Result Value Ref Range WBC 11.2 (H) 3.6 - 11.0 K/uL  
 RBC 4.40 3.80 - 5.20 M/uL  
 HGB 12.3 11.5 - 16.0 g/dL HCT 37.5 35.0 - 47.0 % MCV 85.2 80.0 - 99.0 FL  
 MCH 28.0 26.0 - 34.0 PG  
 MCHC 32.8 30.0 - 36.5 g/dL  
 RDW 12.7 11.5 - 14.5 % PLATELET 152 (H) 694 - 400 K/uL MPV 9.5 8.9 - 12.9 FL  
 NRBC 0.0 0  WBC ABSOLUTE NRBC 0.00 0.00 - 0.01 K/uL NEUTROPHILS 74 32 - 75 % LYMPHOCYTES 19 12 - 49 % MONOCYTES 5 5 - 13 % EOSINOPHILS 0 0 - 7 % BASOPHILS 0 0 - 1 % IMMATURE GRANULOCYTES 2 (H) 0.0 - 0.5 % ABS. NEUTROPHILS 8.3 (H) 1.8 - 8.0 K/UL  
 ABS. LYMPHOCYTES 2.1 0.8 - 3.5 K/UL  
 ABS. MONOCYTES 0.6 0.0 - 1.0 K/UL  
 ABS. EOSINOPHILS 0.0 0.0 - 0.4 K/UL  
 ABS. BASOPHILS 0.0 0.0 - 0.1 K/UL  
 ABS. IMM. GRANS. 0.2 (H) 0.00 - 0.04 K/UL  
 DF AUTOMATED    
 RBC COMMENTS NORMOCYTIC, NORMOCHROMIC METABOLIC PANEL, COMPREHENSIVE Collection Time: 07/07/19  1:15 PM  
Result Value Ref Range Sodium 129 (L) 136 - 145 mmol/L Potassium 4.3 3.5 - 5.1 mmol/L Chloride 94 (L) 97 - 108 mmol/L  
 CO2 29 21 - 32 mmol/L Anion gap 6 5 - 15 mmol/L Glucose 244 (H) 65 - 100 mg/dL BUN 16 6 - 20 MG/DL Creatinine 1.04 (H) 0.55 - 1.02 MG/DL  
 BUN/Creatinine ratio 15 12 - 20 GFR est AA >60 >60 ml/min/1.73m2 GFR est non-AA 54 (L) >60 ml/min/1.73m2 Calcium 10.1 8.5 - 10.1 MG/DL Bilirubin, total 0.2 0.2 - 1.0 MG/DL  
 ALT (SGPT) 15 12 - 78 U/L  
 AST (SGOT) 5 (L) 15 - 37 U/L Alk. phosphatase 101 45 - 117 U/L Protein, total 8.0 6.4 - 8.2 g/dL Albumin 4.0 3.5 - 5.0 g/dL Globulin 4.0 2.0 - 4.0 g/dL A-G Ratio 1.0 (L) 1.1 - 2.2 CK W/ REFLX CKMB Collection Time: 07/07/19  1:15 PM  
Result Value Ref Range CK 46 26 - 192 U/L  
TROPONIN I Collection Time: 07/07/19  1:15 PM  
Result Value Ref Range Troponin-I, Qt. <0.05 <0.05 ng/mL Radiologic Studies -  
XR CHEST PA LAT Final Result IMPRESSION:  
  
No acute pulmonary process. CT Results  (Last 48 hours) None CXR Results  (Last 48 hours) 07/07/19 1403  XR CHEST PA LAT Final result Impression:  IMPRESSION:  
   
No acute pulmonary process. Narrative:  history: Shortness of breath and chest tightness COMPARISON: 3/6/2019 FINDINGS:  
   
Frontal and lateral chest radiograph submitted for review. The heart is not enlarged. No acute pulmonary process. No pleural effusion. No evidence for pneumothorax. Medical Decision Making I am the first provider for this patient. I reviewed the vital signs, available nursing notes, past medical history, past surgical history, family history and social history. Vital Signs-Reviewed the patient's vital signs. Patient Vitals for the past 24 hrs: 
 Temp Pulse Resp BP SpO2  
07/07/19 1410  99 15  100 % 07/07/19 1404 98.4 °F (36.9 °C) (!) 109 15 (!) 165/92 100 % 07/07/19 1402  (!) 111 27 (!) 165/92   
07/07/19 1357  (!) 102 19    
07/07/19 1356    155/89   
07/07/19 1352     100 % 07/07/19 1250 97.9 °F (36.6 °C) 100 18 157/85 94 % Pulse Oximetry Analysis - ***% on *** Cardiac Monitor:  
Rate: *** bpm 
Rhythm: {Rhythm including paccardio:11289} Records Reviewed: {CDIRECORDSREVIEWED:79236} Differential Diagnosis: 
 
*** Provider Notes (Medical Decision Making):  
*** 
 
ED Course:  
 
Initial assessment performed. The patients presenting problems have been discussed, and they are in agreement with the care plan formulated and outlined with them. I have encouraged them to ask questions as they arise throughout their visit. Critical Care Time:  
 
*** Disposition: 
*** PLAN: 
1. Current Discharge Medication List  
  
 
2. Follow-up Information None Return to ED if worse Diagnosis Clinical Impression: No diagnosis found.

## 2019-07-07 NOTE — DISCHARGE INSTRUCTIONS
Patient Education        Asthma Attack: Care Instructions  Your Care Instructions    During an asthma attack, the airways swell and narrow. This makes it hard to breathe. Severe asthma attacks can be life-threatening, but you can help prevent them by keeping your asthma under control and treating symptoms before they get bad. Symptoms include being short of breath, having chest tightness, coughing, and wheezing. Noting and treating these symptoms can also help you avoid future trips to the emergency room. The doctor has checked you carefully, but problems can develop later. If you notice any problems or new symptoms, get medical treatment right away. Follow-up care is a key part of your treatment and safety. Be sure to make and go to all appointments, and call your doctor if you are having problems. It's also a good idea to know your test results and keep a list of the medicines you take. How can you care for yourself at home? · Follow your asthma action plan to prevent and treat attacks. If you don't have an asthma action plan, work with your doctor to create one. · Take your asthma medicines exactly as prescribed. Talk to your doctor right away if you have any questions about how to take them. ? Use your quick-relief medicine when you have symptoms of an attack. Quick-relief medicine is usually an albuterol inhaler. Some people need to use quick-relief medicine before they exercise. ? Take your controller medicine every day, not just when you have symptoms. Controller medicine is usually an inhaled corticosteroid. The goal is to prevent problems before they occur. Don't use your controller medicine to treat an attack that has already started. It doesn't work fast enough to help. ? If your doctor prescribed corticosteroid pills to use during an attack, take them exactly as prescribed. It may take hours for the pills to work, but they may make the episode shorter and help you breathe better. ?  Keep your quick-relief medicine with you at all times. · Talk to your doctor before using other medicines. Some medicines, such as aspirin, can cause asthma attacks in some people. · If you have a peak flow meter, use it to check how well you are breathing. This can help you predict when an asthma attack is going to occur. Then you can take medicine to prevent the asthma attack or make it less severe. · Do not smoke or allow others to smoke around you. Avoid smoky places. Smoking makes asthma worse. If you need help quitting, talk to your doctor about stop-smoking programs and medicines. These can increase your chances of quitting for good. · Learn what triggers an asthma attack for you, and avoid the triggers when you can. Common triggers include colds, smoke, air pollution, dust, pollen, mold, pets, cockroaches, stress, and cold air. · Avoid colds and the flu. Get a pneumococcal vaccine shot. If you have had one before, ask your doctor if you need a second dose. Get a flu vaccine every fall. If you must be around people with colds or the flu, wash your hands often. When should you call for help? Call 911 anytime you think you may need emergency care. For example, call if:    · You have severe trouble breathing.    Call your doctor now or seek immediate medical care if:    · Your symptoms do not get better after you have followed your asthma action plan.     · You have new or worse trouble breathing.     · Your coughing and wheezing get worse.     · You cough up dark brown or bloody mucus (sputum).     · You have a new or higher fever.    Watch closely for changes in your health, and be sure to contact your doctor if:    · You need to use quick-relief medicine on more than 2 days a week (unless it is just for exercise).     · You cough more deeply or more often, especially if you notice more mucus or a change in the color of your mucus.     · You are not getting better as expected. Where can you learn more?   Go to http://nikita-saji.info/. Enter J477 in the search box to learn more about \"Asthma Attack: Care Instructions. \"  Current as of: September 5, 2018  Content Version: 11.9  © 5575-4172 [employer]. Care instructions adapted under license by [employer] (which disclaims liability or warranty for this information). If you have questions about a medical condition or this instruction, always ask your healthcare professional. [place] [address] any warranty or liability for your use of this information. Patient Education        Bronchitis: Care Instructions  Your Care Instructions    Bronchitis is inflammation of the bronchial tubes, which carry air to the lungs. The tubes swell and produce mucus, or phlegm. The mucus and inflamed bronchial tubes make you cough. You may have trouble breathing. Most cases of bronchitis are caused by viruses like those that cause colds. Antibiotics usually do not help and they may be harmful. Bronchitis usually develops rapidly and lasts about 2 to 3 weeks in otherwise healthy people. Follow-up care is a key part of your treatment and safety. Be sure to make and go to all appointments, and call your doctor if you are having problems. It's also a good idea to know your test results and keep a list of the medicines you take. How can you care for yourself at home? · Take all medicines exactly as prescribed. Call your doctor if you think you are having a problem with your medicine. · Get some extra rest.  · Take an over-the-counter pain medicine, such as acetaminophen (Tylenol), ibuprofen (Advil, Motrin), or naproxen (Aleve) to reduce fever and relieve body aches. Read and follow all instructions on the label. · Do not take two or more pain medicines at the same time unless the doctor told you to. Many pain medicines have acetaminophen, which is Tylenol. Too much acetaminophen (Tylenol) can be harmful.   · Take an over-the-counter cough medicine that contains dextromethorphan to help quiet a dry, hacking cough so that you can sleep. Avoid cough medicines that have more than one active ingredient. Read and follow all instructions on the label. · Breathe moist air from a humidifier, hot shower, or sink filled with hot water. The heat and moisture will thin mucus so you can cough it out. · Do not smoke. Smoking can make bronchitis worse. If you need help quitting, talk to your doctor about stop-smoking programs and medicines. These can increase your chances of quitting for good. When should you call for help? Call 911 anytime you think you may need emergency care. For example, call if:    · You have severe trouble breathing.    Call your doctor now or seek immediate medical care if:    · You have new or worse trouble breathing.     · You cough up dark brown or bloody mucus (sputum).     · You have a new or higher fever.     · You have a new rash.    Watch closely for changes in your health, and be sure to contact your doctor if:    · You cough more deeply or more often, especially if you notice more mucus or a change in the color of your mucus.     · You are not getting better as expected. Where can you learn more? Go to http://nikita-saji.info/. Enter H333 in the search box to learn more about \"Bronchitis: Care Instructions. \"  Current as of: September 5, 2018  Content Version: 11.9  © 7708-4690 [employer]. Care instructions adapted under license by [employer] (which disclaims liability or warranty for this information). If you have questions about a medical condition or this instruction, always ask your healthcare professional. [place] [address] any warranty or liability for your use of this information.

## 2019-07-07 NOTE — ED PROVIDER NOTES
EMERGENCY DEPARTMENT HISTORY AND PHYSICAL EXAM      Date: 7/7/2019  Patient Name: Ashish Ro    History of Presenting Illness     Chief Complaint   Patient presents with    Shortness of Breath     started 7/4/19. seen at [facility] given rx but no lab work or imaging. no relief with meds or inhaler and chest is very tight       History Provided By: Patient    HPI: Ashish Ro, 61 y.o. female with PMHx significant for hypertension, hyperlipidemia, diabetes, asthma, presents to the ED with cc of persistent shortness of breath, dry cough, and weakness over the last 4 to 5 days. 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 She believes that dust may have set off her symptoms. She was seen in outside emergency department on July 4 and started on prednisone and codeine cough syrup with only mild improvement in symptoms. She is concerned that she may have fluid on the lungs with that there may be a pneumonia that is not been diagnosed. She denies any, fevers, chills, night sweats, vomiting, diarrhea, or any other associated symptoms. No exacerbating or ameliorating factors. There are no other complaints, changes, or physical findings at this time. PCP: Pedrito Garcia MD    No current facility-administered medications on file prior to encounter. Current Outpatient Medications on File Prior to Encounter   Medication Sig Dispense Refill    cyclobenzaprine (FLEXERIL) 5 mg tablet TAKE 1-2 TABLETS BY MOUTH AT BEDTIME  1    diclofenac EC (VOLTAREN) 75 mg EC tablet TAKE 1 TABLET BY MOUTH TWICE A DAY  1    SITagliptin-metFORMIN (JANUMET) 50-1,000 mg per tablet Take 1 Tab by mouth two (2) times daily (with meals). Dx: E11.42 180 Tab 3    glucose blood VI test strips (BLOOD GLUCOSE TEST) strip Use to test blood sugar once a day. Dx: E11.42 90 Strip 3    ergocalciferol (ERGOCALCIFEROL) 50,000 unit capsule Take 1 Cap by mouth every seven (7) days.  Indications: vitamin D deficiency 12 Cap 3    aspirin delayed-release 81 mg tablet Take  by mouth daily.  Blood-Glucose Meter monitoring kit Use once daily to test blood sugar 1 Kit 0    lancets misc Test blood sugar once a day 30 Each 11    alcohol swabs (ALCOHOL PREP PADS) padm Use to test blood sugar once a day 40 Pad 11    triamterene-hydroCHLOROthiazide (MAXZIDE) 37.5-25 mg per tablet Take  by mouth daily.  amLODIPine (NORVASC) 10 mg tablet Take  by mouth daily.  albuterol (PROVENTIL HFA, VENTOLIN HFA, PROAIR HFA) 90 mcg/actuation inhaler Take 2 Puffs by inhalation every four (4) hours as needed for Wheezing or Shortness of Breath (cough). Indications: Acute Asthma Attack 1 Inhaler 0    fluticasone-salmeterol (ADVAIR DISKUS) 250-50 mcg/Dose diskus inhaler Take 1 Puff by inhalation every twelve (12) hours. Past History     Past Medical History:  Past Medical History:   Diagnosis Date    Arrhythmia     Arthritis     GENERALIZED TO JOINTS-WRISTS & KNEES.  Asthma     INHALERS    Asthma, chronic 10/8/2009    Diabetes ([address]     USES PILLS TO CONTROLL    GERD (gastroesophageal reflux disease)     Gout     Hypertension     CONTROLLED BY MEDS.  Nausea & vomiting     Obesity        Past Surgical History:  Past Surgical History:   Procedure Laterality Date    COLONOSCOPY N/A 5/31/2019    COLONOSCOPY performed by Abigail Almeida MD at [facility] ENDOSCOPY. 6 mm tubular adenoma in sigmoid polyp.  REpeat in 6 months due to poor prep    HX BREAST BIOPSY Right     benign    HX CHOLECYSTECTOMY      HX HEART CATHETERIZATION  98,2011    CARDIAC CATH X2    HX HEENT  1961    CLEFT PALATE REPAIR.    HX HYSTERECTOMY  2011 JULY 18    HX ORTHOPAEDIC      carpal tunnel, trigger finger, hand surgery left arm    HX OTHER SURGICAL      CLeft repair, piloneidal cyst, hand surgery,     HX TUBAL LIGATION  1983       Family History:  Family History   Problem Relation Age of Onset    Asthma Mother    [address] [facility] Hypertension Mother     Diabetes Father     Heart Disease Father         MI    Stroke Brother         CEREBRAL ANEURYSM    Heart Attack Sister     Hypertension Sister     No Known Problems Sister        Social History:  Social History     Tobacco Use    Smoking status: Former Smoker     Years: 10.00     Last attempt to quit: 2010     Years since quittin.9    Smokeless tobacco: Never Used   Substance Use Topics    Alcohol use: No    Drug use: No       Allergies: Allergies   Allergen Reactions    Latex Rash     Severe rash    Ampicillin Anaphylaxis    Betadine Antiseptic Gauze Hives    Contrast Dye [Iodine] Hives and Swelling     Pt. Reports throat swelling      Other Medication Rash     Betadine soap/blisters    Fd And C Blue No.1 Anaphylaxis    Penicillins Hives         Review of Systems   Review of Systems   Constitutional: Negative for chills, diaphoresis, fatigue and fever. HENT: Negative for ear pain and sore throat. Eyes: Negative for pain and redness. Respiratory: Positive for cough and shortness of breath. Cardiovascular: Negative for chest pain and leg swelling. Gastrointestinal: Negative for abdominal pain, diarrhea, nausea and vomiting. Endocrine: Negative for cold intolerance and heat intolerance. Genitourinary: Negative for flank pain and hematuria. Musculoskeletal: Negative for back pain and neck stiffness. Skin: Negative for rash and wound. Neurological: Negative for dizziness, syncope and headaches. All other systems reviewed and are negative. Physical Exam   Physical Exam   Constitutional: She is oriented to person, place, and time. She appears well-developed and well-nourished. HENT:   Head: Normocephalic and atraumatic. Mouth/Throat: Oropharynx is clear and moist. No oropharyngeal exudate. Eyes: Pupils are equal, round, and reactive to light. Conjunctivae and EOM are normal.   Neck: Normal range of motion.    Cardiovascular: Normal rate and regular rhythm. No murmur heard. Pulmonary/Chest: Effort normal and breath sounds normal. No respiratory distress. She has no wheezes. Patient with some mild expiratory wheezing in all lung fields. No expiratory wheezing or crackles. No accessory muscle use. No tachypnea. Abdominal: Soft. Bowel sounds are normal. She exhibits no distension. There is no tenderness. Musculoskeletal: Normal range of motion. She exhibits no edema or deformity. No lower extremity edema. Neurological: She is alert and oriented to person, place, and time. Coordination normal.   Skin: Skin is warm and dry. No rash noted. Psychiatric: She has a normal mood and affect. Her behavior is normal.   Nursing note and vitals reviewed.       Diagnostic Study Results     Labs -     Recent Results (from the past 24 hour(s))   EKG, 12 LEAD, INITIAL    Collection Time: 07/07/19 12:55 PM   Result Value Ref Range    Ventricular Rate 102 BPM    Atrial Rate 102 BPM    P-R Interval 116 ms    QRS Duration 68 ms    Q-T Interval 338 ms    QTC Calculation (Bezet) 440 ms    Calculated P Axis 60 degrees    Calculated R Axis 18 degrees    Calculated T Axis 106 degrees    Diagnosis       Sinus tachycardia  T wave abnormality, consider lateral ischemia  When compared with ECG of 06-MAR-2019 15:38,  Nonspecific T wave abnormality no longer evident in Inferior leads     CBC WITH AUTOMATED DIFF    Collection Time: 07/07/19  1:15 PM   Result Value Ref Range    WBC 11.2 (H) 3.6 - 11.0 K/uL    RBC 4.40 3.80 - 5.20 M/uL    HGB 12.3 11.5 - 16.0 g/dL    HCT 37.5 35.0 - 47.0 %    MCV 85.2 80.0 - 99.0 FL    MCH 28.0 26.0 - 34.0 PG    MCHC 32.8 30.0 - 36.5 g/dL    RDW 12.7 11.5 - 14.5 %    PLATELET 956 (H) 051 - 400 K/uL    MPV 9.5 8.9 - 12.9 FL    NRBC 0.0 0  WBC    ABSOLUTE NRBC 0.00 0.00 - 0.01 K/uL    NEUTROPHILS 74 32 - 75 %    LYMPHOCYTES 19 12 - 49 %    MONOCYTES 5 5 - 13 %    EOSINOPHILS 0 0 - 7 %    BASOPHILS 0 0 - 1 %    IMMATURE GRANULOCYTES 2 (H) 0.0 - 0.5 %    ABS. NEUTROPHILS 8.3 (H) 1.8 - 8.0 K/UL    ABS. LYMPHOCYTES 2.1 0.8 - 3.5 K/UL    ABS. MONOCYTES 0.6 0.0 - 1.0 K/UL    ABS. EOSINOPHILS 0.0 0.0 - 0.4 K/UL    ABS. BASOPHILS 0.0 0.0 - 0.1 K/UL    ABS. IMM. GRANS. 0.2 (H) 0.00 - 0.04 K/UL    DF AUTOMATED      RBC COMMENTS NORMOCYTIC, NORMOCHROMIC     METABOLIC PANEL, COMPREHENSIVE    Collection Time: 07/07/19  1:15 PM   Result Value Ref Range    Sodium 129 (L) 136 - 145 mmol/L    Potassium 4.3 3.5 - 5.1 mmol/L    Chloride 94 (L) 97 - 108 mmol/L    CO2 29 21 - 32 mmol/L    Anion gap 6 5 - 15 mmol/L    Glucose 244 (H) 65 - 100 mg/dL    BUN 16 6 - 20 MG/DL    Creatinine 1.04 (H) 0.55 - 1.02 MG/DL    BUN/Creatinine ratio 15 12 - 20      GFR est AA >60 >60 ml/min/1.73m2    GFR est non-AA 54 (L) >60 ml/min/1.73m2    Calcium 10.1 8.5 - 10.1 MG/DL    Bilirubin, total 0.2 0.2 - 1.0 MG/DL    ALT (SGPT) 15 12 - 78 U/L    AST (SGOT) 5 (L) 15 - 37 U/L    Alk. phosphatase 101 45 - 117 U/L    Protein, total 8.0 6.4 - 8.2 g/dL    Albumin 4.0 3.5 - 5.0 g/dL    Globulin 4.0 2.0 - 4.0 g/dL    A-G Ratio 1.0 (L) 1.1 - 2.2     CK W/ REFLX CKMB    Collection Time: 07/07/19  1:15 PM   Result Value Ref Range    CK 46 26 - 192 U/L   TROPONIN I    Collection Time: 07/07/19  1:15 PM   Result Value Ref Range    Troponin-I, Qt. <0.05 <0.05 ng/mL   POC TROPONIN-I    Collection Time: 07/07/19  3:09 PM   Result Value Ref Range    Troponin-I (POC) <0.04 0.00 - 0.08 ng/mL       Radiologic Studies -   XR CHEST PA LAT   Final Result   IMPRESSION:      No acute pulmonary process. CT Results  (Last 48 hours)    None        CXR Results  (Last 48 hours)               07/07/19 1403  XR CHEST PA LAT Final result    Impression:  IMPRESSION:       No acute pulmonary process. Narrative:  history: Shortness of breath and chest tightness       COMPARISON: 3/6/2019       FINDINGS:       Frontal and lateral chest radiograph submitted for review. The heart is not enlarged. No acute pulmonary process. No pleural effusion. No evidence for pneumothorax. Medical Decision Making   I am the first provider for this patient. I reviewed the vital signs, available nursing notes, past medical history, past surgical history, family history and social history. Vital Signs-Reviewed the patient's vital signs. Patient Vitals for the past 24 hrs:   Temp Pulse Resp BP SpO2   07/07/19 1634 98.2 °F (36.8 °C) 94 15 148/79 99 %   07/07/19 1606  (!) 107 18  98 %   07/07/19 1600  (!) 111 18 151/74 97 %   07/07/19 1545  (!) 105 18 141/81    07/07/19 1519  97 15  98 %   07/07/19 1515  94 14 148/73 98 %   07/07/19 1508  94 15 (!) 106/95 98 %   07/07/19 1443  87 15  100 %   07/07/19 1441  88 16  100 %   07/07/19 1438  86 15  100 %   07/07/19 1430  91 18 128/73 100 %   07/07/19 1410  99 15  100 %   07/07/19 1404 98.4 °F (36.9 °C) (!) 109 15 (!) 165/92 100 %   07/07/19 1402  (!) 111 27 (!) 165/92    07/07/19 1357  (!) 102 19     07/07/19 1356    155/89    07/07/19 1352     100 %   07/07/19 1250 97.9 °F (36.6 °C) 100 18 157/85 94 %       Pulse Oximetry Analysis -99 % on room air    Cardiac Monitor:   Rate: 84 bpm  Rhythm: Normal Sinus Rhythm        Records Reviewed: Nursing Notes and Old Medical Records    Differential Diagnosis:    Patient presents with acute dyspnea. DDx: asthma, copd, pna, pulmonary edema, acute bronchitis, ACS, ptx, pna. Will obtain EKG, labs, CXR, provide O2 as needed for hypoxia, treat symptomatically and reassess. Will continue to monitor closely in ED. Provider Notes (Medical Decision Making):   Patient symptoms consistent with asthma exacerbation and acute bronchitis. She is feeling much better after nebulizer treatments and IV steroids. At this time I feel there are no findings suggestive of CHF exacerbation acute coronary syndrome.   Given protracted symptoms will start on course of azithromycin for further management and follow-up with primary care doctor. Lastly, patient has some mild hyponatremia and she was given IV fluids to improve this. She will follow-up with her primary care doctor for further evaluation. ED Course:     Initial assessment performed. The patients presenting problems have been discussed, and they are in agreement with the care plan formulated and outlined with them. I have encouraged them to ask questions as they arise throughout their visit. Critical Care Time:     None    Disposition:  4:58 PM  Kimber Manzanares's  results have been reviewed with her. She has been counseled regarding her diagnosis. She verbally conveys understanding and agreement of the signs, symptoms, diagnosis, treatment and prognosis and additionally agrees to follow up as recommended with Dr. Russel Dickinson MD in 24 - 48 hours. She also agrees with the care-plan and conveys that all of her questions have been answered. I have also put together some discharge instructions for her that include: 1) educational information regarding their diagnosis, 2) how to care for their diagnosis at home, as well a 3) list of reasons why they would want to return to the ED prior to their follow-up appointment, should their condition change. PLAN:  1. Discharge Medication List as of 7/7/2019  4:31 PM      START taking these medications    Details   azithromycin (ZITHROMAX Z-PEGGY) 250 mg tablet Per packet instructions, Normal, Disp-6 Tab, R-0         CONTINUE these medications which have NOT CHANGED    Details   cyclobenzaprine (FLEXERIL) 5 mg tablet TAKE 1-2 TABLETS BY MOUTH AT BEDTIME, Historical Med, R-1      diclofenac EC (VOLTAREN) 75 mg EC tablet TAKE 1 TABLET BY MOUTH TWICE A DAY, Historical Med, R-1      SITagliptin-metFORMIN (JANUMET) 50-1,000 mg per tablet Take 1 Tab by mouth two (2) times daily (with meals).  Dx: E11.42, Normal, Disp-180 Tab, R-3      glucose blood VI test strips (BLOOD GLUCOSE TEST) strip Use to test blood sugar once a day. Dx: E11.42, Normal, Disp-90 Strip, R-3      ergocalciferol (ERGOCALCIFEROL) 50,000 unit capsule Take 1 Cap by mouth every seven (7) days. Indications: vitamin D deficiency, Normal, Disp-12 Cap, R-3      aspirin delayed-release 81 mg tablet Take  by mouth daily. , Historical Med      Blood-Glucose Meter monitoring kit Use once daily to test blood sugar, Normal, Disp-1 Kit, R-0      lancets misc Test blood sugar once a day, Normal, Disp-30 Each, R-11      alcohol swabs (ALCOHOL PREP PADS) padm Use to test blood sugar once a day, Normal, Disp-40 Pad, R-11      triamterene-hydroCHLOROthiazide (MAXZIDE) 37.5-25 mg per tablet Take  by mouth daily. , Historical Med      amLODIPine (NORVASC) 10 mg tablet Take  by mouth daily. , Historical Med      albuterol (PROVENTIL HFA, VENTOLIN HFA, PROAIR HFA) 90 mcg/actuation inhaler Take 2 Puffs by inhalation every four (4) hours as needed for Wheezing or Shortness of Breath (cough). Indications: Acute Asthma Attack, Print, Disp-1 Inhaler, R-0      fluticasone-salmeterol (ADVAIR DISKUS) 250-50 mcg/Dose diskus inhaler Take 1 Puff by inhalation every twelve (12) hours. , Historical Med           2. Follow-up Information     Follow up With Specialties Details Why Contact Yolanda Hoyos MD Internal Medicine In 1 week  [address]  [phone]          Return to ED if worse     Diagnosis     Clinical Impression:   1. Moderate persistent asthma with acute exacerbation    2. Acute bronchitis, unspecified organism    3.  Hyponatremia

## 2019-07-08 LAB
ATRIAL RATE: 102 BPM
ATRIAL RATE: 99 BPM
CALCULATED P AXIS, ECG09: 52 DEGREES
CALCULATED P AXIS, ECG09: 60 DEGREES
CALCULATED R AXIS, ECG10: 18 DEGREES
CALCULATED R AXIS, ECG10: 24 DEGREES
CALCULATED T AXIS, ECG11: 106 DEGREES
CALCULATED T AXIS, ECG11: 110 DEGREES
DIAGNOSIS, 93000: NORMAL
DIAGNOSIS, 93000: NORMAL
P-R INTERVAL, ECG05: 116 MS
P-R INTERVAL, ECG05: 120 MS
Q-T INTERVAL, ECG07: 338 MS
Q-T INTERVAL, ECG07: 342 MS
QRS DURATION, ECG06: 68 MS
QRS DURATION, ECG06: 68 MS
QTC CALCULATION (BEZET), ECG08: 438 MS
QTC CALCULATION (BEZET), ECG08: 440 MS
VENTRICULAR RATE, ECG03: 102 BPM
VENTRICULAR RATE, ECG03: 99 BPM

## 2019-07-25 ENCOUNTER — OFFICE VISIT (OUTPATIENT)
Dept: INTERNAL MEDICINE CLINIC | Facility: CLINIC | Age: 59
End: 2019-07-25

## 2019-07-25 VITALS
TEMPERATURE: 97.9 F | DIASTOLIC BLOOD PRESSURE: 74 MMHG | BODY MASS INDEX: 31.21 KG/M2 | RESPIRATION RATE: 18 BRPM | HEIGHT: 62 IN | OXYGEN SATURATION: 99 % | HEART RATE: 89 BPM | SYSTOLIC BLOOD PRESSURE: 117 MMHG | WEIGHT: 169.6 LBS

## 2019-07-25 DIAGNOSIS — J45.40 MODERATE PERSISTENT ASTHMA WITHOUT COMPLICATION: Primary | ICD-10-CM

## 2019-07-25 DIAGNOSIS — J01.90 ACUTE NON-RECURRENT SINUSITIS, UNSPECIFIED LOCATION: ICD-10-CM

## 2019-07-25 DIAGNOSIS — R07.89 MUSCULOSKELETAL CHEST PAIN: ICD-10-CM

## 2019-07-25 DIAGNOSIS — J30.89 NON-SEASONAL ALLERGIC RHINITIS DUE TO OTHER ALLERGIC TRIGGER: ICD-10-CM

## 2019-07-25 RX ORDER — FLUTICASONE PROPIONATE 50 MCG
SPRAY, SUSPENSION (ML) NASAL
Qty: 1 BOTTLE | Refills: 2 | Status: SHIPPED | OUTPATIENT
Start: 2019-07-25 | End: 2020-01-09 | Stop reason: SDUPTHER

## 2019-07-25 RX ORDER — PROMETHAZINE HYDROCHLORIDE 25 MG/1
TABLET ORAL
Refills: 0 | COMMUNITY
Start: 2019-04-19 | End: 2020-10-20

## 2019-07-25 RX ORDER — FLUTICASONE PROPIONATE 50 MCG
SPRAY, SUSPENSION (ML) NASAL
Refills: 0 | COMMUNITY
Start: 2019-07-11 | End: 2019-07-25 | Stop reason: SDUPTHER

## 2019-07-25 NOTE — PROGRESS NOTES
Chrissteffanie Jolynnbrent  Identified pt with two pt identifiers(name and ). Chief Complaint   Patient presents with   [facility]    Chest Pain     [facility]       Reviewed record In preparation for visit and have obtained necessary documentation. Has info on advanced directive but has not filled them out. 1. Have you been to the ER, urgent care clinic or hospitalized since your last visit? No     2. Have you seen or consulted any other health care providers outside of the [address] [place] [address] since your last visit? Include any pap smears or colon screening. No    Vitals reviewed with provider. Health Maintenance reviewed: There are no preventive care reminders to display for this patient. Wt Readings from Last 3 Encounters:   19 169 lb 9.6 oz (76.9 kg)   19 168 lb 14 oz (76.6 kg)   19 169 lb (76.7 kg)        Temp Readings from Last 3 Encounters:   19 97.9 °F (36.6 °C) (Oral)   19 98.2 °F (36.8 °C)   19 98.1 °F (36.7 °C) (Oral)        BP Readings from Last 3 Encounters:   19 117/74   19 148/79   19 133/79        Pulse Readings from Last 3 Encounters:   19 89   19 94   19 97        Vitals:    19 1053   BP: 117/74   Pulse: 89   Resp: 18   Temp: 97.9 °F (36.6 °C)   TempSrc: Oral   SpO2: 99%   Weight: 169 lb 9.6 oz (76.9 kg)   Height: 5' 2\" (1.575 m)   PainSc:   0 - No pain   LMP: 2011          Learning Assessment:   :       Learning Assessment 3/25/2019   PRIMARY LEARNER Patient   PRIMARY LANGUAGE ENGLISH   LEARNER PREFERENCE PRIMARY READING   ANSWERED BY patient   RELATIONSHIP SELF        Depression Screening:   :       3 most recent PHQ Screens 3/25/2019   Little interest or pleasure in doing things Not at all   Feeling down, depressed, irritable, or hopeless Several days   Total Score PHQ 2 1        Fall Risk Assessment:   :       Fall Risk Assessment, last 12 mths 3/25/2019   Able to walk?  Yes Fall in past 12 months? No        Abuse Screening:   :       Abuse Screening Questionnaire 3/25/2019   Do you ever feel afraid of your partner? N   Are you in a relationship with someone who physically or mentally threatens you? N   Is it safe for you to go home?  Y        ADL Screening:   :       ADL Assessment 3/25/2019   Feeding yourself No Help Needed   Getting from bed to chair No Help Needed   Getting dressed No Help Needed   Bathing or showering No Help Needed   Walk across the room (includes cane/walker) No Help Needed   Using the telphone No Help Needed   Taking your medications No Help Needed   Preparing meals No Help Needed   Managing money (expenses/bills) No Help Needed   Moderately strenuous housework (laundry) No Help Needed   Shopping for personal items (toiletries/medicines) No Help Needed   Shopping for groceries No Help Needed   Driving No Help Needed   Climbing a flight of stairs No Help Needed   Getting to places beyond walking distances No Help Needed

## 2019-07-25 NOTE — PROGRESS NOTES
CC:   Chief Complaint   Patient presents with   [facility]    Chest Pain     [facility]       HISTORY OF PRESENT ILLNESS  Juan Gary is a 61 y.o. female. Presents for hospital follow up evaluation. She has type 2 DM (last A1c 7.0% on 6/24/19) , HTN, asthma, and allergic rhinitis.  Hospitalized at [facility] 7/10-7/11/19 with musculoskeletal chest pain and bacterial sinusitis. Ruled out for MI by cardiac enzymes and EKG's. Was evaluated by Cardiology and determined that chest pain was noncardiac and did not warrant further evaluation. She had a negative [place] cardiac stress test at [address] on 3/8/18. Was discharged on levofloxacin and Flonase nasal spray. Today she states that the chest pain has resolved and her sinus symptoms are better. Has occasional runny nose and problems breathing due to environmental allergies like cleaning products. Also reports that she has a lot of roaches in he her kitchen that cause her to have chest tightness and SOB when she enters her apartment. Soc Hx  . She has 2 biological sons and 3 step-daughters.  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  Former smoker; quit in 2010. Denies alcohol or recreational drug use. Does not get regular exercise outside of work. Recently completed a diploma program at the [employer] in [place]; plans to continue to get an associates degree.             ROS  A complete review of systems was performed and is negative except for those mentioned in the HPI.      Patient Active Problem List   Diagnosis Code    Type 2 diabetes mellitus without complication, without long-term current use of insulin (HCC) E11.9    HTN (hypertension) I10    Arthritis M19.90    GERD (gastroesophageal reflux disease) K21.9    Vitamin D deficiency E55.9    Fibroadenoma of right breast D24.1     Past Medical History:   Diagnosis Date    Arrhythmia     Arthritis GENERALIZED TO [facility].  Asthma     INHALERS    Asthma, chronic 10/8/2009    Diabetes ([facility] [address]     USES PILLS TO CONTROLL    GERD (gastroesophageal reflux disease)     Gout     Hypertension     CONTROLLED BY MEDS.  Nausea & vomiting     Obesity      Allergies   Allergen Reactions    Latex Rash     Severe rash    Ampicillin Anaphylaxis    Betadine Antiseptic Gauze Hives    Contrast Dye [Iodine] Hives and Swelling     Pt. Reports throat swelling      Other Medication Rash     Betadine soap/blisters    Fd And C Blue No.1 Anaphylaxis    Penicillins Hives       Current Outpatient Medications   Medication Sig Dispense Refill    cyclobenzaprine (FLEXERIL) 5 mg tablet TAKE 1-2 TABLETS BY MOUTH AT BEDTIME  1    diclofenac EC (VOLTAREN) 75 mg EC tablet TAKE 1 TABLET BY MOUTH TWICE A DAY  1    SITagliptin-metFORMIN (JANUMET) 50-1,000 mg per tablet Take 1 Tab by mouth two (2) times daily (with meals). Dx: E11.42 180 Tab 3    glucose blood VI test strips (BLOOD GLUCOSE TEST) strip Use to test blood sugar once a day. Dx: E11.42 90 Strip 3    ergocalciferol (ERGOCALCIFEROL) 50,000 unit capsule Take 1 Cap by mouth every seven (7) days. Indications: vitamin D deficiency 12 Cap 3    aspirin delayed-release 81 mg tablet Take  by mouth daily.  Blood-Glucose Meter monitoring kit Use once daily to test blood sugar 1 Kit 0    lancets misc Test blood sugar once a day 30 Each 11    alcohol swabs (ALCOHOL PREP PADS) padm Use to test blood sugar once a day 40 Pad 11    triamterene-hydroCHLOROthiazide (MAXZIDE) 37.5-25 mg per tablet Take  by mouth daily.  amLODIPine (NORVASC) 10 mg tablet Take  by mouth daily.  albuterol (PROVENTIL HFA, VENTOLIN HFA, PROAIR HFA) 90 mcg/actuation inhaler Take 2 Puffs by inhalation every four (4) hours as needed for Wheezing or Shortness of Breath (cough).  Indications: Acute Asthma Attack 1 Inhaler 0    fluticasone-salmeterol (ADVAIR DISKUS) 250-50 mcg/Dose diskus inhaler Take 1 Puff by inhalation every twelve (12) hours.  fluticasone propionate (FLONASE) 50 mcg/actuation nasal spray INHALE 2 SPRAYS IN EACH NOSTRIL ONCE A DAY. 0    promethazine (PHENERGAN) 25 mg tablet TAKE 1 TAB BY MOUTH EVERY SIX (6) HOURS AS NEEDED FOR NAUSEA.  0         PHYSICAL EXAM  Visit Vitals  /74 (BP 1 Location: Left arm, BP Patient Position: Sitting)   Pulse 89   Temp 97.9 °F (36.6 °C) (Oral)   Resp 18   Ht 5' 2\" (1.575 m)   Wt 169 lb 9.6 oz (76.9 kg)   LMP 06/19/2011   SpO2 99%   BMI 31.02 kg/m²       General: Obese, no distress. HEENT:  Head normocephalic/atraumatic, no scleral icterus  Lungs:  Clear to ausculation bilaterally. Good air movement. Chest Wall: Mild mid-sternal tenderness. Heart:  Regular rate and rhythm, normal S1 and S2, no murmur, gallop, or rub  Extremities: No clubbing, cyanosis, or edema. Neurological: Alert and oriented. Psychiatric: Normal mood and affect. Behavior is normal.         ASSESSMENT AND PLAN    ICD-10-CM ICD-9-CM    1. Moderate persistent asthma without complication T33.76 655.15    2. Musculoskeletal chest pain R07.89 786.59    3. Acute non-recurrent sinusitis, unspecified location J01.90 461.9    4. Non-seasonal allergic rhinitis due to other allergic trigger J30.89 477.8 fluticasone propionate (FLONASE) 50 mcg/actuation nasal spray     Diagnoses and all orders for this visit:    1. Moderate persistent asthma without complication  Controlled. Continue Advair diskus. 2. Musculoskeletal chest pain  Nearly resolved. 3. Acute non-recurrent sinusitis, unspecified location  Resolved. 4. Non-seasonal allergic rhinitis due to other allergic trigger  -     Refill fluticasone propionate (FLONASE) 50 mcg/actuation nasal spray; INHALE 2 SPRAYS IN EACH NOSTRIL ONCE A DAY.   Indications: inflammation of the nose due to an allergy      Follow-up and Dispositions    · Return if symptoms worsen or fail to improve, for Scheduled appointment on 9/20/19. I have discussed the diagnosis with the patient and the intended plan as seen in the above orders. Patient is in agreement. The patient has received an after-visit summary and questions were answered concerning future plans. I have discussed medication side effects and warnings with the patient as well.

## 2019-08-13 ENCOUNTER — TELEPHONE (OUTPATIENT)
Dept: INTERNAL MEDICINE CLINIC | Facility: CLINIC | Age: 59
End: 2019-08-13

## 2019-08-13 RX ORDER — FLUTICASONE PROPIONATE AND SALMETEROL 250; 50 UG/1; UG/1
1 POWDER RESPIRATORY (INHALATION) EVERY 12 HOURS
Qty: 60 EACH | Refills: 11 | Status: SHIPPED | OUTPATIENT
Start: 2019-08-13 | End: 2020-03-05 | Stop reason: ALTCHOICE

## 2019-08-13 NOTE — TELEPHONE ENCOUNTER
----- Message from Manual Abiodun sent at 8/13/2019 12:48 PM EDT -----  Regarding: Dr. Dimple Alegria   Pt is asking for the PCP to give her a call about a confidential matter. Best contact number is [phone].

## 2019-08-13 NOTE — TELEPHONE ENCOUNTER
Patient states she has a problem with roaches in her apartment. She stated someone does spray chemicals for the roaches, but she thinks it makes the roaches worse. She would like a letter to be mailed to her appt manger stating she needs to move related to roaches/asthma. Letter should be addressed to Attn: Ms Maria Del Carmen Gunter, [facility], [address], [place], [facility]. Patient would like a copy of the letter sent to her as well.

## 2019-08-13 NOTE — TELEPHONE ENCOUNTER
REFILL     PCP: Patria Vazquez MD     Last appt: 7/25/2019     Future Appointments   Date Time Provider Terrence Ruth   9/20/2019  2:00 PM Patria Vazquez  [facility]          Requested Prescriptions     Pending Prescriptions Disp Refills    fluticasone propion-salmeterol (ADVAIR DISKUS) 250-50 mcg/dose diskus inhaler 60 Each 3     Sig: Take 1 Puff by inhalation every twelve (12) hours.

## 2019-08-13 NOTE — LETTER
[facility] INTERNAL MEDICINE OF [place] [id number] [address] [phone] August 16, 2019 Attn:  Ms. Branham Favors [facility] [employer] [address] [facility], [address] [place] [address] Dear Ms. René Mattson, As the primary care physician for Ms. Ethel Hairston (date of birth 1960), I am writing to request that she be moved to a different apartment for medical reasons. She has asthma and environmental allergies that are being exacerbated by roaches in her present apartment. She reports that someone does spray chemicals for the roaches, but it does not seem to help. Over the past 2 months, she has been seen in the emergency room twice for asthma exacerbations with acute bronchitis and been hospitalized once for bacterial sinusitis that was related to allergic rhinitis. If there any questions or concerns, please feel free to contact me. Sincerely yours, 
 
 
 
Anahi Rousseau M.D. Internal Medicine

## 2019-08-16 PROBLEM — J45.909 MODERATE ASTHMA: Status: ACTIVE | Noted: 2019-08-16

## 2019-08-16 PROBLEM — J45.40 MODERATE PERSISTENT ASTHMA WITHOUT COMPLICATION: Status: ACTIVE | Noted: 2019-08-16

## 2019-09-23 ENCOUNTER — OFFICE VISIT (OUTPATIENT)
Dept: INTERNAL MEDICINE CLINIC | Facility: CLINIC | Age: 59
End: 2019-09-23

## 2019-09-23 VITALS
OXYGEN SATURATION: 97 % | WEIGHT: 169 LBS | HEIGHT: 62 IN | HEART RATE: 91 BPM | BODY MASS INDEX: 31.1 KG/M2 | RESPIRATION RATE: 16 BRPM | DIASTOLIC BLOOD PRESSURE: 79 MMHG | TEMPERATURE: 98.2 F | SYSTOLIC BLOOD PRESSURE: 115 MMHG

## 2019-09-23 DIAGNOSIS — K21.9 GASTROESOPHAGEAL REFLUX DISEASE WITHOUT ESOPHAGITIS: ICD-10-CM

## 2019-09-23 DIAGNOSIS — J45.40 MODERATE PERSISTENT ASTHMA WITHOUT COMPLICATION: ICD-10-CM

## 2019-09-23 DIAGNOSIS — E11.9 TYPE 2 DIABETES MELLITUS WITHOUT COMPLICATION, WITHOUT LONG-TERM CURRENT USE OF INSULIN (HCC): Primary | ICD-10-CM

## 2019-09-23 DIAGNOSIS — E55.9 VITAMIN D DEFICIENCY: ICD-10-CM

## 2019-09-23 DIAGNOSIS — I10 ESSENTIAL HYPERTENSION: ICD-10-CM

## 2019-09-23 DIAGNOSIS — E87.1 HYPONATREMIA: ICD-10-CM

## 2019-09-23 LAB — HBA1C MFR BLD HPLC: 7.1 %

## 2019-09-23 RX ORDER — AMLODIPINE BESYLATE 10 MG/1
10 TABLET ORAL DAILY
Qty: 90 TAB | Refills: 3 | Status: SHIPPED | OUTPATIENT
Start: 2019-09-23 | End: 2020-09-16 | Stop reason: SDUPTHER

## 2019-09-23 NOTE — PROGRESS NOTES
Juan Gary  Identified pt with two pt identifiers(name and ). Chief Complaint   Patient presents with    Diabetes    Hypertension    Immunization/Injection       1. Have you been to the ER, urgent care clinic since your last visit? Hospitalized since your last visit? [address] for hives  Had hives twice after eating out    2. Have you seen or consulted any other health care providers outside of the [address] [place] [address] since your last visit? Include any pap smears or colon screening. NO    Today's provider has been notified of reason for visit, vitals and flowsheets obtained on patients.      Patient received paperwork for advance directive during previous visit but has not completed at this time     Reviewed record In preparation for visit, huddled with provider and have obtained necessary documentation      Health Maintenance Due   Topic    Influenza Age 5 to Adult        Wt Readings from Last 3 Encounters:   19 169 lb (76.7 kg)   19 169 lb 9.6 oz (76.9 kg)   19 168 lb 14 oz (76.6 kg)     Temp Readings from Last 3 Encounters:   19 97.9 °F (36.6 °C) (Oral)   19 98.2 °F (36.8 °C)   19 98.1 °F (36.7 °C) (Oral)     BP Readings from Last 3 Encounters:   19 142/78   19 117/74   19 148/79     Pulse Readings from Last 3 Encounters:   19 89   19 94   19 97     Vitals:    19 1443   BP: 142/78   Weight: 169 lb (76.7 kg)   PainSc:   0 - No pain   LMP: 2011         Learning Assessment:  :     Learning Assessment 3/25/2019   PRIMARY LEARNER Patient   PRIMARY LANGUAGE ENGLISH   LEARNER PREFERENCE PRIMARY READING   ANSWERED BY patient   RELATIONSHIP SELF       Depression Screening:  :     3 most recent PHQ Screens 3/25/2019   Little interest or pleasure in doing things Not at all   Feeling down, depressed, irritable, or hopeless Several days   Total Score PHQ 2 1       Fall Risk Assessment:  :     Fall Risk Assessment, last 12 mths 3/25/2019   Able to walk? Yes   Fall in past 12 months? No       Abuse Screening:  :     Abuse Screening Questionnaire 3/25/2019   Do you ever feel afraid of your partner? N   Are you in a relationship with someone who physically or mentally threatens you? N   Is it safe for you to go home? Y       ADL Screening:  :     ADL Assessment 3/25/2019   Feeding yourself No Help Needed   Getting from bed to chair No Help Needed   Getting dressed No Help Needed   Bathing or showering No Help Needed   Walk across the room (includes cane/walker) No Help Needed   Using the telphone No Help Needed   Taking your medications No Help Needed   Preparing meals No Help Needed   Managing money (expenses/bills) No Help Needed   Moderately strenuous housework (laundry) No Help Needed   Shopping for personal items (toiletries/medicines) No Help Needed   Shopping for groceries No Help Needed   Driving No Help Needed   Climbing a flight of stairs No Help Needed   Getting to places beyond walking distances No Help Needed                 Medication reconciliation up to date and corrected with patient at this time.

## 2019-09-23 NOTE — PATIENT INSTRUCTIONS
Vaccine Information Statement    Influenza (Flu) Vaccine (Inactivated or Recombinant): What You Need to Know    Many Vaccine Information Statements are available in Bengali and other languages. See www.immunize.org/vis  Hojas de información sobre vacunas están disponibles en español y en muchos otros idiomas. Visite www.immunize.org/vis    1. Why get vaccinated? Influenza vaccine can prevent influenza (flu). Flu is a contagious disease that spreads around the [facility] every year, usually between October and May. Anyone can get the flu, but it is more dangerous for some people. Infants and young children, people 72years of age and older, pregnant women, and people with certain health conditions or a weakened immune system are at greatest risk of flu complications. Pneumonia, bronchitis, sinus infections and ear infections are examples of flu-related complications. If you have a medical condition, such as heart disease, cancer or diabetes, flu can make it worse. Flu can cause fever and chills, sore throat, muscle aches, fatigue, cough, headache, and runny or stuffy nose. Some people may have vomiting and diarrhea, though this is more common in children than adults. Each year thousands of people in the [facility] die from flu, and many more are hospitalized. Flu vaccine prevents millions of illnesses and flu-related visits to the doctor each year. 2. Influenza vaccines     CDC recommends everyone 10months of age and older get vaccinated every flu season. Children 6 months through 6years of age may need 2 doses during a single flu season. Everyone else needs only 1 dose each flu season. It takes about 2 weeks for protection to develop after vaccination. There are many flu viruses, and they are always changing. Each year a new flu vaccine is made to protect against three or four viruses that are likely to cause disease in the upcoming flu season.  Even when the vaccine doesnt exactly match these viruses, it may still provide some protection. Influenza vaccine does not cause flu. Influenza vaccine may be given at the same time as other vaccines. 3. Talk with your health care provider    Tell your vaccine provider if the person getting the vaccine:   Has had an allergic reaction after a previous dose of influenza vaccine, or has any severe, life-threatening allergies.  Has ever had Guillain-Barré Syndrome (also called GBS). In some cases, your health care provider may decide to postpone influenza vaccination to a future visit. People with minor illnesses, such as a cold, may be vaccinated. People who are moderately or severely ill should usually wait until they recover before getting influenza vaccine. Your health care provider can give you more information. 4. Risks of a reaction     Soreness, redness, and swelling where shot is given, fever, muscle aches, and headache can happen after influenza vaccine.  There may be a very small increased risk of Guillain-Barré Syndrome (GBS) after inactivated influenza vaccine (the flu shot). [facility] children who get the flu shot along with pneumococcal vaccine (PCV13), and/or DTaP vaccine at the same time might be slightly more likely to have a seizure caused by fever. Tell your health care provider if a child who is getting flu vaccine has ever had a seizure. People sometimes faint after medical procedures, including vaccination. Tell your provider if you feel dizzy or have vision changes or ringing in the ears. As with any medicine, there is a very remote chance of a vaccine causing a severe allergic reaction, other serious injury, or death. 5. What if there is a serious problem? An allergic reaction could occur after the vaccinated person leaves the clinic.  If you see signs of a severe allergic reaction (hives, swelling of the face and throat, difficulty breathing, a fast heartbeat, dizziness, or weakness), call 9-1-1 and get the person to the nearest hospital.    For other signs that concern you, call your health care provider. Adverse reactions should be reported to the Vaccine Adverse Event Reporting System (VAERS). Your health care provider will usually file this report, or you can do it yourself. Visit the VAERS website at www.vaers. [facility].gov or call 6-991.119.4502. VAERS is only for reporting reactions, and VAERS staff do not give medical advice. 6. The National Vaccine Injury Compensation Program    The [facility] Vaccine Injury Compensation Program (VICP) is a federal program that was created to compensate people who may have been injured by certain vaccines. Visit the VICP website at www.[facility].gov/vaccinecompensation or call 5-130.254.4681 to learn about the program and about filing a claim. There is a time limit to file a claim for compensation. 7. How can I learn more?  Ask your health care provider.  Call your local or state health department.  Contact the Centers for Disease Control and Prevention (CDC):  - Call 7-328.362.2315 (1-800-CDC-INFO) or  - Visit CDCs influenza website at www.cdc.gov/flu    Vaccine Information Statement (Interim)  Inactivated Influenza Vaccine   8/15/2019  42 SATINDER Iglesias [id number]   Department of Health and Human Services  Centers for Disease Control and Prevention    Office Use Only

## 2019-09-23 NOTE — PROGRESS NOTES
CC:   Chief Complaint   Patient presents with    Diabetes    Hypertension    Immunization/Injection       HISTORY OF PRESENT ILLNESS  Santana Velázquez is a 61 y.o. female. Presents for 2 month follow up evaluation. She has type 2 DM (last A1c 7.0% on 6/24/19) , HTN, asthma, and allergic rhinitis.  Was seen at [facility] about 3 weeks ago for hives. Etiology unclear but now resolved. No complaints today. Endocrine Review  She is seen for diabetes.  Since last visit she reports no polyuria or polydipsia, no hypoglycemia.  Home glucose monitoring: is performed regularly.  She reports medication compliance: compliant all of the time.  Medication side effects: none.  Diabetic diet compliance: compliant most of the time.  Lab review: A1c is 7.1% today (9/23/19), was 7.0% in 6/19. Eye exam: overdue.       Cardiovascular Review  The patient has hypertension and hyperlipidemia.  She reports taking medications as instructed, no medication side effects noted.  Diet and Lifestyle: generally follows a low fat low cholesterol diet, generally follows a low sodium diet, no formal exercise but active during the day.  Lab review: labs reviewed and discussed with patient.       Soc Hx  . She has 2 biological sons and 3 step-daughters.  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  Former smoker; quit in 2010. Denies alcohol or recreational drug use. Does not get regular exercise outside of work. Recently completed a diploma program at the [employer] in [place]; plans to continue to get an associates degree.      ROS  A complete review of systems was performed and is negative except for those mentioned in the HPI.      Patient Active Problem List   Diagnosis Code    Type 2 diabetes mellitus without complication, without long-term current use of insulin (HCC) E11.9    HTN (hypertension) I10    Arthritis M19.90    GERD (gastroesophageal reflux disease) K21.9    Vitamin D deficiency E55.9    Fibroadenoma of right breast D24.1    Moderate persistent asthma without complication N26.38     Past Medical History:   Diagnosis Date    Arrhythmia     Arthritis     GENERALIZED TO [employer]TS-WRISTS & KNEES.  Asthma     INHALERS    Asthma, chronic 10/8/2009    Diabetes ([address]     USES PILLS TO CONTROLL    GERD (gastroesophageal reflux disease)     Gout     Hypertension     CONTROLLED BY MEDS.  Nausea & vomiting     Obesity      Allergies   Allergen Reactions    Latex Rash     Severe rash    Ampicillin Anaphylaxis    Betadine Antiseptic Gauze Hives    Contrast Dye [Iodine] Hives and Swelling     Pt. Reports throat swelling      Other Medication Rash     Betadine soap/blisters    Fd And C Blue No.1 Anaphylaxis    Penicillins Hives       Current Outpatient Medications   Medication Sig Dispense Refill    triamterene-hydroCHLOROthiazide (MAXZIDE) 37.5-25 mg per tablet Take 1 Tab by mouth every morning. 90 Tab 1    fluticasone propion-salmeterol (ADVAIR DISKUS) 250-50 mcg/dose diskus inhaler Take 1 Puff by inhalation every twelve (12) hours. 60 Each 11    promethazine (PHENERGAN) 25 mg tablet TAKE 1 TAB BY MOUTH EVERY SIX (6) HOURS AS NEEDED FOR NAUSEA.  0    fluticasone propionate (FLONASE) 50 mcg/actuation nasal spray INHALE 2 SPRAYS IN EACH NOSTRIL ONCE A DAY. Indications: inflammation of the nose due to an allergy 1 Bottle 2    cyclobenzaprine (FLEXERIL) 5 mg tablet TAKE 1-2 TABLETS BY MOUTH AT BEDTIME  1    diclofenac EC (VOLTAREN) 75 mg EC tablet TAKE 1 TABLET BY MOUTH TWICE A DAY  1    SITagliptin-metFORMIN (JANUMET) 50-1,000 mg per tablet Take 1 Tab by mouth two (2) times daily (with meals). Dx: E11.42 180 Tab 3    glucose blood VI test strips (BLOOD GLUCOSE TEST) strip Use to test blood sugar once a day. Dx: E11.42 90 Strip 3    ergocalciferol (ERGOCALCIFEROL) 50,000 unit capsule Take 1 Cap by mouth every seven (7) days.  Indications: vitamin D deficiency 12 Cap 3    aspirin delayed-release 81 mg tablet Take  by mouth daily.  Blood-Glucose Meter monitoring kit Use once daily to test blood sugar 1 Kit 0    lancets misc Test blood sugar once a day 30 Each 11    alcohol swabs (ALCOHOL PREP PADS) padm Use to test blood sugar once a day 40 Pad 11    amLODIPine (NORVASC) 10 mg tablet Take  by mouth daily.  albuterol (PROVENTIL HFA, VENTOLIN HFA, PROAIR HFA) 90 mcg/actuation inhaler Take 2 Puffs by inhalation every four (4) hours as needed for Wheezing or Shortness of Breath (cough). Indications: Acute Asthma Attack 1 Inhaler 0         PHYSICAL EXAM  Visit Vitals  /79   Pulse 91   Temp 98.2 °F (36.8 °C) (Oral)   Resp 16   Ht 5' 2\" (1.575 m)   Wt 169 lb (76.7 kg)   LMP 06/19/2011   SpO2 97%   BMI 30.91 kg/m²       General: Obese, no distress. HEENT:  Head normocephalic/atraumatic, no scleral icterus  Lungs:  Clear to ausculation bilaterally. Good air movement. Heart:  Regular rate and rhythm, normal S1 and S2, no murmur, gallop, or rub  Extremities: No clubbing, cyanosis, or edema. Normal ROM with mild crepitus at both knees. Neurological: Alert and oriented. Psychiatric: Normal mood and affect. Behavior is normal.     Results for orders placed or performed in visit on 09/23/19   AMB POC HEMOGLOBIN A1C   Result Value Ref Range    Hemoglobin A1c (POC) 7.1 %           ASSESSMENT AND PLAN    ICD-10-CM ICD-9-CM    1. Type 2 diabetes mellitus without complication, without long-term current use of insulin ([facility]) E11.9 250.00 AMB POC HEMOGLOBIN A1C   2. Essential hypertension I10 401.9 amLODIPine (NORVASC) 10 mg tablet      METABOLIC PANEL, BASIC   3. Hyponatremia S31.8 743.7 METABOLIC PANEL, BASIC   4. Moderate persistent asthma without complication D00.59 986.92    5. Vitamin D deficiency E55.9 268.9 VITAMIN D, 25 HYDROXY   6. Gastroesophageal reflux disease without esophagitis K21.9 530.81      Diagnoses and all orders for this visit:    1.  Type 2 diabetes mellitus without complication, without long-term current use of insulin (HCC)  Controlled. Continue present management. -     AMB POC HEMOGLOBIN A1C    2. Essential hypertension  Controlled. Continue triamterene-HCTZ and amlodipine. Consider changing amlodipine to ACE-I or to amlodipine-benazepril in the near future  -     amLODIPine (NORVASC) 10 mg tablet; Take 1 Tab by mouth daily.  -     METABOLIC PANEL, BASIC    3. Hyponatremia  Na low in 3/19. Will recheck. -     METABOLIC PANEL, BASIC    4. Moderate persistent asthma without complication  Controlled on Advair. 5. Vitamin D deficiency  -     VITAMIN D, 25 HYDROXY    6. Gastroesophageal reflux disease without esophagitis      Follow-up and Dispositions    · Return in about 3 months (around 12/23/2019), or if symptoms worsen or fail to improve, for DM, HTN, asthma. I have discussed the diagnosis with the patient and the intended plan as seen in the above orders. Patient is in agreement. The patient has received an after-visit summary and questions were answered concerning future plans. I have discussed medication side effects and warnings with the patient as well.

## 2019-09-24 LAB
25(OH)D3+25(OH)D2 SERPL-MCNC: 38.8 NG/ML (ref 30–100)
BUN SERPL-MCNC: 11 MG/DL (ref 6–24)
BUN/CREAT SERPL: 12 (ref 9–23)
CALCIUM SERPL-MCNC: 10 MG/DL (ref 8.7–10.2)
CHLORIDE SERPL-SCNC: 101 MMOL/L (ref 96–106)
CO2 SERPL-SCNC: 26 MMOL/L (ref 20–29)
CREAT SERPL-MCNC: 0.9 MG/DL (ref 0.57–1)
GLUCOSE SERPL-MCNC: 151 MG/DL (ref 65–99)
POTASSIUM SERPL-SCNC: 4.4 MMOL/L (ref 3.5–5.2)
SODIUM SERPL-SCNC: 143 MMOL/L (ref 134–144)

## 2019-10-14 ENCOUNTER — TELEPHONE (OUTPATIENT)
Dept: INTERNAL MEDICINE CLINIC | Facility: CLINIC | Age: 59
End: 2019-10-14

## 2019-10-14 NOTE — TELEPHONE ENCOUNTER
----- Message from Enzo Martins sent at 10/14/2019 10:23 AM EDT -----  Regarding: Dr. Martin/Telephone/refill   General Message/Vendor Calls    Caller's first and last name:      Reason for call: Pt states that the pharmacy contacted her about her Rx  Hai Zarcogle and they notified her about the insurance not covering the cost of the Rx anymore.   Pt would like to know the next plan of care  (or the alternative of the Rx )    Callback required yes/no and why:Y      Best contact number(s): [phone]      Details to clarify the request:      Enzo Martins

## 2019-10-16 NOTE — TELEPHONE ENCOUNTER
Tell patient to call her health insurance customer service number and ask what inhalers are covered that are alternates to Advair diskus. Then let us know.

## 2020-01-09 ENCOUNTER — OFFICE VISIT (OUTPATIENT)
Dept: INTERNAL MEDICINE CLINIC | Facility: CLINIC | Age: 60
End: 2020-01-09

## 2020-01-09 VITALS
DIASTOLIC BLOOD PRESSURE: 79 MMHG | SYSTOLIC BLOOD PRESSURE: 133 MMHG | WEIGHT: 171 LBS | HEIGHT: 62 IN | RESPIRATION RATE: 18 BRPM | BODY MASS INDEX: 31.47 KG/M2 | OXYGEN SATURATION: 94 % | TEMPERATURE: 98.1 F | HEART RATE: 106 BPM

## 2020-01-09 DIAGNOSIS — J01.00 ACUTE NON-RECURRENT MAXILLARY SINUSITIS: ICD-10-CM

## 2020-01-09 DIAGNOSIS — J30.89 NON-SEASONAL ALLERGIC RHINITIS DUE TO OTHER ALLERGIC TRIGGER: ICD-10-CM

## 2020-01-09 DIAGNOSIS — J45.41 MODERATE PERSISTENT ASTHMA WITH EXACERBATION: Primary | ICD-10-CM

## 2020-01-09 DIAGNOSIS — E27.8 ADRENAL NODULE (HCC): ICD-10-CM

## 2020-01-09 DIAGNOSIS — J20.9 ACUTE BRONCHITIS, UNSPECIFIED ORGANISM: ICD-10-CM

## 2020-01-09 PROBLEM — J30.9 ALLERGIC RHINITIS DUE TO ALLERGEN: Status: ACTIVE | Noted: 2020-01-09

## 2020-01-09 RX ORDER — CODEINE PHOSPHATE AND GUAIFENESIN 10; 100 MG/5ML; MG/5ML
10 SOLUTION ORAL
Qty: 236 ML | Refills: 0 | Status: SHIPPED | OUTPATIENT
Start: 2020-01-09 | End: 2020-01-16

## 2020-01-09 RX ORDER — PREDNISONE 20 MG/1
TABLET ORAL
Qty: 10 TAB | Refills: 0 | Status: SHIPPED | OUTPATIENT
Start: 2020-01-09 | End: 2020-02-20 | Stop reason: CLARIF

## 2020-01-09 RX ORDER — NEBULIZER AND COMPRESSOR
EACH MISCELLANEOUS
Qty: 1 EACH | Refills: 0 | Status: SHIPPED | OUTPATIENT
Start: 2020-01-09

## 2020-01-09 RX ORDER — DOXYCYCLINE 100 MG/1
100 TABLET ORAL 2 TIMES DAILY
Qty: 20 TAB | Refills: 0 | Status: SHIPPED | OUTPATIENT
Start: 2020-01-09 | End: 2020-01-19

## 2020-01-09 RX ORDER — FLUTICASONE PROPIONATE 50 MCG
SPRAY, SUSPENSION (ML) NASAL
Qty: 1 BOTTLE | Refills: 11 | Status: SHIPPED | OUTPATIENT
Start: 2020-01-09 | End: 2021-03-18 | Stop reason: SDUPTHER

## 2020-01-09 RX ORDER — ALBUTEROL SULFATE 0.83 MG/ML
2.5 SOLUTION RESPIRATORY (INHALATION)
Qty: 30 NEBULE | Refills: 0 | Status: SHIPPED | OUTPATIENT
Start: 2020-01-09 | End: 2020-03-24 | Stop reason: SDUPTHER

## 2020-01-09 NOTE — PROGRESS NOTES
CC:   Chief Complaint   Patient presents with    Other     seen for wheezing/cough at [facility] is a 61 y.o. female. Presents for ED follow up evaluation. She has type 2 DM (last A1c 7.0% on 6/24/19), HTN, asthma, and allergic rhinitis  Seen at [facility] ED on 1/2/20 with cough, SOB, subjective fevers, and body aches diagnosed as viral URI. CBC and CXR normal.  Rapid flu tests negative. Also complained of RLQ abdominal pain. CT abd/pelvis: right adrenal nodule but was otherwise normal.  Treated with Duoneb treatment, prednisone, and IVF's. Not discharged on any medications but has been taking prednisone 10 mg daily that she already had at home. Told to follow up with her PCP about adrenal nodule. Today she complains of persisting cough productive of yellow sputum, wheezing, SOB, and sinus congestion. Hard to sleep lying down because of worsened SOB and postnasal drainage. Denies fevers, chills, sore throat, ear pain, hemoptysis, CP, myalgias, or RLQ abdominal pain. 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 Missed work yesterday and today. Hospitalized at [facility] 7/10-7/11/19 with musculoskeletal chest pain and bacterial sinusitis. Was discharged on levofloxacin and Flonase nasal spray. CT abd/pelv with IV contrast 1/2/20 [facility]): Right adrenal gland has nodule measuring 1.2 x 1.1 cm. Colonic diverticulosis without evidence of acute diverticulitis. Soc Hx  . Works as a  for [employer] (new job). Former smoker; quit in 2010.         Patient Active Problem List   Diagnosis Code    Type 2 diabetes mellitus without complication, without long-term current use of insulin (HCC) E11.9    HTN (hypertension) I10    Arthritis M19.90    GERD (gastroesophageal reflux disease) K21.9    Vitamin D deficiency E55.9    Fibroadenoma of right breast D24.1    Moderate persistent asthma without complication V72.00     Past Medical History:   Diagnosis Date    Arrhythmia     Arthritis     GENERALIZED TO JOINTS-WRISTS & KNEES.  Asthma     INHALERS    Asthma, chronic 10/8/2009    Diabetes ([address]     USES PILLS TO CONTROLL    GERD (gastroesophageal reflux disease)     Gout     Hypertension     CONTROLLED BY MEDS.  Nausea & vomiting     Obesity      Allergies   Allergen Reactions    Latex Rash     Severe rash    Ampicillin Anaphylaxis    Betadine Antiseptic Gauze Hives    Contrast Dye [Iodine] Hives and Swelling     Pt. Reports throat swelling      Other Medication Rash     Betadine soap/blisters    Fd And C Blue No.1 Anaphylaxis    Penicillins Hives       Current Outpatient Medications   Medication Sig Dispense Refill    amLODIPine (NORVASC) 10 mg tablet Take 1 Tab by mouth daily. 90 Tab 3    triamterene-hydroCHLOROthiazide (MAXZIDE) 37.5-25 mg per tablet Take 1 Tab by mouth every morning. 90 Tab 1    fluticasone propion-salmeterol (ADVAIR DISKUS) 250-50 mcg/dose diskus inhaler Take 1 Puff by inhalation every twelve (12) hours. 60 Each 11    promethazine (PHENERGAN) 25 mg tablet TAKE 1 TAB BY MOUTH EVERY SIX (6) HOURS AS NEEDED FOR NAUSEA.  0    fluticasone propionate (FLONASE) 50 mcg/actuation nasal spray INHALE 2 SPRAYS IN EACH NOSTRIL ONCE A DAY. Indications: inflammation of the nose due to an allergy 1 Bottle 2    cyclobenzaprine (FLEXERIL) 5 mg tablet TAKE 1-2 TABLETS BY MOUTH AT BEDTIME  1    diclofenac EC (VOLTAREN) 75 mg EC tablet TAKE 1 TABLET BY MOUTH TWICE A DAY  1    SITagliptin-metFORMIN (JANUMET) 50-1,000 mg per tablet Take 1 Tab by mouth two (2) times daily (with meals). Dx: E11.42 180 Tab 3    glucose blood VI test strips (BLOOD GLUCOSE TEST) strip Use to test blood sugar once a day.  Dx: E11.42 90 Strip 3    ergocalciferol (ERGOCALCIFEROL) 50,000 unit capsule Take 1 Cap by mouth every seven (7) days. Indications: vitamin D deficiency 12 Cap 3    aspirin delayed-release 81 mg tablet Take  by mouth daily.  lancets misc Test blood sugar once a day 30 Each 11    alcohol swabs (ALCOHOL PREP PADS) padm Use to test blood sugar once a day 40 Pad 11    albuterol (PROVENTIL HFA, VENTOLIN HFA, PROAIR HFA) 90 mcg/actuation inhaler Take 2 Puffs by inhalation every four (4) hours as needed for Wheezing or Shortness of Breath (cough). Indications: Acute Asthma Attack 1 Inhaler 0         PHYSICAL EXAM  Visit Vitals  /79 (BP 1 Location: Left arm, BP Patient Position: Sitting)   Pulse (!) 106   Temp 98.1 °F (36.7 °C) (Oral)   Resp 18   Ht 5' 2\" (1.575 m)   Wt 171 lb (77.6 kg)   LMP 06/19/2011   SpO2 94%   BMI 31.28 kg/m²   2 lb weight gain since last clinic visit 3 months ago    General: Obese, no distress. Coughs frequently. HEENT:  Head normocephalic/atraumatic, no scleral icterus or conjunctivitis. Nasal mucosa normal. Oropharynx normal. Right maxillary sinus tenderness. Normal TM's and ear canals bilaterally. Lungs:  Scattered expiratory wheezes. Good air movement. Heart:  Tachycardic, regular rhythm, normal S1 and S2, no murmur, gallop, or rub  Extremities: No clubbing, cyanosis, or edema. Neurological: Alert and oriented. Psychiatric: Normal mood and affect. Behavior is normal.         ASSESSMENT AND PLAN    ICD-10-CM ICD-9-CM    1. Moderate persistent asthma with exacerbation J45.41 493.92 predniSONE (DELTASONE) 20 mg tablet      Nebulizer & Compressor machine      albuterol (PROVENTIL VENTOLIN) 2.5 mg /3 mL (0.083 %) nebu   2. Acute bronchitis, unspecified organism J20.9 466.0 XR CHEST PA LAT      doxycycline (ADOXA) 100 mg tablet      guaiFENesin-codeine (ROBITUSSIN AC) 100-10 mg/5 mL solution   3. Acute non-recurrent maxillary sinusitis J01.00 461.0 doxycycline (ADOXA) 100 mg tablet   4.  Non-seasonal allergic rhinitis due to other allergic trigger J30.89 477.8 fluticasone propionate (FLONASE) 50 mcg/actuation nasal spray   5. Adrenal nodule ([facility]) E27.9 255.8      Diagnoses and all orders for this visit:    1. Moderate persistent asthma with exacerbation  Precipitated by infection and cleaning products. -     Start predniSONE (DELTASONE) 20 mg tablet; Take 2 tablets by mouth once daily for 5 days.  -     Start Nebulizer & Compressor machine; With accessories. Use as instructed 1-3 times daily as needed. Dx:J45.41  -     Start albuterol (PROVENTIL VENTOLIN) 2.5 mg /3 mL (0.083 %) nebu; 3 mL by Nebulization route three (3) times daily as needed for Wheezing. Dx: J45.41    2. Acute bronchitis, unspecified organism  Rule out pneumonia. Allergic to penicillin. -     XR CHEST PA LAT; Future  -     Start doxycycline (ADOXA) 100 mg tablet; Take 1 Tab by mouth two (2) times a day for 10 days.  -     Start guaiFENesin-codeine (ROBITUSSIN AC) 100-10 mg/5 mL solution; Take 10 mL by mouth three (3) times daily as needed for Cough for up to 7 days. Max Daily Amount: 30 mL. 3. Acute non-recurrent maxillary sinusitis  -     Start doxycycline (ADOXA) 100 mg tablet; Take 1 Tab by mouth two (2) times a day for 10 days. 4. Non-seasonal allergic rhinitis due to other allergic trigger  -     Refill fluticasone propionate (FLONASE) 50 mcg/actuation nasal spray; INHALE 2 SPRAYS IN EACH NOSTRIL ONCE A DAY. Indications: inflammation of the nose due to an allergy    5. Adrenal nodule ([address]  Incidental finding. Once she is better and off prednisone, plan to check the following labs: DHEAS and 1 mg overnight dexamethasone test, plasma fractionated metanephrines, plasma aldosterone concentration, and plasma renin activity. If all normal, plan repeat imaging in 12 months and repeat DHEAS and 1 mg DST annually for 4 years. Follow-up and Dispositions    · Return in about 1 month (around 2/9/2020), or if symptoms worsen or fail to improve, for Asthma, DM. I have discussed the diagnosis with the patient and the intended plan as seen in the above orders. Patient is in agreement. The patient has received an after-visit summary and questions were answered concerning future plans. I have discussed medication side effects and warnings with the patient as well.

## 2020-01-09 NOTE — PROGRESS NOTES
Bernie Marino  Identified pt with two pt identifiers(name and ). Chief Complaint   Patient presents with    Other     seen for wheezing/cough at [facility]       1. Have you been to the ER, urgent care clinic since your last visit? Hospitalized since your last visit? [facility] ER on 20    2. Have you seen or consulted any other health care providers outside of the [address] [place] [address] since your last visit? Include any pap smears or colon screening. NO    Today's provider has been notified of reason for visit, vitals and flowsheets obtained on patients.      Patient received paperwork for advance directive during previous visit but has not completed at this time     Reviewed record In preparation for visit, huddled with provider and have obtained necessary documentation      Health Maintenance Due   Topic    Influenza Age 5 to Adult        Wt Readings from Last 3 Encounters:   20 171 lb (77.6 kg)   19 169 lb (76.7 kg)   19 169 lb 9.6 oz (76.9 kg)     Temp Readings from Last 3 Encounters:   20 98.1 °F (36.7 °C) (Oral)   19 98.2 °F (36.8 °C) (Oral)   19 97.9 °F (36.6 °C) (Oral)     BP Readings from Last 3 Encounters:   20 133/79   19 115/79   19 117/74     Pulse Readings from Last 3 Encounters:   20 (!) 106   19 91   19 89     Vitals:    20 0828   BP: 133/79   Pulse: (!) 106   Resp: 18   Temp: 98.1 °F (36.7 °C)   TempSrc: Oral   SpO2: 94%   Weight: 171 lb (77.6 kg)   Height: 5' 2\" (1.575 m)   PainSc:   8   PainLoc: Back   LMP: 2011         Learning Assessment:  :     Learning Assessment 3/25/2019   PRIMARY LEARNER Patient   PRIMARY LANGUAGE ENGLISH   LEARNER PREFERENCE PRIMARY READING   ANSWERED BY patient   RELATIONSHIP SELF       Depression Screening:  :     3 most recent PHQ Screens 2020   Little interest or pleasure in doing things Not at all   Feeling down, depressed, irritable, or hopeless Not at all Total Score PHQ 2 0       Fall Risk Assessment:  :     Fall Risk Assessment, last 12 mths 3/25/2019   Able to walk? Yes   Fall in past 12 months? No       Abuse Screening:  :     Abuse Screening Questionnaire 3/25/2019   Do you ever feel afraid of your partner? N   Are you in a relationship with someone who physically or mentally threatens you? N   Is it safe for you to go home? Y       ADL Screening:  :     ADL Assessment 3/25/2019   Feeding yourself No Help Needed   Getting from bed to chair No Help Needed   Getting dressed No Help Needed   Bathing or showering No Help Needed   Walk across the room (includes cane/walker) No Help Needed   Using the telphone No Help Needed   Taking your medications No Help Needed   Preparing meals No Help Needed   Managing money (expenses/bills) No Help Needed   Moderately strenuous housework (laundry) No Help Needed   Shopping for personal items (toiletries/medicines) No Help Needed   Shopping for groceries No Help Needed   Driving No Help Needed   Climbing a flight of stairs No Help Needed   Getting to places beyond walking distances No Help Needed                 Medication reconciliation up to date and corrected with patient at this time.

## 2020-01-09 NOTE — LETTER
NOTIFICATION RETURN TO WORK / SCHOOL 
 
1/9/2020 9:18 AM 
 
Ms. Cathleen Heredia [address] [place] [id number] To Whom It May Concern: 
 
Cathleen Heredia is currently under the care of [address] [place] [address]. Please excuse her for missing work on 1/8 and 1/9/2020. She will return to work/school on: 1/10/2020. If there are questions or concerns please have the patient contact our office. Sincerely, Le Hallman MD

## 2020-02-21 ENCOUNTER — ANESTHESIA (OUTPATIENT)
Dept: ENDOSCOPY | Age: 60
End: 2020-02-21
Payer: COMMERCIAL

## 2020-02-21 ENCOUNTER — ANESTHESIA EVENT (OUTPATIENT)
Dept: ENDOSCOPY | Age: 60
End: 2020-02-21
Payer: COMMERCIAL

## 2020-02-21 ENCOUNTER — HOSPITAL ENCOUNTER (OUTPATIENT)
Age: 60
Setting detail: OUTPATIENT SURGERY
Discharge: HOME | End: 2020-02-21
Attending: INTERNAL MEDICINE | Admitting: INTERNAL MEDICINE
Payer: COMMERCIAL

## 2020-02-21 VITALS
RESPIRATION RATE: 12 BRPM | TEMPERATURE: 97.6 F | BODY MASS INDEX: 31.28 KG/M2 | DIASTOLIC BLOOD PRESSURE: 73 MMHG | HEART RATE: 85 BPM | HEIGHT: 62 IN | OXYGEN SATURATION: 99 % | SYSTOLIC BLOOD PRESSURE: 139 MMHG

## 2020-02-21 PROCEDURE — 77030013992 HC SNR POLYP ENDOSC BSC -B: Performed by: INTERNAL MEDICINE

## 2020-02-21 PROCEDURE — 88305 TISSUE EXAM BY PATHOLOGIST: CPT

## 2020-02-21 PROCEDURE — 74011250636 HC RX REV CODE- 250/636: Performed by: INTERNAL MEDICINE

## 2020-02-21 PROCEDURE — 74011250636 HC RX REV CODE- 250/636: Performed by: NURSE ANESTHETIST, CERTIFIED REGISTERED

## 2020-02-21 PROCEDURE — 76060000031 HC ANESTHESIA FIRST 0.5 HR: Performed by: INTERNAL MEDICINE

## 2020-02-21 PROCEDURE — 74011000250 HC RX REV CODE- 250: Performed by: NURSE ANESTHETIST, CERTIFIED REGISTERED

## 2020-02-21 PROCEDURE — 76040000019: Performed by: INTERNAL MEDICINE

## 2020-02-21 RX ORDER — SODIUM CHLORIDE 9 MG/ML
75 INJECTION, SOLUTION INTRAVENOUS CONTINUOUS
Status: DISCONTINUED | OUTPATIENT
Start: 2020-02-21 | End: 2020-02-21 | Stop reason: HOSPADM

## 2020-02-21 RX ORDER — FLUMAZENIL 0.1 MG/ML
0.2 INJECTION INTRAVENOUS
Status: DISCONTINUED | OUTPATIENT
Start: 2020-02-21 | End: 2020-02-21 | Stop reason: HOSPADM

## 2020-02-21 RX ORDER — DEXTROMETHORPHAN/PSEUDOEPHED 2.5-7.5/.8
1.2 DROPS ORAL
Status: DISCONTINUED | OUTPATIENT
Start: 2020-02-21 | End: 2020-02-21 | Stop reason: HOSPADM

## 2020-02-21 RX ORDER — SODIUM CHLORIDE 0.9 % (FLUSH) 0.9 %
5-40 SYRINGE (ML) INJECTION EVERY 8 HOURS
Status: DISCONTINUED | OUTPATIENT
Start: 2020-02-21 | End: 2020-02-21 | Stop reason: HOSPADM

## 2020-02-21 RX ORDER — ATROPINE SULFATE 0.1 MG/ML
0.5 INJECTION INTRAVENOUS
Status: DISCONTINUED | OUTPATIENT
Start: 2020-02-21 | End: 2020-02-21 | Stop reason: HOSPADM

## 2020-02-21 RX ORDER — EPINEPHRINE 0.1 MG/ML
1 INJECTION INTRACARDIAC; INTRAVENOUS
Status: DISCONTINUED | OUTPATIENT
Start: 2020-02-21 | End: 2020-02-21 | Stop reason: HOSPADM

## 2020-02-21 RX ORDER — MIDAZOLAM HYDROCHLORIDE 1 MG/ML
.25-5 INJECTION, SOLUTION INTRAMUSCULAR; INTRAVENOUS
Status: DISCONTINUED | OUTPATIENT
Start: 2020-02-21 | End: 2020-02-21 | Stop reason: HOSPADM

## 2020-02-21 RX ORDER — SODIUM CHLORIDE 0.9 % (FLUSH) 0.9 %
5-40 SYRINGE (ML) INJECTION AS NEEDED
Status: DISCONTINUED | OUTPATIENT
Start: 2020-02-21 | End: 2020-02-21 | Stop reason: HOSPADM

## 2020-02-21 RX ORDER — NALOXONE HYDROCHLORIDE 0.4 MG/ML
0.4 INJECTION, SOLUTION INTRAMUSCULAR; INTRAVENOUS; SUBCUTANEOUS
Status: DISCONTINUED | OUTPATIENT
Start: 2020-02-21 | End: 2020-02-21 | Stop reason: HOSPADM

## 2020-02-21 RX ORDER — PROPOFOL 10 MG/ML
INJECTION, EMULSION INTRAVENOUS AS NEEDED
Status: DISCONTINUED | OUTPATIENT
Start: 2020-02-21 | End: 2020-02-21 | Stop reason: HOSPADM

## 2020-02-21 RX ORDER — LIDOCAINE HYDROCHLORIDE 20 MG/ML
INJECTION, SOLUTION EPIDURAL; INFILTRATION; INTRACAUDAL; PERINEURAL AS NEEDED
Status: DISCONTINUED | OUTPATIENT
Start: 2020-02-21 | End: 2020-02-21 | Stop reason: HOSPADM

## 2020-02-21 RX ADMIN — PROPOFOL 20 MG: 10 INJECTION, EMULSION INTRAVENOUS at 15:19

## 2020-02-21 RX ADMIN — PROPOFOL 20 MG: 10 INJECTION, EMULSION INTRAVENOUS at 15:25

## 2020-02-21 RX ADMIN — PROPOFOL 20 MG: 10 INJECTION, EMULSION INTRAVENOUS at 15:31

## 2020-02-21 RX ADMIN — LIDOCAINE HYDROCHLORIDE 80 MG: 20 INJECTION, SOLUTION EPIDURAL; INFILTRATION; INTRACAUDAL; PERINEURAL at 15:16

## 2020-02-21 RX ADMIN — PROPOFOL 20 MG: 10 INJECTION, EMULSION INTRAVENOUS at 15:21

## 2020-02-21 RX ADMIN — PROPOFOL 20 MG: 10 INJECTION, EMULSION INTRAVENOUS at 15:23

## 2020-02-21 RX ADMIN — PROPOFOL 20 MG: 10 INJECTION, EMULSION INTRAVENOUS at 15:27

## 2020-02-21 RX ADMIN — PROPOFOL 100 MG: 10 INJECTION, EMULSION INTRAVENOUS at 15:16

## 2020-02-21 RX ADMIN — PROPOFOL 20 MG: 10 INJECTION, EMULSION INTRAVENOUS at 15:29

## 2020-02-21 RX ADMIN — SODIUM CHLORIDE 75 ML/HR: 900 INJECTION, SOLUTION INTRAVENOUS at 14:58

## 2020-02-21 NOTE — PROCEDURES
Colonoscopy Procedure Note    Andrew Beard  1960  [id number]    Indications:  Please see below. Pre-operative Diagnosis: FAMILY HX OF COLONIC POLYPS    Post-operative Diagnosis: Colonic diverticulosis, polyps, internal  hemorrhoids, poor prep      : Adam Castillo MD    Referring Provider: Gaby Howe MD    Sedation:  MAC anesthesia Propofol        Procedure Details:    After detailed informed consent was obtained with all risks and benefits of procedure explained and preoperative exam completed, the patient was taken to the endoscopy suite and placed in the left lateral decubitus position. Upon sequential sedation as per above, a digital rectal exam was performed  And was normal.  The Olympus videocolonoscope  was inserted in the rectum and carefully advanced to the cecum, which was identified by the ileocecal valve. The quality of preparation was inadequate. The colonoscope was slowly withdrawn with careful evaluation between folds. Retroflexion in the rectum was performed. Findings:     · A 1.2 cm proximal ascending sessile polyp was removed with hot snare. · A 1 cm sessile transverse colon polyp was removed with a cold snare. · Prep is poor in cecum,transverse and sigmoid colon (Pt ate a regular meal yesterday). · Occasional sigmoid diverticulosis. · Medium internal hemorrhoids      Therapies:  2 complete polypectomies  were performed using hot/cold snare  and the polyps were  retrieved    Specimen:   Specimens were collected as described above and sent to pathology. Complications: None were encountered during the procedure. EBL:  None. Recommendations:     -Await pathology. -Repeat colonoscopy with a gallon prep and liquids only day before procedure. Adam Castillo MD  2/21/2020  3:39 PM

## 2020-02-21 NOTE — ANESTHESIA PREPROCEDURE EVALUATION
Relevant Problems   No relevant active problems       Anesthetic History     PONV          Review of Systems / Medical History  Patient summary reviewed, nursing notes reviewed and pertinent labs reviewed    Pulmonary          Smoker (EX 10 yr smoker, quit in 2010)  Asthma        Neuro/Psych              Cardiovascular    Hypertension        Dysrhythmias       Exercise tolerance: >4 METS  Comments: 3-2019 EKG: NSR, T wave abnormality ( also noted from 2017)    3-2018 NORMAL Cardiac Stress Test,  68% EF   GI/Hepatic/Renal     GERD          Comments: fam hx of colon polyps Endo/Other    Diabetes: type 2    Obesity and arthritis     Other Findings   Comments: Gout    Vit D defic           Physical Exam    Airway  Mallampati: II  TM Distance: 4 - 6 cm  Neck ROM: normal range of motion   Mouth opening: Normal     Cardiovascular  Regular rate and rhythm,  S1 and S2 normal,  no murmur, click, rub, or gallop             Dental    Dentition: Full upper dentures     Pulmonary  Breath sounds clear to auscultation               Abdominal  GI exam deferred       Other Findings            Anesthetic Plan    ASA: 3  Anesthesia type: total IV anesthesia and general          Induction: Intravenous  Anesthetic plan and risks discussed with: Patient

## 2020-02-21 NOTE — PROGRESS NOTES
Anesthesia reports 240mg Propofol, 80mg Lidocaine and 250mL NS given during procedure. Received report from anesthesia staff on vital signs and status of patient.     Endoscope was pre-cleaned at the bedside immediately following procedure by Joann Friend

## 2020-02-21 NOTE — H&P
Pre-endoscopy H and P    The patient was seen and examined in the room/pre-op holding area. The airway was assessed and documented. The problem list, past medical history, and medications were reviewed.      Patient Active Problem List   Diagnosis Code    Type 2 diabetes mellitus without complication, without long-term current use of insulin (HCC) E11.9    HTN (hypertension) I10    Arthritis M19.90    GERD (gastroesophageal reflux disease) K21.9    Vitamin D deficiency E55.9    Fibroadenoma of right breast D24.1    Moderate persistent asthma without complication U77.45    Adrenal nodule (HCC) E27.9    Allergic rhinitis due to allergen J30.9     Social History     Socioeconomic History    Marital status:      Spouse name: Not on file    Number of children: Not on file    Years of education: Not on file    Highest education level: Not on file   Occupational History    Not on file   Social Needs    Financial resource strain: Not on file    Food insecurity:     Worry: Not on file     Inability: Not on file    Transportation needs:     Medical: Not on file     Non-medical: Not on file   Tobacco Use    Smoking status: Former Smoker     Years: 10.00     Last attempt to quit: 2010     Years since quittin.6    Smokeless tobacco: Never Used   Substance and Sexual Activity    Alcohol use: No    Drug use: No    Sexual activity: Yes     Partners: Male     Birth control/protection: None   Lifestyle    Physical activity:     Days per week: Not on file     Minutes per session: Not on file    Stress: Not on file   Relationships    Social connections:     Talks on phone: Not on file     Gets together: Not on file     Attends Oriental orthodox service: Not on file     Active member of club or organization: Not on file     Attends meetings of clubs or organizations: Not on file     Relationship status: Not on file    Intimate partner violence:     Fear of current or ex partner: Not on file Emotionally abused: Not on file     Physically abused: Not on file     Forced sexual activity: Not on file   Other Topics Concern    Not on file   Social History Narrative    Not on file     Past Medical History:   Diagnosis Date    Arrhythmia     Arthritis     GENERALIZED TO [facility].  Asthma     INHALERS    Asthma, chronic 10/8/2009    Diabetes ([facility]     USES PILLS TO CONTROLL    GERD (gastroesophageal reflux disease)     Gout     Hypertension     CONTROLLED BY MEDS.  Nausea & vomiting     Obesity          Prior to Admission Medications   Prescriptions Last Dose Informant Patient Reported? Taking? Nebulizer & Compressor machine 2/20/2020 at Unknown time  No Yes   Sig: With accessories. Use as instructed 1-3 times daily as needed. Dx:J45.41   SITagliptin-metFORMIN (JANUMET) 50-1,000 mg per tablet 2/20/2020 at Unknown time  No Yes   Sig: Take 1 Tab by mouth two (2) times daily (with meals). Dx: E11.42   albuterol (PROVENTIL HFA, VENTOLIN HFA, PROAIR HFA) 90 mcg/actuation inhaler 2/20/2020 at Unknown time  No Yes   Sig: Take 2 Puffs by inhalation every four (4) hours as needed for Wheezing or Shortness of Breath (cough). Indications: Acute Asthma Attack   albuterol (PROVENTIL VENTOLIN) 2.5 mg /3 mL (0.083 %) nebu 2/20/2020 at Unknown time  No Yes   Sig: 3 mL by Nebulization route three (3) times daily as needed for Wheezing. Dx: J45.41   alcohol swabs (ALCOHOL PREP PADS) padm 2/20/2020 at Unknown time  No Yes   Sig: Use to test blood sugar once a day   amLODIPine (NORVASC) 10 mg tablet 2/20/2020 at Unknown time  No Yes   Sig: Take 1 Tab by mouth daily. aspirin delayed-release 81 mg tablet 2/20/2020 at Unknown time  Yes Yes   Sig: Take  by mouth daily.    cyclobenzaprine (FLEXERIL) 5 mg tablet 2/18/2020 at Unknown time  Yes Yes   Sig: TAKE 1-2 TABLETS BY MOUTH AT BEDTIME   diclofenac EC (VOLTAREN) 75 mg EC tablet 2/20/2020 at Unknown time  Yes Yes   Sig: TAKE 1 TABLET BY MOUTH TWICE A DAY   ergocalciferol (ERGOCALCIFEROL) 50,000 unit capsule   No No   Sig: Take 1 Cap by mouth every seven (7) days. Indications: vitamin D deficiency   fluticasone propion-salmeterol (ADVAIR DISKUS) 250-50 mcg/dose diskus inhaler 2/20/2020 at Unknown time  No Yes   Sig: Take 1 Puff by inhalation every twelve (12) hours. fluticasone propionate (FLONASE) 50 mcg/actuation nasal spray 2/20/2020 at Unknown time  No Yes   Sig: INHALE 2 SPRAYS IN EACH NOSTRIL ONCE A DAY. Indications: inflammation of the nose due to an allergy   glucose blood VI test strips (BLOOD GLUCOSE TEST) strip 2/20/2020 at Unknown time  No Yes   Sig: Use to test blood sugar once a day. Dx: E11.42   lancets misc 2/20/2020 at Unknown time  No Yes   Sig: Test blood sugar once a day   promethazine (PHENERGAN) 25 mg tablet 2/20/2020 at Unknown time  Yes Yes   Sig: TAKE 1 TAB BY MOUTH EVERY SIX (6) HOURS AS NEEDED FOR NAUSEA.   triamterene-hydroCHLOROthiazide (MAXZIDE) 37.5-25 mg per tablet 2/20/2020 at Unknown time  No Yes   Sig: Take 1 Tab by mouth every morning. Facility-Administered Medications: None           The review of systems is:  Negative  for shortness of breath or chest pain      The heart, lungs, and mental status were satisfactory for the administration of deep sedation and for the procedure. I discussed with the patient the objectives, risks, consequences and alternatives to the procedure.       Fela Luna MD  2/21/2020  3:12 PM

## 2020-02-21 NOTE — DISCHARGE INSTRUCTIONS
[place] Office: [phone]    Lola Paulson  [id number]  1960    EGD/COLONOSCOPY DISCHARGE INSTRUCTIONS  Discomfort:  Sore throat- throat lozenges or warm salt water gargle  redness at IV site- apply warm compress to area; if redness or soreness persist- contact your physician  Gaseous discomfort- walking, belching will help relieve any discomfort  You may not operate a vehicle for 12 hours  You may not engage in an occupation involving machinery or appliances for rest of today. You may not drink alcoholic beverages for at least 12 hours  Avoid making any critical decisions for at least 24 hour  DIET  You may resume your regular diet - however -  remember your colon is empty and a heavy meal will produce gas. Avoid these foods:  fried / greasy foods, excessive carbonated drinks or too much caffeine  MEDICATIONS   Regarding Aspirin or Nonsteroidal medications specifically, please see below. ACTIVITY  You may resume your normal daily activities. Spend the remainder of the day resting -  avoid any strenuous activity. CALL M.D. ANY SIGN OF   Increasing pain, nausea, vomiting  Abdominal distension (swelling)  New increased bleeding (oral or rectal)  Fever (chills)  Pain in chest area  Bloody discharge from nose or mouth  Shortness of breath    You may not take any Advil, Aspirin, Ibuprofen, Motrin, Aleve, or Goodys for 7 days, ONLY  Tylenol as needed for pain. Follow-up Instructions:   Call  Adam Gandhi MD for any questions or concerns  Results of procedure / biopsy in 7 days   Telephone # [phone]      Follow-up Information    None

## 2020-02-21 NOTE — PROGRESS NOTES
Katherine Burns  1960  [id number]    Situation:  Verbal report received from: BERNARDO Joshi  Procedure: Procedure(s):  COLONOSCOPY  ENDOSCOPIC POLYPECTOMY    Background:    Preoperative diagnosis: FAMILY HX OF COLONIC POLYPS  Postoperative diagnosis: Colon: Diverticulosis, polyp, hemorrhoids, poor prep    :  Dr. Angela Higuera  Assistant(s): Endoscopy Technician-1: Leonora Wade  Endoscopy RN-1: Shellie Meneses RN    Specimens:   ID Type Source Tests Collected by Time Destination   1 : Polyp Preservative Colon, Transverse  Alvaro Eldridge MD 2/21/2020 1529 Pathology   2 : Polyp Preservative Cecum  Alvaro Eldridge MD 2/21/2020 1530 Pathology     H. Pylori  no    Assessment:  Intra-procedure medications     Anesthesia gave intra-procedure sedation and medications, see anesthesia flow sheet yes    Intravenous fluids: NS@ KVO     Vital signs stable      Abdominal assessment: round and soft      Recommendation:  Discharge patient per MD order .   Family or Friend    Permission to share finding with family or friend yes

## 2020-02-21 NOTE — ANESTHESIA POSTPROCEDURE EVALUATION
Procedure(s):  COLONOSCOPY  ENDOSCOPIC POLYPECTOMY. total IV anesthesia, general    Anesthesia Post Evaluation        Patient location during evaluation: PACU  Note status: Adequate. Level of consciousness: responsive to verbal stimuli and sleepy but conscious  Pain management: satisfactory to patient  Airway patency: patent  Anesthetic complications: no  Cardiovascular status: acceptable  Respiratory status: acceptable  Hydration status: acceptable  Comments: +Post-Anesthesia Evaluation and Assessment    Patient: Young Roth MRN: [id number]  SSN: [id number]   YOB: 1960  Age: 61 y.o. Sex: female      Cardiovascular Function/Vital Signs    /76   Pulse 97   Temp 36.4 °C (97.6 °F)   Resp 14   Ht 5' 2\" (1.575 m)   SpO2 100%   Breastfeeding No   BMI 31.28 kg/m²     Patient is status post Procedure(s):  COLONOSCOPY  ENDOSCOPIC POLYPECTOMY. Nausea/Vomiting: Controlled. Postoperative hydration reviewed and adequate. Pain:  Pain Scale 1: Numeric (0 - 10) (02/21/20 1542)  Pain Intensity 1: 0 (02/21/20 1542)   Managed. Neurological Status: At baseline. Mental Status and Level of Consciousness: Arousable. Pulmonary Status:   O2 Device: Room air (02/21/20 1542)   Adequate oxygenation and airway patent. Complications related to anesthesia: None    Post-anesthesia assessment completed. No concerns. Signed By: Theo Negron MD    2/21/2020  Post anesthesia nausea and vomiting:  controlled      Vitals Value Taken Time   /76 2/21/2020  3:50 PM   Temp     Pulse 94 2/21/2020  3:51 PM   Resp 15 2/21/2020  3:51 PM   SpO2 99 % 2/21/2020  3:51 PM   Vitals shown include unvalidated device data.

## 2020-03-04 ENCOUNTER — TELEPHONE (OUTPATIENT)
Dept: INTERNAL MEDICINE CLINIC | Facility: CLINIC | Age: 60
End: 2020-03-04

## 2020-03-04 DIAGNOSIS — J45.40 MODERATE PERSISTENT ASTHMA WITHOUT COMPLICATION: Primary | ICD-10-CM

## 2020-03-04 NOTE — TELEPHONE ENCOUNTER
[facility] pharmacy on file sent a fax requesting an alternative for \"wixela [id number] inhub\". Request states: \"this is not covered, can you switch to something else. Also cannot get fluticasone-salmetrol in stock from [place]". Please fax alternative rx to [phone].

## 2020-03-05 ENCOUNTER — HOSPITAL ENCOUNTER (EMERGENCY)
Age: 60
Discharge: HOME | End: 2020-03-05
Attending: EMERGENCY MEDICINE
Payer: COMMERCIAL

## 2020-03-05 VITALS
SYSTOLIC BLOOD PRESSURE: 151 MMHG | RESPIRATION RATE: 16 BRPM | BODY MASS INDEX: 31.28 KG/M2 | DIASTOLIC BLOOD PRESSURE: 85 MMHG | HEART RATE: 100 BPM | WEIGHT: 169.97 LBS | TEMPERATURE: 98.3 F | OXYGEN SATURATION: 100 % | HEIGHT: 62 IN

## 2020-03-05 DIAGNOSIS — M79.605 LOW BACK PAIN RADIATING TO LEFT LOWER EXTREMITY: Primary | ICD-10-CM

## 2020-03-05 DIAGNOSIS — M54.50 LOW BACK PAIN RADIATING TO LEFT LOWER EXTREMITY: Primary | ICD-10-CM

## 2020-03-05 DIAGNOSIS — M51.36 DDD (DEGENERATIVE DISC DISEASE), LUMBAR: ICD-10-CM

## 2020-03-05 PROCEDURE — 99282 EMERGENCY DEPT VISIT SF MDM: CPT

## 2020-03-05 RX ORDER — HYDROCODONE BITARTRATE AND ACETAMINOPHEN 5; 325 MG/1; MG/1
1 TABLET ORAL
Qty: 9 TAB | Refills: 0 | Status: SHIPPED | OUTPATIENT
Start: 2020-03-05 | End: 2020-03-08

## 2020-03-05 RX ORDER — PREDNISONE 10 MG/1
TABLET ORAL
Qty: 21 TAB | Refills: 0 | Status: SHIPPED | OUTPATIENT
Start: 2020-03-05 | End: 2020-04-01

## 2020-03-05 RX ORDER — BUDESONIDE AND FORMOTEROL FUMARATE DIHYDRATE 160; 4.5 UG/1; UG/1
2 AEROSOL RESPIRATORY (INHALATION) 2 TIMES DAILY
Qty: 1 INHALER | Refills: 5 | Status: SHIPPED | OUTPATIENT
Start: 2020-03-05 | End: 2020-08-25

## 2020-03-05 RX ORDER — IBUPROFEN 600 MG/1
600 TABLET ORAL
Qty: 20 TAB | Refills: 0 | Status: SHIPPED | OUTPATIENT
Start: 2020-03-05 | End: 2020-03-31

## 2020-03-05 NOTE — ED PROVIDER NOTES
EMERGENCY DEPARTMENT HISTORY AND PHYSICAL EXAM      Date: 3/5/2020  Patient Name: Julio Awad    History of Presenting Illness     Chief Complaint   Patient presents with    Back Pain     pt states she started to have back pain last night at work after lifting heavy objects. pt c/o lower back pain on the left side that raidates down her left leg. pt states she has had sciatic nerve problems in the past.     Leg Pain       History Provided By: Patient    HPI: Julio Awad, 61 y.o. female with a history of asthma, arthritis, diabetes, hypertension and others presents ambulatory to the ED with cc of a day or so of 10 out of 10 constant, aching lower back pain that radiates down the left leg and is worse with weightbearing and not associated with a fever or specific injury. She tells me she has had this pain before years ago and was diagnosed with sciatica. 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 Denies saddle anesthesia, abdominal pain, bowel or bladder symptoms, radiating pain, weakness, numbness or fever. There are no other complaints, changes, or physical findings at this time. PCP: Joyce Agudelo MD    Current Outpatient Medications   Medication Sig Dispense Refill    predniSONE (STERAPRED DS) 10 mg dose pack Per Dose Pack instructions 21 Tab 0    HYDROcodone-acetaminophen (NORCO) 5-325 mg per tablet Take 1 Tab by mouth every eight (8) hours as needed for Pain for up to 3 days. Max Daily Amount: 3 Tabs. 9 Tab 0    ibuprofen (MOTRIN) 600 mg tablet Take 1 Tab by mouth every six (6) hours as needed for Pain. 20 Tab 0    budesonide-formoteroL (SYMBICORT) 160-4.5 mcg/actuation HFAA Take 2 Puffs by inhalation two (2) times a day.  Dx: J45.40  Indications: controller medication for asthma 1 Inhaler 5    triamterene-hydroCHLOROthiazide (MAXZIDE) 37.5-25 mg per tablet TAKE 1 TABLET BY MOUTH EVERY DAY IN THE MORNING 30 Tab 5    fluticasone propionate (FLONASE) 50 mcg/actuation nasal spray INHALE 2 SPRAYS IN EACH NOSTRIL ONCE A DAY. Indications: inflammation of the nose due to an allergy 1 Bottle 11    Nebulizer & Compressor machine With accessories. Use as instructed 1-3 times daily as needed. Dx:J45.41 1 Each 0    albuterol (PROVENTIL VENTOLIN) 2.5 mg /3 mL (0.083 %) nebu 3 mL by Nebulization route three (3) times daily as needed for Wheezing. Dx: J45.41 30 Nebule 0    amLODIPine (NORVASC) 10 mg tablet Take 1 Tab by mouth daily. 90 Tab 3    promethazine (PHENERGAN) 25 mg tablet TAKE 1 TAB BY MOUTH EVERY SIX (6) HOURS AS NEEDED FOR NAUSEA.  0    cyclobenzaprine (FLEXERIL) 5 mg tablet TAKE 1-2 TABLETS BY MOUTH AT BEDTIME  1    diclofenac EC (VOLTAREN) 75 mg EC tablet TAKE 1 TABLET BY MOUTH TWICE A DAY  1    SITagliptin-metFORMIN (JANUMET) 50-1,000 mg per tablet Take 1 Tab by mouth two (2) times daily (with meals). Dx: E11.42 180 Tab 3    glucose blood VI test strips (BLOOD GLUCOSE TEST) strip Use to test blood sugar once a day. Dx: E11.42 90 Strip 3    ergocalciferol (ERGOCALCIFEROL) 50,000 unit capsule Take 1 Cap by mouth every seven (7) days. Indications: vitamin D deficiency 12 Cap 3    aspirin delayed-release 81 mg tablet Take  by mouth daily.  lancets misc Test blood sugar once a day 30 Each 11    alcohol swabs (ALCOHOL PREP PADS) padm Use to test blood sugar once a day 40 Pad 11    albuterol (PROVENTIL HFA, VENTOLIN HFA, PROAIR HFA) 90 mcg/actuation inhaler Take 2 Puffs by inhalation every four (4) hours as needed for Wheezing or Shortness of Breath (cough). Indications: Acute Asthma Attack 1 Inhaler 0     Past History     Past Medical History:  Past Medical History:   Diagnosis Date    Arrhythmia     Arthritis     GENERALIZED TO JOINTS-WRISTS & KNEES.     Asthma     INHALERS    Asthma, chronic 10/8/2009    Diabetes ([address]     USES PILLS TO CONTROLL    GERD (gastroesophageal reflux disease)     Gout     Hypertension     CONTROLLED BY MEDS.  Nausea & vomiting     Obesity        Past Surgical History:  Past Surgical History:   Procedure Laterality Date    COLONOSCOPY N/A 2019    COLONOSCOPY performed by Taylor Witt MD at [facility] ENDOSCOPY. 6 mm tubular adenoma in sigmoid polyp. REpeat in 6 months due to poor prep    COLONOSCOPY N/A 2020    COLONOSCOPY performed by Taylor Witt MD at [facility] ENDOSCOPY. 2 tubular adenomas, diverticulosis, internal hemorrhoids    HX BREAST BIOPSY Right     benign    HX CHOLECYSTECTOMY      HX HEART CATHETERIZATION      CARDIAC CATH X2    HX HEENT      CLEFT PALATE REPAIR.    HX HYSTERECTOMY  2011    HX ORTHOPAEDIC      carpal tunnel, trigger finger, hand surgery left arm    HX OTHER SURGICAL      CLeft palate repair, piloneidal cyst, hand surgery,     HX TUBAL LIGATION         Family History:  Family History   Problem Relation Age of Onset    Asthma Mother     Hypertension Mother     Diabetes Father     Heart Disease Father         MI    Stroke Brother         CEREBRAL ANEURYSM    Heart Attack Sister     Hypertension Sister     No Known Problems Sister        Social History:  Social History     Tobacco Use    Smoking status: Former Smoker     Years: 10.00     Last attempt to quit: 2010     Years since quittin.6    Smokeless tobacco: Never Used   Substance Use Topics    Alcohol use: No    Drug use: No       Allergies: Allergies   Allergen Reactions    Latex Rash     Severe rash    Ampicillin Anaphylaxis    Betadine Antiseptic Gauze Hives    Contrast Dye [Iodine] Hives and Swelling     Pt. Reports throat swelling      Other Medication Rash     Betadine soap/blisters    Fd And C Blue No.1 Anaphylaxis    Penicillins Hives     Review of Systems   Review of Systems   Constitutional: Negative for fatigue and fever. HENT: Negative for ear pain and sore throat.     Eyes: Negative for pain, redness and visual disturbance. Respiratory: Negative for cough and shortness of breath. Cardiovascular: Negative for chest pain and palpitations. Gastrointestinal: Negative for abdominal pain, nausea and vomiting. Genitourinary: Negative for dysuria, frequency and urgency. Musculoskeletal: Positive for back pain (That radiates down the left leg). Negative for gait problem, neck pain and neck stiffness. Skin: Negative for rash and wound. Neurological: Negative for dizziness, weakness, light-headedness, numbness and headaches. Physical Exam   Physical Exam  Vitals signs and nursing note reviewed. Constitutional:       General: She is not in acute distress. Appearance: She is well-developed. She is not toxic-appearing. HENT:      Head: Normocephalic and atraumatic. Jaw: No trismus. Right Ear: External ear normal.      Left Ear: External ear normal.      Nose: Nose normal.      Mouth/Throat:      Pharynx: Uvula midline. Eyes:      General: No scleral icterus. Conjunctiva/sclera: Conjunctivae normal.      Pupils: Pupils are equal, round, and reactive to light. Neck:      Musculoskeletal: Full passive range of motion without pain and normal range of motion. Cardiovascular:      Rate and Rhythm: Normal rate and regular rhythm. Pulmonary:      Effort: Pulmonary effort is normal. No tachypnea, accessory muscle usage or respiratory distress. Breath sounds: No decreased breath sounds or wheezing. Abdominal:      Palpations: Abdomen is soft. Tenderness: There is no abdominal tenderness. Musculoskeletal: Normal range of motion. Lumbar back: She exhibits tenderness. Back:       Comments: LOWER BACK:  Ambulates with a normal gait and no limp. No bruising, redness or swelling. No step off. Lower back pain that radiates down the left hip and leg to the knee  No CVA tenderness     Skin:     Findings: No rash.    Neurological:      Mental Status: She is alert and oriented to person, place, and time. She is not disoriented. GCS: GCS eye subscore is 4. GCS verbal subscore is 5. GCS motor subscore is 6. Cranial Nerves: No cranial nerve deficit. Comments: Negative seated SLR  Symmetric bulk and tone of both LE  Normal sensation along all LE dermatomes  Ambulates independently   Psychiatric:         Speech: Speech normal.       Diagnostic Study Results     Labs -   No results found for this or any previous visit (from the past 12 hour(s)). Radiologic Studies -   No orders to display     CT Results  (Last 48 hours)    None        CXR Results  (Last 48 hours)    None        Medical Decision Making   I am the first provider for this patient. I reviewed the vital signs, available nursing notes, past medical history, past surgical history, family history and social history. Vital Signs-Reviewed the patient's vital signs. Patient Vitals for the past 12 hrs:   Temp Pulse Resp BP SpO2   03/05/20 1637 98.3 °F (36.8 °C) 100 16 151/85 100 %       Pulse Oximetry Analysis - 100% on RA    Records Reviewed: Nursing Notes, Old Medical Records, Previous Radiology Studies, Previous Laboratory Studies and     Provider Notes (Medical Decision Making): Afebrile; well-appearing; reassuring exam; imaging of the L-spine from April 2019 is reviewed; CT results of the abdomen and pelvis from January 2, 2020 are reviewed; there has been no specific injury; additional testing deferred    ED Course:   Initial assessment performed. The patients presenting problems have been discussed, and they are in agreement with the care plan formulated and outlined with them. I have encouraged them to ask questions as they arise throughout their visit. Disposition:  Discharge    PLAN:  1.    Discharge Medication List as of 3/5/2020  4:56 PM      START taking these medications    Details   predniSONE (STERAPRED DS) 10 mg dose pack Per Dose Pack instructions, Normal, Disp-21 Tab, R-0 HYDROcodone-acetaminophen (NORCO) 5-325 mg per tablet Take 1 Tab by mouth every eight (8) hours as needed for Pain for up to 3 days. Max Daily Amount: 3 Tabs., Normal, Disp-9 Tab, R-0      ibuprofen (MOTRIN) 600 mg tablet Take 1 Tab by mouth every six (6) hours as needed for Pain., Normal, Disp-20 Tab, R-0         CONTINUE these medications which have NOT CHANGED    Details   budesonide-formoteroL (SYMBICORT) 160-4.5 mcg/actuation HFAA Take 2 Puffs by inhalation two (2) times a day. Dx: J45.40  Indications: controller medication for asthma, Normal, Disp-1 Inhaler, R-5Disp: 10.2 g      triamterene-hydroCHLOROthiazide (MAXZIDE) 37.5-25 mg per tablet TAKE 1 TABLET BY MOUTH EVERY DAY IN THE MORNING, Normal, Disp-30 Tab, R-5      fluticasone propionate (FLONASE) 50 mcg/actuation nasal spray INHALE 2 SPRAYS IN EACH NOSTRIL ONCE A DAY. Indications: inflammation of the nose due to an allergy, Normal, Disp-1 Bottle, R-11      Nebulizer & Compressor machine With accessories. Use as instructed 1-3 times daily as needed. Dx:J45.41, Print, Disp-1 Each, R-0      albuterol (PROVENTIL VENTOLIN) 2.5 mg /3 mL (0.083 %) nebu 3 mL by Nebulization route three (3) times daily as needed for Wheezing. Dx: J45.41, Print, Disp-30 Nebule, R-0      amLODIPine (NORVASC) 10 mg tablet Take 1 Tab by mouth daily. , Normal, Disp-90 Tab, R-3      promethazine (PHENERGAN) 25 mg tablet TAKE 1 TAB BY MOUTH EVERY SIX (6) HOURS AS NEEDED FOR NAUSEA., Historical Med, R-0      cyclobenzaprine (FLEXERIL) 5 mg tablet TAKE 1-2 TABLETS BY MOUTH AT BEDTIME, Historical Med, R-1      diclofenac EC (VOLTAREN) 75 mg EC tablet TAKE 1 TABLET BY MOUTH TWICE A DAY, Historical Med, R-1      SITagliptin-metFORMIN (JANUMET) 50-1,000 mg per tablet Take 1 Tab by mouth two (2) times daily (with meals). Dx: E11.42, Normal, Disp-180 Tab, R-3      glucose blood VI test strips (BLOOD GLUCOSE TEST) strip Use to test blood sugar once a day.  Dx: E11.42, Normal, Disp-90 Strip, R-3 ergocalciferol (ERGOCALCIFEROL) 50,000 unit capsule Take 1 Cap by mouth every seven (7) days. Indications: vitamin D deficiency, Normal, Disp-12 Cap, R-3      aspirin delayed-release 81 mg tablet Take  by mouth daily. , Historical Med      lancets misc Test blood sugar once a day, Normal, Disp-30 Each, R-11      alcohol swabs (ALCOHOL PREP PADS) padm Use to test blood sugar once a day, Normal, Disp-40 Pad, R-11      albuterol (PROVENTIL HFA, VENTOLIN HFA, PROAIR HFA) 90 mcg/actuation inhaler Take 2 Puffs by inhalation every four (4) hours as needed for Wheezing or Shortness of Breath (cough). Indications: Acute Asthma Attack, Print, Disp-1 Inhaler, R-0         STOP taking these medications       fluticasone propion-salmeterol (ADVAIR DISKUS) 250-50 mcg/dose diskus inhaler Comments:   Reason for Stoppin.   Follow-up Information     Follow up With Specialties Details Why Contact Info    Sergio Goetz MD Orthopedic Surgery Schedule an appointment as soon as possible for a visit ORTHO: as needed if symptoms persist [address]  [address]  [facility]  [phone]          Return to ED if worse     Diagnosis     Clinical Impression:   1. Low back pain radiating to left lower extremity    2.  DDD (degenerative disc disease), lumbar

## 2020-03-05 NOTE — LETTER
[address] 
[facility] EMERGENCY DEPT 
[address] [place] [address] [place] [id number] [phone] Work/School Note Date: 3/5/2020 To Whom It May concern: 
 
Cara Redd was seen and treated today in the emergency room by the following provider(s): 
Attending Provider: Mireya Almazan MD 
Physician Assistant: COLTON Issa. Cara Redd may return to work on [id number]. Sincerely, COLTON Magallanes

## 2020-03-05 NOTE — ED NOTES
Pt provided with discharge paperwork. Pt verbalized understanding of discharge paperwork and follow up information. Pt ambulated out of ed without assistance.

## 2020-03-24 DIAGNOSIS — J45.41 MODERATE PERSISTENT ASTHMA WITH EXACERBATION: ICD-10-CM

## 2020-03-24 RX ORDER — ALBUTEROL SULFATE 0.83 MG/ML
2.5 SOLUTION RESPIRATORY (INHALATION)
Qty: 60 NEBULE | Refills: 5 | Status: SHIPPED | OUTPATIENT
Start: 2020-03-24 | End: 2021-01-13 | Stop reason: SDUPTHER

## 2020-03-24 RX ORDER — ALBUTEROL SULFATE 90 UG/1
2 AEROSOL, METERED RESPIRATORY (INHALATION)
Qty: 1 INHALER | Refills: 5 | Status: SHIPPED | OUTPATIENT
Start: 2020-03-24 | End: 2020-10-20

## 2020-03-24 NOTE — TELEPHONE ENCOUNTER
Pt called to request refills of   Requested Prescriptions     Pending Prescriptions Disp Refills    albuterol (PROVENTIL HFA, VENTOLIN HFA, PROAIR HFA) 90 mcg/actuation inhaler 1 Inhaler 0     Sig: Take 2 Puffs by inhalation every four (4) hours as needed for Wheezing or Shortness of Breath (cough). Indications: asthma attack    albuterol (PROVENTIL VENTOLIN) 2.5 mg /3 mL (0.083 %) nebu 30 Nebule 0     Sig: 3 mL by Nebulization route three (3) times daily as needed for Wheezing. Dx: J45.41     Be sent to the [facility] on file.

## 2020-03-31 DIAGNOSIS — M54.50 ACUTE BILATERAL LOW BACK PAIN WITHOUT SCIATICA: Primary | ICD-10-CM

## 2020-03-31 RX ORDER — DICLOFENAC SODIUM 75 MG/1
75 TABLET, DELAYED RELEASE ORAL
Qty: 60 TAB | Refills: 1 | OUTPATIENT
Start: 2020-03-31 | End: 2020-08-14

## 2020-03-31 NOTE — TELEPHONE ENCOUNTER
I called the patient and verified them by name and date of birth. I informed the patient that I needed to know the name of the medication before I could send in the request. I also asked the patient if Dr. Arciniega Hence needs her to schedule a virtual appointment would the patient be able to. The patient stated she would be able to through her phone. Pain medication pended.

## 2020-03-31 NOTE — TELEPHONE ENCOUNTER
I called the patient and verified her by name and date of birth. I informed the patient of the message per Dr. Baylee Willson.  The patient scheduled an appointment with Dr. Baylee Willson for April 1, 2020 at 9:10am.

## 2020-03-31 NOTE — TELEPHONE ENCOUNTER
Called pt back to offer virtual visit for Thursday, pt stated that she isn't sure she needs a visit and wanted to know if Dr. Winter Alcaraz could just refill her \"pain medicine\" for this condition-she has been seen for it in the past. Please give pt a call back to discuss meds at [phone].      ----- Message from Sylwia Leon sent at 3/31/2020  8:42 AM EDT -----  Regarding: Dr. 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

## 2020-04-01 ENCOUNTER — VIRTUAL VISIT (OUTPATIENT)
Dept: INTERNAL MEDICINE CLINIC | Facility: CLINIC | Age: 60
End: 2020-04-01

## 2020-04-01 DIAGNOSIS — M79.671 BILATERAL FOOT PAIN: Primary | ICD-10-CM

## 2020-04-01 DIAGNOSIS — M79.672 BILATERAL FOOT PAIN: Primary | ICD-10-CM

## 2020-04-01 NOTE — PROGRESS NOTES
Bobby Posadas is a 61 y.o. female who was seen by synchronous (real-time) audio-video technology on 4/1/2020. Consent:  She and/or her healthcare decision maker is aware that this patient-initiated Telehealth encounter is a billable service, with coverage as determined by her insurance carrier. She is aware that she may receive a bill and has provided verbal consent to proceed: Yes    I was in the office while conducting this encounter. Assessment & Plan:   Diagnoses and all orders for this visit:    1. Bilateral foot pain  Most likely due to tendonitis or osteoarthritis. Recommended taking diclofenac 75 mg BID (a refill was sent to her pharmacy yesterday), buying new tennis shoes, and using arch supports in her work shoes. 712  Subjective:   Bobby Posadas was seen for Foot Pain (Right Foot // )    Presents for foot pain. She has type 2 DM, HTN, asthma, and allergic rhinitis. She complains of 1 week history of bilateral foot tenderness. Pain is 6/10 in severity, located at the bottom center area of both feet, and also lateral side of right foot, constant, and achy. Pain starts after awaking. Does much walking at work. Her work shoes are good but her tennis shoes she wears at home are worn out. No associated symptoms, denies redness or swelling. Denies recent injury. Has not taken any pain medication for it. Rep rots she used to take gabapentin years ago when she lived in [place] for neuropathy but this current pain is different. She also complains of needing new nebulizer pipe. Misplaced the one she had. Received nebulizer from [facility].    Prior to Admission medications    Medication Sig Start Date End Date Taking? Authorizing Provider   diclofenac EC (VOLTAREN) 75 mg EC tablet Take 1 Tab by mouth two (2) times daily as needed for Pain. Do not take with ibuprofen.  3/31/20  Yes Desiree Martin MD   albuterol (PROVENTIL HFA, VENTOLIN HFA, PROAIR HFA) 90 mcg/actuation inhaler Take 2 Puffs by inhalation every four (4) hours as needed for Wheezing or Shortness of Breath (cough). Indications: asthma attack 3/24/20  Yes Giovana Martin MD   albuterol (PROVENTIL VENTOLIN) 2.5 mg /3 mL (0.083 %) nebu 3 mL by Nebulization route three (3) times daily as needed for Wheezing. Dx: J45.41 3/24/20  Yes Brijesh Styles MD   ergocalciferol (ERGOCALCIFEROL) 1,250 mcg (50,000 unit) capsule TAKE 1 CAP BY MOUTH EVERY SEVEN (7) DAYS. INDICATIONS: VITAMIN D DEFICIENCY 3/16/20  Yes Giovana Martin MD   budesonide-formoteroL (SYMBICORT) 160-4.5 mcg/actuation HFAA Take 2 Puffs by inhalation two (2) times a day. Dx: J45.40  Indications: controller medication for asthma 3/5/20  Yes Brijesh Styles MD   triamterene-hydroCHLOROthiazide (MAXZIDE) 37.5-25 mg per tablet TAKE 1 TABLET BY MOUTH EVERY DAY IN THE MORNING 2/28/20  Yes Brijesh Styles MD   fluticasone propionate (FLONASE) 50 mcg/actuation nasal spray INHALE 2 SPRAYS IN EACH NOSTRIL ONCE A DAY. Indications: inflammation of the nose due to an allergy 1/9/20  Yes Brijesh Styles MD   Nebulizer & Compressor machine With accessories. Use as instructed 1-3 times daily as needed. Dx:J45.41 1/9/20  Yes Brijesh Styles MD   amLODIPine (NORVASC) 10 mg tablet Take 1 Tab by mouth daily. 9/23/19  Yes Giovana Martin MD   promethazine (PHENERGAN) 25 mg tablet TAKE 1 TAB BY MOUTH EVERY SIX (6) HOURS AS NEEDED FOR NAUSEA. 4/19/19  Yes Provider, Historical   cyclobenzaprine (FLEXERIL) 5 mg tablet TAKE 1-2 TABLETS BY MOUTH AT BEDTIME 5/2/19  Yes Provider, Historical   SITagliptin-metFORMIN (JANUMET) 50-1,000 mg per tablet Take 1 Tab by mouth two (2) times daily (with meals). Dx: E11.42 4/15/19  Yes Brijesh Styles MD   glucose blood VI test strips (BLOOD GLUCOSE TEST) strip Use to test blood sugar once a day. Dx: E11.42 4/15/19  Yes Brijesh Styles MD   aspirin delayed-release 81 mg tablet Take  by mouth daily.    Yes Provider, Historical   lancets misc Test blood sugar once a day 3/25/19  Yes Collin May MD   alcohol swabs (ALCOHOL PREP PADS) padm Use to test blood sugar once a day 3/25/19  Yes Brianda Martin MD     Allergies   Allergen Reactions    Latex Rash     Severe rash    Ampicillin Anaphylaxis    Betadine Antiseptic Gauze Hives    Contrast Dye [Iodine] Hives and Swelling     Pt. Reports throat swelling      Other Medication Rash     Betadine soap/blisters    Fd And C Blue No.1 Anaphylaxis    Penicillins Hives       Patient Active Problem List   Diagnosis Code    Type 2 diabetes mellitus without complication, without long-term current use of insulin (HCC) E11.9    HTN (hypertension) I10    Arthritis M19.90    GERD (gastroesophageal reflux disease) K21.9    Vitamin D deficiency E55.9    Fibroadenoma of right breast D24.1    Moderate persistent asthma without complication G55.79    Adrenal nodule (HCC) E27.9    Allergic rhinitis due to allergen J30.9       ROS    PHYSICAL EXAMINATION:    Visit Vitals  LMP 06/19/2011          General: Obese, no distress. HEENT:  Head normocephalic/atraumatic, no scleral icterus  Neurological: Alert and oriented. Psychiatric: Normal mood and affect. Behavior is normal.         We discussed the expected course, resolution and complications of the diagnosis(es) in detail. Medication risks, benefits, costs, interactions, and alternatives were discussed as indicated. I advised her to contact the office if her condition worsens, changes or fails to improve as anticipated. She expressed understanding with the diagnosis(es) and plan. THIS VISIT WAS COMPLETED VIRTUALLY USING DOXY. ME    Pursuant to the emergency declaration under the [facility], [address] [place] [address] authority and the [employer] Act, this Virtual  Visit was conducted, with patient's consent, to reduce the patient's risk of exposure to COVID-19 and provide continuity of care for an established patient. Services were provided through a video synchronous discussion virtually to substitute for in-person clinic visit.     Faheem De La Vega MD

## 2020-04-01 NOTE — PROGRESS NOTES
Ld Lawrence  Identified pt with two pt identifiers(name and ). Chief Complaint   Patient presents with    Foot Pain     Right Foot //        Reviewed record In preparation for visit and have obtained necessary documentation. 1. Have you been to the ER, urgent care clinic or hospitalized since your last visit? No,     2. Have you seen or consulted any other health care providers outside of the [address] [place] [address] since your last visit? Include any pap smears or colon screening. No    Vitals reviewed with provider. Health Maintenance reviewed:     Health Maintenance Due   Topic    Foot Exam Q1     MICROALBUMIN Q1     Lipid Screen           Wt Readings from Last 3 Encounters:   20 169 lb 15.6 oz (77.1 kg)   20 171 lb (77.6 kg)   19 169 lb (76.7 kg)        Temp Readings from Last 3 Encounters:   20 98.3 °F (36.8 °C)   20 97.6 °F (36.4 °C)   20 98.1 °F (36.7 °C) (Oral)        BP Readings from Last 3 Encounters:   20 151/85   20 139/73   20 133/79        Pulse Readings from Last 3 Encounters:   20 100   20 85   20 (!) 106        Vitals:    20 0903   PainSc:   6   PainLoc: Foot   LMP: 2011          Learning Assessment:   :       Learning Assessment 3/25/2019   PRIMARY LEARNER Patient   PRIMARY LANGUAGE ENGLISH   LEARNER PREFERENCE PRIMARY READING   ANSWERED BY patient   RELATIONSHIP SELF        Depression Screening:   :       3 most recent PHQ Screens 2020   Little interest or pleasure in doing things Not at all   Feeling down, depressed, irritable, or hopeless Not at all   Total Score PHQ 2 0        Fall Risk Assessment:   :       Fall Risk Assessment, last 12 mths 3/25/2019   Able to walk? Yes   Fall in past 12 months? No        Abuse Screening:   :       Abuse Screening Questionnaire 2020 3/25/2019   Do you ever feel afraid of your partner?  N N   Are you in a relationship with someone who physically or mentally threatens you? N N   Is it safe for you to go home?  Y Y        ADL Screening:   :       ADL Assessment 3/25/2019   Feeding yourself No Help Needed   Getting from bed to chair No Help Needed   Getting dressed No Help Needed   Bathing or showering No Help Needed   Walk across the room (includes cane/walker) No Help Needed   Using the telphone No Help Needed   Taking your medications No Help Needed   Preparing meals No Help Needed   Managing money (expenses/bills) No Help Needed   Moderately strenuous housework (laundry) No Help Needed   Shopping for personal items (toiletries/medicines) No Help Needed   Shopping for groceries No Help Needed   Driving No Help Needed   Climbing a flight of stairs No Help Needed   Getting to places beyond walking distances No Help Needed

## 2020-05-22 ENCOUNTER — VIRTUAL VISIT (OUTPATIENT)
Dept: INTERNAL MEDICINE CLINIC | Facility: CLINIC | Age: 60
End: 2020-05-22

## 2020-05-22 VITALS — HEIGHT: 62 IN | BODY MASS INDEX: 31.28 KG/M2 | WEIGHT: 170 LBS

## 2020-05-22 DIAGNOSIS — M79.672 BILATERAL FOOT PAIN: Primary | ICD-10-CM

## 2020-05-22 DIAGNOSIS — I10 ESSENTIAL HYPERTENSION: ICD-10-CM

## 2020-05-22 DIAGNOSIS — E55.9 VITAMIN D DEFICIENCY: ICD-10-CM

## 2020-05-22 DIAGNOSIS — Z13.220 SCREENING, LIPID: ICD-10-CM

## 2020-05-22 DIAGNOSIS — M79.671 BILATERAL FOOT PAIN: Primary | ICD-10-CM

## 2020-05-22 DIAGNOSIS — E11.9 TYPE 2 DIABETES MELLITUS WITHOUT COMPLICATION, WITHOUT LONG-TERM CURRENT USE OF INSULIN (HCC): ICD-10-CM

## 2020-05-22 NOTE — PROGRESS NOTES
Ld Lawrence  Identified pt with two pt identifiers(name and ). Chief Complaint   Patient presents with    Foot Pain     Raidating from one side to the other // x 2 weeks        Reviewed record In preparation for visit and have obtained necessary documentation. 1. Have you been to the ER, urgent care clinic or hospitalized since your last visit? No     2. Have you seen or consulted any other health care providers outside of the [address] [place] [address] since your last visit? Include any pap smears or colon screening. No    Patient does not have an advance directive. Vitals reviewed with provider. Health Maintenance reviewed:     Health Maintenance Due   Topic    Shingrix Vaccine Age 50> (1 of 2)    Foot Exam Q1     MICROALBUMIN Q1     Lipid Screen           Wt Readings from Last 3 Encounters:   20 170 lb (77.1 kg)   20 169 lb 15.6 oz (77.1 kg)   20 171 lb (77.6 kg)        Temp Readings from Last 3 Encounters:   20 98.3 °F (36.8 °C)   20 97.6 °F (36.4 °C)   20 98.1 °F (36.7 °C) (Oral)        BP Readings from Last 3 Encounters:   20 151/85   20 139/73   20 133/79        Pulse Readings from Last 3 Encounters:   20 100   20 85   20 (!) 106        Vitals:    20 1102   Weight: 170 lb (77.1 kg)   Height: 5' 2\" (1.575 m)   PainSc:   5   PainLoc: Foot   LMP: 2011          Learning Assessment:   :       Learning Assessment 3/25/2019   PRIMARY LEARNER Patient   PRIMARY LANGUAGE ENGLISH   LEARNER PREFERENCE PRIMARY READING   ANSWERED BY patient   RELATIONSHIP SELF        Depression Screening:   :       3 most recent PHQ Screens 2020   Little interest or pleasure in doing things Not at all   Feeling down, depressed, irritable, or hopeless Not at all   Total Score PHQ 2 0        Fall Risk Assessment:   :       Fall Risk Assessment, last 12 mths 3/25/2019   Able to walk? Yes   Fall in past 12 months?  No        Abuse Screening:   :       Abuse Screening Questionnaire 4/1/2020 3/25/2019   Do you ever feel afraid of your partner? N N   Are you in a relationship with someone who physically or mentally threatens you? N N   Is it safe for you to go home?  Y Y        ADL Screening:   :       ADL Assessment 3/25/2019   Feeding yourself No Help Needed   Getting from bed to chair No Help Needed   Getting dressed No Help Needed   Bathing or showering No Help Needed   Walk across the room (includes cane/walker) No Help Needed   Using the telphone No Help Needed   Taking your medications No Help Needed   Preparing meals No Help Needed   Managing money (expenses/bills) No Help Needed   Moderately strenuous housework (laundry) No Help Needed   Shopping for personal items (toiletries/medicines) No Help Needed   Shopping for groceries No Help Needed   Driving No Help Needed   Climbing a flight of stairs No Help Needed   Getting to places beyond walking distances No Help Needed

## 2020-06-21 DIAGNOSIS — E55.9 VITAMIN D DEFICIENCY: ICD-10-CM

## 2020-06-21 RX ORDER — ERGOCALCIFEROL 1.25 MG/1
50000 CAPSULE ORAL
Qty: 4 CAP | Refills: 3 | Status: SHIPPED | OUTPATIENT
Start: 2020-06-21 | End: 2020-10-08

## 2020-07-22 ENCOUNTER — HOSPITAL ENCOUNTER (EMERGENCY)
Age: 60
Discharge: HOME | End: 2020-07-22
Attending: EMERGENCY MEDICINE | Admitting: EMERGENCY MEDICINE
Payer: COMMERCIAL

## 2020-07-22 VITALS
SYSTOLIC BLOOD PRESSURE: 134 MMHG | TEMPERATURE: 98.5 F | HEART RATE: 88 BPM | BODY MASS INDEX: 32.17 KG/M2 | OXYGEN SATURATION: 100 % | HEIGHT: 62 IN | DIASTOLIC BLOOD PRESSURE: 68 MMHG | WEIGHT: 174.82 LBS | RESPIRATION RATE: 14 BRPM

## 2020-07-22 DIAGNOSIS — M79.602 LEFT ARM PAIN: Primary | ICD-10-CM

## 2020-07-22 PROCEDURE — 99284 EMERGENCY DEPT VISIT MOD MDM: CPT

## 2020-07-22 RX ORDER — TRAMADOL HYDROCHLORIDE 50 MG/1
50 TABLET ORAL
Qty: 20 TAB | Refills: 0 | Status: SHIPPED | OUTPATIENT
Start: 2020-07-22 | End: 2020-07-27

## 2020-07-22 NOTE — LETTER
[address] 
[facility] EMERGENCY DEPT 
[address] [place] [address] Raysa Alba [id number] [phone] Work/School Note Date: 7/22/2020 To Whom It May concern: 
 
Elsa Briseno was seen and treated today in the emergency room by the following provider(s): 
Attending Provider: Jessenia Sparks MD 
Physician Assistant: Chula Murray. Please excuse Elsa Briseno from work on 7/23/20 and 7/24/20. Sincerely, Chula Christopher

## 2020-07-22 NOTE — DISCHARGE INSTRUCTIONS
Patient Education        Arm Pain: Care Instructions  Your Care Instructions     You can hurt your arm by using it too much or by injuring it. Biking, wrestling, and home repair projects are examples of activities that can lead to arm pain. Everyday wear and tear, especially as you get older, can cause arm pain. Your forearms, wrists, hands, and fingers are the parts of your arm that are most likely to become painful. A minor arm injury usually will heal on its own with home treatment to relieve swelling and pain. If you have a more serious injury, you may need tests and treatment. Follow-up care is a key part of your treatment and safety. Be sure to make and go to all appointments, and call your doctor if you are having problems. It's also a good idea to know your test results and keep a list of the medicines you take. How can you care for yourself at home? · Take pain medicines exactly as directed. ? If the doctor gave you a prescription medicine for pain, take it as prescribed. ? If you are not taking a prescription pain medicine, ask your doctor if you can take an over-the-counter medicine. · Rest and protect your arm. Take a break from any activity that may cause pain. · Put ice or a cold pack on your arm for 10 to 20 minutes at a time. Put a thin cloth between the ice and your skin. · Prop up the sore arm on a pillow when you ice it or anytime you sit or lie down during the next 3 days. Try to keep it above the level of your heart. This will help reduce swelling. · If your doctor recommends a sling to support your arm, wear it as directed. When should you call for help? [id number] anytime you think you may need emergency care. For example, call if:  · Your arm or hand is cool or pale or changes color. Call your doctor now or seek immediate medical care if:  · You cannot use your arm. · You have signs of infection, such as:  ? Increased pain, swelling, warmth, or redness.   ? Red streaks running up or down your arm. ? Pus draining from an area of your arm. ? A fever. · You have tingling, weakness, or numbness in your arm. Watch closely for changes in your health, and be sure to contact your doctor if:  · You do not get better as expected. Where can you learn more? Go to http://nikita-saji.info/  Enter B641 in the search box to learn more about \"Arm Pain: Care Instructions. \"  Current as of: June 26, 2019               Content Version: 12.5  © 1792-0248 [employer]. Care instructions adapted under license by [employer] (which disclaims liability or warranty for this information). If you have questions about a medical condition or this instruction, always ask your healthcare professional. [place] [address] any warranty or liability for your use of this information.

## 2020-07-22 NOTE — ED NOTES
Bedside and Verbal shift change report given to Carolina Parker (oncoming nurse) by Oleg Tee (offgoing nurse). Report included the following information SBAR, Kardex, ED Summary, Intake/Output, MAR and Recent Results.

## 2020-07-22 NOTE — ED NOTES
Pt arrives to the ED AAOX4 with a c/c of pain to LUE onset Sunday. Pt had LUE sx four years ago, reports lifting heavy cases of water on Sunday and pain started since. Pt is noted in stable condition, now in ED room with side rail up, bed to lowest position and call light within reach. Will continue to monitor and wait for EDP evaluation.

## 2020-07-22 NOTE — ED PROVIDER NOTES
EMERGENCY DEPARTMENT HISTORY AND PHYSICAL EXAM      Date: 7/22/2020  Patient Name: Kati Berry    History of Presenting Illness     Chief Complaint   Patient presents with    Arm Pain     reports pain in L forearm to shoulder. Tendon surgery on that arm 4yrs ago. Reports on Sunday lifting 4 cases of water on Sunday and the pain started shortly after that then went to work as a  at an [address] [place] [address] making it worse       History Provided By: Patient    HPI: Kati Berry, 61 y.o. female with significant PMHx for DM, HTN and prior several prior left upper extremity surgeries, presents by POV to the ED with cc of left forearm pain. The pain started this past weekend after lifting several heavy cases of water for a family member. She notes that the pain is similar to when she tore ligaments off her elbow 4 years ago that required surgical repair. She also notes a history of carpal tunnel surgery and tigger finger surgery on the left. She is left hand dominate. She is taken over-the-counter medications since the recent injury without relief. The pain is a constant pain that worsens with movement. There are no other complaints, changes, or physical findings at this time. Social Hx: Tobacco (former smoker), EtOH (denies), Illicit drug use (denies)     PCP: Ayanna Pollack MD    No current facility-administered medications on file prior to encounter. Current Outpatient Medications on File Prior to Encounter   Medication Sig Dispense Refill    ergocalciferol (ERGOCALCIFEROL) 1,250 mcg (50,000 unit) capsule TAKE 1 CAP BY MOUTH EVERY SEVEN (7) DAYS. INDICATIONS: VITAMIN D DEFICIENCY 4 Cap 3    diclofenac EC (VOLTAREN) 75 mg EC tablet Take 1 Tab by mouth two (2) times daily as needed for Pain. Do not take with ibuprofen.  60 Tab 1    albuterol (PROVENTIL HFA, VENTOLIN HFA, PROAIR HFA) 90 mcg/actuation inhaler Take 2 Puffs by inhalation every four (4) hours as needed for Wheezing or Shortness of Breath (cough). Indications: asthma attack 1 Inhaler 5    albuterol (PROVENTIL VENTOLIN) 2.5 mg /3 mL (0.083 %) nebu 3 mL by Nebulization route three (3) times daily as needed for Wheezing. Dx: J45.41 60 Nebule 5    budesonide-formoteroL (SYMBICORT) 160-4.5 mcg/actuation HFAA Take 2 Puffs by inhalation two (2) times a day. Dx: J45.40  Indications: controller medication for asthma 1 Inhaler 5    triamterene-hydroCHLOROthiazide (MAXZIDE) 37.5-25 mg per tablet TAKE 1 TABLET BY MOUTH EVERY DAY IN THE MORNING 30 Tab 5    fluticasone propionate (FLONASE) 50 mcg/actuation nasal spray INHALE 2 SPRAYS IN EACH NOSTRIL ONCE A DAY. Indications: inflammation of the nose due to an allergy 1 Bottle 11    Nebulizer & Compressor machine With accessories. Use as instructed 1-3 times daily as needed. Dx:J45.41 1 Each 0    amLODIPine (NORVASC) 10 mg tablet Take 1 Tab by mouth daily. 90 Tab 3    promethazine (PHENERGAN) 25 mg tablet TAKE 1 TAB BY MOUTH EVERY SIX (6) HOURS AS NEEDED FOR NAUSEA.  0    cyclobenzaprine (FLEXERIL) 5 mg tablet TAKE 1-2 TABLETS BY MOUTH AT BEDTIME  1    SITagliptin-metFORMIN (JANUMET) 50-1,000 mg per tablet Take 1 Tab by mouth two (2) times daily (with meals). Dx: E11.42 180 Tab 3    glucose blood VI test strips (BLOOD GLUCOSE TEST) strip Use to test blood sugar once a day. Dx: E11.42 90 Strip 3    aspirin delayed-release 81 mg tablet Take  by mouth daily.  lancets misc Test blood sugar once a day 30 Each 11    alcohol swabs (ALCOHOL PREP PADS) padm Use to test blood sugar once a day 40 Pad 11       Past History     Past Medical History:  Past Medical History:   Diagnosis Date    Arrhythmia     Arthritis     GENERALIZED TO JOINTS-WRISTS & KNEES.  Asthma     INHALERS    Asthma, chronic 10/8/2009    Diabetes ([facility] [address]     USES PILLS TO CONTROLL    GERD (gastroesophageal reflux disease)     Gout     Hypertension     CONTROLLED BY MEDS.     Nausea & vomiting     Obesity        Past Surgical History:  Past Surgical History:   Procedure Laterality Date    COLONOSCOPY N/A 5/31/2019    COLONOSCOPY performed by Omero Merino MD at [facility] ENDOSCOPY. 6 mm tubular adenoma in sigmoid polyp. REpeat in 6 months due to poor prep    COLONOSCOPY N/A 2/21/2020    COLONOSCOPY performed by Omero Merino MD at [facility] ENDOSCOPY. 2 tubular adenomas, diverticulosis, internal hemorrhoids    HX BREAST BIOPSY Right     benign    HX CHOLECYSTECTOMY      HX HEART CATHETERIZATION  98,2011    CARDIAC CATH X2    HX HEENT  1961    CLEFT PALATE REPAIR.    HX HYSTERECTOMY  2011 JULY 18    HX ORTHOPAEDIC      carpal tunnel, trigger finger, hand surgery left arm    HX OTHER SURGICAL      CLeft palate repair, piloneidal cyst, hand surgery,     HX TUBAL LIGATION  1983       Family History:  Family History   Problem Relation Age of Onset    Asthma Mother     Hypertension Mother     Diabetes Father     Heart Disease Father         MI    Stroke Brother         CEREBRAL ANEURYSM    Heart Attack Sister     Hypertension Sister     No Known Problems Sister        Social History:  Social History     Tobacco Use    Smoking status: Former Smoker     Years: 10.00     Last attempt to quit: 7/11/2010     Years since quitting: 10.0    Smokeless tobacco: Never Used   Substance Use Topics    Alcohol use: No    Drug use: No       Allergies: Allergies   Allergen Reactions    Latex Rash     Severe rash    Ampicillin Anaphylaxis    Betadine Antiseptic Gauze Hives    Contrast Dye [Iodine] Hives and Swelling     Pt. Reports throat swelling      Other Medication Rash     Betadine soap/blisters    Fd And C Blue No.1 Anaphylaxis    Penicillins Hives         Review of Systems   Review of Systems   Constitutional: Negative for chills, diaphoresis and fever. HENT: Negative for congestion, ear pain, rhinorrhea and sore throat. Respiratory: Negative for cough and shortness of breath. Cardiovascular: Negative for chest pain. Gastrointestinal: Negative for abdominal pain, constipation, diarrhea, nausea and vomiting. Genitourinary: Negative for difficulty urinating, dysuria, frequency and hematuria. Musculoskeletal: Positive for arthralgias and myalgias. Neurological: Negative for headaches. All other systems reviewed and are negative. Physical Exam   Physical Exam  Vitals signs and nursing note reviewed. Constitutional:       General: She is not in acute distress. Appearance: She is well-developed. She is obese. She is not diaphoretic. Comments: Pleasant 61 y.o. -American female    HENT:      Head: Normocephalic and atraumatic. Eyes:      General:         Right eye: No discharge. Left eye: No discharge. Conjunctiva/sclera: Conjunctivae normal.   Neck:      Musculoskeletal: Normal range of motion and neck supple. Cardiovascular:      Rate and Rhythm: Normal rate and regular rhythm. Pulses: Normal pulses. Pulmonary:      Effort: Pulmonary effort is normal.   Musculoskeletal:      Comments: Left arm: No swelling, ecchymosis, deformity, or erythema. Slight tenderness palpation of the proximal one third of the forearm. Full range of motion of the fingers, wrist, and elbow. Distal neurovascular status intact. Cap refill less than 2 seconds. Amatory without difficulty. Skin:     General: Skin is warm and dry. Neurological:      Mental Status: She is alert and oriented to person, place, and time. Psychiatric:         Behavior: Behavior normal.         Diagnostic Study Results     Labs - none    Radiologic Studies - none    Medical Decision Making   I am the first provider for this patient. I reviewed the vital signs, available nursing notes, past medical history, past surgical history, family history and social history. Vital Signs-Reviewed the patient's vital signs.   Patient Vitals for the past 12 hrs:   Temp Pulse Resp BP SpO2   07/22/20 1815    134/68    07/22/20 [address] 126/81    07/22/20 1745    133/73    07/22/20 1730    132/65    07/22/20 1702 98.5 °F (36.9 °C) 88 14 155/73 100 %       Records Reviewed: Nursing Notes and Old Medical Records   Patient does not recall the name of the surgeon who had previously done surgeries on her left arm. I was unable to find previous orthopedic records that may suggest who had done her prior surgeries but reviewed several other ED records in the process of trying to locate surgeon's name. Provider Notes (Medical Decision Making):   Patient presents the ED with left arm pain as a result of a injury with a minimal mechanism. Differential diagnosis include fracture, sprain, strain, spasm, arthritis, ligamentous injury. After further discussion the patient I decided the x-rays were not necessary today. She was provided a sling. She is to follow-up with her surgeon. ED Course:   Initial assessment performed. The patients presenting problems have been discussed, and they are in agreement with the care plan formulated and outlined with them. I have encouraged them to ask questions as they arise throughout their visit. Critical Care Time: None    Disposition:  DISCHARGE NOTE:  7:30 PM  The pt is ready for discharge. The pt's signs, symptoms, diagnosis, and discharge instructions have been discussed and pt has conveyed their understanding. The pt is to follow up as recommended or return to ER should their symptoms worsen. Plan has been discussed and pt is in agreement. PLAN:  1. Current Discharge Medication List        2. Follow-up Information     Follow up With Specialties Details Why Contact Info    Honey Howard MD Orthopedic Surgery, Hand Surgery In 1 week or your previous upper extrimity surgeon John Daniels [phone]  [facility]  [phone]          Return to ED if worse     Diagnosis     Clinical Impression:   1.  Left arm pain          Please note that this dictation was completed with Dragon, the computer voice recognition software. Quite often unanticipated grammatical, syntax, homophones, and other interpretive errors are inadvertently transcribed by the computer software. Please disregards these errors. Please excuse any errors that have escaped final proofreading. This note will not be viewable in [address]

## 2020-07-23 NOTE — ED NOTES
Pt discharged by [place]. Pt provided with discharge instructions Rx and instructions on follow up care. Pt ambulated out of ED with no apparent difficulty accompanied by RN.

## 2020-07-31 DIAGNOSIS — E11.42 TYPE 2 DIABETES MELLITUS WITH DIABETIC POLYNEUROPATHY, WITHOUT LONG-TERM CURRENT USE OF INSULIN (HCC): ICD-10-CM

## 2020-07-31 RX ORDER — SITAGLIPTIN AND METFORMIN HYDROCHLORIDE 50; 1000 MG/1; MG/1
1 TABLET, FILM COATED ORAL 2 TIMES DAILY WITH MEALS
Qty: 180 TAB | Refills: 3 | Status: SHIPPED | OUTPATIENT
Start: 2020-07-31 | End: 2021-09-23

## 2020-07-31 NOTE — TELEPHONE ENCOUNTER
REFILL     PCP: Mary Del Cid MD     Last appt: Visit date not found   Future Appointments   Date Time Provider Terrence Garzai   8/18/2020  3:30 PM [address] [facility] 2 [facility]   8/26/2020  2:00 PM Brandon Martin MD [facility] BS AMB        Requested Prescriptions     Pending Prescriptions Disp Refills    SITagliptin-metFORMIN (Janumet) 50-1,000 mg per tablet 180 Tab 3     Sig: Take 1 Tab by mouth two (2) times daily (with meals).  Dx: S94.49

## 2020-08-06 LAB
25(OH)D3+25(OH)D2 SERPL-MCNC: 62.1 NG/ML (ref 30–100)
ALBUMIN SERPL-MCNC: 4.5 G/DL (ref 3.8–4.9)
ALBUMIN/CREAT UR: 7 MG/G CREAT (ref 0–29)
ALBUMIN/GLOB SERPL: 1.6 {RATIO} (ref 1.2–2.2)
ALP SERPL-CCNC: 79 IU/L (ref 39–117)
ALT SERPL-CCNC: 11 IU/L (ref 0–32)
AST SERPL-CCNC: 13 IU/L (ref 0–40)
BASOPHILS # BLD AUTO: 0 X10E3/UL (ref 0–0.2)
BASOPHILS NFR BLD AUTO: 1 %
BILIRUB SERPL-MCNC: 0.3 MG/DL (ref 0–1.2)
BUN SERPL-MCNC: 10 MG/DL (ref 8–27)
BUN/CREAT SERPL: 11 (ref 12–28)
CALCIUM SERPL-MCNC: 10.1 MG/DL (ref 8.7–10.3)
CHLORIDE SERPL-SCNC: 99 MMOL/L (ref 96–106)
CHOLEST SERPL-MCNC: 194 MG/DL (ref 100–199)
CO2 SERPL-SCNC: 26 MMOL/L (ref 20–29)
CREAT SERPL-MCNC: 0.92 MG/DL (ref 0.57–1)
CREAT UR-MCNC: 120.7 MG/DL
EOSINOPHIL # BLD AUTO: 0.1 X10E3/UL (ref 0–0.4)
EOSINOPHIL NFR BLD AUTO: 2 %
ERYTHROCYTE [DISTWIDTH] IN BLOOD BY AUTOMATED COUNT: 12.4 % (ref 11.7–15.4)
EST. AVERAGE GLUCOSE BLD GHB EST-MCNC: 148 MG/DL
GLOBULIN SER CALC-MCNC: 2.8 G/DL (ref 1.5–4.5)
GLUCOSE SERPL-MCNC: 149 MG/DL (ref 65–99)
HBA1C MFR BLD: 6.8 % (ref 4.8–5.6)
HCT VFR BLD AUTO: 36.5 % (ref 34–46.6)
HDLC SERPL-MCNC: 57 MG/DL
HGB BLD-MCNC: 11.9 G/DL (ref 11.1–15.9)
IMM GRANULOCYTES # BLD AUTO: 0 X10E3/UL (ref 0–0.1)
IMM GRANULOCYTES NFR BLD AUTO: 0 %
LDLC SERPL CALC-MCNC: 103 MG/DL (ref 0–99)
LYMPHOCYTES # BLD AUTO: 1.5 X10E3/UL (ref 0.7–3.1)
LYMPHOCYTES NFR BLD AUTO: 28 %
MCH RBC QN AUTO: 28.7 PG (ref 26.6–33)
MCHC RBC AUTO-ENTMCNC: 32.6 G/DL (ref 31.5–35.7)
MCV RBC AUTO: 88 FL (ref 79–97)
MICROALBUMIN UR-MCNC: 8.1 UG/ML
MONOCYTES # BLD AUTO: 0.4 X10E3/UL (ref 0.1–0.9)
MONOCYTES NFR BLD AUTO: 8 %
NEUTROPHILS # BLD AUTO: 3.3 X10E3/UL (ref 1.4–7)
NEUTROPHILS NFR BLD AUTO: 61 %
PLATELET # BLD AUTO: 409 X10E3/UL (ref 150–450)
POTASSIUM SERPL-SCNC: 4.1 MMOL/L (ref 3.5–5.2)
PROT SERPL-MCNC: 7.3 G/DL (ref 6–8.5)
RBC # BLD AUTO: 4.14 X10E6/UL (ref 3.77–5.28)
SODIUM SERPL-SCNC: 141 MMOL/L (ref 134–144)
TRIGL SERPL-MCNC: 170 MG/DL (ref 0–149)
TSH SERPL DL<=0.005 MIU/L-ACNC: 1.77 UIU/ML (ref 0.45–4.5)
VLDLC SERPL CALC-MCNC: 34 MG/DL (ref 5–40)
WBC # BLD AUTO: 5.5 X10E3/UL (ref 3.4–10.8)

## 2020-08-07 NOTE — PROGRESS NOTES
Your labs showed normal kidney and liver tests, blood counts, thyroid, vitamin D, and your urine protein test.  Your A1c was 6.8%, meaning controlled diabetes. Your LDL, or bad cholesterol, was a little high at 103. It should be less than 100. Dr. Urbano Green is going to start you on a low-dose cholesterol lowering medication because it helps lower your risk of having a heart attack or stroke. [If she is okay with this, pend me rx for Crestor 5 mg 1 tab by mouth nightly, #30, 3 RF. ]

## 2020-08-10 NOTE — PROGRESS NOTES
Spoke with patient and after verifying name and date of birth of patient gave patient test results and any instructions in note per provider. Patient stated understanding. Patient wants crestor sent to [facility]. Per Dr Shorty Grigsby prescription pended for Crestor 5 mg 1 tab by mouth nightly, #30, 3 RF.

## 2020-08-10 NOTE — TELEPHONE ENCOUNTER
Spoke with patient and after verifying name and date of birth of patient gave patient test results and any instructions in note per provider. Patient stated understanding. Patient wants crestor sent to [facility]. Per Dr Yan Blakely prescription pended for Crestor 5 mg 1 tab by mouth nightly, #30, 3 RF.

## 2020-08-10 NOTE — TELEPHONE ENCOUNTER
Pt is returning a call from Friday, she believes to discuss lab results. Please return call at [phone].

## 2020-08-11 RX ORDER — ROSUVASTATIN CALCIUM 5 MG/1
5 TABLET, COATED ORAL
Qty: 30 TAB | Refills: 3 | Status: SHIPPED | OUTPATIENT
Start: 2020-08-11 | End: 2020-12-05

## 2020-08-14 ENCOUNTER — HOSPITAL ENCOUNTER (EMERGENCY)
Age: 60
Discharge: HOME | End: 2020-08-14
Attending: EMERGENCY MEDICINE
Payer: COMMERCIAL

## 2020-08-14 ENCOUNTER — TELEPHONE (OUTPATIENT)
Dept: INTERNAL MEDICINE CLINIC | Age: 60
End: 2020-08-14

## 2020-08-14 VITALS
TEMPERATURE: 98.5 F | DIASTOLIC BLOOD PRESSURE: 78 MMHG | BODY MASS INDEX: 31.32 KG/M2 | HEIGHT: 62 IN | OXYGEN SATURATION: 100 % | RESPIRATION RATE: 18 BRPM | HEART RATE: 100 BPM | WEIGHT: 170.19 LBS | SYSTOLIC BLOOD PRESSURE: 145 MMHG

## 2020-08-14 DIAGNOSIS — M25.462 EFFUSION OF LEFT KNEE: Primary | ICD-10-CM

## 2020-08-14 PROCEDURE — 99283 EMERGENCY DEPT VISIT LOW MDM: CPT

## 2020-08-14 RX ORDER — DICLOFENAC SODIUM 10 MG/G
2 GEL TOPICAL 4 TIMES DAILY
Qty: 1 EACH | Refills: 0 | Status: SHIPPED | OUTPATIENT
Start: 2020-08-14 | End: 2020-10-20

## 2020-08-14 RX ORDER — DICLOFENAC SODIUM 75 MG/1
75 TABLET, DELAYED RELEASE ORAL 2 TIMES DAILY
Qty: 30 TAB | Refills: 0 | Status: SHIPPED | OUTPATIENT
Start: 2020-08-14 | End: 2020-10-20

## 2020-08-14 NOTE — ED PROVIDER NOTES
EMERGENCY DEPARTMENT HISTORY AND PHYSICAL EXAM      Date: 8/14/2020  Patient Name: Syl Fuentes    Please note that this dictation was completed with [employer], the computer voice recognition software. Quite often unanticipated grammatical, syntax, homophones, and other interpretive errors are inadvertently transcribed by the computer software. Please disregard these errors. Please excuse any errors that have escaped final proofreading. History of Presenting Illness     Chief Complaint   Patient presents with    Knee Pain     pt comes with left knee pain and swelling. recently was walking and felt a pop and has not had relief with advil. state difficulty ambulating. History Provided By: Patient    HPI: Syl Fuentes, 61 y.o. female with PMHx significant for HTN, DM II, asthma, and OA presenting to the ED today from home for a 2 week hx of L medial knee pain. She reports that she felt a pop in the L knee 2 weeks ago and suddenly had difficulty bearing weight and ambulating secondary to increased pain. She denies any clicking or locking at home. She continues to have L medial knee pain and swelling at rest. She was seen at [facility] and had a knee Xray, which revealed a medial effusion. She was referred to ortho but has not followed up secondary to multiple conflicting life events. She is a  at the [employer] [facility] [employer] ED and has continued to ambulate on the knee for the past 2 weeks. She really hasn't been able to take a break and also has not been icing the knee. She did try some 800 mg ibuprofen without significant relief. She denies any further complaints. PCP: Wesley Lowe MD    There are no other complaints, changes, or physical findings at this time. Current Outpatient Medications   Medication Sig Dispense Refill    diclofenac EC (VOLTAREN) 75 mg EC tablet Take 1 Tab by mouth two (2) times a day.  30 Tab 0    diclofenac (Voltaren) 1 % gel Apply 2 g to affected area four (4) times daily. 1 Each 0    rosuvastatin (CRESTOR) 5 mg tablet Take 1 Tab by mouth nightly. 30 Tab 3    SITagliptin-metFORMIN (Janumet) 50-1,000 mg per tablet Take 1 Tab by mouth two (2) times daily (with meals). Dx: E11.42 180 Tab 3    ergocalciferol (ERGOCALCIFEROL) 1,250 mcg (50,000 unit) capsule TAKE 1 CAP BY MOUTH EVERY SEVEN (7) DAYS. INDICATIONS: VITAMIN D DEFICIENCY 4 Cap 3    albuterol (PROVENTIL HFA, VENTOLIN HFA, PROAIR HFA) 90 mcg/actuation inhaler Take 2 Puffs by inhalation every four (4) hours as needed for Wheezing or Shortness of Breath (cough). Indications: asthma attack 1 Inhaler 5    albuterol (PROVENTIL VENTOLIN) 2.5 mg /3 mL (0.083 %) nebu 3 mL by Nebulization route three (3) times daily as needed for Wheezing. Dx: J45.41 60 Nebule 5    budesonide-formoteroL (SYMBICORT) 160-4.5 mcg/actuation HFAA Take 2 Puffs by inhalation two (2) times a day. Dx: J45.40  Indications: controller medication for asthma 1 Inhaler 5    triamterene-hydroCHLOROthiazide (MAXZIDE) 37.5-25 mg per tablet TAKE 1 TABLET BY MOUTH EVERY DAY IN THE MORNING 30 Tab 5    fluticasone propionate (FLONASE) 50 mcg/actuation nasal spray INHALE 2 SPRAYS IN EACH NOSTRIL ONCE A DAY. Indications: inflammation of the nose due to an allergy 1 Bottle 11    Nebulizer & Compressor machine With accessories. Use as instructed 1-3 times daily as needed. Dx:J45.41 1 Each 0    amLODIPine (NORVASC) 10 mg tablet Take 1 Tab by mouth daily. 90 Tab 3    promethazine (PHENERGAN) 25 mg tablet TAKE 1 TAB BY MOUTH EVERY SIX (6) HOURS AS NEEDED FOR NAUSEA.  0    cyclobenzaprine (FLEXERIL) 5 mg tablet TAKE 1-2 TABLETS BY MOUTH AT BEDTIME  1    glucose blood VI test strips (BLOOD GLUCOSE TEST) strip Use to test blood sugar once a day. Dx: E11.42 90 Strip 3    aspirin delayed-release 81 mg tablet Take  by mouth daily.       lancets misc Test blood sugar once a day 30 Each 11    alcohol swabs (ALCOHOL PREP PADS) padm Use to test blood sugar once a day 40 Pad 11       Past History     Past Medical History:  Past Medical History:   Diagnosis Date    Arrhythmia     Arthritis     GENERALIZED TO JOINTS-WRISTS & KNEES.  Asthma     INHALERS    Asthma, chronic 10/8/2009    Diabetes ([address]     USES PILLS TO CONTROLL    GERD (gastroesophageal reflux disease)     Gout     Hypertension     CONTROLLED BY MEDS.  Nausea & vomiting     Obesity        Past Surgical History:  Past Surgical History:   Procedure Laterality Date    COLONOSCOPY N/A 5/31/2019    COLONOSCOPY performed by Omero Merino MD at [facility]. 6 mm tubular adenoma in sigmoid polyp. REpeat in 6 months due to poor prep    COLONOSCOPY N/A 2/21/2020    COLONOSCOPY performed by Omero Merino MD at [facility]. 2 tubular adenomas, diverticulosis, internal hemorrhoids    HX BREAST BIOPSY Right     benign    HX CHOLECYSTECTOMY      HX HEART CATHETERIZATION  98,2011    CARDIAC CATH X2    HX HEENT  1961    CLEFT PALATE REPAIR.    HX HYSTERECTOMY  2011 JULY 18    HX ORTHOPAEDIC      carpal tunnel, trigger finger, hand surgery left arm    HX OTHER SURGICAL      CLeft palate repair, piloneidal cyst, hand surgery,     HX TUBAL LIGATION  1983       Family History:  Family History   Problem Relation Age of Onset    Asthma Mother     Hypertension Mother     Diabetes Father     Heart Disease Father         MI    Stroke Brother         CEREBRAL ANEURYSM    Heart Attack Sister     Hypertension Sister     No Known Problems Sister        Social History:  Social History     Tobacco Use    Smoking status: Former Smoker     Years: 10.00     Last attempt to quit: 7/11/2010     Years since quitting: 10.1    Smokeless tobacco: Never Used   Substance Use Topics    Alcohol use: No    Drug use: No       Allergies: Allergies   Allergen Reactions    Latex Rash     Severe rash    Ampicillin Anaphylaxis    Betadine Antiseptic Gauze Hives    Contrast Dye [Iodine] Hives and Swelling     Pt.  Reports throat swelling      Other Medication Rash     Betadine soap/blisters    Fd And C Blue No.1 Anaphylaxis    Penicillins Hives         Review of Systems   Review of Systems   Constitutional: Negative for chills. HENT: Negative for congestion. Eyes: Negative for itching. Respiratory: Negative for cough and choking. Cardiovascular: Negative for chest pain. Gastrointestinal: Negative for abdominal pain. Genitourinary: Negative for dysuria. Musculoskeletal: Positive for arthralgias. Skin: Negative for rash. Neurological: Negative for dizziness and headaches. Psychiatric/Behavioral: Negative for suicidal ideas. Physical Exam   Physical Exam  Constitutional:       General: She is not in acute distress. Appearance: She is normal weight. She is not ill-appearing or toxic-appearing. HENT:      Head: Normocephalic and atraumatic. Nose: Nose normal.      Mouth/Throat:      Mouth: Mucous membranes are moist.      Pharynx: No oropharyngeal exudate. Eyes:      Pupils: Pupils are equal, round, and reactive to light. Neck:      Musculoskeletal: Normal range of motion. No neck rigidity. Cardiovascular:      Rate and Rhythm: Normal rate. Heart sounds: No murmur. Pulmonary:      Effort: Pulmonary effort is normal. No respiratory distress. Breath sounds: No wheezing. Abdominal:      General: Abdomen is flat. Bowel sounds are normal. There is no distension. Tenderness: There is no abdominal tenderness. Musculoskeletal:         General: Swelling, tenderness and deformity present. Comments: Tenderness to the medial joint line of the L knee, no crepitus, no laxity, negative anterior and posterior drawer, +moderate effusion to the L medial knee, +Janusz, good distal ROM and CSMx4   Skin:     General: Skin is warm and dry. Capillary Refill: Capillary refill takes less than 2 seconds. Findings: No rash. Neurological:      General: No focal deficit present. Mental Status: She is alert and oriented to person, place, and time. Cranial Nerves: No cranial nerve deficit. Psychiatric:         Mood and Affect: Mood normal.           Diagnostic Study Results     Labs -   No results found for this or any previous visit (from the past 12 hour(s)). Radiologic Studies -   No orders to display     CT Results  (Last 48 hours)    None        CXR Results  (Last 48 hours)    None            Medical Decision Making   I am the first provider for this patient. I reviewed the vital signs, available nursing notes, past medical history, past surgical history, family history and social history. Vital Signs-Reviewed the patient's vital signs. Patient Vitals for the past 12 hrs:   Temp Pulse Resp BP SpO2   08/14/20 1805 98.5 °F (36.9 °C) 100 18 145/78 100 %         Records Reviewed: Nursing Notes and Old Medical Records    Provider Notes (Medical Decision Making):   60 yo lady here for a reeval of L knee pain. She already head Xray at [address] [place] [address] and was referred to ortho. She really hasn't had the time to rest and elevate as she is a  and caring for everyone but herself. Discussed few days off from work, trial of NSAIDs/ice/elevation, and needs ortho f/up. She does have a L knee brace from [address] [place] [address] but she does not care for it-will trial ACE instead. Suspect L medial meniscus injury, L MCL seems intact but this is also included in the ddx. Pt agreeable to plan as above. ED Course:   Initial assessment performed. The patients presenting problems have been discussed, and they are in agreement with the care plan formulated and outlined with them. I have encouraged them to ask questions as they arise throughout their visit. Critical Care Time:   N/A    DISCHARGE NOTE:    Helene Manzanares's  results have been reviewed with her. She has been counseled regarding her diagnosis.   She verbally conveys understanding and agreement of the signs, symptoms, diagnosis, treatment and prognosis and additionally agrees to follow up as recommended with Dr. Wesley Lowe MD in 24 - 48 hours. She also agrees with the care-plan and conveys that all of her questions have been answered. I have also put together some discharge instructions for her that include: 1) educational information regarding their diagnosis, 2) how to care for their diagnosis at home, as well a 3) list of reasons why they would want to return to the ED prior to their follow-up appointment, should their condition change. She and/or family's questions have been answered. I have encouraged them to see the official results in [facility]\" or to retrieve the specifics of their results from medical records. PLAN:  1. Return precautions as discussed  2. Follow-up with providers as directed  3. Medications as prescribed    Return to ED if worse     Diagnosis     Clinical Impression:   1. Effusion of left knee        Current Discharge Medication List      START taking these medications    Details   diclofenac (Voltaren) 1 % gel Apply 2 g to affected area four (4) times daily. Qty: 1 Each, Refills: 0         CONTINUE these medications which have CHANGED    Details   diclofenac EC (VOLTAREN) 75 mg EC tablet Take 1 Tab by mouth two (2) times a day. Qty: 30 Tab, Refills: 0         CONTINUE these medications which have NOT CHANGED    Details   rosuvastatin (CRESTOR) 5 mg tablet Take 1 Tab by mouth nightly. Qty: 30 Tab, Refills: 3      SITagliptin-metFORMIN (Janumet) 50-1,000 mg per tablet Take 1 Tab by mouth two (2) times daily (with meals). Dx: E11.42  Qty: 180 Tab, Refills: 3    Associated Diagnoses: Type 2 diabetes mellitus with diabetic polyneuropathy, without long-term current use of insulin (HCC)      ergocalciferol (ERGOCALCIFEROL) 1,250 mcg (50,000 unit) capsule TAKE 1 CAP BY MOUTH EVERY SEVEN (7) DAYS. INDICATIONS: VITAMIN D DEFICIENCY  Qty: 4 Cap, Refills: 3    Comments: DX Code Needed  .   Associated Diagnoses: Vitamin D deficiency      albuterol (PROVENTIL HFA, VENTOLIN HFA, PROAIR HFA) 90 mcg/actuation inhaler Take 2 Puffs by inhalation every four (4) hours as needed for Wheezing or Shortness of Breath (cough). Indications: asthma attack  Qty: 1 Inhaler, Refills: 5      albuterol (PROVENTIL VENTOLIN) 2.5 mg /3 mL (0.083 %) nebu 3 mL by Nebulization route three (3) times daily as needed for Wheezing. Dx: J45.41  Qty: 60 Nebule, Refills: 5    Associated Diagnoses: Moderate persistent asthma with exacerbation      budesonide-formoteroL (SYMBICORT) 160-4.5 mcg/actuation HFAA Take 2 Puffs by inhalation two (2) times a day. Dx: J45.40  Indications: controller medication for asthma  Qty: 1 Inhaler, Refills: 5    Comments: Disp: 10.2 g  Associated Diagnoses: Moderate persistent asthma without complication      triamterene-hydroCHLOROthiazide (MAXZIDE) 37.5-25 mg per tablet TAKE 1 TABLET BY MOUTH EVERY DAY IN THE MORNING  Qty: 30 Tab, Refills: 5      fluticasone propionate (FLONASE) 50 mcg/actuation nasal spray INHALE 2 SPRAYS IN EACH NOSTRIL ONCE A DAY. Indications: inflammation of the nose due to an allergy  Qty: 1 Bottle, Refills: 11    Associated Diagnoses: Non-seasonal allergic rhinitis due to other allergic trigger      Nebulizer & Compressor machine With accessories. Use as instructed 1-3 times daily as needed. Dx:J45.41  Qty: 1 Each, Refills: 0    Associated Diagnoses: Moderate persistent asthma with exacerbation      amLODIPine (NORVASC) 10 mg tablet Take 1 Tab by mouth daily. Qty: 90 Tab, Refills: 3    Associated Diagnoses: Essential hypertension      promethazine (PHENERGAN) 25 mg tablet TAKE 1 TAB BY MOUTH EVERY SIX (6) HOURS AS NEEDED FOR NAUSEA. Refills: 0      cyclobenzaprine (FLEXERIL) 5 mg tablet TAKE 1-2 TABLETS BY MOUTH AT BEDTIME  Refills: 1      glucose blood VI test strips (BLOOD GLUCOSE TEST) strip Use to test blood sugar once a day.  Dx: E11.42  Qty: 90 Strip, Refills: 3    Associated Diagnoses: Type 2 diabetes mellitus with diabetic polyneuropathy, without long-term current use of insulin (HCC)      aspirin delayed-release 81 mg tablet Take  by mouth daily.       lancets misc Test blood sugar once a day  Qty: 30 Each, Refills: 11    Associated Diagnoses: Type 2 diabetes mellitus with diabetic polyneuropathy, without long-term current use of insulin (HCC)      alcohol swabs (ALCOHOL PREP PADS) padm Use to test blood sugar once a day  Qty: 40 Pad, Refills: 11    Associated Diagnoses: Type 2 diabetes mellitus with diabetic polyneuropathy, without long-term current use of insulin ([facility]             Follow-up Information     Follow up With Specialties Details Why Contact Info    Babatunde Xiong MD Internal Medicine   [address] [place] [address]  [phone]      Marilyn Kline, [address] [place] [address] Orthopedic Surgery   [address]  [address]  [phone]

## 2020-08-14 NOTE — LETTER
NOTIFICATION RETURN TO WORK / SCHOOL 
 
8/14/2020 6:33 PM 
 
 
To Whom It May Concern: 
 
Josue Moreno is currently under the care of [facility] EMERGENCY DEPT. Patient was seen in my office on 8/15/20 and is now released to return to work with restrictions as follows: ice and elevate the L knee for 15 minutes every 4 hours on 8/17/20. If there are questions or concerns please have the patient contact our office. Sincerely, Lucy Sharma PA-C

## 2020-08-14 NOTE — TELEPHONE ENCOUNTER
Verified patients name and date of birth. Patient states her left knee has small \"egg sized\" swollen area on it. She denies redness and heat to area, but it is uncomfortable. She has been taking leftover prednisone. Advised her to go to urgent care or preferably [facility]. Patient stated understanding.

## 2020-08-14 NOTE — TELEPHONE ENCOUNTER
Pt called and would like to have predisone called into the pharmacy for her. Pt stated that her knee is swollen and painful. She went to the er and finished the medications that they gave her and she had some predisone left over and took that and that is the only they that is helping with the pain.  Pt stated she only has 4 left and would like to get another rx to help with the pain

## 2020-08-14 NOTE — DISCHARGE INSTRUCTIONS
Patient Education   Please ice the knee and elevate it for 15 minutes every 4 hours  Make sure to follow up with ortho     Learning About Joint Effusion  What is it? Fluid is normally found in joints such as knees, hips, and elbows. When too much fluid builds up around a joint in your body, it's called joint effusion. When you have this problem, your joint may look swollen. What causes it? Many things can cause fluid buildup in a joint. It may be caused by a condition like osteoarthritis, rheumatoid arthritis, or gout. It may also happen because of an infection. Or it can happen because of an injury, like a twisting fall. What are the symptoms? You might feel pain when you try to straighten a joint where you have fluid buildup. Your joint may be stiff or swollen. How is it diagnosed? Your doctor will do a physical exam. You may need an X-ray. You may need other imaging tests, like an MRI or a CT scan. Your doctor may remove some fluid from your joint to learn more. This is called aspiration. It's done by using a needle to drain fluid from your joint. How is it treated? Your doctor may suggest rest, ice, and raising the joint (elevation) to help with pain and swelling. The fluid might be drained from the area. Your doctor may suggest using nonprescription anti-inflammatory drugs (NSAIDs) or getting a steroid shot. Or you may need surgery to repair damage. Follow-up care is a key part of your treatment and safety. Be sure to make and go to all appointments, and call your doctor if you are having problems. It's also a good idea to know your test results and keep a list of the medicines you take. Current as of: January 15, 2020               Content Version: 12.5  © 1281-9580 [employer]. Care instructions adapted under license by [employer] (which disclaims liability or warranty for this information).  If you have questions about a medical condition or this instruction, always ask your healthcare professional. [place] [address] any warranty or liability for your use of this information.

## 2020-08-17 ENCOUNTER — PATIENT OUTREACH (OUTPATIENT)
Dept: FAMILY MEDICINE CLINIC | Age: 60
End: 2020-08-17

## 2020-08-17 NOTE — DISCHARGE INSTRUCTIONS
Outpatient Physical Therapy Daily Treatment Note         Visit Number: 14  Referring Diagnosis: s/p Right pec repair        Referring Physician: Monica Singh MD   Insurance: [employer] - [employer]  Date of Initial Evaluation: 6/26/2020  Date of Surgery, if applicable: 6/5/2020 8/28/20=12wks        Treatment Precautions: enter Phase 3 Progress to full PROM, Initiate AROM. Avoid adduction, IR  Referring Physician:Monica Singh MD  Next Scheduled physician appointment: 8/31/20     Next Treatment Session   Plan: review wall slides (progress towards abduction), review AROM flex/scap to 90*, look to add scapular strength/stability  Problem: none     Subjective  Patient states his shoulder started feeling looser yesterday and still good today.       Eval:   Had surgery 6/5/2020 and has been wearing sling as prescribed.  Has been removing to move elbow, and sleeping in sling. Has been icing a couple times a day.  Injured bench pressing; heard tear.  Has been released to light duty as a .  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  R handed.  Wants to get back into weight lifting. Has 4 young children that need to be lifted.  Wants to be able to resume playing catch with children.      Pain Report   ·  Current: 0/10 with sling, slight soreness with movements  · Description:  discomfort and heaviness     Objective All measurements taken at [facility] unless otherwise noted.          Quick Disabilities of the Arm, Shoulder and Hand (QDASH): Score: 44 (11-55), Calculated Score 75 (0-100)  scored 0-100; a higher score indicates greater disability     Observation/Posture: slight RS, dec scap activation     Range of Motion:      Shoulder Norm Left  Right  Right Right Right Right   Date   IE IE      7/3/20  7/17/2020 7/31/2020 8/17/2020   Flexion 170° -180 °  160 A/180 P 82 A/130 P    94/145 120/150 136/160   Extension 50°-60° 40 A 30 A    [place] Office: [phone]    Jacqueline Shi  [id number]  1960    EGD/COLONOSCOPY DISCHARGE INSTRUCTIONS  Discomfort:  Sore throat- throat lozenges or warm salt water gargle  redness at IV site- apply warm compress to area; if redness or soreness persist- contact your physician  Gaseous discomfort- walking, belching will help relieve any discomfort  You may not operate a vehicle for 12 hours  You may not engage in an occupation involving machinery or appliances for rest of today. You may not drink alcoholic beverages for at least 12 hours  Avoid making any critical decisions for at least 24 hour  DIET  You may resume your regular diet - however -  remember your colon is empty and a heavy meal will produce gas. Avoid these foods:  fried / greasy foods, excessive carbonated drinks or too much caffeine  MEDICATIONS   Regarding Aspirin or Nonsteroidal medications specifically, please see below. ACTIVITY  You may resume your normal daily activities. Spend the remainder of the day resting -  avoid any strenuous activity. CALL M.D. ANY SIGN OF   Increasing pain, nausea, vomiting  Abdominal distension (swelling)  New increased bleeding (oral or rectal)  Fever (chills)  Pain in chest area  Bloody discharge from nose or mouth  Shortness of breath    You may not take any Advil, Aspirin, Ibuprofen, Motrin, Aleve, or Goodys for 7 days, ONLY  Tylenol as needed for pain. Follow-up Instructions:   Call  Adam Arauz MD for any questions or concerns  Results of procedure / biopsy in 7 days   Telephone # [phone]      Follow-up Information    None  40 55    Abduction 170° -180° NT NT    70/110 110/130 126/145   Internal Rotation  60°-100° 90 P 70 P     75/80 75/80    External  Rotation 80°-90° 90 P 2 P 25    30/40 50/60 55/70   Comments: * denotes pain              Strength:NT at this time secondary to surgery  Shoulder Left Right Left Right Comment/Position   Date IE IE           PN           PN     Flexion /5 /5         Abduction /5 /5         Internal Rotation /5 /5         External  Rotation /5 /5         LT /5 /5         MT /5 /5         Rhom /5 /5         Comments: * denotes pain         Palpation: tender R ant pec and sh  Joint play: NT           Treatment Treatment explained and consent received.   Therapeutic Exercise  HEP Exercise  Reps Resistance Not Performed   [x]?  scap retract/lat pull down  15 [employer] to green [x]?    []?  Seated table slide (with stability ball) 15   [x]?    [x]?  Standing weight shift A/P, lat 15   [x]?    [x]?   wall slide flexion  10   []?    [x]?   wall slide scaption prog towards abd 10   []?    [x]?   AROM flex to 90 (alt iso holds)2sets 10   [x]?    [x]?   AROM Abd to 90 (alt iso holds)2sets  10   [x]?    []?   Pulley flexion/scaption 10ea   []?    []?  Seated single arm flexion, scaption and scaption on stability ball  10eax2  [x]?    []?  Chicken wing with focus on ER 2  [x]?    []?  Horizontal abduction   Red tubing [x]?    []?        []?                 Manual Therapy  Technique Comment Not Performed   STM pec , gentle arm distraction     PROM flex, ER, abd, ext     Scar mobs     IASTM HG8  x                     ASSESSMENT                                                                                                               Patient demonstrated an increase shoulder range, both active and passive in all planes.            PLAN     Goals  1.  Patient will demonstrate  inc R sh PROM by 50+ degrees to allow patient to reach overhead into cupboards and to don and doff shirts and jackets with pain less than  or equal to 0-3/10. []?Goal Met []?Goal Not Met []?Progressing     2.  Patient will demonstrate dec tenderness % to improve ability to perform quick movements for work duties. []?Goal Met []?Goal Not Met []?Progressing     3.  Patient will demonstrate 4+-5/5 R upper extremity strength to allow patient to resume lifting and perform work duties within protocol timeframes. []?Goal Met []?Goal Not Met []?Progressing     4.  Patient will demonstrate independence with a personalized home exercise program and symptom management techniques.    []?Goal Met []?Goal Not Met []?Progressing     Frequency/Duration: 2 times per week for 12 weeks with tapering as the patient progresses for an estimated total of 24 visits

## 2020-08-17 NOTE — PROGRESS NOTES
Patient contacted regarding recent discharge and COVID-19 risk. Discussed COVID-19 related testing which was not done at this time. Test results were not done. Patient informed of results, if available? n/a    Care Transition Nurse/ Ambulatory Care Manager/ LPN Care Coordinator contacted the patient by telephone to perform post discharge assessment. Verified name and  with patient as identifiers. Patient has following risk factors of: asthma and diabetes. CTN/ACM/LPN reviewed discharge instructions, medical action plan and red flags related to discharge diagnosis. Reviewed and educated them on any new and changed medications related to discharge diagnosis. Advised obtaining a 90-day supply of all daily and as-needed medications. Advance Care Planning:   Does patient have an Advance Directive: not on file     Education provided regarding infection prevention, and signs and symptoms of COVID-19 and when to seek medical attention with patient who verbalized understanding. Discussed exposure protocols and quarantine from [address] you at higher risk for severe illness  and given an opportunity for questions and concerns. The patient agrees to contact the COVID-19 hotline [phone] or PCP office for questions related to their healthcare. CTN/ACM/LPN provided contact information for future reference. From CDC: Are you at higher risk for severe illness?  Wash your hands often.  Avoid close contact (6 feet, which is about two arm lengths) with people who are sick.  Put distance between yourself and other people if COVID-19 is spreading in your community.  Clean and disinfect frequently touched surfaces.  Avoid all cruise travel and non-essential air travel.  Call your healthcare professional if you have concerns about COVID-19 and your underlying condition or if you are sick.     For more information on steps you can take to protect yourself, see CDC's How to Protect Yourself Patient/family/caregiver given information for [employer] and agrees to enroll no  Patient's preferred e-mail:  n/a  Patient's preferred phone number: n/a  Based on Loop alert triggers, patient will be contacted by nurse care manager for worsening symptoms. Plan for follow-up call in 7-14 days based on severity of symptoms and risk factors.

## 2020-08-19 ENCOUNTER — HOSPITAL ENCOUNTER (OUTPATIENT)
Dept: MAMMOGRAPHY | Age: 60
Discharge: HOME | End: 2020-08-19
Attending: INTERNAL MEDICINE
Payer: COMMERCIAL

## 2020-08-19 DIAGNOSIS — Z12.31 VISIT FOR SCREENING MAMMOGRAM: ICD-10-CM

## 2020-08-19 PROCEDURE — 77067 SCR MAMMO BI INCL CAD: CPT

## 2020-08-25 DIAGNOSIS — J45.40 MODERATE PERSISTENT ASTHMA WITHOUT COMPLICATION: ICD-10-CM

## 2020-08-25 RX ORDER — BUDESONIDE AND FORMOTEROL FUMARATE DIHYDRATE 160; 4.5 UG/1; UG/1
AEROSOL RESPIRATORY (INHALATION)
Qty: 10.2 INHALER | Refills: 5 | Status: SHIPPED | OUTPATIENT
Start: 2020-08-25 | End: 2021-03-18 | Stop reason: SDUPTHER

## 2020-09-01 RX ORDER — PREDNISONE 10 MG/1
TABLET ORAL
Qty: 21 TAB | Refills: 0 | OUTPATIENT
Start: 2020-09-01

## 2020-09-01 NOTE — TELEPHONE ENCOUNTER
Pt called to request a refill of prednisone be sent to the pharmacy on file. Pt stated she has two left and needs them to \"keep her from having an asthma attack\". Please return call if more info is needed at [phone].

## 2020-09-01 NOTE — TELEPHONE ENCOUNTER
I called the patient and verified them by name and date of birth. I informed the patient that I needed to know how many milligrams the prednisone is. The patient is going to call back with the information.

## 2020-09-02 NOTE — TELEPHONE ENCOUNTER
I called the patient and verified them by name and date of birth. I informed the patient on the message from [facility]. She stated it only acts up based on the weather and the patient stated they do not understand what a virtual appointment will do for asthma. I tried to get the patient to schedule an appointment with [facility]. The patient declined and stated they will try to make out and if they get worse they will go to the ER.

## 2020-09-09 ENCOUNTER — PATIENT OUTREACH (OUTPATIENT)
Dept: FAMILY MEDICINE CLINIC | Age: 60
End: 2020-09-09

## 2020-09-09 NOTE — PROGRESS NOTES
Patient resolved from Transition of Care episode on 9/9/20  Discussed COVID-19 related testing which was not done at this time. Test results were not done. Patient informed of results, if available? n/a     Patient/family has been provided the following resources and education related to COVID-19:                         Signs, symptoms and red flags related to COVID-19            [facility] exposure and quarantine guidelines            [facility] exposure contact - [phone]            Contact for their local Department of Health                 Patient currently reports that the following symptoms have improved:  no worsening symptoms. No further outreach scheduled with this CTN/ACM/LPN/HC/ MA. Episode of Care resolved. Patient has this CTN/ACM/LPN/HC/MA contact information if future needs arise.

## 2020-09-16 DIAGNOSIS — I10 ESSENTIAL HYPERTENSION: ICD-10-CM

## 2020-09-16 RX ORDER — AMLODIPINE BESYLATE 10 MG/1
10 TABLET ORAL DAILY
Qty: 90 TAB | Refills: 3 | Status: SHIPPED | OUTPATIENT
Start: 2020-09-16 | End: 2021-09-23

## 2020-09-16 NOTE — TELEPHONE ENCOUNTER
PCP: Stanford Young MD     Last appt: Visit date not found   Future Appointments   Date Time Provider Terrence Ruth   10/20/2020  9:10 AM Stanford Young MD [facility] AMB        Requested Prescriptions     Pending Prescriptions Disp Refills    amLODIPine (NORVASC) 10 mg tablet 90 Tab 3     Sig: Take 1 Tab by mouth daily.

## 2020-09-16 NOTE — TELEPHONE ENCOUNTER
[facility] on file sent a fax requesting a refill of   Requested Prescriptions     Pending Prescriptions Disp Refills    amLODIPine (NORVASC) 10 mg tablet 90 Tab 3     Sig: Take 1 Tab by mouth daily.

## 2020-10-08 DIAGNOSIS — E55.9 VITAMIN D DEFICIENCY: ICD-10-CM

## 2020-10-08 RX ORDER — ERGOCALCIFEROL 1.25 MG/1
50000 CAPSULE ORAL
Qty: 4 CAP | Refills: 3 | Status: SHIPPED | OUTPATIENT
Start: 2020-10-08 | End: 2021-01-22

## 2020-10-20 ENCOUNTER — OFFICE VISIT (OUTPATIENT)
Dept: INTERNAL MEDICINE CLINIC | Age: 60
End: 2020-10-20
Payer: COMMERCIAL

## 2020-10-20 VITALS
OXYGEN SATURATION: 97 % | TEMPERATURE: 98.3 F | RESPIRATION RATE: 18 BRPM | BODY MASS INDEX: 32.06 KG/M2 | WEIGHT: 174.2 LBS | SYSTOLIC BLOOD PRESSURE: 136 MMHG | HEIGHT: 62 IN | DIASTOLIC BLOOD PRESSURE: 78 MMHG | HEART RATE: 92 BPM

## 2020-10-20 DIAGNOSIS — J30.89 NON-SEASONAL ALLERGIC RHINITIS DUE TO OTHER ALLERGIC TRIGGER: ICD-10-CM

## 2020-10-20 DIAGNOSIS — R49.0 VOICE HOARSENESS: ICD-10-CM

## 2020-10-20 DIAGNOSIS — I10 ESSENTIAL HYPERTENSION: ICD-10-CM

## 2020-10-20 DIAGNOSIS — K21.9 GASTROESOPHAGEAL REFLUX DISEASE WITHOUT ESOPHAGITIS: ICD-10-CM

## 2020-10-20 DIAGNOSIS — I73.9 PERIPHERAL VASCULAR DISEASE (HCC): ICD-10-CM

## 2020-10-20 DIAGNOSIS — Z23 NEEDS FLU SHOT: ICD-10-CM

## 2020-10-20 DIAGNOSIS — E11.9 TYPE 2 DIABETES MELLITUS WITHOUT COMPLICATION, WITHOUT LONG-TERM CURRENT USE OF INSULIN (HCC): Primary | ICD-10-CM

## 2020-10-20 DIAGNOSIS — R11.0 NAUSEA: ICD-10-CM

## 2020-10-20 DIAGNOSIS — M17.12 PRIMARY OSTEOARTHRITIS OF LEFT KNEE: ICD-10-CM

## 2020-10-20 LAB — HBA1C MFR BLD HPLC: 7.1 %

## 2020-10-20 PROCEDURE — 3051F HG A1C>EQUAL 7.0%<8.0%: CPT | Performed by: INTERNAL MEDICINE

## 2020-10-20 PROCEDURE — 83036 HEMOGLOBIN GLYCOSYLATED A1C: CPT | Performed by: INTERNAL MEDICINE

## 2020-10-20 PROCEDURE — 90686 IIV4 VACC NO PRSV 0.5 ML IM: CPT | Performed by: INTERNAL MEDICINE

## 2020-10-20 PROCEDURE — 99214 OFFICE O/P EST MOD 30 MIN: CPT | Performed by: INTERNAL MEDICINE

## 2020-10-20 PROCEDURE — 90471 IMMUNIZATION ADMIN: CPT | Performed by: INTERNAL MEDICINE

## 2020-10-20 RX ORDER — PROMETHAZINE HYDROCHLORIDE 25 MG/1
TABLET ORAL
Qty: 20 TAB | Refills: 3 | Status: SHIPPED | OUTPATIENT
Start: 2020-10-20 | End: 2021-02-22

## 2020-10-20 NOTE — PROGRESS NOTES
Chief Complaint   Patient presents with    Diabetes    Hypertension    Foot Pain     Pt states she has been having hoarseness as the day goes along. Pt states her left leg has a sore painful area that started 3 months ago, that she's using the Voltaren gel and it is not helping. 1. Have you been to the ER, urgent care clinic since your last visit? Hospitalized since your last visit? Yes When: Manjula Damon 3 months ago for left leg    2. Have you seen or consulted any other health care providers outside of the [address] [place] [address] since your last visit? Include any pap smears or colon screening. Yes When: Leon Patino at [facility] 9/2020 for injection for left leg    Visit Vitals  /78 (BP 1 Location: Right arm, BP Patient Position: Sitting)   Pulse 92   Temp 98.3 °F (36.8 °C) (Oral)   Resp 18   Ht 5' 2\" (1.575 m)   Wt 174 lb 3.2 oz (79 kg)   LMP 06/19/2011   SpO2 97%   BMI 31.86 kg/m²     Administered flu vaccine, pt tolerated well, VIS provided.

## 2020-10-20 NOTE — PROGRESS NOTES
CC:   Chief Complaint   Patient presents with    Diabetes    Hypertension    Foot Pain       HISTORY OF PRESENT ILLNESS  Nelson Montoya is a 61 y.o. female. Presents for 4 month follow up evaluation. She has type 2 DM, HTN, asthma, and allergic rhinitis.       No longer having foot pain, improved on its own. Now complains of left knee pain for 3 months. Pain located a few inches below left pain. Worse with walking up the stairs. Saw Orthopedics; corticosteroid injection to left knee did not help, using diclofenac gel without much relief. Complains that throat gets dry and voice gets hoarse and weak towards the end of the day. Becomes hard to hear her. Denies runny nose, postnasal drainage, cough, or recent cold. Increased stress because house she was renting was sold. Moves to new place next week. 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 She is seen for diabetes. Since last visit she reports no polyuria or polydipsia, no hypoglycemia. Home glucose monitoring: is performed regularly. FBS: 121-143. She reports medication compliance: compliant all of the time. Medication side effects: none. Diabetic diet compliance: compliant most of the time. Lab review: A1c is 7.1% today (10/20/20), was 6.8% on 8/5/20  Eye exam: UTD. The patient has hypertension. Denies HA's, CP, SOB, dizziness, heart palpitations, or leg swelling. Home BP monitoring: not done. She reports taking medications as instructed, no medication side effects noted. Diet and Lifestyle: generally follows a low sodium diet, no formal exercise but active during the day. ROS  A complete review of systems was performed and is negative except for those mentioned in the HPI.        Patient Active Problem List   Diagnosis Code    Type 2 diabetes mellitus without complication, without long-term current use of insulin (HCC) E11.9    HTN (hypertension) I10    Arthritis M19.90    GERD (gastroesophageal reflux disease) K21.9    Vitamin D deficiency E55.9    Fibroadenoma of right breast D24.1    Moderate persistent asthma without complication S80.93    Adrenal nodule (HCC) E27.8    Allergic rhinitis due to allergen J30.9     Past Medical History:   Diagnosis Date    Arrhythmia     Arthritis     GENERALIZED TO JOINTS-WRISTS & KNEES.  Asthma     INHALERS    Asthma, chronic 10/8/2009    Diabetes ([address]     USES PILLS TO CONTROLL    GERD (gastroesophageal reflux disease)     Gout     Hypertension     CONTROLLED BY MEDS.  Nausea & vomiting     Obesity      Allergies   Allergen Reactions    Latex Rash     Severe rash    Ampicillin Anaphylaxis    Betadine Antiseptic Gauze Hives    Contrast Dye [Iodine] Hives and Swelling     Pt. Reports throat swelling      Other Medication Rash     Betadine soap/blisters    Fd And C Blue No.1 Anaphylaxis    Penicillins Hives       Current Outpatient Medications   Medication Sig Dispense Refill    ergocalciferol (ERGOCALCIFEROL) 1,250 mcg (50,000 unit) capsule TAKE 1 CAP BY MOUTH EVERY SEVEN (7) DAYS. INDICATIONS: VITAMIN D DEFICIENCY 4 Cap 3    amLODIPine (NORVASC) 10 mg tablet Take 1 Tab by mouth daily. 90 Tab 3    triamterene-hydroCHLOROthiazide (MAXZIDE) 37.5-25 mg per tablet TAKE 1 TABLET BY MOUTH EVERY DAY IN THE MORNING 90 Tab 1    Symbicort 160-4.5 mcg/actuation HFAA TAKE 2 PUFFS BY INHALATION TWO (2) TIMES A DAY. DX: J45.40 INDICATIONS: CONTROLLER MEDICATION FOR ASTHMA 10.2 Inhaler 5    rosuvastatin (CRESTOR) 5 mg tablet Take 1 Tab by mouth nightly. 30 Tab 3    SITagliptin-metFORMIN (Janumet) 50-1,000 mg per tablet Take 1 Tab by mouth two (2) times daily (with meals). Dx: E11.42 180 Tab 3    albuterol (PROVENTIL VENTOLIN) 2.5 mg /3 mL (0.083 %) nebu 3 mL by Nebulization route three (3) times daily as needed for Wheezing.  Dx: J45.41 60 Nebule 5    fluticasone propionate (FLONASE) 50 mcg/actuation nasal spray INHALE 2 SPRAYS IN [facility] NOSTRIL ONCE A DAY. Indications: inflammation of the nose due to an allergy 1 Bottle 11    Nebulizer & Compressor machine With accessories. Use as instructed 1-3 times daily as needed. Dx:J45.41 1 Each 0    cyclobenzaprine (FLEXERIL) 5 mg tablet TAKE 1-2 TABLETS BY MOUTH AT BEDTIME  1    glucose blood VI test strips (BLOOD GLUCOSE TEST) strip Use to test blood sugar once a day. Dx: E11.42 90 Strip 3    aspirin delayed-release 81 mg tablet Take  by mouth daily.  lancets misc Test blood sugar once a day 30 Each 11    alcohol swabs (ALCOHOL PREP PADS) padm Use to test blood sugar once a day 40 Pad 11         PHYSICAL EXAM  Visit Vitals  /78 (BP 1 Location: Right arm, BP Patient Position: Sitting)   Pulse 92   Temp 98.3 °F (36.8 °C) (Oral)   Resp 18   Ht 5' 2\" (1.575 m)   Wt 174 lb 3.2 oz (79 kg)   LMP 06/19/2011   SpO2 97%   BMI 31.86 kg/m²       General: Obese, no distress. HEENT:  Head normocephalic/atraumatic, no scleral icterus  Lungs:  Clear to ausculation bilaterally. Good air movement. Heart:  Regular rate and rhythm, normal S1 and S2, no murmur, gallop, or rub  Extremities: No clubbing, cyanosis, or edema. Normal ROM with mild crepitus at left knee. Localized area of tenderness at anterior left leg about 2 finger-breadths below patella. Neurological: Alert and oriented. Psychiatric: Normal mood and affect. Behavior is normal.   Diabetic foot exam:     Left Foot:   Visual Exam: normal    Pulse DP: 1+ (weak)   Filament test: normal sensation    Vibratory sensation: normal      Right Foot:   Visual Exam: callous - small at great toe   Pulse DP: 1+ (weak)   Filament test: normal sensation    Vibratory sensation: normal      Results for orders placed or performed in visit on 10/20/20   AMB POC HEMOGLOBIN A1C   Result Value Ref Range    Hemoglobin A1c (POC) 7.1 %         ASSESSMENT AND PLAN    ICD-10-CM ICD-9-CM    1.  Type 2 diabetes mellitus without complication, without long-term current use of insulin (HCC)  E11.9 250.00  DIABETES FOOT EXAM      AMB POC HEMOGLOBIN A1C   2. Essential hypertension  I10 401.9    3. Peripheral vascular disease (HCC)  I73.9 443.9    4. Voice hoarseness  R49.0 784.42 REFERRAL TO ENT-OTOLARYNGOLOGY   5. Non-seasonal allergic rhinitis due to other allergic trigger  J30.89 477.8    6. Gastroesophageal reflux disease without esophagitis  K21.9 530.81    7. Primary osteoarthritis of left knee  M17.12 715.16    8. Nausea  R11.0 787.02 promethazine (PHENERGAN) 25 mg tablet   9. Needs flu shot  Z23 V04.81 INFLUENZA VIRUS VAC QUAD,SPLIT,PRESV FREE SYRINGE IM     Diagnoses and all orders for this visit:    1. Type 2 diabetes mellitus without complication, without long-term current use of insulin ([facility])  A1c 7.1% today. Controlled. Continue Janumet. -      DIABETES FOOT EXAM  -     AMB POC HEMOGLOBIN A1C    2. Essential hypertension  Controlled at 136/78. Continue triamterene-HCTZ and amlodipine. 3. Peripheral vascular disease (HCC)  Asymptomatic on ASA and statin. 4. Voice hoarseness and weakness in evenings  -     REFERRAL TO ENT-OTOLARYNGOLOGY    5. Non-seasonal allergic rhinitis due to other allergic trigger  Controled on present management. 6. Gastroesophageal reflux disease without esophagitis  Controlled. 7. Primary osteoarthritis of left knee  Diagnosed with pes anserine bursitis. Follow up with Orthopedics. 8. Nausea  Has intermittent nausea with no vomiting.  -     Refill promethazine (PHENERGAN) 25 mg tablet; TAKE 1 TAB BY MOUTH EVERY SIX (6) HOURS AS NEEDED FOR NAUSEA. 9. Needs flu shot  -     INFLUENZA VIRUS VAC QUAD,SPLIT,PRESV FREE SYRINGE IM      Follow-up and Dispositions    · Return in about 4 months (around 2/20/2021), or if symptoms worsen or fail to improve, for DM, HTN, left knee pain. I have discussed the diagnosis with the patient and the intended plan as seen in the above orders. Patient is in agreement.   The patient has received an after-visit summary and questions were answered concerning future plans. I have discussed medication side effects and warnings with the patient as well.

## 2020-12-05 RX ORDER — ROSUVASTATIN CALCIUM 5 MG/1
TABLET, COATED ORAL
Qty: 30 TAB | Refills: 3 | Status: SHIPPED | OUTPATIENT
Start: 2020-12-05 | End: 2021-09-23

## 2020-12-29 ENCOUNTER — HOSPITAL ENCOUNTER (EMERGENCY)
Age: 60
Discharge: HOME | End: 2020-12-29
Attending: EMERGENCY MEDICINE
Payer: COMMERCIAL

## 2020-12-29 ENCOUNTER — APPOINTMENT (OUTPATIENT)
Dept: GENERAL RADIOLOGY | Age: 60
End: 2020-12-29
Attending: STUDENT IN AN ORGANIZED HEALTH CARE EDUCATION/TRAINING PROGRAM
Payer: COMMERCIAL

## 2020-12-29 VITALS
HEIGHT: 62 IN | TEMPERATURE: 98.5 F | HEART RATE: 89 BPM | DIASTOLIC BLOOD PRESSURE: 63 MMHG | RESPIRATION RATE: 18 BRPM | BODY MASS INDEX: 31.36 KG/M2 | SYSTOLIC BLOOD PRESSURE: 153 MMHG | WEIGHT: 170.42 LBS | OXYGEN SATURATION: 100 %

## 2020-12-29 DIAGNOSIS — M19.019 ARTHRITIS, SHOULDER REGION: ICD-10-CM

## 2020-12-29 DIAGNOSIS — M25.512 ACUTE PAIN OF LEFT SHOULDER: Primary | ICD-10-CM

## 2020-12-29 PROCEDURE — 74011250637 HC RX REV CODE- 250/637: Performed by: EMERGENCY MEDICINE

## 2020-12-29 PROCEDURE — 99283 EMERGENCY DEPT VISIT LOW MDM: CPT

## 2020-12-29 PROCEDURE — 73030 X-RAY EXAM OF SHOULDER: CPT

## 2020-12-29 RX ORDER — METHOCARBAMOL 750 MG/1
750 TABLET, FILM COATED ORAL
Status: COMPLETED | OUTPATIENT
Start: 2020-12-29 | End: 2020-12-29

## 2020-12-29 RX ORDER — KETOROLAC TROMETHAMINE 10 MG/1
10 TABLET, FILM COATED ORAL
Qty: 10 TAB | Refills: 0 | Status: SHIPPED | OUTPATIENT
Start: 2020-12-29 | End: 2021-02-22

## 2020-12-29 RX ORDER — METHOCARBAMOL 750 MG/1
750 TABLET, FILM COATED ORAL
Qty: 20 TAB | Refills: 0 | Status: SHIPPED | OUTPATIENT
Start: 2020-12-29 | End: 2021-01-08

## 2020-12-29 RX ADMIN — METHOCARBAMOL TABLETS 750 MG: 750 TABLET, COATED ORAL at 14:17

## 2020-12-29 NOTE — ED NOTES
Joaquim Rivera MD saw pt in room 4 for focused care and assessment. Mia CHANG reviewed discharge instructions with the patient. The patient verbalized understanding. All questions and concerns were addressed. The patient declined a wheelchair and is discharged ambulatory in the care of family members with instructions and prescriptions in hand. Pt is alert and oriented x 4. Respirations are clear and unlabored.

## 2020-12-29 NOTE — ED PROVIDER NOTES
EMERGENCY DEPARTMENT HISTORY AND PHYSICAL EXAM      Date: 12/29/2020  Patient Name: Denzel Baker    History of Presenting Illness     Chief Complaint   Patient presents with    Shoulder Pain     Surgery on shoulder 4 year ago, been pulling and lifting alot at work and believe i threw it out at [employer]". + active and passive ROM in shoulder. History Provided By: Patient    HPI: Denzel Baker, 61 y.o. female presents to the ED with cc of shoulder pain. Patient has a history of prior surgery on her left shoulder. She has been lifting a lot at work, and has had increased pain over the last 2 weeks. She has been taking ibuprofen and Advil, as well as Flexeril. She still has a significant pain when she moves the extremity. At rest, the pain is mild. She denies any new trauma. She denies chest pain, cough, fever or chills. She did experience shortness of breath 2 days ago after lifting a lot of objects at work. She denies numbness or tingling. She is left-hand dominant. There are no other complaints, changes, or physical findings at this time. PCP: Brijesh Styles MD    No current facility-administered medications on file prior to encounter. Current Outpatient Medications on File Prior to Encounter   Medication Sig Dispense Refill    rosuvastatin (CRESTOR) 5 mg tablet TAKE 1 TABLET BY MOUTH EVERY DAY AT NIGHT 30 Tab 3    promethazine (PHENERGAN) 25 mg tablet TAKE 1 TAB BY MOUTH EVERY SIX (6) HOURS AS NEEDED FOR NAUSEA. 20 Tab 3    ergocalciferol (ERGOCALCIFEROL) 1,250 mcg (50,000 unit) capsule TAKE 1 CAP BY MOUTH EVERY SEVEN (7) DAYS. INDICATIONS: VITAMIN D DEFICIENCY 4 Cap 3    amLODIPine (NORVASC) 10 mg tablet Take 1 Tab by mouth daily. 90 Tab 3    triamterene-hydroCHLOROthiazide (MAXZIDE) 37.5-25 mg per tablet TAKE 1 TABLET BY MOUTH EVERY DAY IN THE MORNING 90 Tab 1    Symbicort 160-4.5 mcg/actuation HFAA TAKE 2 PUFFS BY INHALATION TWO (2) TIMES A DAY.  DX: J45.40 INDICATIONS: CONTROLLER MEDICATION FOR ASTHMA 10.2 Inhaler 5    SITagliptin-metFORMIN (Janumet) 50-1,000 mg per tablet Take 1 Tab by mouth two (2) times daily (with meals). Dx: E11.42 180 Tab 3    albuterol (PROVENTIL VENTOLIN) 2.5 mg /3 mL (0.083 %) nebu 3 mL by Nebulization route three (3) times daily as needed for Wheezing. Dx: J45.41 60 Nebule 5    fluticasone propionate (FLONASE) 50 mcg/actuation nasal spray INHALE 2 SPRAYS IN EACH NOSTRIL ONCE A DAY. Indications: inflammation of the nose due to an allergy 1 Bottle 11    Nebulizer & Compressor machine With accessories. Use as instructed 1-3 times daily as needed. Dx:J45.41 1 Each 0    cyclobenzaprine (FLEXERIL) 5 mg tablet TAKE 1-2 TABLETS BY MOUTH AT BEDTIME  1    glucose blood VI test strips (BLOOD GLUCOSE TEST) strip Use to test blood sugar once a day. Dx: E11.42 90 Strip 3    aspirin delayed-release 81 mg tablet Take  by mouth daily.  lancets misc Test blood sugar once a day 30 Each 11    alcohol swabs (ALCOHOL PREP PADS) padm Use to test blood sugar once a day 40 Pad 11       Past History     Past Medical History:  Past Medical History:   Diagnosis Date    Arrhythmia     Arthritis     GENERALIZED TO JOINTS-WRISTS & KNEES.  Asthma     INHALERS    Asthma, chronic 10/8/2009    Diabetes ([facility] [address]     USES PILLS TO CONTROLL    GERD (gastroesophageal reflux disease)     Gout     Hypertension     CONTROLLED BY MEDS.  Nausea & vomiting     Obesity        Past Surgical History:  Past Surgical History:   Procedure Laterality Date    COLONOSCOPY N/A 5/31/2019    COLONOSCOPY performed by Karoline Babin MD at [facility] ENDOSCOPY. 6 mm tubular adenoma in sigmoid polyp.  REpeat in 6 months due to poor prep    COLONOSCOPY N/A 2/21/2020    COLONOSCOPY performed by Karoline Babin MD at [facility] ENDOSCOPY. 2 tubular adenomas, diverticulosis, internal hemorrhoids    HX BREAST BIOPSY Right 2018    benign    HX CHOLECYSTECTOMY      HX HEART CATHETERIZATION  07,7596 CARDIAC CATH X2    HX HEENT  1961    CLEFT PALATE REPAIR.    HX HYSTERECTOMY  2011 JULY 18    HX ORTHOPAEDIC      carpal tunnel, trigger finger, hand surgery left arm    HX OTHER SURGICAL      CLeft palate repair, piloneidal cyst, hand surgery,     HX TUBAL LIGATION  1983       Family History:  Family History   Problem Relation Age of Onset    Asthma Mother     Hypertension Mother     Diabetes Father     Heart Disease Father         MI    Stroke Brother         CEREBRAL ANEURYSM    Heart Attack Sister     Hypertension Sister     No Known Problems Sister        Social History:  Social History     Tobacco Use    Smoking status: Former Smoker     Years: 10.00     Quit date: 7/11/2010     Years since quitting: 10.4    Smokeless tobacco: Never Used   Substance Use Topics    Alcohol use: No    Drug use: No       Allergies: Allergies   Allergen Reactions    Latex Rash     Severe rash    Ampicillin Anaphylaxis    Betadine Antiseptic Gauze Hives    Contrast Dye [Iodine] Hives and Swelling     Pt. Reports throat swelling      Other Medication Rash     Betadine soap/blisters    Fd And C Blue No.1 Anaphylaxis    Penicillins Hives         Review of Systems   Review of Systems   Constitutional: Negative for fever. HENT: Negative for congestion. Eyes: Negative. Respiratory: Positive for shortness of breath. Negative for cough. Cardiovascular: Negative for leg swelling. Gastrointestinal: Negative for abdominal pain. Endocrine: Negative for heat intolerance. Genitourinary: Negative for dysuria. Musculoskeletal: Negative for back pain. Skin: Negative for rash. Allergic/Immunologic: Negative for immunocompromised state. Neurological: Negative for dizziness. Hematological: Does not bruise/bleed easily. Psychiatric/Behavioral: Negative. All other systems reviewed and are negative. Physical Exam   Physical Exam  Vitals signs and nursing note reviewed.    Constitutional: General: She is not in acute distress. Appearance: She is well-developed. HENT:      Head: Normocephalic. Neck:      Musculoskeletal: Normal range of motion and neck supple. Cardiovascular:      Rate and Rhythm: Normal rate and regular rhythm. Pulses: Normal pulses. Heart sounds: Normal heart sounds. Pulmonary:      Effort: Pulmonary effort is normal.      Breath sounds: Normal breath sounds. Abdominal:      General: Bowel sounds are normal.      Palpations: Abdomen is soft. Tenderness: There is no abdominal tenderness. Musculoskeletal:         General: Tenderness present. Comments: Left shoulder tenderness and decreased range of motion   Skin:     General: Skin is warm and dry. Neurological:      General: No focal deficit present. Mental Status: She is alert. Psychiatric:         Mood and Affect: Mood normal.         Behavior: Behavior normal.         Diagnostic Study Results     Labs -   No results found for this or any previous visit (from the past 12 hour(s)). Radiologic Studies -   XR SHOULDER LT AP/LAT MIN 2 V   Final Result   IMPRESSION: No acute findings. Degenerative changes. .        CT Results  (Last 48 hours)    None        CXR Results  (Last 48 hours)    None          Medical Decision Making   I am the first provider for this patient. I reviewed the vital signs, available nursing notes, past medical history, past surgical history, family history and social history. Vital Signs-Reviewed the patient's vital signs. Patient Vitals for the past 12 hrs:   Temp Pulse Resp BP SpO2   12/29/20 1235 98.5 °F (36.9 °C) 89 18 (!) 153/63 100 %           Records Reviewed: Nursing Notes and Old Medical Records    Provider Notes (Medical Decision Making):   Fracture, sprain, arthritis, dislocation,    ED Course:   Initial assessment performed.  The patients presenting problems have been discussed, and they are in agreement with the care plan formulated and outlined with them. I have encouraged them to ask questions as they arise throughout their visit. Progress note:    Patient's results were reviewed. Patient is advised to follow-up and return to ER if worse             Critical Care Time:   0    Disposition:  home  d  DISCHARGE PLAN:  1. Discharge Medication List as of 12/29/2020  2:19 PM      START taking these medications    Details   ketorolac (TORADOL) 10 mg tablet Take 1 Tab by mouth every six (6) hours as needed for Pain., Normal, Disp-10 Tab, R-0      methocarbamoL (ROBAXIN) 750 mg tablet Take 1 Tab by mouth four (4) times daily as needed for Muscle Spasm(s). , Normal, Disp-20 Tab, R-0         CONTINUE these medications which have NOT CHANGED    Details   rosuvastatin (CRESTOR) 5 mg tablet TAKE 1 TABLET BY MOUTH EVERY DAY AT NIGHT, Normal, Disp-30 Tab, R-3      promethazine (PHENERGAN) 25 mg tablet TAKE 1 TAB BY MOUTH EVERY SIX (6) HOURS AS NEEDED FOR NAUSEA., Normal, Disp-20 Tab,R-3      ergocalciferol (ERGOCALCIFEROL) 1,250 mcg (50,000 unit) capsule TAKE 1 CAP BY MOUTH EVERY SEVEN (7) DAYS. INDICATIONS: VITAMIN D DEFICIENCY, Normal, Disp-4 Cap,R-3      amLODIPine (NORVASC) 10 mg tablet Take 1 Tab by mouth daily. , Normal, Disp-90 Tab,R-3      triamterene-hydroCHLOROthiazide (MAXZIDE) 37.5-25 mg per tablet TAKE 1 TABLET BY MOUTH EVERY DAY IN THE MORNING, Normal, Disp-90 Tab,R-1      Symbicort 160-4.5 mcg/actuation HFAA TAKE 2 PUFFS BY INHALATION TWO (2) TIMES A DAY. DX: J45.40 INDICATIONS: CONTROLLER MEDICATION FOR ASTHMA, Normal, Disp-10.2 Inhaler,R-5      SITagliptin-metFORMIN (Janumet) 50-1,000 mg per tablet Take 1 Tab by mouth two (2) times daily (with meals). Dx: E11.42, Normal, Disp-180 Tab,R-3      albuterol (PROVENTIL VENTOLIN) 2.5 mg /3 mL (0.083 %) nebu 3 mL by Nebulization route three (3) times daily as needed for Wheezing.  Dx: J45.41, Normal, Disp-60 Nebule, R-5      fluticasone propionate (FLONASE) 50 mcg/actuation nasal spray INHALE 2 SPRAYS IN EACH NOSTRIL ONCE A DAY. Indications: inflammation of the nose due to an allergy, Normal, Disp-1 Bottle, R-11      Nebulizer & Compressor machine With accessories. Use as instructed 1-3 times daily as needed. Dx:J45.41, Print, Disp-1 Each, R-0      glucose blood VI test strips (BLOOD GLUCOSE TEST) strip Use to test blood sugar once a day. Dx: E11.42, Normal, Disp-90 Strip, R-3      aspirin delayed-release 81 mg tablet Take  by mouth daily. , Historical Med      lancets misc Test blood sugar once a day, Normal, Disp-30 Each, R-11      alcohol swabs (ALCOHOL PREP PADS) padm Use to test blood sugar once a day, Normal, Disp-40 Pad, R-11         STOP taking these medications       cyclobenzaprine (FLEXERIL) 5 mg tablet Comments:   Reason for Stoppin.   Follow-up Information     Follow up With Specialties Details Why Contact Info    Brijesh Styles MD Internal Medicine In 2 days As needed [address] [place] [address]  [phone]      [facility] EMERGENCY DEPT Emergency Medicine  If symptoms worsen [address] [facility] [address]   [address] [address] [phone]        3. Return to ED if worse     Diagnosis     Clinical Impression:   1. Acute pain of left shoulder    2. Arthritis, shoulder region        Attestations:    Roby Russell MD    Please note that this dictation was completed with [employer], the computer voice recognition software. Quite often unanticipated grammatical, syntax, homophones, and other interpretive errors are inadvertently transcribed by the computer software. Please disregard these errors. Please excuse any errors that have escaped final proofreading. Thank you.

## 2021-01-08 ENCOUNTER — VIRTUAL VISIT (OUTPATIENT)
Dept: INTERNAL MEDICINE CLINIC | Age: 61
End: 2021-01-08
Payer: COMMERCIAL

## 2021-01-08 DIAGNOSIS — G47.62 NOCTURNAL LEG CRAMPS: Primary | ICD-10-CM

## 2021-01-08 DIAGNOSIS — E11.9 TYPE 2 DIABETES MELLITUS WITHOUT COMPLICATION, WITHOUT LONG-TERM CURRENT USE OF INSULIN (HCC): ICD-10-CM

## 2021-01-08 DIAGNOSIS — E11.9 TYPE 2 DIABETES MELLITUS WITHOUT COMPLICATION, WITHOUT LONG-TERM CURRENT USE OF INSULIN (HCC): Primary | ICD-10-CM

## 2021-01-08 PROCEDURE — 99212 OFFICE O/P EST SF 10 MIN: CPT | Performed by: NURSE PRACTITIONER

## 2021-01-08 RX ORDER — INSULIN PUMP SYRINGE, 3 ML
EACH MISCELLANEOUS
Qty: 1 KIT | Refills: 0 | Status: SHIPPED | OUTPATIENT
Start: 2021-01-08 | End: 2021-02-22

## 2021-01-08 RX ORDER — IBUPROFEN 800 MG/1
800 TABLET ORAL
COMMUNITY
Start: 2020-07-29 | End: 2021-02-22

## 2021-01-08 RX ORDER — FAMOTIDINE 20 MG/1
TABLET, FILM COATED ORAL
COMMUNITY
Start: 2021-01-03 | End: 2021-02-22 | Stop reason: ALTCHOICE

## 2021-01-08 RX ORDER — METHYLPREDNISOLONE 4 MG/1
TABLET ORAL
COMMUNITY
Start: 2020-09-15 | End: 2021-02-22

## 2021-01-08 RX ORDER — IBUPROFEN 200 MG
CAPSULE ORAL
Qty: 100 STRIP | Refills: 3 | Status: SHIPPED | OUTPATIENT
Start: 2021-01-08 | End: 2021-02-22

## 2021-01-08 RX ORDER — CYCLOBENZAPRINE HCL 5 MG
TABLET ORAL
COMMUNITY
Start: 2020-10-22 | End: 2021-08-14

## 2021-01-08 RX ORDER — LANCETS
EACH MISCELLANEOUS
Qty: 100 EACH | Refills: 3 | Status: SHIPPED | OUTPATIENT
Start: 2021-01-08 | End: 2021-02-22

## 2021-01-08 NOTE — PATIENT INSTRUCTIONS
Nighttime Leg Cramps: Care Instructions Your Care Instructions Nighttime leg cramps happen when a leg muscle tightens up suddenly. This most often happens in the calf. But cramps in the thigh or foot are also common. Cramps often occur just as you fall asleep or wake up. Leg cramps can be painful. They can last a few seconds to a few minutes. Though they are common, experts don't know exactly what causes them. To treat muscle cramps, you can stretch and massage the muscle. If cramps keep coming back, your doctor may prescribe medicine that relaxes your muscles. Follow-up care is a key part of your treatment and safety. Be sure to make and go to all appointments, and call your doctor if you are having problems. It's also a good idea to know your test results and keep a list of the medicines you take. How can you care for yourself at home? · To stop a leg cramp, sit down and straighten your leg as you bend your foot up toward your knee. It may help to place a rolled towel under the ball of your foot and, while you hold the towel at both ends, gently pull the towel toward you while you keep your knee straight. This stretches the calf muscles. The cramp usually goes away after a few minutes. · Take a warm shower or bath to relax the muscle. Some people find that a heating pad placed on the muscle can also help. Others get relief by rubbing the calf with an ice pack. · Stretch your muscles every day, especially before and after exercise and at bedtime. Regular stretching can relax your muscles and may prevent cramps. · Do not suddenly increase the amount of exercise you get. Increase your exercise a little each week. · If your doctor prescribes medicine, take it exactly as prescribed. Call your doctor if you think you are having a problem with your medicine. · Ask your doctor if you can take an over-the-counter pain medicine, such as acetaminophen (Tylenol), ibuprofen (Advil, Motrin), or naproxen (Aleve). Be safe with medicines. Read and follow all instructions on the label. · Drink plenty of fluids. If you have kidney, heart, or liver disease and have to limit fluids, talk with your doctor before you increase the amount of fluids you drink. When should you call for help? Watch closely for changes in your health, and be sure to contact your doctor if: 
  · You often have muscle cramps that do not go away after home treatment.  
  · Your muscle cramps often wake you up at night.  
  · You do not get better as expected. Where can you learn more? Go to http://www.gray.com/ Enter S910 in the search box to learn more about \"Nighttime Leg Cramps: Care Instructions. \" Current as of: November 20, 2019               Content Version: 12.6 © 0548-1923 [employer]. Care instructions adapted under license by [employer] (which disclaims liability or warranty for this information). If you have questions about a medical condition or this instruction, always ask your healthcare professional. [address] any warranty or liability for your use of this information.

## 2021-01-08 NOTE — PROGRESS NOTES
Rodrigue Ayon is a 64 y.o. female, evaluated via audio-only technology on 1/8/2021 for Claudication (Left leg, mostly at night )  . Assessment & Plan:   Diagnoses and all orders for this visit:    1. Nocturnal leg cramps  -     METABOLIC PANEL, BASIC; Future  -     MAGNESIUM; Future  Recommend start magnesium 400mg qhs, make sure drinking enough water  Don't suspect ADR from Janumet, pt to  meter and start checking BS reguarlly  Check for E/I, if normal, consider may be ADR from crestor    2. Type 2 diabetes mellitus without complication, without long-term current use of insulin ([facility] [address]  See above      Lab Results   Component Value Date/Time    Hemoglobin A1c 6.8 (H) 08/05/2020 01:00 PM    Hemoglobin A1c (POC) 7.1 10/20/2020 09:45 AM                12  Subjective:     Pt c/o leg cramps, thinks from her Janumet. Has cut out soda at night but still having them. Has been occurring for months  States 8 mos ago told by ortho she had torn ligament, saw Dr. Óscar Rodriguez on October 20, had injection in her knee- didn't help at all. Went to the ED for the same thing on 12-29- note states was seen for left shoulder, given toradol and robaxin. Cramps are around her left ankle and calf. Denies any calf swelling. Hasn't been checking her blood sugar since meter broken. Has had low potassium in the past.   States her leg cramps started prior to starting crestor over the summer. Prior to Admission medications    Medication Sig Start Date End Date Taking? Authorizing Provider   cyclobenzaprine (FLEXERIL) 5 mg tablet TAKE 1 TO 2 TABLETS BY MOUTH EVERY DAY AT BEDTIME 10/22/20  Yes Provider, Historical   famotidine (PEPCID) 20 mg tablet  1/3/21  Yes Provider, Historical   methylPREDNISolone (MEDROL DOSEPACK) 4 mg tablet Take as directed on package instructions. 9/15/20  Yes Provider, Historical   ibuprofen (MOTRIN) 800 mg tablet Take 800 mg by mouth two (2) times daily as needed.  7/29/20  Yes Provider, Historical ketorolac (TORADOL) 10 mg tablet Take 1 Tab by mouth every six (6) hours as needed for Pain. 12/29/20  Yes Milana Wetzel MD   rosuvastatin (CRESTOR) 5 mg tablet TAKE 1 TABLET BY MOUTH EVERY DAY AT NIGHT 12/5/20  Yes Molly Abrams MD   promethazine (PHENERGAN) 25 mg tablet TAKE 1 TAB BY MOUTH EVERY SIX (6) HOURS AS NEEDED FOR NAUSEA. 10/20/20  Yes Molly Abrams MD   ergocalciferol (ERGOCALCIFEROL) 1,250 mcg (50,000 unit) capsule TAKE 1 CAP BY MOUTH EVERY SEVEN (7) DAYS. INDICATIONS: VITAMIN D DEFICIENCY 10/8/20  Yes Paulina Martin MD   amLODIPine (NORVASC) 10 mg tablet Take 1 Tab by mouth daily. 9/16/20  Yes Molly Abrams MD   triamterene-hydroCHLOROthiazide (MAXZIDE) 37.5-25 mg per tablet TAKE 1 TABLET BY MOUTH EVERY DAY IN THE MORNING 8/26/20  Yes Molly Abrams MD   Symbicort 160-4.5 mcg/actuation HFAA TAKE 2 PUFFS BY INHALATION TWO (2) TIMES A DAY. DX: J45.40 INDICATIONS: CONTROLLER MEDICATION FOR ASTHMA 8/25/20  Yes Paulina Martin MD   SITagliptin-metFORMIN (Janumet) 50-1,000 mg per tablet Take 1 Tab by mouth two (2) times daily (with meals). Dx: E11.42 7/31/20  Yes Paulina Martin MD   albuterol (PROVENTIL VENTOLIN) 2.5 mg /3 mL (0.083 %) nebu 3 mL by Nebulization route three (3) times daily as needed for Wheezing. Dx: J45.41 3/24/20  Yes Paulina Martin MD   fluticasone propionate (FLONASE) 50 mcg/actuation nasal spray INHALE 2 SPRAYS IN EACH NOSTRIL ONCE A DAY. Indications: inflammation of the nose due to an allergy 1/9/20  Yes Molly Abrams MD   Nebulizer & Compressor machine With accessories. Use as instructed 1-3 times daily as needed. Dx:J45.41 1/9/20  Yes Molly Abrams MD   aspirin delayed-release 81 mg tablet Take  by mouth daily. Yes Provider, Historical   alcohol swabs (ALCOHOL PREP PADS) padm Use to test blood sugar once a day 3/25/19  Yes Anahi Martin MD   methocarbamoL (ROBAXIN) 750 mg tablet Take 1 Tab by mouth four (4) times daily as needed for Muscle Spasm(s).  12/29/20 Harry Garcia MD   glucose blood VI test strips (BLOOD GLUCOSE TEST) strip Use to test blood sugar once a day. Dx: E11.42 4/15/19   Sugar Osorio MD   lancets misc Test blood sugar once a day 3/25/19   Sugar Osorio MD         ROS see hpi  This visit was completed virtually using [email or web address] me       No flowsheet data found. Mariano Ferro, who was evaluated through a patient-initiated, synchronous (real-time) audio only encounter, and/or her healthcare decision maker, is aware that it is a billable service, with coverage as determined by her insurance carrier. She provided verbal consent to proceed: Yes. She has not had a related appointment within my department in the past 7 days or scheduled within the next 24 hours.       Total Time: minutes: 5-10 minutes    Luke Wall NP

## 2021-01-08 NOTE — PROGRESS NOTES
Rishabh Womack  Identified pt with two pt identifiers(name and ). Chief Complaint   Patient presents with    Claudication     Left leg, mostly at night        Reviewed record In preparation for visit and have obtained necessary documentation. 1. Have you been to the ER, urgent care clinic or hospitalized since your last visit? Yes, [facility] about a week ago for left arm pain. 2. Have you seen or consulted any other health care providers outside of the [address] [place] [address] since your last visit? Include any pap smears or colon screening. No    Patient does not have an advance directive. Vitals reviewed with provider. Health Maintenance reviewed:     Health Maintenance Due   Topic    Pneumococcal 0-64 years (1 of 1 - PPSV23)    Shingrix Vaccine Age 50> (1 of 2)        Wt Readings from Last 3 Encounters:   20 170 lb 6.7 oz (77.3 kg)   10/20/20 174 lb 3.2 oz (79 kg)   20 170 lb 3.1 oz (77.2 kg)      Temp Readings from Last 3 Encounters:   20 98.5 °F (36.9 °C)   10/20/20 98.3 °F (36.8 °C) (Oral)   20 98.5 °F (36.9 °C)      BP Readings from Last 3 Encounters:   20 (!) 153/63   10/20/20 136/78   20 145/78      Pulse Readings from Last 3 Encounters:   20 89   10/20/20 92   20 100      There were no vitals filed for this visit. Learning Assessment:   :     Learning Assessment 3/25/2019   PRIMARY LEARNER Patient   PRIMARY LANGUAGE ENGLISH   LEARNER PREFERENCE PRIMARY READING   ANSWERED BY patient   RELATIONSHIP SELF        Depression Screening:   :     3 most recent PHQ Screens 10/20/2020   Little interest or pleasure in doing things Not at all   Feeling down, depressed, irritable, or hopeless Not at all   Total Score PHQ 2 0        Fall Risk Assessment:   :     Fall Risk Assessment, last 12 mths 3/25/2019   Able to walk? Yes   Fall in past 12 months?  No        Abuse Screening:   :     Abuse Screening Questionnaire 2020 3/25/2019   Do you ever feel afraid of your partner? N N   Are you in a relationship with someone who physically or mentally threatens you? N N   Is it safe for you to go home?  Y Y        ADL Screening:   :     ADL Assessment 3/25/2019   Feeding yourself No Help Needed   Getting from bed to chair No Help Needed   Getting dressed No Help Needed   Bathing or showering No Help Needed   Walk across the room (includes cane/walker) No Help Needed   Using the telphone No Help Needed   Taking your medications No Help Needed   Preparing meals No Help Needed   Managing money (expenses/bills) No Help Needed   Moderately strenuous housework (laundry) No Help Needed   Shopping for personal items (toiletries/medicines) No Help Needed   Shopping for groceries No Help Needed   Driving No Help Needed   Climbing a flight of stairs No Help Needed   Getting to places beyond walking distances No Help Needed

## 2021-01-08 NOTE — TELEPHONE ENCOUNTER
Per Dr Nataly Smith and supplies pended to PCP. Patient has virtual appt with NP today regarding leg cramps.

## 2021-01-08 NOTE — TELEPHONE ENCOUNTER
----- Message from Maryjane Velázquez sent at 1/8/2021  7:31 AM EST -----  Regarding: Dr. Rosa Bartlett first and last name: Patient       Reason for call: Diabetic meter stop working       Callback required yes/no and why: Yes to speak with nurse       Best contact number(s): [phone]      Details to clarify the request: Patient stated that her diabetic meter stop working and that she stop taking her medication at night due to leg cramps. Pt would like a call back to discuss getting a prescription for the meter and her concern about what to do about leg cramps.  Pt uses the [address] [phone]      [employer]

## 2021-01-13 DIAGNOSIS — J45.41 MODERATE PERSISTENT ASTHMA WITH EXACERBATION: ICD-10-CM

## 2021-01-13 RX ORDER — ALBUTEROL SULFATE 0.83 MG/ML
2.5 SOLUTION RESPIRATORY (INHALATION)
Qty: 60 NEBULE | Refills: 5 | Status: SHIPPED | OUTPATIENT
Start: 2021-01-13

## 2021-01-13 NOTE — TELEPHONE ENCOUNTER
[facility] on file sent a fax requesting a refill of   Requested Prescriptions     Pending Prescriptions Disp Refills    albuterol (PROVENTIL VENTOLIN) 2.5 mg /3 mL (0.083 %) nebu 60 Nebule 5     Sig: 3 mL by Nebulization route three (3) times daily as needed for Wheezing.  Dx: Z05.93

## 2021-01-13 NOTE — TELEPHONE ENCOUNTER
PCP: Rodolfo Yusuf MD     Last appt: 1/8/2021   Future Appointments   Date Time Provider Terrence Ruth   2/22/2021  9:10 AM Rodolfo Yusuf MD [facility] AMB        Requested Prescriptions     Pending Prescriptions Disp Refills    albuterol (PROVENTIL VENTOLIN) 2.5 mg /3 mL (0.083 %) nebu 60 Nebule 5     Sig: 3 mL by Nebulization route three (3) times daily as needed for Wheezing.  Dx: N43.37

## 2021-01-22 DIAGNOSIS — E55.9 VITAMIN D DEFICIENCY: ICD-10-CM

## 2021-01-22 RX ORDER — ERGOCALCIFEROL 1.25 MG/1
50000 CAPSULE ORAL
Qty: 4 CAP | Refills: 3 | Status: SHIPPED | OUTPATIENT
Start: 2021-01-22 | End: 2021-05-16

## 2021-02-01 ENCOUNTER — TELEPHONE (OUTPATIENT)
Dept: INTERNAL MEDICINE CLINIC | Age: 61
End: 2021-02-01

## 2021-02-01 NOTE — TELEPHONE ENCOUNTER
Pt has accepted a new job and they wanted to  Know which vaccines she has had, copy placed at  for pickup.

## 2021-02-08 ENCOUNTER — TELEPHONE (OUTPATIENT)
Dept: INTERNAL MEDICINE CLINIC | Age: 61
End: 2021-02-08

## 2021-02-08 DIAGNOSIS — Z20.6 HIV EXPOSURE: Primary | ICD-10-CM

## 2021-02-08 NOTE — TELEPHONE ENCOUNTER
Please see below per Catherine. Please submit updated lab orders as appropriate for printing/mailing to pt.       ----- Message from [facility] sent at 2/8/2021  8:05 AM EST -----  Regarding: Mario/telephone  Pt is requesting a new lab slip she lost the one she had. She is requesting to add a HIV test to the lab slip. Pts number is [phone].

## 2021-02-11 NOTE — TELEPHONE ENCOUNTER
Verified patients name and date of birth. Patient would like to have an HIV test. She recently found out that an old friend of hers from many years ago  and he had HIV. She has no symptoms but would like to be tested. Will  lab orders(with orders that Eric Fernandes NP placed) on this coming Monday.

## 2021-02-17 LAB
BUN SERPL-MCNC: 13 MG/DL (ref 8–27)
BUN/CREAT SERPL: 12 (ref 12–28)
CALCIUM SERPL-MCNC: 9.7 MG/DL (ref 8.7–10.3)
CHLORIDE SERPL-SCNC: 99 MMOL/L (ref 96–106)
CO2 SERPL-SCNC: 23 MMOL/L (ref 20–29)
CREAT SERPL-MCNC: 1.06 MG/DL (ref 0.57–1)
GLUCOSE SERPL-MCNC: 119 MG/DL (ref 65–99)
HIV 1+2 AB+HIV1 P24 AG SERPL QL IA: NON REACTIVE
MAGNESIUM SERPL-MCNC: 1.4 MG/DL (ref 1.6–2.3)
POTASSIUM SERPL-SCNC: 4.5 MMOL/L (ref 3.5–5.2)
SODIUM SERPL-SCNC: 141 MMOL/L (ref 134–144)

## 2021-02-18 NOTE — PROGRESS NOTES
Pls let pt know potassium and sodium are normal but magnesium is low.  Start magnesium 400mg qhs if she hasn't already and try to eat a diet rich in magnesium which includes spinach, nuts, seeds, whole grains, bananas, etc.

## 2021-02-22 ENCOUNTER — OFFICE VISIT (OUTPATIENT)
Dept: INTERNAL MEDICINE CLINIC | Age: 61
End: 2021-02-22
Payer: COMMERCIAL

## 2021-02-22 VITALS
WEIGHT: 176 LBS | HEART RATE: 82 BPM | RESPIRATION RATE: 14 BRPM | DIASTOLIC BLOOD PRESSURE: 78 MMHG | TEMPERATURE: 98.2 F | SYSTOLIC BLOOD PRESSURE: 123 MMHG | BODY MASS INDEX: 32.39 KG/M2 | HEIGHT: 62 IN | OXYGEN SATURATION: 97 %

## 2021-02-22 DIAGNOSIS — E11.9 TYPE 2 DIABETES MELLITUS WITHOUT COMPLICATION, WITHOUT LONG-TERM CURRENT USE OF INSULIN (HCC): Primary | ICD-10-CM

## 2021-02-22 DIAGNOSIS — H53.9 VISION CHANGES: ICD-10-CM

## 2021-02-22 DIAGNOSIS — I73.9 PERIPHERAL VASCULAR DISEASE (HCC): ICD-10-CM

## 2021-02-22 DIAGNOSIS — K21.9 GASTROESOPHAGEAL REFLUX DISEASE WITHOUT ESOPHAGITIS: ICD-10-CM

## 2021-02-22 DIAGNOSIS — J45.40 MODERATE PERSISTENT ASTHMA WITHOUT COMPLICATION: ICD-10-CM

## 2021-02-22 DIAGNOSIS — I10 ESSENTIAL HYPERTENSION: ICD-10-CM

## 2021-02-22 LAB — HBA1C MFR BLD HPLC: 6.7 %

## 2021-02-22 PROCEDURE — 99214 OFFICE O/P EST MOD 30 MIN: CPT | Performed by: INTERNAL MEDICINE

## 2021-02-22 PROCEDURE — 83036 HEMOGLOBIN GLYCOSYLATED A1C: CPT | Performed by: INTERNAL MEDICINE

## 2021-02-22 RX ORDER — OMEPRAZOLE 40 MG/1
40 CAPSULE, DELAYED RELEASE ORAL 2 TIMES DAILY
COMMUNITY
Start: 2021-02-01

## 2021-02-22 NOTE — PROGRESS NOTES
CC:   Chief Complaint   Patient presents with    Diabetes    Hypertension    Other     seeing spots, started 2/20/21       HISTORY OF PRESENT ILLNESS  Linn Aldana is a 64 y.o. female. Presents for 4 month follow up evaluation. She has type 2 DM, HTN, asthma, and allergic rhinitis.       Complains of seeing black spots in her vision last night so took no medications.  this morning. Concerned that she is on too many medications. Has only 1 Toradol tablet left; was prescribed through ED. Dr. Hair Paiz started her on omeprazole 40 mg BID for hoarseness; has helped a lot. Nocturnal leg cramps better; has not started Mg tabs yet. Denies CP, SOB, heart palpitations, or dizziness. Home BP monitoring: not done. She reports taking medications as instructed, no medication side effects noted.  Diet and Lifestyle: generally follows a low sodium diet, no formal exercise but active during the day.      Denies polyuria or polydipsia, no hypoglycemia.  Home glucose monitoring: is performed regularly.  FBS: 121-143. She reports medication compliance: compliant all of the time.  Medication side effects: none.  Diabetic diet compliance: compliant most of the time.  Lab review: A1c 6.7% today (2/22/21), was 7.1% on 10/20/20.  Eye exam: UTD.       Works in housekeeping at [employer]. Will be transferring to working at [employer] in a month. ROS  A complete review of systems was performed and is negative except for those mentioned in the HPI.     Patient Active Problem List   Diagnosis Code    Type 2 diabetes mellitus without complication, without long-term current use of insulin (HCC) E11.9    HTN (hypertension) I10    Arthritis M19.90    GERD (gastroesophageal reflux disease) K21.9    Vitamin D deficiency E55.9    Fibroadenoma of right breast D24.1    Moderate persistent asthma without complication X61.68    Adrenal nodule (HCC) E27.8    Allergic rhinitis due to allergen J30.9    Peripheral vascular disease (HCC) I73.9     Past Medical History:   Diagnosis Date    Arrhythmia     Arthritis     GENERALIZED TO JOINTS-WRISTS & KNEES.  Asthma     INHALERS    Asthma, chronic 10/8/2009    Diabetes ([facility] [address]     USES PILLS TO CONTROLL    GERD (gastroesophageal reflux disease)     Gout     Hypertension     CONTROLLED BY MEDS.  Nausea & vomiting     Obesity      Allergies   Allergen Reactions    Latex Rash     Severe rash    Ampicillin Anaphylaxis    Betadine Antiseptic Gauze Hives    Contrast Dye [Iodine] Hives and Swelling     Pt. Reports throat swelling      Other Medication Rash     Betadine soap/blisters    Fd And C Blue No.1 Anaphylaxis    Penicillins Hives       Current Outpatient Medications   Medication Sig Dispense Refill    omeprazole (PRILOSEC) 40 mg capsule Take 40 mg by mouth two (2) times a day.  triamterene-hydroCHLOROthiazide (MAXZIDE) 37.5-25 mg per tablet TAKE 1 TABLET BY MOUTH EVERY DAY IN THE MORNING 90 Tab 3    ergocalciferol (ERGOCALCIFEROL) 1,250 mcg (50,000 unit) capsule TAKE 1 CAP BY MOUTH EVERY SEVEN (7) DAYS. INDICATIONS: VITAMIN D DEFICIENCY 4 Cap 3    albuterol (PROVENTIL VENTOLIN) 2.5 mg /3 mL (0.083 %) nebu 3 mL by Nebulization route three (3) times daily as needed for Wheezing. Dx: J45.41 60 Nebule 5    Blood-Glucose Meter monitoring kit Test once a day 1 Kit 0    lancets misc Test blood sugar once a day 100 Each 3    glucose blood VI test strips (blood glucose test) strip Test blood sugar once a day 100 Strip 3    cyclobenzaprine (FLEXERIL) 5 mg tablet TAKE 1 TO 2 TABLETS BY MOUTH EVERY DAY AT BEDTIME      ketorolac (TORADOL) 10 mg tablet Take 1 Tab by mouth every six (6) hours as needed for Pain. 10 Tab 0    rosuvastatin (CRESTOR) 5 mg tablet TAKE 1 TABLET BY MOUTH EVERY DAY AT NIGHT 30 Tab 3    amLODIPine (NORVASC) 10 mg tablet Take 1 Tab by mouth daily.  90 Tab 3    Symbicort 160-4.5 mcg/actuation HFAA TAKE 2 PUFFS BY INHALATION TWO (2) TIMES A DAY. DX: J45.40 INDICATIONS: CONTROLLER MEDICATION FOR ASTHMA 10.2 Inhaler 5    SITagliptin-metFORMIN (Janumet) 50-1,000 mg per tablet Take 1 Tab by mouth two (2) times daily (with meals). Dx: E11.42 180 Tab 3    fluticasone propionate (FLONASE) 50 mcg/actuation nasal spray INHALE 2 SPRAYS IN EACH NOSTRIL ONCE A DAY. Indications: inflammation of the nose due to an allergy 1 Bottle 11    Nebulizer & Compressor machine With accessories. Use as instructed 1-3 times daily as needed. Dx:J45.41 1 Each 0    aspirin delayed-release 81 mg tablet Take  by mouth daily.  alcohol swabs (ALCOHOL PREP PADS) padm Use to test blood sugar once a day 40 Pad 11         PHYSICAL EXAM  Visit Vitals  /78 (BP 1 Location: Left upper arm, BP Patient Position: Sitting, BP Cuff Size: Large adult long)   Pulse 82   Temp 98.2 °F (36.8 °C) (Oral)   Resp 14   Ht 5' 2\" (1.575 m)   Wt 176 lb (79.8 kg)   LMP 06/19/2011   SpO2 97%   BMI 32.19 kg/m²   2 lb increase since last clinic visit    General: Obese, no distress. HEENT:  Head normocephalic/atraumatic, no scleral icterus  Neck: Supple. No carotid bruits, JVD, lymphadenopathy, or thyromegaly. Lungs:  Clear to ausculation bilaterally. Good air movement. Heart:  Regular rate and rhythm, normal S1 and S2, no murmur, gallop, or rub  Extremities: No clubbing, cyanosis, or edema. Neurological: Alert and oriented. Psychiatric: Normal mood and affect.  Behavior is normal.     Results for orders placed or performed in visit on 56/62/98   METABOLIC PANEL, BASIC   Result Value Ref Range    Glucose 119 (H) 65 - 99 mg/dL    BUN 13 8 - 27 mg/dL    Creatinine 1.06 (H) 0.57 - 1.00 mg/dL    GFR est non-AA 57 (L) >59 mL/min/1.73    GFR est AA 65 >59 mL/min/1.73    BUN/Creatinine ratio 12 12 - 28    Sodium 141 134 - 144 mmol/L    Potassium 4.5 3.5 - 5.2 mmol/L    Chloride 99 96 - 106 mmol/L    CO2 23 20 - 29 mmol/L    Calcium 9.7 8.7 - 10.3 mg/dL   MAGNESIUM Result Value Ref Range    Magnesium 1.4 (L) 1.6 - 2.3 mg/dL         ASSESSMENT AND PLAN    ICD-10-CM ICD-9-CM    1. Type 2 diabetes mellitus without complication, without long-term current use of insulin (HCC)  E11.9 250.00 AMB POC HEMOGLOBIN A1C   2. Essential hypertension  I10 401.9    3. Moderate persistent asthma without complication  Z51.81 467.09    4. Gastroesophageal reflux disease without esophagitis  K21.9 530.81    5. Peripheral vascular disease (HCC)  I73.9 443.9    6. Vision changes  H53.9 368.9      Diagnoses and all orders for this visit:    1. Type 2 diabetes mellitus without complication, without long-term current use of insulin (HCC)  Controlled. A1c 6.7% today. Continue Janumet. -     AMB POC HEMOGLOBIN A1C    2. Essential hypertension  Controlled at 136/78. Continue triamterene-HCTZ and amlodipine. Plan to change amlodipine to ACE-I at next clinic visit. 3. Moderate persistent asthma without complication  Continue Symbicort inhaler. 4. Gastroesophageal reflux disease without esophagitis  Controlled on omeprazole. GERD was determined to be the cause of her voice hoarseness and weakness in the evenings. 5. Peripheral vascular disease (HCC)  Stable on ASA and statin. 6. Vision changes  If occurs again, recommended she schedule appointment with her eye doctor. Reviewed medications. Stop Toradol. Follow-up and Dispositions    · Return in about 4 months (around 6/22/2021), or if symptoms worsen or fail to improve, for DM, HTN, asthma. I have discussed the diagnosis with the patient and the intended plan as seen in the above orders. Patient is in agreement. The patient has received an after-visit summary and questions were answered concerning future plans. I have discussed medication side effects and warnings with the patient as well.

## 2021-02-22 NOTE — PROGRESS NOTES
Identified pt with two pt identifiers. Reviewed record in preparation for visit and have obtained necessary documentation. All patient medications has been reviewed. Chief Complaint   Patient presents with    Diabetes    Hypertension    Other     seeing spots, started 2/20/21     Additional information about chief complaint:    Visit Vitals  /78 (BP 1 Location: Left upper arm, BP Patient Position: Sitting, BP Cuff Size: Large adult long)   Pulse 82   Temp 98.2 °F (36.8 °C) (Oral)   Resp 14   Ht 5' 2\" (1.575 m)   Wt 176 lb (79.8 kg)   SpO2 97%   BMI 32.19 kg/m²       Health Maintenance Due   Topic    COVID-19 Vaccine (1 of 2)       1. Have you been to the ER, urgent care clinic since your last visit? Hospitalized since your last visit? NO     2. Have you seen or consulted any other health care providers outside of the [address] [place] [address] since your last visit? Include any pap smears or colon screening.   NO   bdg

## 2021-03-02 ENCOUNTER — HOSPITAL ENCOUNTER (EMERGENCY)
Age: 61
Discharge: HOME | End: 2021-03-02
Attending: EMERGENCY MEDICINE | Admitting: EMERGENCY MEDICINE
Payer: COMMERCIAL

## 2021-03-02 VITALS
TEMPERATURE: 97 F | RESPIRATION RATE: 16 BRPM | DIASTOLIC BLOOD PRESSURE: 89 MMHG | SYSTOLIC BLOOD PRESSURE: 148 MMHG | OXYGEN SATURATION: 99 % | HEART RATE: 92 BPM

## 2021-03-02 DIAGNOSIS — H61.23 BILATERAL IMPACTED CERUMEN: Primary | ICD-10-CM

## 2021-03-02 PROCEDURE — 99281 EMR DPT VST MAYX REQ PHY/QHP: CPT

## 2021-03-02 PROCEDURE — 99282 EMERGENCY DEPT VISIT SF MDM: CPT

## 2021-03-02 PROCEDURE — 76010010392 HC REMOVAL IMPACTED WAX IRRIGATION/LVG UNI

## 2021-03-02 NOTE — ED PROVIDER NOTES
HPI patient is a 57-year-old female with past medical history significant for type 2 diabetes, GERD, gout, hypertension, obesity, arthritis and asthma who presents to the ED reporting her ears feeling plugged and hoarse voice. She was evaluated by an ENT February 2 and prescribed Prilosec after having her throat evaluated. She states her ears continue to feel plugged relieved by her self usage of Q-tips. Denies any ear drainage or bleeding; denies hearing loss. Denies any fever, difficulty breathing, difficulty swallowing, SOB or chest pain. Denies any nausea, vomiting or diarrhea. Pt. Reports taking Prilosec for the last 2 weeks without relief of symptoms. Past Medical History:   Diagnosis Date    Arrhythmia     Arthritis     GENERALIZED TO JOINTS-WRISTS & KNEES.  Asthma     INHALERS    Asthma, chronic 10/8/2009    Diabetes ([address]     USES PILLS TO CONTROLL    GERD (gastroesophageal reflux disease)     Gout     Hypertension     CONTROLLED BY MEDS.  Nausea & vomiting     Obesity        Past Surgical History:   Procedure Laterality Date    COLONOSCOPY N/A 5/31/2019    COLONOSCOPY performed by Luis E Abdi MD at [facility] ENDOSCOPY. 6 mm tubular adenoma in sigmoid polyp.  REpeat in 6 months due to poor prep    COLONOSCOPY N/A 2/21/2020    COLONOSCOPY performed by Luis E Abdi MD at [facility] ENDOSCOPY. 2 tubular adenomas, diverticulosis, internal hemorrhoids    HX BREAST BIOPSY Right 2018    benign    HX CHOLECYSTECTOMY      HX HEART CATHETERIZATION  98,2011    CARDIAC CATH X2    HX HEENT  1961    CLEFT PALATE REPAIR.    HX HYSTERECTOMY  2011 JULY 18    HX ORTHOPAEDIC      carpal tunnel, trigger finger, hand surgery left arm    HX OTHER SURGICAL      CLeft palate repair, piloneidal cyst, hand surgery,     HX TUBAL LIGATION  1983         Family History:   Problem Relation Age of Onset   [address] [facility] Asthma Mother     Hypertension Mother     Diabetes Father     Heart Disease Father         MI   [address] [facility] Stroke Brother         CEREBRAL ANEURYSM    Heart Attack Sister     Hypertension Sister     No Known Problems Sister        Social History     Socioeconomic History    Marital status:      Spouse name: Not on file    Number of children: Not on file    Years of education: Not on file    Highest education level: Not on file   Occupational History    Not on file   Social Needs    Financial resource strain: Not on file    Food insecurity     Worry: Not on file     Inability: Not on file    Transportation needs     Medical: Not on file     Non-medical: Not on file   Tobacco Use    Smoking status: Former Smoker     Years: 10.00     Quit date: 7/11/2010     Years since quitting: 10.6    Smokeless tobacco: Never Used   Substance and Sexual Activity    Alcohol use: No    Drug use: No    Sexual activity: Yes     Partners: Male     Birth control/protection: None   Lifestyle    Physical activity     Days per week: Not on file     Minutes per session: Not on file    Stress: Not on file   Relationships    Social connections     Talks on phone: Not on file     Gets together: Not on file     Attends [employer] service: Not on file     Active member of club or organization: Not on file     Attends meetings of clubs or organizations: Not on file     Relationship status: Not on file    Intimate partner violence     Fear of current or ex partner: Not on file     Emotionally abused: Not on file     Physically abused: Not on file     Forced sexual activity: Not on file   Other Topics Concern    Not on file   Social History Narrative    Not on file         ALLERGIES: Latex, Ampicillin, Betadine antiseptic gauze, Contrast dye [iodine], Other medication, Fd and c blue no.1, and Penicillins    Review of Systems   Constitutional: Negative for activity change, appetite change, fever and unexpected weight change. HENT: Positive for ear pain.  Negative for congestion, ear discharge, sore throat and trouble swallowing. Eyes: Negative for visual disturbance. Respiratory: Negative for cough and shortness of breath. Cardiovascular: Negative for chest pain, palpitations and leg swelling. Gastrointestinal: Negative for abdominal pain, diarrhea, nausea and vomiting. Genitourinary: Negative for dysuria. Musculoskeletal: Negative for back pain and neck pain. Skin: Negative for rash. Neurological: Negative for dizziness, light-headedness and headaches. All other systems reviewed and are negative. Vitals:    03/02/21 1047   BP: (!) 148/89   Pulse: 92   Resp: 16   Temp: 97 °F (36.1 °C)   SpO2: 99%            Physical Exam  Vitals signs and nursing note reviewed. Constitutional:       General: She is not in acute distress. Appearance: Normal appearance. She is obese. She is not ill-appearing, toxic-appearing or diaphoretic. Comments: Female; former smoker   HENT:      Right Ear: There is impacted cerumen. Left Ear: There is impacted cerumen. Neck:      Musculoskeletal: Normal range of motion and neck supple. Cardiovascular:      Rate and Rhythm: Normal rate and regular rhythm. Pulmonary:      Effort: Pulmonary effort is normal.      Breath sounds: Normal breath sounds. Neurological:      Mental Status: She is alert. MDM       Procedures    Attempted to flush cerumen from patient's ears; she did not tolerate the procedure well. Recommend that she follow-up with her ENT Dr. Eli Quinones for further ear cleaning. She was instructed not to put Q-tips in her ears. Patient's results and plan of care have been reviewed with her. Patient has verbally conveyed her understanding and agreement of her signs, symptoms, diagnosis, treatment and prognosis and additionally agrees to follow up as recommended or return to the Emergency Room should his condition change prior to follow-up.   Discharge instructions have also been provided to the patient with some educational information regarding his diagnosis as well a list of reasons why he would want to return to the ER prior to his follow-up appointment should his condition change. Walter Ackerman, CALVIN

## 2021-03-02 NOTE — ED TRIAGE NOTES
Pt c/o feeling like something is in her left ear and feels like a \"cold\" in her right ear, feels like drainage but nothing comes out, seen by ENT on Feb 1, pt c/o pain in her throat from the ears

## 2021-03-02 NOTE — ED NOTES
Discharge and follow up discussed by the provider, patient verbalized understanding. Patient ambulatory to the exit with steady gait. VSS and GCS 15 at time of discharge.

## 2021-03-03 ENCOUNTER — PATIENT OUTREACH (OUTPATIENT)
Dept: CASE MANAGEMENT | Age: 61
End: 2021-03-03

## 2021-03-04 ENCOUNTER — PATIENT OUTREACH (OUTPATIENT)
Dept: CASE MANAGEMENT | Age: 61
End: 2021-03-04

## 2021-03-18 DIAGNOSIS — J30.89 NON-SEASONAL ALLERGIC RHINITIS DUE TO OTHER ALLERGIC TRIGGER: ICD-10-CM

## 2021-03-18 DIAGNOSIS — J45.40 MODERATE PERSISTENT ASTHMA WITHOUT COMPLICATION: ICD-10-CM

## 2021-03-18 RX ORDER — FLUTICASONE PROPIONATE 50 MCG
SPRAY, SUSPENSION (ML) NASAL
Qty: 1 BOTTLE | Refills: 11 | Status: SHIPPED | OUTPATIENT
Start: 2021-03-18 | End: 2022-03-23

## 2021-03-18 RX ORDER — BUDESONIDE AND FORMOTEROL FUMARATE DIHYDRATE 160; 4.5 UG/1; UG/1
2 AEROSOL RESPIRATORY (INHALATION) 2 TIMES DAILY
Qty: 10.2 INHALER | Refills: 5 | Status: SHIPPED | OUTPATIENT
Start: 2021-03-18 | End: 2022-01-08

## 2021-03-18 NOTE — TELEPHONE ENCOUNTER
----- Message from [facility] sent at 3/18/2021  8:59 AM EDT -----  Regarding: Dr. Drew Payne  Medication Refill    Caller (if not patient): Pt      Relationship of caller (if not patient): Pt      Best contact number(s): [phone]      Name of medication and dosage if known: Symbicort and Flonase      Is patient out of this medication (yes/no):  No, almost      Pharm[facility] name: [facility] listed in chart? (yes/no): Yes  Pharmacy phone number: [phone]      Details to clarify the request: Has not gotten call from pharmacy regarding these medications      [facility]

## 2021-03-18 NOTE — TELEPHONE ENCOUNTER
PCP: Karon Cervantes MD     Last appt: 2/22/2021   No future appointments. Requested Prescriptions     Pending Prescriptions Disp Refills    fluticasone propionate (FLONASE) 50 mcg/actuation nasal spray 1 Bottle 11     Sig: INHALE 2 SPRAYS IN EACH NOSTRIL ONCE A DAY. Indications: inflammation of the nose due to an allergy    budesonide-formoteroL (Symbicort) 160-4.5 mcg/actuation HFAA 10.2 Inhaler 5     Sig: Take 2 Puffs by inhalation two (2) times a day.

## 2021-03-30 ENCOUNTER — TELEPHONE (OUTPATIENT)
Dept: INTERNAL MEDICINE CLINIC | Age: 61
End: 2021-03-30

## 2021-03-30 NOTE — TELEPHONE ENCOUNTER
Patient was seen by a speech therapist and he felt something on the right side of her throat. Patient said it is painful and sends pain to her ears. Therapist is wanting a test done to see what is on her throat. Give patient a call back if needed.     [facility]# [phone]

## 2021-04-07 ENCOUNTER — OFFICE VISIT (OUTPATIENT)
Dept: INTERNAL MEDICINE CLINIC | Age: 61
End: 2021-04-07
Payer: COMMERCIAL

## 2021-04-07 VITALS
DIASTOLIC BLOOD PRESSURE: 70 MMHG | BODY MASS INDEX: 32.02 KG/M2 | HEIGHT: 62 IN | OXYGEN SATURATION: 98 % | SYSTOLIC BLOOD PRESSURE: 115 MMHG | WEIGHT: 174 LBS | TEMPERATURE: 98.7 F | HEART RATE: 85 BPM | RESPIRATION RATE: 14 BRPM

## 2021-04-07 DIAGNOSIS — R22.1 MASS OF RIGHT SIDE OF NECK: Primary | ICD-10-CM

## 2021-04-07 DIAGNOSIS — R49.0 HOARSENESS: ICD-10-CM

## 2021-04-07 PROCEDURE — 99213 OFFICE O/P EST LOW 20 MIN: CPT | Performed by: PHYSICIAN ASSISTANT

## 2021-04-07 NOTE — PROGRESS NOTES
Identified pt with two pt identifiers. Reviewed record in preparation for visit and have obtained necessary documentation. All patient medications has been reviewed. Chief Complaint   Patient presents with    Neck Pain     painful nodule on right side of throat      Additional information about chief complaint: Wants compression socks or insoles for work     Visit Vitals  /70 (BP 1 Location: Left upper arm, BP Patient Position: Sitting, BP Cuff Size: Large adult)   Pulse 85   Temp 98.7 °F (37.1 °C) (Oral)   Resp 14   Ht 5' 2\" (1.575 m)   Wt 174 lb (78.9 kg)   SpO2 98%   BMI 31.83 kg/m²       Health Maintenance Due   Topic    COVID-19 Vaccine (1)       1. Have you been to the ER, urgent care clinic since your last visit? [facility] since your last visit? [facility]   2. Have you seen or consulted any other health care providers outside of the [address] [place] [address] since your last visit? Include any pap smears or colon screening.   ENT Dr. Maira Mchugh on [facility] [address] Arango Favors

## 2021-04-07 NOTE — PROGRESS NOTES
Lois Avila is a 64y.o. year old female seen in clinic today for   Chief Complaint   Patient presents with    Neck Pain     painful nodule on right side of throat        she presents with R sided neck/throat nodule. Very painful per patient. Radiates to R ear. Found by speech therapist 3/31/21. Referred to speech therapist by Dr. Nata Ayala ENT. Originally referred to ENT due to hoarseness. ENT found no damage to vocal cords. Hoarseness gets worse throughout day. Sometimes feels like she is getting a sore throat by the end of the day, relieved with ibuprofen. . No fever. she specifically denies any CP, SOB, HA. Dizziness, fevers, chills, N/V/D, urinary symptoms or other bowel changes. Current Outpatient Medications on File Prior to Visit   Medication Sig Dispense Refill    fluticasone propionate (FLONASE) 50 mcg/actuation nasal spray INHALE 2 SPRAYS IN EACH NOSTRIL ONCE A DAY. Indications: inflammation of the nose due to an allergy 1 Bottle 11    budesonide-formoteroL (Symbicort) 160-4.5 mcg/actuation HFAA Take 2 Puffs by inhalation two (2) times a day. 10.2 Inhaler 5    omeprazole (PRILOSEC) 40 mg capsule Take 40 mg by mouth two (2) times a day.  triamterene-hydroCHLOROthiazide (MAXZIDE) 37.5-25 mg per tablet TAKE 1 TABLET BY MOUTH EVERY DAY IN THE MORNING 90 Tab 3    ergocalciferol (ERGOCALCIFEROL) 1,250 mcg (50,000 unit) capsule TAKE 1 CAP BY MOUTH EVERY SEVEN (7) DAYS. INDICATIONS: VITAMIN D DEFICIENCY 4 Cap 3    albuterol (PROVENTIL VENTOLIN) 2.5 mg /3 mL (0.083 %) nebu 3 mL by Nebulization route three (3) times daily as needed for Wheezing. Dx: J45.41 60 Nebule 5    rosuvastatin (CRESTOR) 5 mg tablet TAKE 1 TABLET BY MOUTH EVERY DAY AT NIGHT 30 Tab 3    amLODIPine (NORVASC) 10 mg tablet Take 1 Tab by mouth daily. 90 Tab 3    SITagliptin-metFORMIN (Janumet) 50-1,000 mg per tablet Take 1 Tab by mouth two (2) times daily (with meals).  Dx: E11.42 180 Tab 3    Nebulizer & Compressor machine With accessories. Use as instructed 1-3 times daily as needed. Dx:J45.41 1 Each 0    aspirin delayed-release 81 mg tablet Take  by mouth daily.  cyclobenzaprine (FLEXERIL) 5 mg tablet TAKE 1 TO 2 TABLETS BY MOUTH EVERY DAY AT BEDTIME       No current facility-administered medications on file prior to visit. Allergies   Allergen Reactions    Latex Rash     Severe rash    Ampicillin Anaphylaxis    Betadine Antiseptic Gauze Hives    Contrast Dye [Iodine] Hives and Swelling     Pt. Reports throat swelling      Other Medication Rash     Betadine soap/blisters    Fd And C Blue No.1 Anaphylaxis    Penicillins Hives     Past Medical History:   Diagnosis Date    Arrhythmia     Arthritis     GENERALIZED TO JOINTS-WRISTS & KNEES.  Asthma     INHALERS    Asthma, chronic 10/8/2009    Diabetes ([address]     USES PILLS TO CONTROLL    GERD (gastroesophageal reflux disease)     Gout     Hypertension     CONTROLLED BY MEDS.  Nausea & vomiting     Obesity       Past Surgical History:   Procedure Laterality Date    COLONOSCOPY N/A 5/31/2019    COLONOSCOPY performed by Moose Arita MD at [facility] ENDOSCOPY. 6 mm tubular adenoma in sigmoid polyp.  REpeat in 6 months due to poor prep    COLONOSCOPY N/A 2/21/2020    COLONOSCOPY performed by Moose Arita MD at [facility] ENDOSCOPY. 2 tubular adenomas, diverticulosis, internal hemorrhoids    HX BREAST BIOPSY Right 2018    benign    HX CHOLECYSTECTOMY      HX HEART CATHETERIZATION  98,2011    CARDIAC CATH X2    HX HEENT  1961    CLEFT PALATE REPAIR.    HX HYSTERECTOMY  2011 JULY 18    HX ORTHOPAEDIC      carpal tunnel, trigger finger, hand surgery left arm    HX OTHER SURGICAL      CLeft palate repair, piloneidal cyst, hand surgery,     HX TUBAL LIGATION  1983        Family History   Problem Relation Age of Onset   Ish Buchanan Asthma Mother     Hypertension Mother     Diabetes Father     Heart Disease Father         MI    Stroke Brother CEREBRAL ANEURYSM    Heart Attack Sister     Hypertension Sister     No Known Problems Sister         Social History     Socioeconomic History    Marital status:      Spouse name: Not on file    Number of children: Not on file    Years of education: Not on file    Highest education level: Not on file   Occupational History    Not on file   Social Needs    Financial resource strain: Not on file    Food insecurity     Worry: Not on file     Inability: Not on file    Transportation needs     Medical: Not on file     Non-medical: Not on file   Tobacco Use    Smoking status: Former Smoker     Years: 10.00     Quit date: 7/11/2010     Years since quitting: 10.7    Smokeless tobacco: Never Used   Substance and Sexual Activity    Alcohol use: No    Drug use: No    Sexual activity: Yes     Partners: Male     Birth control/protection: None   Lifestyle    Physical activity     Days per week: Not on file     Minutes per session: Not on file    Stress: Not on file   Relationships    Social connections     Talks on phone: Not on file     Gets together: Not on file     Attends Orthodox service: Not on file     Active member of club or organization: Not on file     Attends meetings of clubs or organizations: Not on file     Relationship status: Not on file    Intimate partner violence     Fear of current or ex partner: Not on file     Emotionally abused: Not on file     Physically abused: Not on file     Forced sexual activity: Not on file   Other Topics Concern    Not on file   Social History Narrative    Not on file           Visit Vitals  /70 (BP 1 Location: Left upper arm, BP Patient Position: Sitting, BP Cuff Size: Large adult)   Pulse 85   Temp 98.7 °F (37.1 °C) (Oral)   Resp 14   Ht 5' 2\" (1.575 m)   Wt 174 lb (78.9 kg)   LMP 06/19/2011   SpO2 98%   BMI 31.83 kg/m²       Review of Systems   Constitutional: Negative for chills, fever, malaise/fatigue and weight loss.    HENT: Positive for sore throat. Negative for ear pain and hearing loss. HOARSENESS +   Respiratory: Negative for cough and shortness of breath. Cardiovascular: Negative for chest pain and leg swelling. Gastrointestinal: Negative for abdominal pain, blood in stool, constipation, diarrhea, heartburn, melena, nausea and vomiting. Genitourinary: Negative for dysuria and hematuria. Musculoskeletal: Positive for neck pain (R anterior neck pain). Negative for back pain and myalgias. Skin: Negative for rash. Neurological: Negative for weakness and headaches. Psychiatric/Behavioral: Negative for depression. Physical Exam  Vitals signs and nursing note reviewed. Constitutional:       Appearance: Normal appearance. She is normal weight. HENT:      Head: Normocephalic and atraumatic. Right Ear: Tympanic membrane, ear canal and external ear normal.      Left Ear: Tympanic membrane, ear canal and external ear normal.      Nose: Nose normal.      Mouth/Throat:      Mouth: Mucous membranes are moist.      Pharynx: Oropharynx is clear. Eyes:      Conjunctiva/sclera: Conjunctivae normal.      Pupils: Pupils are equal, round, and reactive to light. Neck:      Musculoskeletal: Normal range of motion and neck supple. No neck rigidity. Cardiovascular:      Pulses: Normal pulses. Pulmonary:      Effort: Pulmonary effort is normal.      Breath sounds: No wheezing. Abdominal:      General: Abdomen is flat. Musculoskeletal: Normal range of motion. Skin:     General: Skin is warm and dry. Capillary Refill: Capillary refill takes less than 2 seconds. Neurological:      General: No focal deficit present. Mental Status: She is alert and oriented to person, place, and time. Psychiatric:         Mood and Affect: Mood normal.         Behavior: Behavior normal.         Thought Content: Thought content normal.           ASSESSMENT AND PLAN:  Diagnoses and all orders for this visit:    1.  Mass of right side of neck    2. Hoarseness    Had clear laryngoscopy by ENT. Speech therapy concerned by tender area on R anterior neck. Will order CT  Scan of soft tissue of neck to evaluate lymph nodes, throat, thyroid. I have discussed the diagnosis with the patient and the intended plan as seen in the above orders. Patient is in agreement. The patient has received an after-visit summary and questions were answered concerning future plans. I have discussed medication side effects and warnings with the patient as well.     Stephanie Roa PA-C

## 2021-04-07 NOTE — TELEPHONE ENCOUNTER
Pt was seen at [facility] speech, records obtained and requested ENT records. Pt has an appointment today.

## 2021-04-11 PROBLEM — E11.3293 MILD NONPROLIFERATIVE DIABETIC RETINOPATHY OF BOTH EYES WITHOUT MACULAR EDEMA ASSOCIATED WITH TYPE 2 DIABETES MELLITUS (HCC): Status: ACTIVE | Noted: 2021-04-11

## 2021-04-14 ENCOUNTER — HOSPITAL ENCOUNTER (OUTPATIENT)
Dept: CT IMAGING | Age: 61
Discharge: HOME | End: 2021-04-14
Attending: PHYSICIAN ASSISTANT
Payer: COMMERCIAL

## 2021-04-14 DIAGNOSIS — R22.1 MASS OF RIGHT SIDE OF NECK: ICD-10-CM

## 2021-04-14 PROCEDURE — 70490 CT SOFT TISSUE NECK W/O DYE: CPT

## 2021-04-20 ENCOUNTER — TELEPHONE (OUTPATIENT)
Dept: INTERNAL MEDICINE CLINIC | Age: 61
End: 2021-04-20

## 2021-04-20 NOTE — TELEPHONE ENCOUNTER
Dr. Liliane Romo office called and stated that the pt told them she was recently seen to go over the CT results.  They are requesting notes from that appt and CT results to be faxed to [phone]

## 2021-05-16 DIAGNOSIS — E55.9 VITAMIN D DEFICIENCY: ICD-10-CM

## 2021-05-16 RX ORDER — ERGOCALCIFEROL 1.25 MG/1
50000 CAPSULE ORAL
Qty: 4 CAP | Refills: 3 | Status: SHIPPED | OUTPATIENT
Start: 2021-05-16 | End: 2021-09-23

## 2021-06-14 ENCOUNTER — HOSPITAL ENCOUNTER (EMERGENCY)
Age: 61
Discharge: HOME | End: 2021-06-14
Attending: EMERGENCY MEDICINE
Payer: COMMERCIAL

## 2021-06-14 VITALS
RESPIRATION RATE: 16 BRPM | BODY MASS INDEX: 30.95 KG/M2 | HEART RATE: 98 BPM | TEMPERATURE: 98.3 F | SYSTOLIC BLOOD PRESSURE: 154 MMHG | HEIGHT: 62 IN | OXYGEN SATURATION: 94 % | WEIGHT: 168.21 LBS | DIASTOLIC BLOOD PRESSURE: 84 MMHG

## 2021-06-14 DIAGNOSIS — N39.0 URINARY TRACT INFECTION WITH HEMATURIA, SITE UNSPECIFIED: Primary | ICD-10-CM

## 2021-06-14 DIAGNOSIS — R31.9 URINARY TRACT INFECTION WITH HEMATURIA, SITE UNSPECIFIED: Primary | ICD-10-CM

## 2021-06-14 LAB
AMORPH CRY URNS QL MICRO: ABNORMAL
APPEARANCE UR: ABNORMAL
BACTERIA URNS QL MICRO: NEGATIVE /HPF
BILIRUB UR QL: NEGATIVE
COLOR UR: ABNORMAL
EPITH CASTS URNS QL MICRO: ABNORMAL /LPF
GLUCOSE UR STRIP.AUTO-MCNC: 250 MG/DL
HGB UR QL STRIP: ABNORMAL
HYALINE CASTS URNS QL MICRO: ABNORMAL /LPF (ref 0–5)
KETONES UR QL STRIP.AUTO: NEGATIVE MG/DL
LEUKOCYTE ESTERASE UR QL STRIP.AUTO: ABNORMAL
NITRITE UR QL STRIP.AUTO: NEGATIVE
PH UR STRIP: 5.5 [PH] (ref 5–8)
PROT UR STRIP-MCNC: 100 MG/DL
RBC #/AREA URNS HPF: ABNORMAL /HPF (ref 0–5)
SP GR UR REFRACTOMETRY: 1.02 (ref 1–1.03)
UA: UC IF INDICATED,UAUC: ABNORMAL
UROBILINOGEN UR QL STRIP.AUTO: 1 EU/DL (ref 0.2–1)
WBC URNS QL MICRO: >100 /HPF (ref 0–4)
YEAST URNS QL MICRO: PRESENT

## 2021-06-14 PROCEDURE — 74011250637 HC RX REV CODE- 250/637: Performed by: PHYSICIAN ASSISTANT

## 2021-06-14 PROCEDURE — 87086 URINE CULTURE/COLONY COUNT: CPT

## 2021-06-14 PROCEDURE — 81001 URINALYSIS AUTO W/SCOPE: CPT

## 2021-06-14 PROCEDURE — 99284 EMERGENCY DEPT VISIT MOD MDM: CPT

## 2021-06-14 RX ORDER — NITROFURANTOIN 25; 75 MG/1; MG/1
100 CAPSULE ORAL
Status: COMPLETED | OUTPATIENT
Start: 2021-06-14 | End: 2021-06-14

## 2021-06-14 RX ORDER — NITROFURANTOIN 25; 75 MG/1; MG/1
100 CAPSULE ORAL 2 TIMES DAILY
Qty: 10 CAPSULE | Refills: 0 | Status: SHIPPED | OUTPATIENT
Start: 2021-06-14 | End: 2021-06-19

## 2021-06-14 RX ORDER — PHENAZOPYRIDINE HYDROCHLORIDE 100 MG/1
200 TABLET, FILM COATED ORAL
Status: COMPLETED | OUTPATIENT
Start: 2021-06-14 | End: 2021-06-14

## 2021-06-14 RX ADMIN — NITROFURANTOIN MONOHYDRATE/MACROCRYSTALLINE 100 MG: 25; 75 CAPSULE ORAL at 12:52

## 2021-06-14 RX ADMIN — PHENAZOPYRIDINE HYDROCHLORIDE 200 MG: 100 TABLET ORAL at 12:52

## 2021-06-14 NOTE — ED PROVIDER NOTES
EMERGENCY DEPARTMENT HISTORY AND PHYSICAL EXAM      Date: 6/14/2021  Patient Name: Bridgette Anthony    History of Presenting Illness     Chief Complaint   Patient presents with    Urinary Pain     pain on urination and urgency since Saturday. History Provided By: Patient    HPI: Bridgette Anthony, 64 y.o. female with PMHx for NIDDM, HTN, GERD presents BIB self to the ED with cc of dysuria, frequency, urgency, and chills x 2 days. She denies measured fevers, hematuria, abd pain, flank pain, N/V. Tolerating PO at home. Pt does [employer] night shifts and has been drinking more coffee than usual.     There are no other complaints, changes, or physical findings at this time. PCP: Scar Cruz MD    No current facility-administered medications on file prior to encounter. Current Outpatient Medications on File Prior to Encounter   Medication Sig Dispense Refill    ergocalciferol (ERGOCALCIFEROL) 1,250 mcg (50,000 unit) capsule TAKE 1 CAP BY MOUTH EVERY SEVEN (7) DAYS. INDICATIONS: VITAMIN D DEFICIENCY 4 Cap 3    fluticasone propionate (FLONASE) 50 mcg/actuation nasal spray INHALE 2 SPRAYS IN EACH NOSTRIL ONCE A DAY. Indications: inflammation of the nose due to an allergy 1 Bottle 11    budesonide-formoteroL (Symbicort) 160-4.5 mcg/actuation HFAA Take 2 Puffs by inhalation two (2) times a day. 10.2 Inhaler 5    omeprazole (PRILOSEC) 40 mg capsule Take 40 mg by mouth two (2) times a day.  triamterene-hydroCHLOROthiazide (MAXZIDE) 37.5-25 mg per tablet TAKE 1 TABLET BY MOUTH EVERY DAY IN THE MORNING 90 Tab 3    albuterol (PROVENTIL VENTOLIN) 2.5 mg /3 mL (0.083 %) nebu 3 mL by Nebulization route three (3) times daily as needed for Wheezing.  Dx: J45.41 60 Nebule 5    cyclobenzaprine (FLEXERIL) 5 mg tablet TAKE 1 TO 2 TABLETS BY MOUTH EVERY DAY AT BEDTIME      rosuvastatin (CRESTOR) 5 mg tablet TAKE 1 TABLET BY MOUTH EVERY DAY AT NIGHT 30 Tab 3    amLODIPine (NORVASC) 10 mg tablet Take 1 Tab by mouth daily. 90 Tab 3    SITagliptin-metFORMIN (Janumet) 50-1,000 mg per tablet Take 1 Tab by mouth two (2) times daily (with meals). Dx: E11.42 180 Tab 3    Nebulizer & Compressor machine With accessories. Use as instructed 1-3 times daily as needed. Dx:J45.41 1 Each 0    aspirin delayed-release 81 mg tablet Take  by mouth daily. Past History     Past Medical History:  Past Medical History:   Diagnosis Date    Arrhythmia     Arthritis     GENERALIZED TO JOINTS-WRISTS & KNEES.  Asthma     INHALERS    Asthma, chronic 10/8/2009    Diabetes [address] [address]     USES PILLS TO CONTROLL    GERD (gastroesophageal reflux disease)     Gout     Hypertension     CONTROLLED BY MEDS.  Nausea & vomiting     Obesity        Past Surgical History:  Past Surgical History:   Procedure Laterality Date    COLONOSCOPY N/A 5/31/2019    COLONOSCOPY performed by Kassandra Singer MD at [facility] ENDOSCOPY. 6 mm tubular adenoma in sigmoid polyp.  REpeat in 6 months due to poor prep    COLONOSCOPY N/A 2/21/2020    COLONOSCOPY performed by Kassandra Singer MD at [facility] ENDOSCOPY. 2 tubular adenomas, diverticulosis, internal hemorrhoids    HX BREAST BIOPSY Right 2018    benign    HX CHOLECYSTECTOMY      HX HEART CATHETERIZATION  98,2011    CARDIAC CATH X2    HX HEENT  1961    CLEFT PALATE REPAIR.    HX HYSTERECTOMY  2011 JULY 18    HX ORTHOPAEDIC      carpal tunnel, trigger finger, hand surgery left arm    HX OTHER SURGICAL      CLeft palate repair, piloneidal cyst, hand surgery,     HX TUBAL LIGATION  1983       Family History:  Family History   Problem Relation Age of Onset    Asthma Mother     Hypertension Mother     Diabetes Father     Heart Disease Father         MI    Stroke Brother         CEREBRAL ANEURYSM    Heart Attack Sister     Hypertension Sister     No Known Problems Sister        Social History:  Social History     Tobacco Use    Smoking status: Former Smoker     Years: 10.00     Quit date: 7/11/2010     Years since quitting: 10.9    Smokeless tobacco: Never Used   Vaping Use    Vaping Use: Never used   Substance Use Topics    Alcohol use: No    Drug use: No       Allergies: Allergies   Allergen Reactions    Latex Rash     Severe rash    Ampicillin Anaphylaxis    Betadine Antiseptic Gauze Hives    Contrast Dye [Iodine] Hives and Swelling     Pt. Reports throat swelling      Other Medication Rash     Betadine soap/blisters    Fd And C Blue No.1 Anaphylaxis    Penicillins Hives    Keflex [Cephalexin] Other (comments)         Review of Systems   Review of Systems   Constitutional: Positive for chills. Negative for fever. HENT: Negative for congestion. Eyes: Negative for redness. Respiratory: Negative for shortness of breath. Cardiovascular: Negative for chest pain. Gastrointestinal: Negative for abdominal pain and vomiting. Endocrine: Negative for polydipsia. Genitourinary: Positive for dysuria, frequency and urgency. Musculoskeletal: Negative for gait problem. Skin: Negative for rash. Allergic/Immunologic:         -- Latex -- Rash    --  Severe rash   -- Ampicillin -- Anaphylaxis   -- Betadine Antiseptic Gauze -- Hives   -- Contrast Dye (Iodine) -- Hives and Swelling    --  Pt. Reports throat swelling   -- Other Medication -- Rash    --  Betadine soap/blisters   -- Fd And C Blue No.1 -- Anaphylaxis   -- Penicillins -- Hives   -- Keflex (Cephalexin) -- Other (comments)     Neurological: Negative for dizziness. Hematological: Does not bruise/bleed easily. Psychiatric/Behavioral: Negative for agitation. Physical Exam   Physical Exam  Vitals and nursing note reviewed. Constitutional:       General: She is not in acute distress. Appearance: Normal appearance. She is well-developed. She is not toxic-appearing. HENT:      Head: Normocephalic and atraumatic.       Nose: Nose normal.      Mouth/Throat:      Mouth: Mucous membranes are moist.   Eyes:      General: Lids are normal. Extraocular Movements: Extraocular movements intact. Conjunctiva/sclera: Conjunctivae normal.      Pupils: Pupils are equal, round, and reactive to light. Cardiovascular:      Rate and Rhythm: Normal rate and regular rhythm. Pulmonary:      Effort: Pulmonary effort is normal.      Breath sounds: Normal breath sounds. Abdominal:      Palpations: Abdomen is soft. Tenderness: There is abdominal tenderness (Suprapubic). There is no right CVA tenderness, left CVA tenderness or guarding. Musculoskeletal:         General: Normal range of motion. Cervical back: Normal range of motion and neck supple. Skin:     General: Skin is warm and dry. Neurological:      General: No focal deficit present. Mental Status: She is alert and oriented to person, place, and time. Psychiatric:         Mood and Affect: Mood normal.         Behavior: Behavior normal. Behavior is cooperative.          Diagnostic Study Results     Labs -     Recent Results (from the past 12 hour(s))   URINALYSIS W/ REFLEX CULTURE    Collection Time: 06/14/21 12:07 PM    Specimen: Urine   Result Value Ref Range    Color DARK YELLOW      Appearance TURBID (A) CLEAR      Specific gravity 1.022 1.003 - 1.030      pH (UA) 5.5 5.0 - 8.0      Protein 100 (A) NEG mg/dL    Glucose 250 (A) NEG mg/dL    Ketone Negative NEG mg/dL    Bilirubin Negative NEG      Blood LARGE (A) NEG      Urobilinogen 1.0 0.2 - 1.0 EU/dL    Nitrites Negative NEG      Leukocyte Esterase LARGE (A) NEG      WBC >100 (H) 0 - 4 /hpf    RBC 10-20 0 - 5 /hpf    Epithelial cells FEW FEW /lpf    Bacteria Negative NEG /hpf    UA:UC IF INDICATED URINE CULTURE ORDERED (A) CNI      Amorphous Crystals FEW (A) NEG      Hyaline cast 0-2 0 - 5 /lpf    Yeast PRESENT (A) NEG         Radiologic Studies -   No orders to display     CT Results  (Last 48 hours)    None        CXR Results  (Last 48 hours)    None            Medical Decision Making   I am the first provider for this patient. I reviewed the vital signs, available nursing notes, past medical history, past surgical history, family history and social history. Vital Signs-Reviewed the patient's vital signs. Patient Vitals for the past 12 hrs:   Temp Pulse Resp BP SpO2   06/14/21 1201 98.3 °F (36.8 °C) 98 16 (!) 154/84 94 %       Records Reviewed: Nursing Notes    Provider Notes (Medical Decision Making):   No evidence of pyelo, kidney stone. Patient is agreeable to discharge home with plans for Macrobid. First dose given in ED. Urine culture pending. Advised on medication use, oral hydration at home. Follow-up with PCP. ED return precautions given. ED Course:   Initial assessment performed. The patients presenting problems have been discussed, and they are in agreement with the care plan formulated and outlined with them. I have encouraged them to ask questions as they arise throughout their visit. Critical Care Time: None    Disposition:  D/c    PLAN:  1. Discharge Medication List as of 6/14/2021  1:25 PM      START taking these medications    Details   nitrofurantoin, macrocrystal-monohydrate, (Macrobid) 100 mg capsule Take 1 Capsule by mouth two (2) times a day for 5 days. , Normal, Disp-10 Capsule, R-0         CONTINUE these medications which have NOT CHANGED    Details   ergocalciferol (ERGOCALCIFEROL) 1,250 mcg (50,000 unit) capsule TAKE 1 CAP BY MOUTH EVERY SEVEN (7) DAYS. INDICATIONS: VITAMIN D DEFICIENCY, Normal, Disp-4 Cap, R-3      fluticasone propionate (FLONASE) 50 mcg/actuation nasal spray INHALE 2 SPRAYS IN EACH NOSTRIL ONCE A DAY.   Indications: inflammation of the nose due to an allergy, Normal, Disp-1 Bottle, R-11      budesonide-formoteroL (Symbicort) 160-4.5 mcg/actuation HFAA Take 2 Puffs by inhalation two (2) times a day., Normal, Disp-10.2 Inhaler, R-5      omeprazole (PRILOSEC) 40 mg capsule Take 40 mg by mouth two (2) times a day., Historical Med      triamterene-hydroCHLOROthiazide (MAXZIDE) 37.5-25 mg per tablet TAKE 1 TABLET BY MOUTH EVERY DAY IN THE MORNING, Normal, Disp-90 Tab, R-3      albuterol (PROVENTIL VENTOLIN) 2.5 mg /3 mL (0.083 %) nebu 3 mL by Nebulization route three (3) times daily as needed for Wheezing. Dx: J45.41, Normal, Disp-60 Nebule, R-5      cyclobenzaprine (FLEXERIL) 5 mg tablet TAKE 1 TO 2 TABLETS BY MOUTH EVERY DAY AT BEDTIME, Historical Med      rosuvastatin (CRESTOR) 5 mg tablet TAKE 1 TABLET BY MOUTH EVERY DAY AT NIGHT, Normal, Disp-30 Tab, R-3      amLODIPine (NORVASC) 10 mg tablet Take 1 Tab by mouth daily. , Normal, Disp-90 Tab,R-3      SITagliptin-metFORMIN (Janumet) 50-1,000 mg per tablet Take 1 Tab by mouth two (2) times daily (with meals). Dx: E11.42, Normal, Disp-180 Tab,R-3      Nebulizer & Compressor machine With accessories. Use as instructed 1-3 times daily as needed. Dx:J45.41, Print, Disp-1 Each, R-0      aspirin delayed-release 81 mg tablet Take  by mouth daily. , Historical Med           2. Follow-up Information     Follow up With Specialties Details Why Contact Info    [facility] EMERGENCY DEPT Emergency Medicine  As needed, If symptoms worsen [address] [facility] [address]    Delores Sin MD Internal Medicine Call  For follow up [place]  [phone]          Return to ED if worse     Diagnosis     Clinical Impression:   1. Urinary tract infection with hematuria, site unspecified          Please note that this dictation was completed with [employer], the [employer] voice recognition software. Quite often unanticipated grammatical, syntax, homophones, and other interpretive errors are inadvertently transcribed by the computer software. Please disregards these errors. Please excuse any errors that have escaped final proofreading.

## 2021-06-14 NOTE — Clinical Note
[address] 
[facility] EMERGENCY DEPT 
[address] [facility] [id number] [phone] Work/School Note Date: 6/14/2021 To Whom It May concern: 
 
Jose Luis Valverde was seen and treated today in the emergency room by the following provider(s): 
Attending Provider: Marii Agudelo MD 
Physician Assistant: Chula Rodriguez. Jose Luis Valverde is excused from work/school on 06/14/21 and 06/15/21. She is medically clear to return to work/school on 6/16/2021. Sincerely, Joe Banks, 49Angel Choi

## 2021-06-15 LAB
BACTERIA SPEC CULT: NORMAL
CC UR VC: NORMAL
SERVICE CMNT-IMP: NORMAL

## 2021-06-28 ENCOUNTER — OFFICE VISIT (OUTPATIENT)
Dept: INTERNAL MEDICINE CLINIC | Age: 61
End: 2021-06-28
Payer: COMMERCIAL

## 2021-06-28 VITALS
TEMPERATURE: 98.4 F | DIASTOLIC BLOOD PRESSURE: 78 MMHG | BODY MASS INDEX: 30.99 KG/M2 | RESPIRATION RATE: 16 BRPM | HEART RATE: 74 BPM | WEIGHT: 168.4 LBS | SYSTOLIC BLOOD PRESSURE: 128 MMHG | OXYGEN SATURATION: 95 % | HEIGHT: 62 IN

## 2021-06-28 DIAGNOSIS — E78.00 HYPERCHOLESTEROLEMIA: ICD-10-CM

## 2021-06-28 DIAGNOSIS — K11.1 SALIVARY GLAND ENLARGEMENT: ICD-10-CM

## 2021-06-28 DIAGNOSIS — E11.3293 MILD NONPROLIFERATIVE DIABETIC RETINOPATHY OF BOTH EYES WITHOUT MACULAR EDEMA ASSOCIATED WITH TYPE 2 DIABETES MELLITUS (HCC): ICD-10-CM

## 2021-06-28 DIAGNOSIS — E11.9 TYPE 2 DIABETES MELLITUS WITHOUT COMPLICATION, WITHOUT LONG-TERM CURRENT USE OF INSULIN (HCC): Primary | ICD-10-CM

## 2021-06-28 DIAGNOSIS — Z78.9 ACE INHIBITOR INTOLERANCE: ICD-10-CM

## 2021-06-28 DIAGNOSIS — E55.9 VITAMIN D DEFICIENCY: ICD-10-CM

## 2021-06-28 DIAGNOSIS — I10 ESSENTIAL HYPERTENSION: ICD-10-CM

## 2021-06-28 DIAGNOSIS — R49.0 HOARSENESS: ICD-10-CM

## 2021-06-28 DIAGNOSIS — J45.40 MODERATE PERSISTENT ASTHMA WITHOUT COMPLICATION: ICD-10-CM

## 2021-06-28 LAB — HBA1C MFR BLD HPLC: 6.7 %

## 2021-06-28 PROCEDURE — 99214 OFFICE O/P EST MOD 30 MIN: CPT | Performed by: INTERNAL MEDICINE

## 2021-06-28 PROCEDURE — 83036 HEMOGLOBIN GLYCOSYLATED A1C: CPT | Performed by: INTERNAL MEDICINE

## 2021-06-28 NOTE — PATIENT INSTRUCTIONS
Learning About Tests When You Have Diabetes  Why do you need regular tests? Diabetes can lead to other health problems if it's not well controlled. You'll need tests to monitor how well your diabetes is controlled and to check for other things like high cholesterol or kidney problems. Having tests on a regular schedule can help your doctor find problems early, when it's easier to manage them. What tests do you need? These are the tests you may need and how often you should have them. A1c blood test.   This test shows the average level of blood sugar over the past 2 to 3 months. It helps your doctor see whether blood sugar levels have been staying within your target range. How often: Every 3 to 6 months  Goal: A blood sugar level in your target range  Blood pressure test.   This test measures the pressure of blood flow in the arteries. Controlling blood pressure can help prevent damage to nerves and blood vessels. How often: Every 3 to 6 months  Goal: A blood pressure level in your target range  Cholesterol test.   This test measures the amount of a type of fat in the blood. It is common for people with diabetes to also have high cholesterol. Too much cholesterol in the blood can build up inside the blood vessels and raise the risk for heart attack and stroke. How often: At the time of your diabetes diagnosis, and as often as your doctor recommends after that  Goal: A cholesterol level in your target range  Albumin-creatinine ratio test.   This test checks for kidney damage by looking for the protein albumin (say \"al-BYOO-olya\") in the urine. Albumin is normally found in the blood. Kidney damage can let small amounts of it (microalbumin) leak into the urine. How often: Once a year  Goal: No protein in the urine  Blood creatinine test/estimated glomerular filtration (eGFR). The blood creatinine (say \"yxbr-IU-ua-neen\") level shows how well your kidneys are working.  Creatinine is a waste product that muscles release into the blood. Blood creatinine is used to estimate the glomerular filtration rate. A high level of creatinine and/or a low eGFR may mean your kidneys are not working as well as they should. How often: Once a year  Goal: Normal level of creatinine in the blood. The eGFR goal is greater than 60 mL/min/1.73 m². Complete foot exam.   The doctor checks for foot sores and whether any sensation has been lost.  How often: Once a year  Goal: Healthy feet with no foot ulcers or loss of feeling  Dental exam and cleaning. The dentist checks for gum disease and tooth decay. People with high blood sugar are more likely to have these problems. How often: Every 6 months  Goal: Healthy teeth and gums  Complete eye exam.   High blood sugar levels can damage the eyes. This exam is done by an ophthalmologist or optometrist. It includes a dilated eye exam. The exam shows whether there's damage to the back of the eye (diabetic retinopathy). How often: Once a year. If you don't have any signs of diabetic retinopathy, your doctor may recommend an exam every 2 years. Goal: No damage to the back of the eye  Thyroid-stimulating hormone (TSH) blood test.   This test checks for thyroid disease. Too little thyroid hormone can cause some medicines (like insulin) to stay in the body longer. This can cause low blood sugar. You may be tested if you have high cholesterol or are a woman over 48years old. How often: As part of your diabetes diagnosis, and as often as your doctor recommends after that  Goal: Normal level of TSH in the blood  Follow-up care is a key part of your treatment and safety. Be sure to make and go to all appointments, and call your doctor if you are having problems. It's also a good idea to know your test results and keep a list of the medicines you take. Where can you learn more?   Go to http://www.[employer].com/  Enter A243 in the search box to learn more about \"Learning About Tests When You Have Diabetes. \"  Current as of: August 31, 2020               Content Version: 12.8  © 2006-2021 [employer]. Care instructions adapted under license by "[employer] (which disclaims liability or warranty for this information). If you have questions about a medical condition or this instruction, always ask your healthcare professional. [place] [address] any warranty or liability for your use of this information.

## 2021-06-28 NOTE — PROGRESS NOTES
CC:   Chief Complaint   Patient presents with    Diabetes     Room 3A //     Hypertension    Asthma       HISTORY OF PRESENT ILLNESS  Nicole Middleton is a 64 y.o. female. Presents for 4 month follow up evaluation. She has type 2 DM, HTN, asthma, and allergic rhinitis.         Following with Dr. Nate Calderon (ENT) for chronic hoarseness that worsens throughout day. Was started on omeprazole 40 mg BID that is helping and referred to Speech Therapy; helped her learn to how take a breath before speaking when voice feels weak. Saw Francisco SEGURA on 4/7/21 for mass at right side of neck noted by Speech Therapist.  CT neck soft tissue showed slight prominence at right submandibular gland. Followed up with Dr. Nate Calderon about this; no salivary gland stone. Told to drink more water. Complains of having a floater in her vision. Saw a retinal specialist at [facility].     Denies CP, SOB, heart palpitations, or dizziness. Home BP monitoring: not done.  She reports taking medications as instructed, no medication side effects noted.  Diet and Lifestyle: generally follows a low sodium diet, no formal exercise but active during the day.       Denies polyuria or polydipsia, no hypoglycemia.  Home glucose monitoring: is performed regularly.  FBS: less than 145.  She reports medication compliance: compliant all of the time.  Medication side effects: none.  Diabetic diet compliance: compliant most of the time.  Lab review: A1c 6.7% today (6/28/21), was 6.7% on 2/22/21. Eye exam: UTD.        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 Plans to apply for SS before age 58. COVID vaccine: had 2/2 vaccines. ROS  A complete review of systems was performed and is negative except for those mentioned in the HPI.        Patient Active Problem List   Diagnosis Code    Type 2 diabetes mellitus without complication, without long-term current use of insulin (HCC) E11.9    HTN (hypertension) I10    Arthritis M19.90    GERD (gastroesophageal reflux disease) K21.9    Vitamin D deficiency E55.9    Fibroadenoma of right breast D24.1    Moderate persistent asthma without complication M65.62    Adrenal nodule (HCC) E27.8    Allergic rhinitis due to allergen J30.9    Peripheral vascular disease (HCC) I73.9    Mild nonproliferative diabetic retinopathy of both eyes without macular edema associated with type 2 diabetes mellitus ([facility] P99.0854     Past Medical History:   Diagnosis Date    Arrhythmia     Arthritis     GENERALIZED TO JOINTS-WRISTS & KNEES.  Asthma     INHALERS    Asthma, chronic 10/8/2009    Diabetes ([facility]     USES PILLS TO CONTROLL    GERD (gastroesophageal reflux disease)     Gout     Hypertension     CONTROLLED BY MEDS.  Nausea & vomiting     Obesity      Allergies   Allergen Reactions    Latex Rash     Severe rash    Ampicillin Anaphylaxis    Betadine Antiseptic Gauze Hives    Contrast Dye [Iodine] Hives and Swelling     Pt. Reports throat swelling      Other Medication Rash     Betadine soap/blisters    Fd And C Blue No.1 Anaphylaxis    Penicillins Hives    Keflex [Cephalexin] Other (comments)       Current Outpatient Medications   Medication Sig Dispense Refill    ergocalciferol (ERGOCALCIFEROL) 1,250 mcg (50,000 unit) capsule TAKE 1 CAP BY MOUTH EVERY SEVEN (7) DAYS. INDICATIONS: VITAMIN D DEFICIENCY 4 Cap 3    fluticasone propionate (FLONASE) 50 mcg/actuation nasal spray INHALE 2 SPRAYS IN EACH NOSTRIL ONCE A DAY. Indications: inflammation of the nose due to an allergy 1 Bottle 11    budesonide-formoteroL (Symbicort) 160-4.5 mcg/actuation HFAA Take 2 Puffs by inhalation two (2) times a day. 10.2 Inhaler 5    omeprazole (PRILOSEC) 40 mg capsule Take 40 mg by mouth two (2) times a day.       triamterene-hydroCHLOROthiazide (MAXZIDE) 37.5-25 mg per tablet TAKE 1 TABLET BY MOUTH EVERY DAY IN THE MORNING 90 Tab 3    albuterol (PROVENTIL VENTOLIN) 2.5 mg /3 mL (0.083 %) nebu 3 mL by Nebulization route three (3) times daily as needed for Wheezing. Dx: J45.41 60 Nebule 5    cyclobenzaprine (FLEXERIL) 5 mg tablet TAKE 1 TO 2 TABLETS BY MOUTH EVERY DAY AT BEDTIME      rosuvastatin (CRESTOR) 5 mg tablet TAKE 1 TABLET BY MOUTH EVERY DAY AT NIGHT 30 Tab 3    amLODIPine (NORVASC) 10 mg tablet Take 1 Tab by mouth daily. 90 Tab 3    SITagliptin-metFORMIN (Janumet) 50-1,000 mg per tablet Take 1 Tab by mouth two (2) times daily (with meals). Dx: E11.42 180 Tab 3    Nebulizer & Compressor machine With accessories. Use as instructed 1-3 times daily as needed. Dx:J45.41 1 Each 0    aspirin delayed-release 81 mg tablet Take  by mouth daily. PHYSICAL EXAM  Visit Vitals  /78 (BP 1 Location: Right arm, BP Patient Position: Sitting, BP Cuff Size: Large adult)   Pulse 74   Temp 98.4 °F (36.9 °C) (Oral)   Resp 16   Ht 5' 2\" (1.575 m)   Wt 168 lb 6.4 oz (76.4 kg)   LMP 06/19/2011   SpO2 95%   BMI 30.80 kg/m²       General: Overweight, no distress. HEENT:  Head normocephalic/atraumatic, no scleral icterus  Neck: Tender mildly enlarged right submandibular gland. Lungs:  Clear to ausculation bilaterally. Good air movement. Heart:  Regular rate and rhythm, normal S1 and S2, no murmur, gallop, or rub  Extremities: No clubbing, cyanosis, or edema. Neurological: Alert and oriented. Psychiatric: Normal mood and affect. Behavior is normal.     Results for orders placed or performed in visit on 06/28/21   AMB POC HEMOGLOBIN A1C   Result Value Ref Range    Hemoglobin A1c (POC) 6.7 %         ASSESSMENT AND PLAN    ICD-10-CM ICD-9-CM    1. Type 2 diabetes mellitus without complication, without long-term current use of insulin ([facility])  E11.9 250.00 AMB POC HEMOGLOBIN A1C      HEMOGLOBIN A1C WITH EAG      MICROALBUMIN, UR, RAND W/ MICROALB/CREAT RATIO   2.  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 Essential hypertension  X85 944.7 METABOLIC PANEL, COMPREHENSIVE      CBC WITH AUTOMATED DIFF   4. Hypercholesterolemia  E78.00 272.0 LIPID PANEL   5. Hoarseness  R49.0 784.42    6. Moderate persistent asthma without complication  S23.63 957.52    7. Salivary gland enlargement  K11.1 527.1 SJOGREN'S ABS, SSA AND SSB   8. Vitamin D deficiency  E55.9 268.9      Diagnoses and all orders for this visit:    1. Type 2 diabetes mellitus without complication, without long-term current use of insulin ([facility])  A1c 6.7% today. Controlled. Continue Janumet. -     AMB POC HEMOGLOBIN A1C  -     HEMOGLOBIN A1C WITH EAG; Future  -     MICROALBUMIN, UR, RAND W/ MICROALB/CREAT RATIO; Future    2. Mild nonproliferative diabetic retinopathy of both eyes without macular edema associated with type 2 diabetes mellitus ([facility]  Instructed to follow up with eye doctor. 3. Essential hypertension  Controlled. Continue triamterene-HCTZ and amlodipine. Has ACE-I/ARB intolerance due to angioedema.        -     METABOLIC PANEL, COMPREHENSIVE; Future  -     CBC WITH AUTOMATED DIFF; Future    4. Hypercholesterolemia  Continue Crestor 5 mg daily.  -     LIPID PANEL; Future    5. Hoarseness  Improved. 6. Moderate persistent asthma without complication  Controlled. 7. Salivary gland enlargement  Rule out Sjogren's syndrome.  -     SJOGREN'S ABS, SSA AND SSB; Future    8. Vitamin D deficiency      Follow-up and Dispositions    · Return in about 4 months (around 10/28/2021), or if symptoms worsen or fail to improve, for DM, HTN, chol; fasting labs 1 week before next appointment (call clinic in October to schedule). I have discussed the diagnosis with the patient and the intended plan as seen in the above orders. Patient is in agreement. The patient has received an after-visit summary and questions were answered concerning future plans.   I have discussed medication side effects and warnings with the patient as well.

## 2021-06-28 NOTE — PROGRESS NOTES
Lois Avila  Identified pt with two pt identifiers(name and ). Chief Complaint   Patient presents with    Diabetes     Room 3A //     Hypertension    Asthma       Reviewed record In preparation for visit and have obtained necessary documentation. 1. Have you been to the ER, urgent care clinic or hospitalized since your last visit? Yes. [facility]     2. Have you seen or consulted any other health care providers outside of the [address] [place] [address] since your last visit? Include any pap smears or colon screening. No    Patient does not have an advance directive. Vitals reviewed with provider.     Health Maintenance reviewed:     Health Maintenance Due   Topic    COVID-19 Vaccine (1)    Shingrix Vaccine Age 50> (1 of 2)          Wt Readings from Last 3 Encounters:   21 168 lb 6.4 oz (76.4 kg)   21 168 lb 3.4 oz (76.3 kg)   21 174 lb (78.9 kg)        Temp Readings from Last 3 Encounters:   21 98.4 °F (36.9 °C) (Oral)   21 98.3 °F (36.8 °C)   21 98.7 °F (37.1 °C) (Oral)        BP Readings from Last 3 Encounters:   21 128/78   21 (!) 154/84   21 115/70        Pulse Readings from Last 3 Encounters:   21 74   21 98   21 85        Vitals:    21 0809   BP: 128/78   Pulse: 74   Resp: 16   Temp: 98.4 °F (36.9 °C)   TempSrc: Oral   SpO2: 95%   Weight: 168 lb 6.4 oz (76.4 kg)   Height: 5' 2\" (1.575 m)   PainSc:   0 - No pain   LMP: 2011          Learning Assessment:   :       Learning Assessment 3/25/2019   PRIMARY LEARNER Patient   PRIMARY LANGUAGE ENGLISH   LEARNER PREFERENCE PRIMARY READING   ANSWERED BY patient   RELATIONSHIP SELF        Depression Screening:   :       3 most recent PHQ Screens 2021   Little interest or pleasure in doing things Not at all   Feeling down, depressed, irritable, or hopeless Not at all   Total Score PHQ 2 0        Fall Risk Assessment:   :       Fall Risk Assessment, last 12 mths 2/22/2021   Able to walk? Yes   Fall in past 12 months? 1   Do you feel unsteady? 0   Are you worried about falling 0   Fall with injury? 1        Abuse Screening:   :       Abuse Screening Questionnaire 6/28/2021 1/8/2021 4/1/2020 3/25/2019   Do you ever feel afraid of your partner? N N N N   Are you in a relationship with someone who physically or mentally threatens you? N N N N   Is it safe for you to go home?  Y Y Y Y        ADL Screening:   :       ADL Assessment 1/8/2021   Feeding yourself No Help Needed   Getting from bed to chair No Help Needed   Getting dressed No Help Needed   Bathing or showering No Help Needed   Walk across the room (includes cane/walker) No Help Needed   Using the telphone No Help Needed   Taking your medications No Help Needed   Preparing meals No Help Needed   Managing money (expenses/bills) No Help Needed   Moderately strenuous housework (laundry) No Help Needed   Shopping for personal items (toiletries/medicines) No Help Needed   Shopping for groceries No Help Needed   Driving No Help Needed   Climbing a flight of stairs No Help Needed   Getting to places beyond walking distances No Help Needed

## 2021-08-14 ENCOUNTER — APPOINTMENT (OUTPATIENT)
Dept: GENERAL RADIOLOGY | Age: 61
End: 2021-08-14
Attending: PHYSICIAN ASSISTANT
Payer: COMMERCIAL

## 2021-08-14 ENCOUNTER — HOSPITAL ENCOUNTER (EMERGENCY)
Age: 61
Discharge: HOME | End: 2021-08-14
Attending: STUDENT IN AN ORGANIZED HEALTH CARE EDUCATION/TRAINING PROGRAM
Payer: COMMERCIAL

## 2021-08-14 VITALS
HEIGHT: 62 IN | DIASTOLIC BLOOD PRESSURE: 78 MMHG | HEART RATE: 81 BPM | TEMPERATURE: 98 F | SYSTOLIC BLOOD PRESSURE: 145 MMHG | BODY MASS INDEX: 30.95 KG/M2 | RESPIRATION RATE: 16 BRPM | OXYGEN SATURATION: 97 % | WEIGHT: 168.21 LBS

## 2021-08-14 DIAGNOSIS — R07.89 MUSCULOSKELETAL CHEST PAIN: Primary | ICD-10-CM

## 2021-08-14 LAB
ALBUMIN SERPL-MCNC: 3.7 G/DL (ref 3.5–5)
ALBUMIN/GLOB SERPL: 0.9 {RATIO} (ref 1.1–2.2)
ALP SERPL-CCNC: 86 U/L (ref 45–117)
ALT SERPL-CCNC: 18 U/L (ref 12–78)
ANION GAP SERPL CALC-SCNC: 6 MMOL/L (ref 5–15)
AST SERPL-CCNC: 8 U/L (ref 15–37)
BASOPHILS # BLD: 0 K/UL (ref 0–0.1)
BASOPHILS NFR BLD: 1 % (ref 0–1)
BILIRUB SERPL-MCNC: 0.3 MG/DL (ref 0.2–1)
BUN SERPL-MCNC: 15 MG/DL (ref 6–20)
BUN/CREAT SERPL: 16 (ref 12–20)
CALCIUM SERPL-MCNC: 9.2 MG/DL (ref 8.5–10.1)
CHLORIDE SERPL-SCNC: 102 MMOL/L (ref 97–108)
CO2 SERPL-SCNC: 28 MMOL/L (ref 21–32)
CREAT SERPL-MCNC: 0.92 MG/DL (ref 0.55–1.02)
DIFFERENTIAL METHOD BLD: ABNORMAL
EOSINOPHIL # BLD: 0.2 K/UL (ref 0–0.4)
EOSINOPHIL NFR BLD: 3 % (ref 0–7)
ERYTHROCYTE [DISTWIDTH] IN BLOOD BY AUTOMATED COUNT: 12.9 % (ref 11.5–14.5)
GLOBULIN SER CALC-MCNC: 4 G/DL (ref 2–4)
GLUCOSE SERPL-MCNC: 209 MG/DL (ref 65–100)
HCT VFR BLD AUTO: 33.8 % (ref 35–47)
HGB BLD-MCNC: 11.2 G/DL (ref 11.5–16)
IMM GRANULOCYTES # BLD AUTO: 0 K/UL (ref 0–0.04)
IMM GRANULOCYTES NFR BLD AUTO: 0 % (ref 0–0.5)
LYMPHOCYTES # BLD: 1.9 K/UL (ref 0.8–3.5)
LYMPHOCYTES NFR BLD: 33 % (ref 12–49)
MCH RBC QN AUTO: 29.3 PG (ref 26–34)
MCHC RBC AUTO-ENTMCNC: 33.1 G/DL (ref 30–36.5)
MCV RBC AUTO: 88.5 FL (ref 80–99)
MONOCYTES # BLD: 0.4 K/UL (ref 0–1)
MONOCYTES NFR BLD: 7 % (ref 5–13)
NEUTS SEG # BLD: 3.4 K/UL (ref 1.8–8)
NEUTS SEG NFR BLD: 56 % (ref 32–75)
NRBC # BLD: 0 K/UL (ref 0–0.01)
NRBC BLD-RTO: 0 PER 100 WBC
PLATELET # BLD AUTO: 330 K/UL (ref 150–400)
PMV BLD AUTO: 9.8 FL (ref 8.9–12.9)
POTASSIUM SERPL-SCNC: 3.7 MMOL/L (ref 3.5–5.1)
PROT SERPL-MCNC: 7.7 G/DL (ref 6.4–8.2)
RBC # BLD AUTO: 3.82 M/UL (ref 3.8–5.2)
SODIUM SERPL-SCNC: 136 MMOL/L (ref 136–145)
TROPONIN I SERPL-MCNC: <0.05 NG/ML
WBC # BLD AUTO: 6 K/UL (ref 3.6–11)

## 2021-08-14 PROCEDURE — 84484 ASSAY OF TROPONIN QUANT: CPT

## 2021-08-14 PROCEDURE — 96374 THER/PROPH/DIAG INJ IV PUSH: CPT

## 2021-08-14 PROCEDURE — 99284 EMERGENCY DEPT VISIT MOD MDM: CPT

## 2021-08-14 PROCEDURE — 85025 COMPLETE CBC W/AUTO DIFF WBC: CPT

## 2021-08-14 PROCEDURE — 36415 COLL VENOUS BLD VENIPUNCTURE: CPT

## 2021-08-14 PROCEDURE — 96375 TX/PRO/DX INJ NEW DRUG ADDON: CPT

## 2021-08-14 PROCEDURE — 93005 ELECTROCARDIOGRAM TRACING: CPT

## 2021-08-14 PROCEDURE — 74011250636 HC RX REV CODE- 250/636: Performed by: STUDENT IN AN ORGANIZED HEALTH CARE EDUCATION/TRAINING PROGRAM

## 2021-08-14 PROCEDURE — 80053 COMPREHEN METABOLIC PANEL: CPT

## 2021-08-14 PROCEDURE — 71046 X-RAY EXAM CHEST 2 VIEWS: CPT

## 2021-08-14 RX ORDER — LIDOCAINE 4 G/100G
1 PATCH TOPICAL EVERY 12 HOURS
Qty: 5 PATCH | Refills: 2 | Status: SHIPPED | OUTPATIENT
Start: 2021-08-14 | End: 2021-10-25 | Stop reason: ALTCHOICE

## 2021-08-14 RX ORDER — KETOROLAC TROMETHAMINE 30 MG/ML
15 INJECTION, SOLUTION INTRAMUSCULAR; INTRAVENOUS
Status: COMPLETED | OUTPATIENT
Start: 2021-08-14 | End: 2021-08-14

## 2021-08-14 RX ORDER — DIAZEPAM 10 MG/2ML
2 INJECTION INTRAMUSCULAR
Status: COMPLETED | OUTPATIENT
Start: 2021-08-14 | End: 2021-08-14

## 2021-08-14 RX ORDER — CYCLOBENZAPRINE HCL 10 MG
10 TABLET ORAL
Qty: 20 TABLET | Refills: 0 | OUTPATIENT
Start: 2021-08-14 | End: 2021-12-14

## 2021-08-14 RX ADMIN — DIAZEPAM 2 MG: 5 INJECTION, SOLUTION INTRAMUSCULAR; INTRAVENOUS at 13:59

## 2021-08-14 RX ADMIN — KETOROLAC TROMETHAMINE 15 MG: 30 INJECTION, SOLUTION INTRAMUSCULAR; INTRAVENOUS at 13:59

## 2021-08-14 NOTE — ED PROVIDER NOTES
EMERGENCY DEPARTMENT HISTORY AND PHYSICAL EXAM      Date: 8/14/2021  Patient Name: Abby Proctor    History of Presenting Illness     Chief Complaint   Patient presents with    Chest Pain     anterior wall chest pain x 2 days. pain is worse with movement and is reproducible on palpation. pt does heavy lifting at work and thinks she has strained her muscles       History Provided By: Patient    HPI: Abby Proctor, 64 y.o. female with past medical history of type 2 diabetes, hypertension, asthma, PVD, GERD, presents to the ED with cc of right-sided chest pain. Patient reports that symptoms have been present for the past 2 days. She reports that pain is worsened by movement as well as direct palpation. She reports that she works as a cleaning personnel at [id number], and that her work involves a lot of heavy lifting and extensive use of her upper extremities. She is R hand dominant. 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 She denies any shortness of breath, palpitations, nausea, diaphoresis, lightheadedness/dizziness, syncope. PCP: Jaya Cash MD    No current facility-administered medications on file prior to encounter. Current Outpatient Medications on File Prior to Encounter   Medication Sig Dispense Refill    ergocalciferol (ERGOCALCIFEROL) 1,250 mcg (50,000 unit) capsule TAKE 1 CAP BY MOUTH EVERY SEVEN (7) DAYS. INDICATIONS: VITAMIN D DEFICIENCY 4 Cap 3    fluticasone propionate (FLONASE) 50 mcg/actuation nasal spray INHALE 2 SPRAYS IN EACH NOSTRIL ONCE A DAY. Indications: inflammation of the nose due to an allergy 1 Bottle 11    budesonide-formoteroL (Symbicort) 160-4.5 mcg/actuation HFAA Take 2 Puffs by inhalation two (2) times a day. 10.2 Inhaler 5    omeprazole (PRILOSEC) 40 mg capsule Take 40 mg by mouth two (2) times a day.       triamterene-hydroCHLOROthiazide (MAXZIDE) 37.5-25 mg per tablet TAKE 1 TABLET BY MOUTH EVERY DAY IN THE MORNING 90 Tab 3    albuterol (PROVENTIL VENTOLIN) 2.5 mg /3 mL (0.083 %) nebu 3 mL by Nebulization route three (3) times daily as needed for Wheezing. Dx: J45.41 60 Nebule 5    cyclobenzaprine (FLEXERIL) 5 mg tablet TAKE 1 TO 2 TABLETS BY MOUTH EVERY DAY AT BEDTIME      rosuvastatin (CRESTOR) 5 mg tablet TAKE 1 TABLET BY MOUTH EVERY DAY AT NIGHT 30 Tab 3    amLODIPine (NORVASC) 10 mg tablet Take 1 Tab by mouth daily. 90 Tab 3    SITagliptin-metFORMIN (Janumet) 50-1,000 mg per tablet Take 1 Tab by mouth two (2) times daily (with meals). Dx: E11.42 180 Tab 3    Nebulizer & Compressor machine With accessories. Use as instructed 1-3 times daily as needed. Dx:J45.41 1 Each 0    aspirin delayed-release 81 mg tablet Take  by mouth daily. Past History     Past Medical History:  Past Medical History:   Diagnosis Date    Arrhythmia     Arthritis     GENERALIZED TO JOINTS-WRISTS & KNEES.  Asthma     INHALERS    Asthma, chronic 10/8/2009    Diabetes ([facility] [address]     USES PILLS TO CONTROLL    GERD (gastroesophageal reflux disease)     Gout     Hypertension     CONTROLLED BY MEDS.  Nausea & vomiting     Obesity        Past Surgical History:  Past Surgical History:   Procedure Laterality Date    COLONOSCOPY N/A 5/31/2019    COLONOSCOPY performed by Rodolfo Quiroga MD at [facility] ENDOSCOPY. 6 mm tubular adenoma in sigmoid polyp.  REpeat in 6 months due to poor prep    COLONOSCOPY N/A 2/21/2020    COLONOSCOPY performed by Rodolfo Quiroga MD at [facility] ENDOSCOPY. 2 tubular adenomas, diverticulosis, internal hemorrhoids    HX BREAST BIOPSY Right 2018    benign    HX CHOLECYSTECTOMY      HX HEART CATHETERIZATION  98,2011    CARDIAC CATH X2    HX HEENT  1961    CLEFT PALATE REPAIR.    HX HYSTERECTOMY  2011 JULY 18    HX ORTHOPAEDIC      carpal tunnel, trigger finger, hand surgery left arm    HX OTHER SURGICAL      CLeft palate repair, piloneidal cyst, hand surgery,     [address] [place] [address] History:  Family History   Problem Relation Age of Onset    Asthma Mother     Hypertension Mother     Diabetes Father     Heart Disease Father         MI    Stroke Brother         CEREBRAL ANEURYSM    Heart Attack Sister     Hypertension Sister     No Known Problems Sister        Social History:  Social History     Tobacco Use    Smoking status: Former Smoker     Years: 10.00     Quit date: 2010     Years since quittin.1    Smokeless tobacco: Never Used   Vaping Use    Vaping Use: Never used   Substance Use Topics    Alcohol use: No    Drug use: No       Allergies: Allergies   Allergen Reactions    Latex Rash     Severe rash    Ampicillin Anaphylaxis    Betadine Antiseptic Gauze Hives    Contrast Dye [Iodine] Hives and Swelling     Pt. Reports throat swelling      Other Medication Rash     Betadine soap/blisters    Fd And C Blue No.1 Anaphylaxis    Penicillins Hives    Keflex [Cephalexin] Other (comments)    Lisinopril Swelling         Review of Systems   Review of Systems   Constitutional: Negative for chills and fever. HENT: Negative for congestion and rhinorrhea. Eyes: Negative for visual disturbance. Respiratory: Negative for chest tightness and shortness of breath. Cardiovascular: Positive for chest pain. Negative for palpitations and leg swelling. Gastrointestinal: Negative for abdominal pain, diarrhea, nausea and vomiting. Genitourinary: Negative for dysuria, flank pain and hematuria. Musculoskeletal: Positive for myalgias. Negative for back pain and neck pain. Skin: Negative for rash. Allergic/Immunologic: Negative for immunocompromised state. Neurological: Negative for dizziness, speech difficulty, weakness and headaches. Hematological: Negative for adenopathy. Psychiatric/Behavioral: Negative for dysphoric mood and suicidal ideas. Physical Exam   Physical Exam  Vitals and nursing note reviewed.    Constitutional:       General: She is not in acute distress. Appearance: She is not ill-appearing or toxic-appearing. HENT:      Head: Normocephalic and atraumatic. Nose: Nose normal.      Mouth/Throat:      Mouth: Mucous membranes are moist.   Eyes:      Extraocular Movements: Extraocular movements intact. Pupils: Pupils are equal, round, and reactive to light. Cardiovascular:      Rate and Rhythm: Normal rate and regular rhythm. Pulses: Normal pulses. Pulmonary:      Effort: Pulmonary effort is normal.      Breath sounds: No stridor. No wheezing or rhonchi. Chest:      Chest wall: Tenderness present. Abdominal:      General: Abdomen is flat. There is no distension. Tenderness: There is no abdominal tenderness. Musculoskeletal:         General: Normal range of motion. Cervical back: Normal range of motion and neck supple. Skin:     General: Skin is warm and dry. Neurological:      General: No focal deficit present. Mental Status: She is alert and oriented to person, place, and time.    Psychiatric:         Judgment: Judgment normal.         Diagnostic Study Results     Labs -     Recent Results (from the past 84 hour(s))   EKG, 12 LEAD, INITIAL    Collection Time: 08/14/21  1:19 PM   Result Value Ref Range    Ventricular Rate 82 BPM    Atrial Rate 82 BPM    P-R Interval 152 ms    QRS Duration 68 ms    Q-T Interval 384 ms    QTC Calculation (Bezet) 448 ms    Calculated P Axis 49 degrees    Calculated R Axis 19 degrees    Calculated T Axis 104 degrees    Diagnosis       Normal sinus rhythm  T wave abnormality, consider lateral ischemia    Confirmed by Kathie Ballesteros ([id number]) on 8/15/2021 1:12:55 PM     CBC WITH AUTOMATED DIFF    Collection Time: 08/14/21  1:37 PM   Result Value Ref Range    WBC 6.0 3.6 - 11.0 K/uL    RBC 3.82 3.80 - 5.20 M/uL    HGB 11.2 (L) 11.5 - 16.0 g/dL    HCT 33.8 (L) 35.0 - 47.0 %    MCV 88.5 80.0 - 99.0 FL    MCH 29.3 26.0 - 34.0 PG    MCHC 33.1 30.0 - 36.5 g/dL    RDW 12.9 11.5 - 14.5 %    PLATELET 583 781 - 782 K/uL    MPV 9.8 8.9 - 12.9 FL    NRBC 0.0 0  WBC    ABSOLUTE NRBC 0.00 0.00 - 0.01 K/uL    NEUTROPHILS 56 32 - 75 %    LYMPHOCYTES 33 12 - 49 %    MONOCYTES 7 5 - 13 %    EOSINOPHILS 3 0 - 7 %    BASOPHILS 1 0 - 1 %    IMMATURE GRANULOCYTES 0 0.0 - 0.5 %    ABS. NEUTROPHILS 3.4 1.8 - 8.0 K/UL    ABS. LYMPHOCYTES 1.9 0.8 - 3.5 K/UL    ABS. MONOCYTES 0.4 0.0 - 1.0 K/UL    ABS. EOSINOPHILS 0.2 0.0 - 0.4 K/UL    ABS. BASOPHILS 0.0 0.0 - 0.1 K/UL    ABS. IMM. GRANS. 0.0 0.00 - 0.04 K/UL    DF AUTOMATED     METABOLIC PANEL, COMPREHENSIVE    Collection Time: 08/14/21  1:37 PM   Result Value Ref Range    Sodium 136 136 - 145 mmol/L    Potassium 3.7 3.5 - 5.1 mmol/L    Chloride 102 97 - 108 mmol/L    CO2 28 21 - 32 mmol/L    Anion gap 6 5 - 15 mmol/L    Glucose 209 (H) 65 - 100 mg/dL    BUN 15 6 - 20 MG/DL    Creatinine 0.92 0.55 - 1.02 MG/DL    BUN/Creatinine ratio 16 12 - 20      GFR est AA >60 >60 ml/min/1.73m2    GFR est non-AA >60 >60 ml/min/1.73m2    Calcium 9.2 8.5 - 10.1 MG/DL    Bilirubin, total 0.3 0.2 - 1.0 MG/DL    ALT (SGPT) 18 12 - 78 U/L    AST (SGOT) 8 (L) 15 - 37 U/L    Alk. phosphatase 86 45 - 117 U/L    Protein, total 7.7 6.4 - 8.2 g/dL    Albumin 3.7 3.5 - 5.0 g/dL    Globulin 4.0 2.0 - 4.0 g/dL    A-G Ratio 0.9 (L) 1.1 - 2.2     TROPONIN I    Collection Time: 08/14/21  1:37 PM   Result Value Ref Range    Troponin-I, Qt. <0.05 <0.05 ng/mL       Radiologic Studies -   XR CHEST PA LAT   Final Result   No acute findings. CT Results  (Last 48 hours)    None        CXR Results  (Last 48 hours)    None          Medical Decision Making   I am the first provider for this patient. I reviewed the vital signs, available nursing notes, past medical history, past surgical history, family history and social history. Vital Signs-Reviewed the patient's vital signs.   Patient Vitals for the past 24 hrs:   Temp Pulse Resp BP SpO2   08/14/21 1139 98 °F (36.7 °C) 81 16 (!) 166/74 100 %       EKG interpretation: (Preliminary)  Normal sinus rhythm, T wave inversion in leads I and aVL, unchanged compared to previous EKG. No new acute ST changes concerning for ischemia. EKG interpretation by Rubi Biggs MD    Records Reviewed: Nursing Notes, Old Medical Records, Previous electrocardiograms and Previous Laboratory Studies    Provider Notes (Medical Decision Making):   Patient's chest pain is completely reproducible on exam. This is also worsened by movement. Based on history as well as physical exam findings, most likely diagnosis is musculoskeletal chest pain. However, patient does have several cardiovascular risk factors. Therefore, will obtain cardiac work-up including labs, EKG, troponin, and chest x-ray to definitively rule out for ischemia. The meantime, will treat patient with Toradol and Valium. Work-up in the ED is largely unremarkable. Opponent is negative. Do not feel she needs repeat troponin as symptoms have been present for the past 2 days, and I would have expected troponin to have peaked by now if chest pain was secondary to cardiac etiology. Doubt ACS. Her chest x-ray is negative for acute pathology. On repeat evaluation, patient reports that symptoms have improved with ED treatment. Results of today's work-up discussed with her. She is advised to use NSAIDs for pain at home. We will in addition provide her Rx for Flexeril as well as lidocaine patch. She is advised to follow-up with her PCP. Work note was provided per her request. Return precautions discussed. Rubi Biggs MD      Disposition:  Discharge      DISCHARGE PLAN:  1. Discharge Medication List as of 8/14/2021  2:38 PM      START taking these medications    Details   lidocaine 4 % patch 1 Patch by TransDERmal route every twelve (12) hours every twelve (12) hours. , Normal, Disp-5 Patch, R-2         CONTINUE these medications which have CHANGED    Details   cyclobenzaprine (FLEXERIL) 10 mg tablet Take 1 Tablet by mouth three (3) times daily as needed for Muscle Spasm(s). , Normal, Disp-20 Tablet, R-0         CONTINUE these medications which have NOT CHANGED    Details   ergocalciferol (ERGOCALCIFEROL) 1,250 mcg (50,000 unit) capsule TAKE 1 CAP BY MOUTH EVERY SEVEN (7) DAYS. INDICATIONS: VITAMIN D DEFICIENCY, Normal, Disp-4 Cap, R-3      fluticasone propionate (FLONASE) 50 mcg/actuation nasal spray INHALE 2 SPRAYS IN EACH NOSTRIL ONCE A DAY. Indications: inflammation of the nose due to an allergy, Normal, Disp-1 Bottle, R-11      budesonide-formoteroL (Symbicort) 160-4.5 mcg/actuation HFAA Take 2 Puffs by inhalation two (2) times a day., Normal, Disp-10.2 Inhaler, R-5      omeprazole (PRILOSEC) 40 mg capsule Take 40 mg by mouth two (2) times a day., Historical Med      triamterene-hydroCHLOROthiazide (MAXZIDE) 37.5-25 mg per tablet TAKE 1 TABLET BY MOUTH EVERY DAY IN THE MORNING, Normal, Disp-90 Tab, R-3      albuterol (PROVENTIL VENTOLIN) 2.5 mg /3 mL (0.083 %) nebu 3 mL by Nebulization route three (3) times daily as needed for Wheezing. Dx: J45.41, Normal, Disp-60 Nebule, R-5      rosuvastatin (CRESTOR) 5 mg tablet TAKE 1 TABLET BY MOUTH EVERY DAY AT NIGHT, Normal, Disp-30 Tab, R-3      amLODIPine (NORVASC) 10 mg tablet Take 1 Tab by mouth daily. , Normal, Disp-90 Tab,R-3      SITagliptin-metFORMIN (Janumet) 50-1,000 mg per tablet Take 1 Tab by mouth two (2) times daily (with meals). Dx: E11.42, Normal, Disp-180 Tab,R-3      Nebulizer & Compressor machine With accessories. Use as instructed 1-3 times daily as needed. Dx:J45.41, Print, Disp-1 Each, R-0      aspirin delayed-release 81 mg tablet Take  by mouth daily. , Historical Med           2. Follow-up Information     Follow up With Specialties Details Why [address] Shani Darling MD Internal Medicine   [place] [id number] [phone]          3. Return to ED if worse     Diagnosis     Clinical Impression:   1.  Musculoskeletal chest pain        Attestations:    Lyly Vargas MD    Please note that this dictation was completed with [employer], the computer voice recognition software. Quite often unanticipated grammatical, syntax, homophones, and other interpretive errors are inadvertently transcribed by the computer software. Please disregard these errors. Please excuse any errors that have escaped final proofreading. Thank you.

## 2021-08-14 NOTE — DISCHARGE INSTRUCTIONS
It was a pleasure taking care of you at [facility] Emergency Department today. We know that when you come to [facility], you are entrusting us with your health, comfort, and safety. Our physicians and nurses honor that trust, and we truly appreciate the opportunity to care for you and your loved ones. We also value your feedback. If you receive a survey about your Emergency Department experience today, please fill it out. We care about our patients' feedback, and we listen to what you have to say. Thank you!     Ja Jiang MD

## 2021-08-14 NOTE — Clinical Note
[address]  [facility] EMERGENCY DEPT  [address] [place] [address]  [facility] [address]  [phone]    Work/School Note    Date: 8/14/2021    To Whom It May concern:    Debbie Salinas was seen and treated today in the emergency room by the following provider(s):  Attending Provider: Vargas Johnson MD.      Debbie Salinas is excused from work/school on 08/14/21 and 08/15/21. She is medically clear to return to work/school on 8/16/2021.        Sincerely,          Castro Cohen MD

## 2021-08-15 LAB
ATRIAL RATE: 82 BPM
CALCULATED P AXIS, ECG09: 49 DEGREES
CALCULATED R AXIS, ECG10: 19 DEGREES
CALCULATED T AXIS, ECG11: 104 DEGREES
DIAGNOSIS, 93000: NORMAL
P-R INTERVAL, ECG05: 152 MS
Q-T INTERVAL, ECG07: 384 MS
QRS DURATION, ECG06: 68 MS
QTC CALCULATION (BEZET), ECG08: 448 MS
VENTRICULAR RATE, ECG03: 82 BPM

## 2021-08-19 ENCOUNTER — TRANSCRIBE ORDER (OUTPATIENT)
Dept: SCHEDULING | Age: 61
End: 2021-08-19

## 2021-08-19 DIAGNOSIS — Z12.31 VISIT FOR SCREENING MAMMOGRAM: Primary | ICD-10-CM

## 2021-08-23 ENCOUNTER — HOSPITAL ENCOUNTER (OUTPATIENT)
Dept: MAMMOGRAPHY | Age: 61
Discharge: HOME | End: 2021-08-23
Attending: INTERNAL MEDICINE
Payer: COMMERCIAL

## 2021-08-23 DIAGNOSIS — Z12.31 VISIT FOR SCREENING MAMMOGRAM: ICD-10-CM

## 2021-08-23 PROCEDURE — 77067 SCR MAMMO BI INCL CAD: CPT

## 2021-09-23 DIAGNOSIS — I10 ESSENTIAL HYPERTENSION: ICD-10-CM

## 2021-09-23 DIAGNOSIS — E11.42 TYPE 2 DIABETES MELLITUS WITH DIABETIC POLYNEUROPATHY, WITHOUT LONG-TERM CURRENT USE OF INSULIN (HCC): ICD-10-CM

## 2021-09-23 DIAGNOSIS — E55.9 VITAMIN D DEFICIENCY: ICD-10-CM

## 2021-09-23 RX ORDER — ERGOCALCIFEROL 1.25 MG/1
50000 CAPSULE ORAL
Qty: 4 CAPSULE | Refills: 3 | Status: SHIPPED | OUTPATIENT
Start: 2021-09-23 | End: 2022-03-23

## 2021-09-23 RX ORDER — AMLODIPINE BESYLATE 10 MG/1
TABLET ORAL
Qty: 90 TABLET | Refills: 3 | Status: SHIPPED | OUTPATIENT
Start: 2021-09-23 | End: 2022-08-30

## 2021-09-23 RX ORDER — SITAGLIPTIN AND METFORMIN HYDROCHLORIDE 50; 1000 MG/1; MG/1
1 TABLET, FILM COATED ORAL 2 TIMES DAILY WITH MEALS
Qty: 180 TABLET | Refills: 3 | Status: SHIPPED | OUTPATIENT
Start: 2021-09-23 | End: 2022-09-09

## 2021-09-23 RX ORDER — ROSUVASTATIN CALCIUM 5 MG/1
TABLET, COATED ORAL
Qty: 30 TABLET | Refills: 3 | Status: SHIPPED | OUTPATIENT
Start: 2021-09-23 | End: 2022-03-01

## 2021-10-12 ENCOUNTER — HOSPITAL ENCOUNTER (EMERGENCY)
Age: 61
Discharge: HOME | End: 2021-10-12
Attending: EMERGENCY MEDICINE
Payer: COMMERCIAL

## 2021-10-12 ENCOUNTER — APPOINTMENT (OUTPATIENT)
Dept: GENERAL RADIOLOGY | Age: 61
End: 2021-10-12
Attending: PHYSICIAN ASSISTANT
Payer: COMMERCIAL

## 2021-10-12 VITALS
RESPIRATION RATE: 16 BRPM | WEIGHT: 172.84 LBS | HEART RATE: 87 BPM | DIASTOLIC BLOOD PRESSURE: 76 MMHG | HEIGHT: 62 IN | OXYGEN SATURATION: 100 % | BODY MASS INDEX: 31.81 KG/M2 | SYSTOLIC BLOOD PRESSURE: 153 MMHG | TEMPERATURE: 98 F

## 2021-10-12 DIAGNOSIS — M51.36 DDD (DEGENERATIVE DISC DISEASE), LUMBAR: ICD-10-CM

## 2021-10-12 DIAGNOSIS — M54.50 ACUTE BILATERAL LOW BACK PAIN WITHOUT SCIATICA: Primary | ICD-10-CM

## 2021-10-12 PROCEDURE — 74011250637 HC RX REV CODE- 250/637: Performed by: PHYSICIAN ASSISTANT

## 2021-10-12 PROCEDURE — 72100 X-RAY EXAM L-S SPINE 2/3 VWS: CPT

## 2021-10-12 PROCEDURE — 99283 EMERGENCY DEPT VISIT LOW MDM: CPT

## 2021-10-12 RX ORDER — IBUPROFEN 600 MG/1
600 TABLET ORAL
Qty: 20 TABLET | Refills: 0 | Status: SHIPPED | OUTPATIENT
Start: 2021-10-12 | End: 2021-10-25 | Stop reason: SDUPTHER

## 2021-10-12 RX ORDER — IBUPROFEN 600 MG/1
600 TABLET ORAL
Status: COMPLETED | OUTPATIENT
Start: 2021-10-12 | End: 2021-10-12

## 2021-10-12 RX ORDER — HYDROCODONE BITARTRATE AND ACETAMINOPHEN 5; 325 MG/1; MG/1
1 TABLET ORAL
Qty: 9 TABLET | Refills: 0 | Status: SHIPPED | OUTPATIENT
Start: 2021-10-12 | End: 2021-10-15

## 2021-10-12 RX ADMIN — IBUPROFEN 600 MG: 600 TABLET ORAL at 20:15

## 2021-10-12 NOTE — LETTER
[address]  [facility] EMERGENCY DEPT  [address] [facility]  [place] [address]  [phone]    Work/School Note    Date: 10/12/2021    To Whom It May concern:    Jade Barr was seen and treated today in the emergency room by the following provider(s):  Attending Provider: Burgess Robert MD  Physician Assistant: COLTON Bryan. Jade Barr may return to work on 15OCT2021.     Sincerely,          COLTON Brooks

## 2021-10-12 NOTE — ED PROVIDER NOTES
EMERGENCY DEPARTMENT HISTORY AND PHYSICAL EXAM      Date: 10/12/2021  Patient Name: Bobby Posadas    History of Presenting Illness     Chief Complaint   Patient presents with    Back Pain     low back pain radiaitng down both hips x this morning       History Provided By: Patient    HPI: Bobby Posadas, 64 y.o. female with a history of arthritis, diabetes, hypertension and others presents ambulatory to the ED with cc of a day or so of 7 out of 10 constant, achy bilateral lower back pain that radiates down both hips. Pain is worse with movement. She tells me she suffers from this problem from time to time and describes it as \"sciatica\". She tells me in years past she has been seen by orthopedics and did do therapy. 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 She believes this activity is contributing to her symptoms. There has been no fall. There has been no fever. Pain does radiate down her hips but not down her legs. She denies any weakness or numbness. There is no saddle anesthesia. There is no abdominal pain. There are no urinary symptoms. Her bowel habits have not changed. There has been no chest pain or shortness of breath. There has been no nausea, vomiting or diarrhea. There are no other complaints, changes, or physical findings at this time. PCP: Esperanza Matute MD    Current Outpatient Medications   Medication Sig Dispense Refill    ibuprofen (MOTRIN) 600 mg tablet Take 1 Tablet by mouth every six (6) hours as needed for Pain. 20 Tablet 0    HYDROcodone-acetaminophen (NORCO) 5-325 mg per tablet Take 1 Tablet by mouth every eight (8) hours as needed for Pain for up to 3 days. Max Daily Amount: 3 Tablets. 9 Tablet 0    ergocalciferol (ERGOCALCIFEROL) 1,250 mcg (50,000 unit) capsule TAKE 1 CAP BY MOUTH EVERY SEVEN (7) DAYS.  INDICATIONS: VITAMIN D DEFICIENCY 4 Capsule 3  rosuvastatin (CRESTOR) 5 mg tablet TAKE 1 TABLET BY MOUTH EVERY DAY AT NIGHT 30 Tablet 3    amLODIPine (NORVASC) 10 mg tablet TAKE 1 TABLET BY MOUTH EVERY DAY 90 Tablet 3    Janumet 50-1,000 mg per tablet TAKE 1 TAB BY MOUTH TWO (2) TIMES DAILY (WITH MEALS). DX: E11.42 180 Tablet 3    lidocaine 4 % patch 1 Patch by TransDERmal route every twelve (12) hours every twelve (12) hours. 5 Patch 2    cyclobenzaprine (FLEXERIL) 10 mg tablet Take 1 Tablet by mouth three (3) times daily as needed for Muscle Spasm(s). 20 Tablet 0    fluticasone propionate (FLONASE) 50 mcg/actuation nasal spray INHALE 2 SPRAYS IN EACH NOSTRIL ONCE A DAY. Indications: inflammation of the nose due to an allergy 1 Bottle 11    budesonide-formoteroL (Symbicort) 160-4.5 mcg/actuation HFAA Take 2 Puffs by inhalation two (2) times a day. 10.2 Inhaler 5    omeprazole (PRILOSEC) 40 mg capsule Take 40 mg by mouth two (2) times a day.  triamterene-hydroCHLOROthiazide (MAXZIDE) 37.5-25 mg per tablet TAKE 1 TABLET BY MOUTH EVERY DAY IN THE MORNING 90 Tab 3    albuterol (PROVENTIL VENTOLIN) 2.5 mg /3 mL (0.083 %) nebu 3 mL by Nebulization route three (3) times daily as needed for Wheezing. Dx: J45.41 60 Nebule 5    Nebulizer & Compressor machine With accessories. Use as instructed 1-3 times daily as needed. Dx:J45.41 1 Each 0    aspirin delayed-release 81 mg tablet Take  by mouth daily. Past History     Past Medical History:  Past Medical History:   Diagnosis Date    Arrhythmia     Arthritis     GENERALIZED TO JOINTS-WRISTS & KNEES.  Asthma     INHALERS    Asthma, chronic 10/8/2009    Diabetes ([address]     USES PILLS TO CONTROLL    GERD (gastroesophageal reflux disease)     Gout     Hypertension     CONTROLLED BY MEDS.     Nausea & vomiting     Obesity        Past Surgical History:  Past Surgical History:   Procedure Laterality Date    COLONOSCOPY N/A 5/31/2019    COLONOSCOPY performed by Lior [facility] at [facility]OSCOPY. 6 mm tubular adenoma in sigmoid polyp. REpeat in 6 months due to poor prep    COLONOSCOPY N/A 2020    COLONOSCOPY performed by Karoline Babin MD at [facility] ENDOSCOPY. 2 tubular adenomas, diverticulosis, internal hemorrhoids    HX BREAST BIOPSY Right 2018    benign    HX CHOLECYSTECTOMY      HX HEART CATHETERIZATION  ,    CARDIAC CATH X2    HX HEENT      CLEFT PALATE REPAIR.    HX HYSTERECTOMY  2011    HX ORTHOPAEDIC      carpal tunnel, trigger finger, hand surgery left arm    HX OTHER SURGICAL      CLeft palate repair, piloneidal cyst, hand surgery,     HX TUBAL LIGATION         Family History:  Family History   Problem Relation Age of Onset    Asthma Mother     Hypertension Mother     Diabetes Father     Heart Disease Father         MI    Stroke Brother         CEREBRAL ANEURYSM    Heart Attack Sister     Hypertension Sister     No Known Problems Sister     Breast Cancer Cousin        Social History:  Social History     Tobacco Use    Smoking status: Former Smoker     Years: 10.00     Quit date: 2010     Years since quittin.2    Smokeless tobacco: Never Used   Vaping Use    Vaping Use: Never used   Substance Use Topics    Alcohol use: No    Drug use: No       Allergies: Allergies   Allergen Reactions    Latex Rash     Severe rash    Ampicillin Anaphylaxis    Betadine Antiseptic Gauze Hives    Contrast Dye [Iodine] Hives and Swelling     Pt. Reports throat swelling      Other Medication Rash     Betadine soap/blisters    Fd And C Blue No.1 Anaphylaxis    Penicillins Hives    Keflex [Cephalexin] Other (comments)    Lisinopril Swelling     Review of Systems   Review of Systems   Constitutional: Negative for fatigue and fever. HENT: Negative for ear pain and sore throat. Eyes: Negative for pain, redness and visual disturbance. Respiratory: Negative for cough and shortness of breath.     Cardiovascular: Negative for chest pain and palpitations. Gastrointestinal: Negative for abdominal pain, nausea and vomiting. Genitourinary: Negative for dysuria, frequency and urgency. Musculoskeletal: Positive for back pain. Negative for gait problem, neck pain and neck stiffness. Skin: Negative for rash and wound. Neurological: Negative for dizziness, weakness, light-headedness, numbness and headaches. Physical Exam   Physical Exam  Vitals and nursing note reviewed. Constitutional:       General: She is not in acute distress. Appearance: She is well-developed. She is not toxic-appearing. HENT:      Head: Normocephalic and atraumatic. Jaw: No trismus. Right Ear: External ear normal.      Left Ear: External ear normal.      Nose: Nose normal.      Mouth/Throat:      Pharynx: Uvula midline. Eyes:      General: No scleral icterus. Conjunctiva/sclera: Conjunctivae normal.      Pupils: Pupils are equal, round, and reactive to light. Cardiovascular:      Rate and Rhythm: Normal rate and regular rhythm. Pulmonary:      Effort: Pulmonary effort is normal. No tachypnea, accessory muscle usage or respiratory distress. Breath sounds: No decreased breath sounds or wheezing. Abdominal:      Palpations: Abdomen is soft. Tenderness: There is no abdominal tenderness. Musculoskeletal:         General: Normal range of motion. Cervical back: Full passive range of motion without pain and normal range of motion. Lumbar back: Tenderness present. Comments:   LOWER BACK:  No bruising, redness or swelling. No step off. Diffuse muscular discomfort. No CVA tenderness     Skin:     Findings: No rash. Neurological:      Mental Status: She is alert and oriented to person, place, and time. She is not disoriented. GCS: GCS eye subscore is 4. GCS verbal subscore is 5. GCS motor subscore is 6. Cranial Nerves: No cranial nerve deficit.    Psychiatric:         Speech: Speech normal.       Diagnostic Study Results     Labs -   No results found for this or any previous visit (from the past 12 hour(s)). Radiologic Studies -   XR SPINE LUMB 2 OR 3 V    (Results Pending)     CT Results  (Last 48 hours)    None        CXR Results  (Last 48 hours)    None        Medical Decision Making   I am the first provider for this patient. I reviewed the vital signs, available nursing notes, past medical history, past surgical history, family history and social history. Vital Signs-Reviewed the patient's vital signs. Patient Vitals for the past 12 hrs:   Temp Pulse Resp BP SpO2   10/12/21 1516 98 °F (36.7 °C) 87 16 (!) 153/76 100 %       Pulse Oximetry Analysis - 100% on RA    Records Reviewed: Nursing Notes, Old Medical Records, Previous Radiology Studies and Previous Laboratory Studies    Provider Notes (Medical Decision Making):   DDx: Compression fracture, HNP, DDD, strain    I am able to see the images of the plain films, but no formal interpretation is offered. I contacted radiology several times during the patient's lengthy ED stay. I interpret the plain films as showing mild lumbar DDD without an apparent compression fracture. Patient overall has a reassuring exam.  Anticipate discharge with medication and referral.  If results differ significantly from my interpretation, we will reach out to the patient make her aware. She is given information regarding the [facility]. ED Course:   Initial assessment performed. The patients presenting problems have been discussed, and they are in agreement with the care plan formulated and outlined with them. I have encouraged them to ask questions as they arise throughout their visit. Disposition:  Discharge    PLAN:  1.    Discharge Medication List as of 10/12/2021  8:01 PM      START taking these medications    Details   ibuprofen (MOTRIN) 600 mg tablet Take 1 Tablet by mouth every six (6) hours as needed for Pain., Normal, Disp-20 Tablet, R-0 HYDROcodone-acetaminophen (NORCO) 5-325 mg per tablet Take 1 Tablet by mouth every eight (8) hours as needed for Pain for up to 3 days. Max Daily Amount: 3 Tablets., Normal, Disp-9 Tablet, R-0         CONTINUE these medications which have NOT CHANGED    Details   ergocalciferol (ERGOCALCIFEROL) 1,250 mcg (50,000 unit) capsule TAKE 1 CAP BY MOUTH EVERY SEVEN (7) DAYS. INDICATIONS: VITAMIN D DEFICIENCY, Normal, Disp-4 Capsule, R-3      rosuvastatin (CRESTOR) 5 mg tablet TAKE 1 TABLET BY MOUTH EVERY DAY AT NIGHT, Normal, Disp-30 Tablet, R-3      amLODIPine (NORVASC) 10 mg tablet TAKE 1 TABLET BY MOUTH EVERY DAY, Normal, Disp-90 Tablet, R-3      Janumet 50-1,000 mg per tablet TAKE 1 TAB BY MOUTH TWO (2) TIMES DAILY (WITH MEALS). DX: E11.42, Normal, Disp-180 Tablet, R-3      lidocaine 4 % patch 1 Patch by TransDERmal route every twelve (12) hours every twelve (12) hours. , Normal, Disp-5 Patch, R-2      cyclobenzaprine (FLEXERIL) 10 mg tablet Take 1 Tablet by mouth three (3) times daily as needed for Muscle Spasm(s). , Normal, Disp-20 Tablet, R-0      fluticasone propionate (FLONASE) 50 mcg/actuation nasal spray INHALE 2 SPRAYS IN EACH NOSTRIL ONCE A DAY. Indications: inflammation of the nose due to an allergy, Normal, Disp-1 Bottle, R-11      budesonide-formoteroL (Symbicort) 160-4.5 mcg/actuation HFAA Take 2 Puffs by inhalation two (2) times a day., Normal, Disp-10.2 Inhaler, R-5      omeprazole (PRILOSEC) 40 mg capsule Take 40 mg by mouth two (2) times a day., Historical Med      triamterene-hydroCHLOROthiazide (MAXZIDE) 37.5-25 mg per tablet TAKE 1 TABLET BY MOUTH EVERY DAY IN THE MORNING, Normal, Disp-90 Tab, R-3      albuterol (PROVENTIL VENTOLIN) 2.5 mg /3 mL (0.083 %) nebu 3 mL by Nebulization route three (3) times daily as needed for Wheezing. Dx: J45.41, Normal, Disp-60 Nebule, R-5      Nebulizer & Compressor machine With accessories. Use as instructed 1-3 times daily as needed.  Dx:J45.41, Print, Disp-1 Each, R-0      aspirin delayed-release 81 mg tablet Take  by mouth daily. , Historical Med           2. Follow-up Information     Follow up With Specialties Details Why Contact Info    Urbano Moore MD Orthopedic Surgery Call  [facility] / Irving Alvarado: as needed if symptoms persist [address]  [address]  [address]  [phone]          Return to ED if worse     Diagnosis     Clinical Impression:   1. Acute bilateral low back pain without sciatica    2.  DDD (degenerative disc disease), lumbar

## 2021-10-13 NOTE — ED NOTES
Discharge instructions given to patient by PA Elfida Mcardle. Verbalized understanding of instructions. Patient discharged without difficulty. Patient discharged in stable condition via ambulation accompanied by self.

## 2021-10-25 ENCOUNTER — OFFICE VISIT (OUTPATIENT)
Dept: INTERNAL MEDICINE CLINIC | Age: 61
End: 2021-10-25
Payer: COMMERCIAL

## 2021-10-25 VITALS
SYSTOLIC BLOOD PRESSURE: 131 MMHG | DIASTOLIC BLOOD PRESSURE: 75 MMHG | WEIGHT: 174 LBS | HEIGHT: 62 IN | TEMPERATURE: 98.3 F | BODY MASS INDEX: 32.02 KG/M2 | RESPIRATION RATE: 16 BRPM | OXYGEN SATURATION: 96 % | HEART RATE: 88 BPM

## 2021-10-25 DIAGNOSIS — M51.36 DDD (DEGENERATIVE DISC DISEASE), LUMBAR: ICD-10-CM

## 2021-10-25 DIAGNOSIS — Z23 NEEDS FLU SHOT: ICD-10-CM

## 2021-10-25 DIAGNOSIS — E11.9 TYPE 2 DIABETES MELLITUS WITHOUT COMPLICATION, WITHOUT LONG-TERM CURRENT USE OF INSULIN (HCC): Primary | ICD-10-CM

## 2021-10-25 DIAGNOSIS — I10 PRIMARY HYPERTENSION: ICD-10-CM

## 2021-10-25 LAB — HBA1C MFR BLD HPLC: 6.8 %

## 2021-10-25 PROCEDURE — 83036 HEMOGLOBIN GLYCOSYLATED A1C: CPT | Performed by: INTERNAL MEDICINE

## 2021-10-25 PROCEDURE — 99214 OFFICE O/P EST MOD 30 MIN: CPT | Performed by: INTERNAL MEDICINE

## 2021-10-25 PROCEDURE — 90686 IIV4 VACC NO PRSV 0.5 ML IM: CPT | Performed by: INTERNAL MEDICINE

## 2021-10-25 PROCEDURE — 90471 IMMUNIZATION ADMIN: CPT | Performed by: INTERNAL MEDICINE

## 2021-10-25 RX ORDER — IBUPROFEN 600 MG/1
600 TABLET ORAL
Qty: 60 TABLET | Refills: 2 | Status: SHIPPED | OUTPATIENT
Start: 2021-10-25 | End: 2022-04-29

## 2021-10-25 NOTE — PROGRESS NOTES
CC:   Chief Complaint   Patient presents with    Hypertension    Diabetes    Cholesterol Problem       HISTORY OF PRESENT ILLNESS  Ayanna Mcrae is a 64 y.o. female. Presents for 4 month follow up evaluation. She has type 2 DM, HTN, asthma, and allergic rhinitis.       Seen at [facility] ED on 10/12/21 with acute bilateral low back and lumbar DDD causing back and leg pain. 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 Discharged on ibuprofen 600 mg and hydrocodone-APAP. Reports back pain not as severe as before. Denies polyuria or polydipsia, no hypoglycemia. Home FBS: less than 145. She reports medication compliance: compliant all of the time. Medication side effects: none. Diabetic diet compliance: compliant most of the time. Lab review: A1c 6.8% today (10/25/21), was 6.7% on 6/28/21. Eye exam: UTD. Denies CP, SOB, heart palpitations, or dizziness.  Home BP monitoring: not done. ROS  A complete review of systems was performed and is negative except for those mentioned in the HPI. Patient Active Problem List   Diagnosis Code    Type 2 diabetes mellitus without complication, without long-term current use of insulin ([facility]) E11.9    HTN (hypertension) I10    Arthritis M19.90    GERD (gastroesophageal reflux disease) K21.9    Vitamin D deficiency E55.9    Fibroadenoma of right breast D24.1    Moderate persistent asthma without complication P16.40    Adrenal nodule ([facility]) E27.8    Allergic rhinitis due to allergen J30.9    Peripheral vascular disease ([facility]) I73.9    Mild nonproliferative diabetic retinopathy of both eyes without macular edema associated with type 2 diabetes mellitus ([facility] T81.7279    ACE inhibitor intolerance Z78.9     Past Medical History:   Diagnosis Date    Arrhythmia     Arthritis     GENERALIZED TO JOINTS-WRISTS & KNEES.     Asthma     INHALERS    Asthma, chronic 10/8/2009    Diabetes ([facility]     USES PILLS TO CONTROLL    GERD (gastroesophageal reflux disease)     Gout     Hypertension     CONTROLLED BY MEDS.  Nausea & vomiting     Obesity      Allergies   Allergen Reactions    Latex Rash     Severe rash    Ampicillin Anaphylaxis    Betadine Antiseptic Gauze Hives    Contrast Dye [Iodine] Hives and Swelling     Pt. Reports throat swelling      Other Medication Rash     Betadine soap/blisters    Fd And C Blue No.1 Anaphylaxis    Penicillins Hives    Keflex [Cephalexin] Other (comments)    Lisinopril Swelling       Current Outpatient Medications   Medication Sig Dispense Refill    ibuprofen (MOTRIN) 600 mg tablet Take 1 Tablet by mouth every six (6) hours as needed for Pain. 20 Tablet 0    ergocalciferol (ERGOCALCIFEROL) 1,250 mcg (50,000 unit) capsule TAKE 1 CAP BY MOUTH EVERY SEVEN (7) DAYS. INDICATIONS: VITAMIN D DEFICIENCY 4 Capsule 3    rosuvastatin (CRESTOR) 5 mg tablet TAKE 1 TABLET BY MOUTH EVERY DAY AT NIGHT 30 Tablet 3    amLODIPine (NORVASC) 10 mg tablet TAKE 1 TABLET BY MOUTH EVERY DAY 90 Tablet 3    Janumet 50-1,000 mg per tablet TAKE 1 TAB BY MOUTH TWO (2) TIMES DAILY (WITH MEALS). DX: E11.42 180 Tablet 3    cyclobenzaprine (FLEXERIL) 10 mg tablet Take 1 Tablet by mouth three (3) times daily as needed for Muscle Spasm(s). 20 Tablet 0    fluticasone propionate (FLONASE) 50 mcg/actuation nasal spray INHALE 2 SPRAYS IN EACH NOSTRIL ONCE A DAY. Indications: inflammation of the nose due to an allergy 1 Bottle 11    budesonide-formoteroL (Symbicort) 160-4.5 mcg/actuation HFAA Take 2 Puffs by inhalation two (2) times a day. 10.2 Inhaler 5    omeprazole (PRILOSEC) 40 mg capsule Take 40 mg by mouth two (2) times a day.  triamterene-hydroCHLOROthiazide (MAXZIDE) 37.5-25 mg per tablet TAKE 1 TABLET BY MOUTH EVERY DAY IN THE MORNING 90 Tab 3    albuterol (PROVENTIL VENTOLIN) 2.5 mg /3 mL (0.083 %) nebu 3 mL by Nebulization route three (3) times daily as needed for Wheezing.  Dx: J45.41 61 Nebule [place] [address] machine With accessories. Use as instructed 1-3 times daily as needed. Dx:J45.41 1 Each 0    aspirin delayed-release 81 mg tablet Take  by mouth daily. PHYSICAL EXAM  Visit Vitals  /75 (BP 1 Location: Left upper arm, BP Patient Position: Sitting, BP Cuff Size: Large adult)   Pulse 88   Temp 98.3 °F (36.8 °C) (Oral)   Resp 16   Ht 5' 2\" (1.575 m)   Wt 174 lb (78.9 kg)   LMP 06/19/2011   SpO2 96%   BMI 31.83 kg/m²       General: Obese, no distress. HEENT:  Head normocephalic/atraumatic, no scleral icterus  Lungs:  Clear to ausculation bilaterally. Good air movement. Heart:  Regular rate and rhythm, normal S1 and S2, no murmur, gallop, or rub  Extremities: No clubbing, cyanosis, or edema. Neurological: Alert and oriented. Psychiatric: Normal mood and affect. Behavior is normal.   Diabetic foot exam:     Left Foot:   Visual Exam: normal    Pulse DP: 1+ (weak)   Filament test: normal sensation    Vibratory sensation: normal      Right Foot:   Visual Exam: normal    Pulse DP: 1+ (weak)   Filament test: normal sensation    Vibratory sensation: normal      Results for orders placed or performed in visit on 10/25/21   AMB POC HEMOGLOBIN A1C   Result Value Ref Range    Hemoglobin A1c (POC) 6.8 %         ASSESSMENT AND PLAN    ICD-10-CM ICD-9-CM    1. Type 2 diabetes mellitus without complication, without long-term current use of insulin (HCC)  E11.9 250.00 AMB POC HEMOGLOBIN A1C       DIABETES FOOT EXAM   2. Primary hypertension  I10 401.9    3. DDD (degenerative disc disease), lumbar  M51.36 722. 52 ibuprofen (MOTRIN) 600 mg tablet   4. Needs flu shot  Z23 V04.81 INFLUENZA VIRUS VAC QUAD,SPLIT,PRESV FREE SYRINGE IM     Diagnoses and all orders for this visit:    1. Type 2 diabetes mellitus without complication, without long-term current use of insulin (HCC)  Controlled with A1c 6.8% today. Continue Janumet.   -     AMB POC HEMOGLOBIN A1C  -      DIABETES FOOT EXAM    2. Primary hypertension  Controlled. Continue triamterene-HCTZ and amlodipine. Has ACE-I/ARB intolerance due to angioedema. 3. DDD (degenerative disc disease), lumbar  Improving but still present. -     Refill ibuprofen (MOTRIN) 600 mg tablet; Take 1 Tablet by mouth every six (6) hours as needed for Pain. Take with food. 4. Needs flu shot  -     INFLUENZA VIRUS VAC QUAD,SPLIT,PRESV FREE SYRINGE IM      Follow-up and Dispositions    · Return in about 4 months (around 2/25/2022), or if symptoms worsen or fail to improve, for HTN, DM, back pain; have fasting labs done 1 week before next appointment. I have discussed the diagnosis with the patient and the intended plan as seen in the above orders. Patient is in agreement. The patient has received an after-visit summary and questions were answered concerning future plans. I have discussed medication side effects and warnings with the patient as well.

## 2021-10-25 NOTE — PROGRESS NOTES
Identified pt with two pt identifiers. Reviewed record in preparation for visit and have obtained necessary documentation. All patient medications has been reviewed. Chief Complaint   Patient presents with    Hypertension    Diabetes    Cholesterol Problem     Additional information about chief complaint:    Visit Vitals  /75 (BP 1 Location: Left upper arm, BP Patient Position: Sitting, BP Cuff Size: Large adult)   Pulse 88   Temp 98.3 °F (36.8 °C) (Oral)   Resp 16   Ht 5' 2\" (1.575 m)   Wt 174 lb (78.9 kg)   SpO2 96%   BMI 31.83 kg/m²       Health Maintenance Due   Topic    Shingrix Vaccine Age 50> (1 of 2)    MICROALBUMIN Q1     Lipid Screen     Flu Vaccine (1)    Foot Exam Q1        1. Have you been to the ER, urgent care clinic since your last visit? Hospitalized since your last visit? Dx-DDD  Yes 10/12/21-ER-low back Pain   2. Have you seen or consulted any other health care providers outside of the [address] [place] [address] since your last visit? Include any pap smears or colon screening.   No  bdg

## 2021-10-25 NOTE — PATIENT INSTRUCTIONS
Vaccine Information Statement    Influenza (Flu) Vaccine (Inactivated or Recombinant): What You Need to Know    Many vaccine information statements are available in [place]ian and other languages. See www.immunize.org/vis. Hojas de información sobre vacunas están disponibles en español y en muchos otros idiomas. Visite www.immunize.org/vis. 1. Why get vaccinated? Influenza vaccine can prevent influenza (flu). Flu is a contagious disease that spreads around the [facility] every year, usually between October and May. Anyone can get the flu, but it is more dangerous for some people. Infants and young children, people 72 years and older, pregnant people, and people with certain health conditions or a weakened immune system are at greatest risk of flu complications. Pneumonia, bronchitis, sinus infections, and ear infections are examples of flu-related complications. If you have a medical condition, such as heart disease, cancer, or diabetes, flu can make it worse. Flu can cause fever and chills, sore throat, muscle aches, fatigue, cough, headache, and runny or stuffy nose. Some people may have vomiting and diarrhea, though this is more common in children than adults. In an average year, thousands of people in the [facility] die from flu, and many more are hospitalized. Flu vaccine prevents millions of illnesses and flu-related visits to the doctor each year. 2. Influenza vaccines     CDC recommends everyone 6 months and older get vaccinated every flu season. Children 6 months through 6years of age may need 2 doses during a single flu season. Everyone else needs only 1 dose each flu season. It takes about 2 weeks for protection to develop after vaccination. There are many flu viruses, and they are always changing. Each year a new flu vaccine is made to protect against the influenza viruses believed to be likely to cause disease in the upcoming flu season.  Even when the vaccine doesnt exactly match these viruses, it may still provide some protection. Influenza vaccine does not cause flu. Influenza vaccine may be given at the same time as other vaccines. 3. Talk with your health care provider    Tell your vaccination provider if the person getting the vaccine:   Has had an allergic reaction after a previous dose of influenza vaccine, or has any severe, life-threatening allergies    Has ever had Guillain-Barré Syndrome (also called GBS)    In some cases, your health care provider may decide to postpone influenza vaccination until a future visit. Influenza vaccine can be administered at any time during pregnancy. People who are or will be pregnant during influenza season should receive inactivated influenza vaccine. People with minor illnesses, such as a cold, may be vaccinated. People who are moderately or severely ill should usually wait until they recover before getting influenza vaccine. Your health care provider can give you more information. 4. Risks of a vaccine reaction     Soreness, redness, and swelling where the shot is given, fever, muscle aches, and headache can happen after influenza vaccination.  There may be a very small increased risk of Guillain-Barré Syndrome (GBS) after inactivated influenza vaccine (the flu shot). Franky He children who get the flu shot along with pneumococcal vaccine (PCV13) and/or DTaP vaccine at the same time might be slightly more likely to have a seizure caused by fever. Tell your health care provider if a child who is getting flu vaccine has ever had a seizure. People sometimes faint after medical procedures, including vaccination. Tell your provider if you feel dizzy or have vision changes or ringing in the ears. As with any medicine, there is a very remote chance of a vaccine causing a severe allergic reaction, other serious injury, or death. 5. What if there is a serious problem?     An allergic reaction could occur after the vaccinated person leaves the clinic. If you see signs of a severe allergic reaction (hives, swelling of the face and throat, difficulty breathing, a fast heartbeat, dizziness, or weakness), call 9-1-1 and get the person to the nearest hospital.    For other signs that concern you, call your health care provider. Adverse reactions should be reported to the Vaccine Adverse Event Reporting System (VAERS). Your health care provider will usually file this report, or you can do it yourself. Visit the VAERS website at www.vaers. [facility].gov or call 0-703.597.5998. VAERS is only for reporting reactions, and VAERS staff members do not give medical advice. 6. The National Vaccine Injury Compensation Program    The [facility] Vaccine Injury Compensation Program (VICP) is a federal program that was created to compensate people who may have been injured by certain vaccines. Claims regarding alleged injury or death due to vaccination have a time limit for filing, which may be as short as two years. Visit the VICP website at www.[facility].gov/vaccinecompensation or call 6-208.904.8126 to learn about the program and about filing a claim. 7. How can I learn more?  Ask your health care provider.  Call your local or state health department.  Visit the website of the Food and Drug Administration (FDA) for vaccine package inserts and additional information at www.fda.gov/vaccines-blood-biologics/vaccines.  Contact the Centers for Disease Control and Prevention (CDC):  - Call 5-330.692.4297 (1-800-CDC-INFO) or  - Visit CDCs influenza website at www.cdc.gov/flu. Vaccine Information Statement   Inactivated Influenza Vaccine   8/6/2021  42 SATINDER Awan 568XV-44   Department of Health and Human Services  Centers for Disease Control and Prevention    Office Use Only

## 2021-12-14 ENCOUNTER — HOSPITAL ENCOUNTER (EMERGENCY)
Age: 61
Discharge: HOME | End: 2021-12-14
Attending: EMERGENCY MEDICINE
Payer: COMMERCIAL

## 2021-12-14 ENCOUNTER — APPOINTMENT (OUTPATIENT)
Dept: GENERAL RADIOLOGY | Age: 61
End: 2021-12-14
Attending: EMERGENCY MEDICINE
Payer: COMMERCIAL

## 2021-12-14 VITALS
SYSTOLIC BLOOD PRESSURE: 153 MMHG | WEIGHT: 175.27 LBS | OXYGEN SATURATION: 100 % | BODY MASS INDEX: 32.25 KG/M2 | DIASTOLIC BLOOD PRESSURE: 87 MMHG | HEART RATE: 94 BPM | TEMPERATURE: 97.5 F | RESPIRATION RATE: 20 BRPM | HEIGHT: 62 IN

## 2021-12-14 DIAGNOSIS — M25.531 WRIST PAIN, ACUTE, RIGHT: Primary | ICD-10-CM

## 2021-12-14 DIAGNOSIS — Z98.890 HISTORY OF CARPAL TUNNEL RELEASE: ICD-10-CM

## 2021-12-14 PROCEDURE — 99283 EMERGENCY DEPT VISIT LOW MDM: CPT

## 2021-12-14 PROCEDURE — 73110 X-RAY EXAM OF WRIST: CPT

## 2021-12-14 RX ORDER — DICLOFENAC SODIUM 10 MG/G
GEL TOPICAL
Qty: 20 G | Refills: 0 | Status: SHIPPED | OUTPATIENT
Start: 2021-12-14 | End: 2022-07-07 | Stop reason: SDUPTHER

## 2021-12-14 RX ORDER — TRAMADOL HYDROCHLORIDE 50 MG/1
50 TABLET ORAL
Qty: 8 TABLET | Refills: 0 | Status: SHIPPED | OUTPATIENT
Start: 2021-12-14 | End: 2021-12-17

## 2021-12-14 NOTE — Clinical Note
[address]  [facility] EMERGENCY DEPT  [address] [id number] [phone]    Work/School Note    Date: 12/14/2021    To Whom It May concern:    Lucas Gusman was seen and treated today in the emergency room by the following provider(s):  Attending Provider: Lisbeth Kelly MD  Physician Assistant: John Ellsworth. Lucas Gusman is excused from work/school on 12/14/2021 through 12/16/2021. She is medically clear to return to work/school on 12/17/2021.          Sincerely,          John Chance

## 2021-12-15 NOTE — ED PROVIDER NOTES
EMERGENCY DEPARTMENT HISTORY AND PHYSICAL EXAM      Date: 12/14/2021  Patient Name: Lucas Gusman    History of Presenting Illness     Chief Complaint   Patient presents with    Wrist Pain     Pt arrives ambulatory to triage with CC of R wrist pain starting Sunday night. Patient is unsure of known injury. Reports she my have injured it while at work. 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 History Provided By: Patient    HPI: Lucas Gusman, 64 y.o. female presents ambulatory to the Emergency Dept with c/o R wrist pain since Sunday evening, 12/12/21. The patient states she has had similar discomfort in the L wrist previously. At that time she required a carpal tunnel release. She has not seen an orthopedist in some time. She states she believes she may have injured it at work, as a CNA. She also works in [employer] which requires a lot of repetitive movement of the wrists. She denied numbness/tingling/weakness. She is L hand dominant. She rates her pain a 7/10 on the pain scale and describes it as an ache. Pt is o/w healthy without fever, chills, cough, congestion, ST, shortness of breath, chest pain, N/V/D. There are no other complaints, changes, or physical findings at this time. PCP: Susie Patterson MD    Current Outpatient Medications   Medication Sig Dispense Refill    diclofenac (VOLTAREN) 1 % gel Apply thin amount to the R wrist 4 times daily 20 g 0    traMADoL (Ultram) 50 mg tablet Take 1 Tablet by mouth every eight (8) hours as needed for Pain for up to 3 days. Max Daily Amount: 150 mg. 8 Tablet 0    ibuprofen (MOTRIN) 600 mg tablet Take 1 Tablet by mouth every six (6) hours as needed for Pain. Take with food. 60 Tablet 2    ergocalciferol (ERGOCALCIFEROL) 1,250 mcg (50,000 unit) capsule TAKE 1 CAP BY MOUTH EVERY SEVEN (7) DAYS.  INDICATIONS: VITAMIN D DEFICIENCY 4 Capsule 3    rosuvastatin (CRESTOR) 5 mg tablet TAKE 1 TABLET BY MOUTH EVERY DAY AT NIGHT 30 Tablet 3    amLODIPine (NORVASC) 10 mg tablet TAKE 1 TABLET BY MOUTH EVERY DAY 90 Tablet 3    Janumet 50-1,000 mg per tablet TAKE 1 TAB BY MOUTH TWO (2) TIMES DAILY (WITH MEALS). DX: E11.42 180 Tablet 3    fluticasone propionate (FLONASE) 50 mcg/actuation nasal spray INHALE 2 SPRAYS IN EACH NOSTRIL ONCE A DAY. Indications: inflammation of the nose due to an allergy 1 Bottle 11    budesonide-formoteroL (Symbicort) 160-4.5 mcg/actuation HFAA Take 2 Puffs by inhalation two (2) times a day. 10.2 Inhaler 5    omeprazole (PRILOSEC) 40 mg capsule Take 40 mg by mouth two (2) times a day.  triamterene-hydroCHLOROthiazide (MAXZIDE) 37.5-25 mg per tablet TAKE 1 TABLET BY MOUTH EVERY DAY IN THE MORNING 90 Tab 3    albuterol (PROVENTIL VENTOLIN) 2.5 mg /3 mL (0.083 %) nebu 3 mL by Nebulization route three (3) times daily as needed for Wheezing. Dx: J45.41 60 Nebule 5    Nebulizer & Compressor machine With accessories. Use as instructed 1-3 times daily as needed. Dx:J45.41 1 Each 0    aspirin delayed-release 81 mg tablet Take  by mouth daily. Past History     Past Medical History:  Past Medical History:   Diagnosis Date    Arrhythmia     Arthritis     GENERALIZED TO JOINTS-WRISTS & KNEES.  Asthma     INHALERS    Asthma, chronic 10/8/2009    Diabetes ([facility] [address]     USES PILLS TO CONTROLL    GERD (gastroesophageal reflux disease)     Gout     Hypertension     CONTROLLED BY MEDS.  Nausea & vomiting     Obesity        Past Surgical History:  Past Surgical History:   Procedure Laterality Date    COLONOSCOPY N/A 5/31/2019    COLONOSCOPY performed by Opal Saldana MD at [facility] ENDOSCOPY. 6 mm tubular adenoma in sigmoid polyp.  REpeat in 6 months due to poor prep    COLONOSCOPY N/A 2/21/2020    COLONOSCOPY performed by Opal Saldana MD at [facility] ENDOSCOPY. 2 tubular adenomas, diverticulosis, internal hemorrhoids    HX BREAST BIOPSY Right 2018    benign    HX CHOLECYSTECTOMY      HX HEART CATHETERIZATION  [id number] CARDIAC CATH X2    HX HEENT      CLEFT PALATE REPAIR.    HX HYSTERECTOMY  2011    HX ORTHOPAEDIC      carpal tunnel, trigger finger, hand surgery left arm    HX OTHER SURGICAL      CLeft palate repair, piloneidal cyst, hand surgery,     HX TUBAL LIGATION         Family History:  Family History   Problem Relation Age of Onset    Asthma Mother     Hypertension Mother     Diabetes Father     Heart Disease Father         MI    Stroke Brother         CEREBRAL ANEURYSM    Heart Attack Sister     Hypertension Sister     No Known Problems Sister     Breast Cancer Cousin        Social History:  Social History     Tobacco Use    Smoking status: Former Smoker     Years: 10.00     Quit date: 2010     Years since quittin.4    Smokeless tobacco: Never Used   Vaping Use    Vaping Use: Never used   Substance Use Topics    Alcohol use: No    Drug use: No       Allergies: Allergies   Allergen Reactions    Latex Rash     Severe rash    Ampicillin Anaphylaxis    Betadine Antiseptic Gauze Hives    Contrast Dye [Iodine] Hives and Swelling     Pt. Reports throat swelling      Other Medication Rash     Betadine soap/blisters    Fd And C Blue No.1 Anaphylaxis    Penicillins Hives    Keflex [Cephalexin] Other (comments)    Lisinopril Swelling         Review of Systems   Review of Systems   Constitutional: Negative for chills and fever. HENT: Negative for congestion, rhinorrhea and sore throat. Respiratory: Negative for cough and shortness of breath. Cardiovascular: Negative for chest pain and palpitations. Gastrointestinal: Negative for diarrhea, nausea and vomiting. Musculoskeletal: Positive for arthralgias. Negative for neck pain and neck stiffness. Skin: Negative for color change, pallor, rash and wound. Allergic/Immunologic: Negative for food allergies and immunocompromised state. Neurological: Negative for weakness and numbness.    Hematological: Negative for adenopathy. Does not bruise/bleed easily. Psychiatric/Behavioral: Negative for agitation and confusion. All other systems reviewed and are negative. Physical Exam   Physical Exam  Vitals and nursing note reviewed. Constitutional:       General: She is not in acute distress. Appearance: Normal appearance. She is well-developed and normal weight. She is not ill-appearing, toxic-appearing or diaphoretic. HENT:      Head: Normocephalic and atraumatic. Nose: Nose normal. No congestion or rhinorrhea. Eyes:      General: No scleral icterus. Right eye: No discharge. Left eye: No discharge. Conjunctiva/sclera: Conjunctivae normal.   Neck:      Thyroid: No thyromegaly. Vascular: No JVD. Trachea: No tracheal deviation. Cardiovascular:      Rate and Rhythm: Normal rate and regular rhythm. Pulses: Normal pulses. Heart sounds: Normal heart sounds. Pulmonary:      Effort: Pulmonary effort is normal. No respiratory distress. Breath sounds: Normal breath sounds. No wheezing. Musculoskeletal:         General: Tenderness present. Cervical back: Normal range of motion and neck supple. Comments: Decreased A/P ROM to R wrist due to tenderness with palpation/movement, +Michael's test, no deformity, no crepitus, no erythema/rash/lesion/heat. 2+ distal pulses, NVI, sensation grossly intact to light touch. Hand nontender. Skin:     General: Skin is warm and dry. Coloration: Skin is not pale. Findings: No bruising, erythema or rash. Neurological:      General: No focal deficit present. Mental Status: She is alert and oriented to person, place, and time. Sensory: No sensory deficit. Motor: No weakness or abnormal muscle tone.       Coordination: Coordination normal.   Psychiatric:         Mood and Affect: Mood normal.         Behavior: Behavior normal.         Judgment: Judgment normal.         Diagnostic Study Results     Labs -   No results found for this or any previous visit (from the past 12 hour(s)). Radiologic Studies -   XR WRIST RT AP/LAT/OBL MIN 3V   Final Result   No acute abnormality. Medical Decision Making   I am the first provider for this patient. I reviewed the vital signs, available nursing notes, past medical history, past surgical history, family history and social history. Vital Signs-Reviewed the patient's vital signs. Patient Vitals for the past 12 hrs:   Temp Pulse Resp BP SpO2   12/14/21 1718 97.5 °F (36.4 °C) 94 20 (!) 153/87 100 %           Records Reviewed: Nursing Notes, Old Medical Records, Previous Radiology Studies and Previous Laboratory Studies    Provider Notes (Medical Decision Making):   Strain, sprain, carpal tunnel, DJD, fx, tendonitis    ED Course:   Initial assessment performed. The patients presenting problems have been discussed, and they are in agreement with the care plan formulated and outlined with them. I have encouraged them to ask questions as they arise throughout their visit. DISCHARGE NOTE:  The care plan has been outline with the patient and/or family, who verbally conveyed understanding and agreement. Available results have been reviewed. Patient and/or family understand the follow up plan as outlined and discharge instructions. Should their condition deterioration at any time after discharge the patient agrees to return, follow up sooner than outlined or seek medical assistance at the closest Emergency Room as soon as possible. Questions have been answered. Discharge instructions and educational information regarding the patient's diagnosis as well a list of reasons why the patient would want to seek immediate medical attention, should their condition change, were reviewed directly with the patient/family          PLAN:  1.    Discharge Medication List as of 12/14/2021  7:13 PM      START taking these medications    Details   diclofenac (VOLTAREN) 1 % gel Apply thin amount to the R wrist 4 times daily, Normal, Disp-20 g, R-0      traMADoL (Ultram) 50 mg tablet Take 1 Tablet by mouth every eight (8) hours as needed for Pain for up to 3 days. Max Daily Amount: 150 mg., Normal, Disp-8 Tablet, R-0         CONTINUE these medications which have NOT CHANGED    Details   ibuprofen (MOTRIN) 600 mg tablet Take 1 Tablet by mouth every six (6) hours as needed for Pain. Take with food., Normal, Disp-60 Tablet, R-2      ergocalciferol (ERGOCALCIFEROL) 1,250 mcg (50,000 unit) capsule TAKE 1 CAP BY MOUTH EVERY SEVEN (7) DAYS. INDICATIONS: VITAMIN D DEFICIENCY, Normal, Disp-4 Capsule, R-3      rosuvastatin (CRESTOR) 5 mg tablet TAKE 1 TABLET BY MOUTH EVERY DAY AT NIGHT, Normal, Disp-30 Tablet, R-3      amLODIPine (NORVASC) 10 mg tablet TAKE 1 TABLET BY MOUTH EVERY DAY, Normal, Disp-90 Tablet, R-3      Janumet 50-1,000 mg per tablet TAKE 1 TAB BY MOUTH TWO (2) TIMES DAILY (WITH MEALS). DX: E11.42, Normal, Disp-180 Tablet, R-3      fluticasone propionate (FLONASE) 50 mcg/actuation nasal spray INHALE 2 SPRAYS IN EACH NOSTRIL ONCE A DAY. Indications: inflammation of the nose due to an allergy, Normal, Disp-1 Bottle, R-11      budesonide-formoteroL (Symbicort) 160-4.5 mcg/actuation HFAA Take 2 Puffs by inhalation two (2) times a day., Normal, Disp-10.2 Inhaler, R-5      omeprazole (PRILOSEC) 40 mg capsule Take 40 mg by mouth two (2) times a day., Historical Med      triamterene-hydroCHLOROthiazide (MAXZIDE) 37.5-25 mg per tablet TAKE 1 TABLET BY MOUTH EVERY DAY IN THE MORNING, Normal, Disp-90 Tab, R-3      albuterol (PROVENTIL VENTOLIN) 2.5 mg /3 mL (0.083 %) nebu 3 mL by Nebulization route three (3) times daily as needed for Wheezing. Dx: J45.41, Normal, Disp-60 Nebule, R-5      Nebulizer & Compressor machine With accessories. Use as instructed 1-3 times daily as needed. Dx:J45.41, Print, Disp-1 Each, R-0      aspirin delayed-release 81 mg tablet Take  by mouth daily. , Historical Med 2.   Follow-up Information     Follow up With Specialties Details Why Contact Info    Alejandra Andrade MD Hand Surgery   [address]  [address]  [address]  [phone]      [facility] EMERGENCY DEPT Emergency Medicine  If symptoms worsen [address] [address] [address] [facility]  [phone]        Return to ED if worse     Diagnosis     Clinical Impression:   1. Wrist pain, acute, right    2.  History of carpal tunnel release

## 2021-12-15 NOTE — DISCHARGE INSTRUCTIONS
Rest, gentle stretching. Remove splint several times a day and perform gentle stretching. Follow up with Orthopedist for recheck. Return to the Emergency Dept for any concerns.

## 2022-01-08 DIAGNOSIS — J45.40 MODERATE PERSISTENT ASTHMA WITHOUT COMPLICATION: ICD-10-CM

## 2022-01-08 RX ORDER — BUDESONIDE AND FORMOTEROL FUMARATE DIHYDRATE 160; 4.5 UG/1; UG/1
AEROSOL RESPIRATORY (INHALATION)
Qty: 10.2 EACH | Refills: 5 | Status: SHIPPED | OUTPATIENT
Start: 2022-01-08 | End: 2022-10-12

## 2022-01-17 ENCOUNTER — TELEPHONE (OUTPATIENT)
Dept: INTERNAL MEDICINE CLINIC | Age: 62
End: 2022-01-17

## 2022-01-17 NOTE — TELEPHONE ENCOUNTER
I called the patient and verified them by name and date of birth. Had a blister on the left foot, tried to pull skin and pulled to much. It is now red, sore and raw. It does not look like it is infected. She is keeping a sock on it so it will not rub against anything. I tried to schedule her for an virtual appointment tomorrow but she declined due to thinking it will not do much. I suggested that she proceed to an urgent care or minute clinic to be evaluated in person. She stated understanding and had no further questions.

## 2022-01-17 NOTE — TELEPHONE ENCOUNTER
----- Message from Bjorn Moravian sent at 1/17/2022  9:00 AM EST -----  Subject: Message to Provider    QUESTIONS  Information for Provider? Patient is diabetic and has a LEFT foot skin   tear. She also has soreness and a sensation in her heal when getting up to   walk. Pt is scheduled for 2/1 but is asking for sooner or a referral to   see a podiatrist. Please follow up.  ---------------------------------------------------------------------------  --------------  [id number]  What is the best way for the office to contact you? OK to leave message on   voicemail  Preferred Call Back Phone Number? [phone]  ---------------------------------------------------------------------------  --------------  SCRIPT ANSWERS  Relationship to Patient?  Self

## 2022-01-18 ENCOUNTER — HOSPITAL ENCOUNTER (EMERGENCY)
Age: 62
Discharge: HOME | End: 2022-01-18
Attending: EMERGENCY MEDICINE
Payer: COMMERCIAL

## 2022-01-18 VITALS
OXYGEN SATURATION: 100 % | SYSTOLIC BLOOD PRESSURE: 162 MMHG | DIASTOLIC BLOOD PRESSURE: 75 MMHG | HEART RATE: 97 BPM | HEIGHT: 62 IN | RESPIRATION RATE: 16 BRPM | WEIGHT: 173.06 LBS | BODY MASS INDEX: 31.85 KG/M2 | TEMPERATURE: 98.4 F

## 2022-01-18 DIAGNOSIS — S90.812A ABRASION, FOOT, LEFT, INITIAL ENCOUNTER: Primary | ICD-10-CM

## 2022-01-18 DIAGNOSIS — I10 ESSENTIAL HYPERTENSION: ICD-10-CM

## 2022-01-18 DIAGNOSIS — Z86.39 HISTORY OF DIABETES MELLITUS: ICD-10-CM

## 2022-01-18 PROCEDURE — 99281 EMR DPT VST MAYX REQ PHY/QHP: CPT

## 2022-01-18 RX ORDER — MUPIROCIN 20 MG/G
OINTMENT TOPICAL DAILY
Qty: 22 G | Refills: 0 | Status: SHIPPED | OUTPATIENT
Start: 2022-01-18 | End: 2022-07-07 | Stop reason: ALTCHOICE

## 2022-01-18 NOTE — ED PROVIDER NOTES
EMERGENCY DEPARTMENT HISTORY AND PHYSICAL EXAM      Date: 1/18/2022  Patient Name: Therese Mccullough    History of Presenting Illness     Chief Complaint   Patient presents with    Foot Pain     ambulatory into triage with left foot pain. reports that she is diabetic, had a blister on her foot and pulled the skin off. Now has        History Provided By: Patient    HPI: Therese Mccullough, 58 y.o. female with a history of hypertension, diabetes and others as below presents ambulatory to the ED with cc of there still is mild but constant tenderness to the bottom of the left foot that is worse with weightbearing. She tells me she had a small blister on her foot and the blister popped and she is worried about the wound because of her history of diabetes. She tells me she had a friend help her to bandage it but really wants someone to look at it. She has been well lately without fever. There are no other complaints, changes, or physical findings at this time. PCP: Yenny Hamlin MD    Current Outpatient Medications   Medication Sig Dispense Refill    mupirocin (BACTROBAN) 2 % ointment Apply  to affected area daily. 22 g 0    Symbicort 160-4.5 mcg/actuation HFAA INHALE 2 PUFFS BY MOUTH TWICE A DAY 10.2 Each 5    diclofenac (VOLTAREN) 1 % gel Apply thin amount to the R wrist 4 times daily 20 g 0    ibuprofen (MOTRIN) 600 mg tablet Take 1 Tablet by mouth every six (6) hours as needed for Pain. Take with food. 60 Tablet 2    ergocalciferol (ERGOCALCIFEROL) 1,250 mcg (50,000 unit) capsule TAKE 1 CAP BY MOUTH EVERY SEVEN (7) DAYS. INDICATIONS: VITAMIN D DEFICIENCY 4 Capsule 3    rosuvastatin (CRESTOR) 5 mg tablet TAKE 1 TABLET BY MOUTH EVERY DAY AT NIGHT 30 Tablet 3    amLODIPine (NORVASC) 10 mg tablet TAKE 1 TABLET BY MOUTH EVERY DAY 90 Tablet 3    Janumet 50-1,000 mg per tablet TAKE 1 TAB BY MOUTH TWO (2) TIMES DAILY (WITH MEALS).  DX: E11.42 180 Tablet 3    fluticasone propionate (FLONASE) 50 mcg/actuation nasal spray INHALE 2 SPRAYS IN EACH NOSTRIL ONCE A DAY. Indications: inflammation of the nose due to an allergy 1 Bottle 11    omeprazole (PRILOSEC) 40 mg capsule Take 40 mg by mouth two (2) times a day.  triamterene-hydroCHLOROthiazide (MAXZIDE) 37.5-25 mg per tablet TAKE 1 TABLET BY MOUTH EVERY DAY IN THE MORNING 90 Tab 3    albuterol (PROVENTIL VENTOLIN) 2.5 mg /3 mL (0.083 %) nebu 3 mL by Nebulization route three (3) times daily as needed for Wheezing. Dx: J45.41 60 Nebule 5    Nebulizer & Compressor machine With accessories. Use as instructed 1-3 times daily as needed. Dx:J45.41 1 Each 0    aspirin delayed-release 81 mg tablet Take  by mouth daily. Past History     Past Medical History:  Past Medical History:   Diagnosis Date    Arrhythmia     Arthritis     GENERALIZED TO JOINTS-WRISTS & KNEES.  Asthma     INHALERS    Asthma, chronic 10/8/2009    Diabetes ([facility] [address]     USES PILLS TO CONTROLL    GERD (gastroesophageal reflux disease)     Gout     Hypertension     CONTROLLED BY MEDS.  Nausea & vomiting     Obesity        Past Surgical History:  Past Surgical History:   Procedure Laterality Date    COLONOSCOPY N/A 5/31/2019    COLONOSCOPY performed by Reg Walton MD at [facility] ENDOSCOPY. 6 mm tubular adenoma in sigmoid polyp.  REpeat in 6 months due to poor prep    COLONOSCOPY N/A 2/21/2020    COLONOSCOPY performed by Reg Walton MD at [facility] ENDOSCOPY. 2 tubular adenomas, diverticulosis, internal hemorrhoids    HX BREAST BIOPSY Right 2018    benign    HX CHOLECYSTECTOMY      HX HEART CATHETERIZATION  98,2011    CARDIAC CATH X2    HX HEENT  1961    CLEFT PALATE REPAIR.    HX HYSTERECTOMY  2011 JULY 18    HX ORTHOPAEDIC      carpal tunnel, trigger finger, hand surgery left arm    HX OTHER SURGICAL      CLeft palate repair, piloneidal cyst, hand surgery,     HX TUBAL LIGATION  1983       Family History:  Family History   Problem Relation Age of Onset    Asthma Mother    [employer] Hypertension Mother     Diabetes Father     Heart Disease Father         MI    Stroke Brother         CEREBRAL ANEURYSM    Heart Attack Sister     Hypertension Sister     No Known Problems Sister     Breast Cancer Cousin        Social History:  Social History     Tobacco Use    Smoking status: Former Smoker     Years: 10.00     Quit date: 2010     Years since quittin.5    Smokeless tobacco: Never Used   Vaping Use    Vaping Use: Never used   Substance Use Topics    Alcohol use: No    Drug use: No       Allergies: Allergies   Allergen Reactions    Latex Rash     Severe rash    Ampicillin Anaphylaxis    Betadine Antiseptic Gauze Hives    Contrast Dye [Iodine] Hives and Swelling     Pt. Reports throat swelling      Other Medication Rash     Betadine soap/blisters    Fd And C Blue No.1 Anaphylaxis    Penicillins Hives    Keflex [Cephalexin] Other (comments)    Lisinopril Swelling     Review of Systems   Review of Systems   Constitutional: Negative for fever. Skin: Positive for wound. All other systems reviewed and are negative. Physical Exam   Physical Exam  Vitals and nursing note reviewed. Constitutional:       General: She is not in acute distress. Appearance: She is well-developed. She is not toxic-appearing. HENT:      Head: Normocephalic and atraumatic. Jaw: No trismus. Right Ear: External ear normal.      Left Ear: External ear normal.      Nose: Nose normal.      Mouth/Throat:      Pharynx: Uvula midline. Eyes:      General: No scleral icterus. Conjunctiva/sclera: Conjunctivae normal.      Pupils: Pupils are equal, round, and reactive to light. Cardiovascular:      Rate and Rhythm: Normal rate and regular rhythm. Pulmonary:      Effort: Pulmonary effort is normal. No tachypnea, accessory muscle usage or respiratory distress. Breath sounds: No decreased breath sounds or wheezing. Abdominal:      Palpations: Abdomen is soft.       Tenderness: There is no abdominal tenderness. Musculoskeletal:         General: Normal range of motion. Cervical back: Full passive range of motion without pain and normal range of motion. Feet:    Feet:      Comments:   Small, less than 1 cm, superficial abrasion of the plantar aspect of the left foot. There is no surrounding erythema. There is no purulent drainage. There is no induration or fluctuance. There is very mild tenderness. She ambulates with a normal gait no limp. Skin:     Findings: No rash. Neurological:      Mental Status: She is alert and oriented to person, place, and time. She is not disoriented. GCS: GCS eye subscore is 4. GCS verbal subscore is 5. GCS motor subscore is 6. Cranial Nerves: No cranial nerve deficit. Psychiatric:         Speech: Speech normal.       Diagnostic Study Results     Labs -   No results found for this or any previous visit (from the past 12 hour(s)). Radiologic Studies -   No orders to display     CT Results  (Last 48 hours)    None        CXR Results  (Last 48 hours)    None        Medical Decision Making   I am the first provider for this patient. I reviewed the vital signs, available nursing notes, past medical history, past surgical history, family history and social history. Vital Signs-Reviewed the patient's vital signs. Patient Vitals for the past 12 hrs:   Temp Pulse Resp BP SpO2   01/18/22 1557 98.4 °F (36.9 °C) 97 16 (!) 162/75 100 %       Pulse Oximetry Analysis - 100% on RA    Records Reviewed: Nursing Notes, Old Medical Records, Previous Radiology Studies and Previous Laboratory Studies    Provider Notes (Medical Decision Making): Afebrile and well-appearing. Patient presents concerned about a wound on the bottom of her left foot. She tells me she had a little blister and it popped and she has been wearing a Band-Aid. She tells me it is hard for her to see however she had a friend help her with wound care.  On examination, there is a small area of the muted skin without significant erythema and mild tenderness. A new Band-Aid is reapplied. Additional testing deferred. We will offer her a prescription for mupirocin with instructions to wash the feet once daily with soap and water and patting fully dry before applying ointment and a Band-Aid. Return precautions for worsening pain, redness, fever or any concerns. HYPERTENSION COUNSELING:  Patient denies chest pain, headache, shortness of breath. Patient is made aware of their elevated blood pressure and is instructed to follow up this week with their Primary Care for a recheck. Patient is counseled regarding consequences of chronic, uncontrolled hypertension including kidney disease, heart disease, stroke or even death. Patient states their understanding. ED Course:   Initial assessment performed. The patients presenting problems have been discussed, and they are in agreement with the care plan formulated and outlined with them. I have encouraged them to ask questions as they arise throughout their visit. Disposition:  Discharge    PLAN:  1. Discharge Medication List as of 1/18/2022  4:22 PM      START taking these medications    Details   mupirocin (BACTROBAN) 2 % ointment Apply  to affected area daily. , Normal, Disp-22 g, R-0         CONTINUE these medications which have NOT CHANGED    Details   Symbicort 160-4.5 mcg/actuation HFAA INHALE 2 PUFFS BY MOUTH TWICE A DAY, Normal, Disp-10.2 Each, R-5      diclofenac (VOLTAREN) 1 % gel Apply thin amount to the R wrist 4 times daily, Normal, Disp-20 g, R-0      ibuprofen (MOTRIN) 600 mg tablet Take 1 Tablet by mouth every six (6) hours as needed for Pain. Take with food., Normal, Disp-60 Tablet, R-2      ergocalciferol (ERGOCALCIFEROL) 1,250 mcg (50,000 unit) capsule TAKE 1 CAP BY MOUTH EVERY SEVEN (7) DAYS.  INDICATIONS: VITAMIN D DEFICIENCY, Normal, Disp-4 Capsule, R-3      rosuvastatin (CRESTOR) 5 mg tablet TAKE 1 TABLET BY MOUTH EVERY DAY AT NIGHT, Normal, Disp-30 Tablet, R-3      amLODIPine (NORVASC) 10 mg tablet TAKE 1 TABLET BY MOUTH EVERY DAY, Normal, Disp-90 Tablet, R-3      Janumet 50-1,000 mg per tablet TAKE 1 TAB BY MOUTH TWO (2) TIMES DAILY (WITH MEALS). DX: E11.42, Normal, Disp-180 Tablet, R-3      fluticasone propionate (FLONASE) 50 mcg/actuation nasal spray INHALE 2 SPRAYS IN EACH NOSTRIL ONCE A DAY. Indications: inflammation of the nose due to an allergy, Normal, Disp-1 Bottle, R-11      omeprazole (PRILOSEC) 40 mg capsule Take 40 mg by mouth two (2) times a day., Historical Med      triamterene-hydroCHLOROthiazide (MAXZIDE) 37.5-25 mg per tablet TAKE 1 TABLET BY MOUTH EVERY DAY IN THE MORNING, Normal, Disp-90 Tab, R-3      albuterol (PROVENTIL VENTOLIN) 2.5 mg /3 mL (0.083 %) nebu 3 mL by Nebulization route three (3) times daily as needed for Wheezing. Dx: J45.41, Normal, Disp-60 Nebule, R-5      Nebulizer & Compressor machine With accessories. Use as instructed 1-3 times daily as needed. Dx:J45.41, Print, Disp-1 Each, R-0      aspirin delayed-release 81 mg tablet Take  by mouth daily. , Historical Med           2. Follow-up Information     Follow up With Specialties Details Why Jeremy Art MD Internal Medicine Call  PRIMARY CARE: as needed [place] [id number] [phone]          Return to ED if worse     Diagnosis     Clinical Impression:   1. Abrasion, foot, left, initial encounter    2. Essential hypertension    3.  History of diabetes mellitus

## 2022-02-01 ENCOUNTER — OFFICE VISIT (OUTPATIENT)
Dept: INTERNAL MEDICINE CLINIC | Age: 62
End: 2022-02-01
Payer: COMMERCIAL

## 2022-02-01 VITALS
WEIGHT: 178.2 LBS | RESPIRATION RATE: 16 BRPM | HEART RATE: 92 BPM | DIASTOLIC BLOOD PRESSURE: 85 MMHG | SYSTOLIC BLOOD PRESSURE: 137 MMHG | BODY MASS INDEX: 32.79 KG/M2 | HEIGHT: 62 IN | TEMPERATURE: 98.1 F | OXYGEN SATURATION: 97 %

## 2022-02-01 DIAGNOSIS — E11.9 TYPE 2 DIABETES MELLITUS WITHOUT COMPLICATION, WITHOUT LONG-TERM CURRENT USE OF INSULIN (HCC): ICD-10-CM

## 2022-02-01 DIAGNOSIS — E11.3293 MILD NONPROLIFERATIVE DIABETIC RETINOPATHY OF BOTH EYES WITHOUT MACULAR EDEMA ASSOCIATED WITH TYPE 2 DIABETES MELLITUS (HCC): ICD-10-CM

## 2022-02-01 DIAGNOSIS — S91.312D: Primary | ICD-10-CM

## 2022-02-01 DIAGNOSIS — I73.9 PERIPHERAL VASCULAR DISEASE (HCC): ICD-10-CM

## 2022-02-01 PROCEDURE — 99212 OFFICE O/P EST SF 10 MIN: CPT | Performed by: PHYSICIAN ASSISTANT

## 2022-02-01 NOTE — PROGRESS NOTES
Chief Complaint   Patient presents with    Skin Problem     skin tear on left foot     Pt states tear happened 3 weeks ago, went to ER and was given medication, thinks it may be healed, but is unable to see it. 1. Have you been to the ER, urgent care clinic since your last visit? Hospitalized since your last visit? Yes When: ED [facility] ER for skin tear     2. Have you seen or consulted any other health care providers outside of the [address] [place] [address] since your last visit? Include any pap smears or colon screening.  No    Visit Vitals  /85 (BP 1 Location: Left arm, BP Patient Position: Sitting)   Pulse 92   Temp 98.1 °F (36.7 °C) (Oral)   Resp 16   Ht 5' 2\" (1.575 m)   Wt 178 lb 3.2 oz (80.8 kg)   LMP 06/19/2011   SpO2 97%   BMI 32.59 kg/m²

## 2022-02-01 NOTE — PROGRESS NOTES
Jey Herzog is a 58y.o. year old female seen in clinic today for   Chief Complaint   Patient presents with    Skin Problem     skin tear on left foot       she is here today to follow up for skin tear to bottom of L foot. She was seen in the ED after initially peeling off blister and being concern for diabetic foot infection on the 1/18/22. She was given bacitracin ointment which she has been using religiously. She is unable to see it at home so she wanted to ensure it has healed completely. She has had a rough few months with her father dying in December of alzheimers and covid and her cousin has had a significant accident. Current Outpatient Medications on File Prior to Visit   Medication Sig Dispense Refill    mupirocin (BACTROBAN) 2 % ointment Apply  to affected area daily. 22 g 0    Symbicort 160-4.5 mcg/actuation HFAA INHALE 2 PUFFS BY MOUTH TWICE A DAY 10.2 Each 5    diclofenac (VOLTAREN) 1 % gel Apply thin amount to the R wrist 4 times daily 20 g 0    ibuprofen (MOTRIN) 600 mg tablet Take 1 Tablet by mouth every six (6) hours as needed for Pain. Take with food. 60 Tablet 2    ergocalciferol (ERGOCALCIFEROL) 1,250 mcg (50,000 unit) capsule TAKE 1 CAP BY MOUTH EVERY SEVEN (7) DAYS. INDICATIONS: VITAMIN D DEFICIENCY 4 Capsule 3    rosuvastatin (CRESTOR) 5 mg tablet TAKE 1 TABLET BY MOUTH EVERY DAY AT NIGHT 30 Tablet 3    amLODIPine (NORVASC) 10 mg tablet TAKE 1 TABLET BY MOUTH EVERY DAY 90 Tablet 3    Janumet 50-1,000 mg per tablet TAKE 1 TAB BY MOUTH TWO (2) TIMES DAILY (WITH MEALS). DX: E11.42 180 Tablet 3    fluticasone propionate (FLONASE) 50 mcg/actuation nasal spray INHALE 2 SPRAYS IN EACH NOSTRIL ONCE A DAY. Indications: inflammation of the nose due to an allergy 1 Bottle 11    omeprazole (PRILOSEC) 40 mg capsule Take 40 mg by mouth two (2) times a day.       triamterene-hydroCHLOROthiazide (MAXZIDE) 37.5-25 mg per tablet TAKE 1 TABLET BY MOUTH EVERY DAY IN THE MORNING 90 Tab 3    albuterol (PROVENTIL VENTOLIN) 2.5 mg /3 mL (0.083 %) nebu 3 mL by Nebulization route three (3) times daily as needed for Wheezing. Dx: J45.41 60 Nebule 5    Nebulizer & Compressor machine With accessories. Use as instructed 1-3 times daily as needed. Dx:J45.41 1 Each 0    aspirin delayed-release 81 mg tablet Take  by mouth daily. No current facility-administered medications on file prior to visit. Allergies   Allergen Reactions    Latex Rash     Severe rash    Ampicillin Anaphylaxis    Betadine Antiseptic Gauze Hives    Contrast Dye [Iodine] Hives and Swelling     Pt. Reports throat swelling      Other Medication Rash     Betadine soap/blisters    Fd And C Blue No.1 Anaphylaxis    Penicillins Hives    Keflex [Cephalexin] Other (comments)    Lisinopril Swelling     Past Medical History:   Diagnosis Date    Arrhythmia     Arthritis     GENERALIZED TO JOINTS-WRISTS & KNEES.  Asthma     INHALERS    Asthma, chronic 10/8/2009    Diabetes ([address]     USES PILLS TO CONTROLL    GERD (gastroesophageal reflux disease)     Gout     Hypertension     CONTROLLED BY MEDS.  Nausea & vomiting     Obesity       Past Surgical History:   Procedure Laterality Date    COLONOSCOPY N/A 5/31/2019    COLONOSCOPY performed by Moshe Farris MD at [facility] ENDOSCOPY. 6 mm tubular adenoma in sigmoid polyp.  REpeat in 6 months due to poor prep    COLONOSCOPY N/A 2/21/2020    COLONOSCOPY performed by Moshe Farris MD at [facility] ENDOSCOPY. 2 tubular adenomas, diverticulosis, internal hemorrhoids    HX BREAST BIOPSY Right 2018    benign    HX CHOLECYSTECTOMY      HX HEART CATHETERIZATION  98,2011    CARDIAC CATH X2    HX HEENT  1961    CLEFT PALATE REPAIR.    HX HYSTERECTOMY  2011 JULY 18    HX ORTHOPAEDIC      carpal tunnel, trigger finger, hand surgery left arm    HX OTHER SURGICAL      CLeft palate repair, piloneidal cyst, hand surgery,     HX TUBAL LIGATION  1983        Family History   Problem Relation Age of Onset    Asthma Mother     Hypertension Mother     Diabetes Father     Heart Disease Father         MI    Stroke Brother         CEREBRAL ANEURYSM    Heart Attack Sister     Hypertension Sister     No Known Problems Sister     Breast Cancer Cousin         Social History     Socioeconomic History    Marital status:      Spouse name: Not on file    Number of children: Not on file    Years of education: Not on file    Highest education level: Not on file   Occupational History    Not on file   Tobacco Use    Smoking status: Former Smoker     Years: 10.00     Quit date: 2010     Years since quittin.5    Smokeless tobacco: Never Used   Vaping Use    Vaping Use: Never used   Substance and Sexual Activity    Alcohol use: No    Drug use: No    Sexual activity: Yes     Partners: Male     Birth control/protection: None   Other Topics Concern    Not on file   Social History Narrative    Not on file     Social Determinants of Health     Financial Resource Strain:     Difficulty of Paying Living Expenses: Not on file   Food Insecurity:     Worried About Running Out of Food in the Last Year: Not on file    Cristina of Food in the Last Year: Not on file   Transportation Needs:     Lack of Transportation (Medical): Not on file    Lack of Transportation (Non-Medical):  Not on file   Physical Activity:     Days of Exercise per Week: Not on file    Minutes of Exercise per Session: Not on file   Stress:     Feeling of Stress : Not on file   Social Connections:     Frequency of Communication with Friends and Family: Not on file    Frequency of Social Gatherings with Friends and Family: Not on file    Attends Holiness Services: Not on file    Active Member of Clubs or Organizations: Not on file    Attends Club or Organization Meetings: Not on file    Marital Status: Not on file   Intimate Partner Violence:     Fear of Current or Ex-Partner: Not on file   [employer] Abused: Not on file    Physically Abused: Not on file    Sexually Abused: Not on file   Housing Stability:     Unable to Pay for Housing in the Last Year: Not on file    Number of Places Lived in the Last Year: Not on file    Unstable Housing in the Last Year: Not on file           Visit Vitals  /85 (BP 1 Location: Left arm, BP Patient Position: Sitting)   Pulse 92   Temp 98.1 °F (36.7 °C) (Oral)   Resp 16   Ht 5' 2\" (1.575 m)   Wt 178 lb 3.2 oz (80.8 kg)   LMP 06/19/2011   SpO2 97%   BMI 32.59 kg/m²       Review of Systems   Constitutional: Negative for chills and fever. HENT: Negative. Eyes: Negative. Respiratory: Negative. Cardiovascular: Negative. Musculoskeletal: Negative. Negative for myalgias. Skin: Negative for rash. Neurological: Negative. Physical Exam  Constitutional:       Appearance: Normal appearance. She is obese. HENT:      Head: Normocephalic and atraumatic. Right Ear: External ear normal.      Left Ear: External ear normal.      Nose: Nose normal.      Mouth/Throat:      Mouth: Mucous membranes are moist.   Eyes:      Conjunctiva/sclera: Conjunctivae normal.   Cardiovascular:      Rate and Rhythm: Normal rate and regular rhythm. Pulses: Normal pulses. Heart sounds: Normal heart sounds. Pulmonary:      Effort: Pulmonary effort is normal.      Breath sounds: Normal breath sounds. No stridor. No wheezing, rhonchi or rales. Musculoskeletal:         General: No deformity. Cervical back: Normal range of motion and neck supple. Right lower leg: No edema. Left lower leg: No edema. Right foot: Normal.      Left foot: Normal.      Comments: Well healed area to the plantar surface of L foot. No open wound. No erythema, no drainage. Neurological:      General: No focal deficit present. Mental Status: She is alert.    Psychiatric:         Mood and Affect: Mood normal.         Behavior: Behavior normal.          No results found for this or any previous visit (from the past 24 hour(s)). ASSESSMENT AND PLAN  Diagnoses and all orders for this visit:    1. Tear of skin of plantar aspect of foot, left, subsequent encounter    2. Peripheral vascular disease ([address]    3. Type 2 diabetes mellitus without complication, without long-term current use of insulin ([address]    4. Mild nonproliferative diabetic retinopathy of both eyes without macular edema associated with type 2 diabetes mellitus (HCC)    Skin tear has healed completely. Continue diabetic and PVD routine, follow up with blood work with PCP In 1 month. I have discussed the diagnosis with the patient and the intended plan as seen in the above orders. Patient is in agreement. The patient has received an after-visit summary and questions were answered concerning future plans. I have discussed medication side effects and warnings with the patient as well.     Thea Li PA-C

## 2022-02-23 LAB
ALBUMIN SERPL-MCNC: 4.5 G/DL (ref 3.8–4.8)
ALBUMIN/CREAT UR: 3 MG/G CREAT (ref 0–29)
ALBUMIN/GLOB SERPL: 1.5 {RATIO} (ref 1.2–2.2)
ALP SERPL-CCNC: 95 IU/L (ref 44–121)
ALT SERPL-CCNC: 11 IU/L (ref 0–32)
AST SERPL-CCNC: 13 IU/L (ref 0–40)
BASOPHILS # BLD AUTO: 0 X10E3/UL (ref 0–0.2)
BASOPHILS NFR BLD AUTO: 1 %
BILIRUB SERPL-MCNC: 0.2 MG/DL (ref 0–1.2)
BUN SERPL-MCNC: 14 MG/DL (ref 8–27)
BUN/CREAT SERPL: 13 (ref 12–28)
CALCIUM SERPL-MCNC: 9.5 MG/DL (ref 8.7–10.3)
CHLORIDE SERPL-SCNC: 94 MMOL/L (ref 96–106)
CHOLEST SERPL-MCNC: 123 MG/DL (ref 100–199)
CO2 SERPL-SCNC: 21 MMOL/L (ref 20–29)
CREAT SERPL-MCNC: 1.12 MG/DL (ref 0.57–1)
CREAT UR-MCNC: 255.1 MG/DL
ENA SS-A AB SER-ACNC: <0.2 AI (ref 0–0.9)
ENA SS-B AB SER-ACNC: <0.2 AI (ref 0–0.9)
EOSINOPHIL # BLD AUTO: 0.2 X10E3/UL (ref 0–0.4)
EOSINOPHIL NFR BLD AUTO: 3 %
ERYTHROCYTE [DISTWIDTH] IN BLOOD BY AUTOMATED COUNT: 12.5 % (ref 11.7–15.4)
EST. AVERAGE GLUCOSE BLD GHB EST-MCNC: 157 MG/DL
GLOBULIN SER CALC-MCNC: 3 G/DL (ref 1.5–4.5)
GLUCOSE SERPL-MCNC: 220 MG/DL (ref 65–99)
HBA1C MFR BLD: 7.1 % (ref 4.8–5.6)
HCT VFR BLD AUTO: 35.6 % (ref 34–46.6)
HDLC SERPL-MCNC: 45 MG/DL
HGB BLD-MCNC: 11.8 G/DL (ref 11.1–15.9)
IMM GRANULOCYTES # BLD AUTO: 0 X10E3/UL (ref 0–0.1)
IMM GRANULOCYTES NFR BLD AUTO: 0 %
LDLC SERPL CALC-MCNC: 50 MG/DL (ref 0–99)
LYMPHOCYTES # BLD AUTO: 2.4 X10E3/UL (ref 0.7–3.1)
LYMPHOCYTES NFR BLD AUTO: 38 %
MCH RBC QN AUTO: 28.9 PG (ref 26.6–33)
MCHC RBC AUTO-ENTMCNC: 33.1 G/DL (ref 31.5–35.7)
MCV RBC AUTO: 87 FL (ref 79–97)
MICROALBUMIN UR-MCNC: 7.6 UG/ML
MONOCYTES # BLD AUTO: 0.4 X10E3/UL (ref 0.1–0.9)
MONOCYTES NFR BLD AUTO: 7 %
NEUTROPHILS # BLD AUTO: 3.3 X10E3/UL (ref 1.4–7)
NEUTROPHILS NFR BLD AUTO: 51 %
PLATELET # BLD AUTO: 382 X10E3/UL (ref 150–450)
POTASSIUM SERPL-SCNC: 4 MMOL/L (ref 3.5–5.2)
PROT SERPL-MCNC: 7.5 G/DL (ref 6–8.5)
RBC # BLD AUTO: 4.09 X10E6/UL (ref 3.77–5.28)
SODIUM SERPL-SCNC: 136 MMOL/L (ref 134–144)
TRIGL SERPL-MCNC: 167 MG/DL (ref 0–149)
VLDLC SERPL CALC-MCNC: 28 MG/DL (ref 5–40)
WBC # BLD AUTO: 6.4 X10E3/UL (ref 3.4–10.8)

## 2022-03-02 ENCOUNTER — OFFICE VISIT (OUTPATIENT)
Dept: INTERNAL MEDICINE CLINIC | Age: 62
End: 2022-03-02
Payer: COMMERCIAL

## 2022-03-02 VITALS
WEIGHT: 174 LBS | BODY MASS INDEX: 32.02 KG/M2 | HEART RATE: 85 BPM | RESPIRATION RATE: 16 BRPM | SYSTOLIC BLOOD PRESSURE: 125 MMHG | TEMPERATURE: 98.1 F | DIASTOLIC BLOOD PRESSURE: 79 MMHG | OXYGEN SATURATION: 94 % | HEIGHT: 62 IN

## 2022-03-02 DIAGNOSIS — N18.31 TYPE 2 DIABETES MELLITUS WITH STAGE 3A CHRONIC KIDNEY DISEASE, WITHOUT LONG-TERM CURRENT USE OF INSULIN (HCC): Primary | ICD-10-CM

## 2022-03-02 DIAGNOSIS — E11.22 TYPE 2 DIABETES MELLITUS WITH STAGE 3A CHRONIC KIDNEY DISEASE, WITHOUT LONG-TERM CURRENT USE OF INSULIN (HCC): Primary | ICD-10-CM

## 2022-03-02 DIAGNOSIS — E11.51 DIABETES MELLITUS WITH PERIPHERAL VASCULAR DISEASE (HCC): ICD-10-CM

## 2022-03-02 DIAGNOSIS — M17.0 PRIMARY OSTEOARTHRITIS OF BOTH KNEES: ICD-10-CM

## 2022-03-02 DIAGNOSIS — E11.3293 MILD NONPROLIFERATIVE DIABETIC RETINOPATHY OF BOTH EYES WITHOUT MACULAR EDEMA ASSOCIATED WITH TYPE 2 DIABETES MELLITUS (HCC): ICD-10-CM

## 2022-03-02 DIAGNOSIS — I10 PRIMARY HYPERTENSION: ICD-10-CM

## 2022-03-02 DIAGNOSIS — J30.89 NON-SEASONAL ALLERGIC RHINITIS DUE TO OTHER ALLERGIC TRIGGER: ICD-10-CM

## 2022-03-02 DIAGNOSIS — J45.40 MODERATE PERSISTENT ASTHMA WITHOUT COMPLICATION: ICD-10-CM

## 2022-03-02 DIAGNOSIS — K21.9 GASTROESOPHAGEAL REFLUX DISEASE WITHOUT ESOPHAGITIS: ICD-10-CM

## 2022-03-02 PROCEDURE — 99214 OFFICE O/P EST MOD 30 MIN: CPT | Performed by: INTERNAL MEDICINE

## 2022-03-02 PROCEDURE — 3051F HG A1C>EQUAL 7.0%<8.0%: CPT | Performed by: INTERNAL MEDICINE

## 2022-03-02 NOTE — PROGRESS NOTES
CC:   Chief Complaint   Patient presents with    Hypertension     Room 3A //     Diabetes    Back Pain       HISTORY OF PRESENT ILLNESS  Radha Villanueva is a 58 y.o. female. Presents for 4 month follow up evaluation. She has type 2 DM, HTN, asthma, and allergic rhinitis.       Complains of having much mucus in her throat. Has to clear throat frequently. When she hacks it up, mucus is clear. Took Mucinex for 2 weeks without improvement. Uses Flonase nasal spray but not helping enough. Also takes omeprazole for GERD. Denies fevers, chills, runny nose, sneezing, and cough. Denies polyuria, polydipsia, or hypoglycemia. Home FBS: less than 140. She reports medication compliance: compliant all of the time. Medication side effects: none. Diabetic diet compliance: noncompliant some of the time; has been eating jellybeans, bought $9 worth to snack on. Lab review: A1c 6.8% on 10/25/21, was 6.7% on 21. Eye exam: UTD.     Denies CP, SOB, heart palpitations, or dizziness. Home BP monitoring: not done. Her father  in Dec. Her mother now lives in the same building she lives in ([address] [place] [address],[address] [facility]). COVID booster vaccine: had    ROS  A complete review of systems was performed and is negative except for those mentioned in the HPI.     Patient Active Problem List   Diagnosis Code    Type 2 diabetes mellitus without complication, without long-term current use of insulin (HCC) E11.9    HTN (hypertension) I10    Arthritis M19.90    GERD (gastroesophageal reflux disease) K21.9    Vitamin D deficiency E55.9    Fibroadenoma of right breast D24.1    Moderate persistent asthma without complication S13.30    Adrenal nodule (HCC) E27.8    Allergic rhinitis due to allergen J30.9    Peripheral vascular disease ([facility]) I73.9    Mild nonproliferative diabetic retinopathy of both eyes without macular edema associated with type 2 diabetes mellitus ([facility] J60.5454    ACE inhibitor intolerance Z78.9 Past Medical History:   Diagnosis Date    Arrhythmia     Arthritis     GENERALIZED TO JOINTS-WRISTS & KNEES.  Asthma     INHALERS    Asthma, chronic 10/8/2009    Diabetes ([address]     USES PILLS TO CONTROLL    GERD (gastroesophageal reflux disease)     Gout     Hypertension     CONTROLLED BY MEDS.  Nausea & vomiting     Obesity      Allergies   Allergen Reactions    Latex Rash     Severe rash    Ampicillin Anaphylaxis    Betadine Antiseptic Gauze Hives    Contrast Dye [Iodine] Hives and Swelling     Pt. Reports throat swelling      Other Medication Rash     Betadine soap/blisters    Fd And C Blue No.1 Anaphylaxis    Penicillins Hives    Keflex [Cephalexin] Other (comments)    Lisinopril Swelling       Current Outpatient Medications   Medication Sig Dispense Refill    rosuvastatin (CRESTOR) 5 mg tablet TAKE 1 TABLET BY MOUTH EVERY DAY AT NIGHT 90 Tablet 3    triamterene-hydroCHLOROthiazide (MAXZIDE) 37.5-25 mg per tablet TAKE 1 TABLET BY MOUTH EVERY DAY IN THE MORNING 90 Tablet 3    mupirocin (BACTROBAN) 2 % ointment Apply  to affected area daily. 22 g 0    Symbicort 160-4.5 mcg/actuation HFAA INHALE 2 PUFFS BY MOUTH TWICE A DAY 10.2 Each 5    diclofenac (VOLTAREN) 1 % gel Apply thin amount to the R wrist 4 times daily 20 g 0    ibuprofen (MOTRIN) 600 mg tablet Take 1 Tablet by mouth every six (6) hours as needed for Pain. Take with food. 60 Tablet 2    ergocalciferol (ERGOCALCIFEROL) 1,250 mcg (50,000 unit) capsule TAKE 1 CAP BY MOUTH EVERY SEVEN (7) DAYS. INDICATIONS: VITAMIN D DEFICIENCY 4 Capsule 3    amLODIPine (NORVASC) 10 mg tablet TAKE 1 TABLET BY MOUTH EVERY DAY 90 Tablet 3    Janumet 50-1,000 mg per tablet TAKE 1 TAB BY MOUTH TWO (2) TIMES DAILY (WITH MEALS). DX: E11.42 180 Tablet 3    fluticasone propionate (FLONASE) 50 mcg/actuation nasal spray INHALE 2 SPRAYS IN EACH NOSTRIL ONCE A DAY.   Indications: inflammation of the nose due to an allergy 1 Bottle 11    omeprazole (PRILOSEC) 40 mg capsule Take 40 mg by mouth two (2) times a day.  albuterol (PROVENTIL VENTOLIN) 2.5 mg /3 mL (0.083 %) nebu 3 mL by Nebulization route three (3) times daily as needed for Wheezing. Dx: J45.41 60 Nebule 5    Nebulizer & Compressor machine With accessories. Use as instructed 1-3 times daily as needed. Dx:J45.41 1 Each 0    aspirin delayed-release 81 mg tablet Take  by mouth daily. (Patient not taking: Reported on 3/2/2022)           PHYSICAL EXAM  Visit Vitals  /79 (BP 1 Location: Left upper arm, BP Patient Position: Sitting, BP Cuff Size: Large adult)   Pulse 85   Temp 98.1 °F (36.7 °C) (Oral)   Resp 16   Ht 5' 2\" (1.575 m)   Wt 174 lb (78.9 kg)   LMP 06/19/2011   SpO2 94%   BMI 31.83 kg/m²       General: Well-developed and well-nourished, no distress. Coughs up clear mucus occasionally. HEENT:  Head normocephalic/atraumatic, no scleral icterus  Neck: Supple. No carotid bruits, JVD, lymphadenopathy, or thyromegaly. Lungs:  Clear to ausculation bilaterally. Good air movement. Heart:  Regular rate and rhythm, normal S1 and S2, no murmur, gallop, or rub  Extremities: No clubbing, cyanosis, or edema. Neurological: Alert and oriented. Psychiatric: Normal mood and affect. Behavior is normal.     Results for orders placed or performed in visit on 10/25/21   AMB POC HEMOGLOBIN A1C   Result Value Ref Range    Hemoglobin A1c (POC) 6.8 %         ASSESSMENT AND PLAN    ICD-10-CM ICD-9-CM    1. Type 2 diabetes mellitus with stage 3a chronic kidney disease, without long-term current use of insulin ([facility])  E11.22 250.40     N18.31 585.3    2. Mild nonproliferative diabetic retinopathy of both eyes without macular edema associated with type 2 diabetes mellitus ([facility]  M72.5951 250.50      362.04    3. Diabetes mellitus with peripheral vascular disease (HCC)  E11.51 250.70      443.81    4. Primary hypertension  I10 401.9    5.  Non-seasonal allergic rhinitis due to other allergic trigger  J30.89 477.8    6. Gastroesophageal reflux disease without esophagitis  K21.9 530.81    7. Moderate persistent asthma without complication  A81.26 163.89    8. Primary osteoarthritis of both knees  M17.0 715.16      Discussed lab results from 2/22/22. A1c 7.1%, was 6.8% in 10/21. Glucose high at 220. Unchanged CKD stage 3a. Avoid NSAID's. Normal liver tests, blood counts, lipid panel (tot chol 123, LDL 50),urine microalbumin, and test for Sjogren's syndrome. Diagnoses and all orders for this visit:    1. Type 2 diabetes mellitus with stage 3a chronic kidney disease, without long-term current use of insulin (HCC)  A1c 7.1% on 2/22/22. Not as well controlled as before. Stop eating jellybeans. 2. Mild nonproliferative diabetic retinopathy of both eyes without macular edema associated with type 2 diabetes mellitus ([facility]  Schedule follow up appointment with Dr. Levy [facility]). 3. Diabetes mellitus with peripheral vascular disease (HCC)  Stable. Continue ASA 81 mg daily. 4. Primary hypertension  Controlled. Continue amlodipine and triamterene-HCTZ. She has ACE-I allergy. 5. Non-seasonal allergic rhinitis due to other allergic trigger  Recommended she start daily Claritin, Zyrtec, or Allegra. If does not improve, will have her follow up with Dr. Amaya Caban (ENT). 6. Gastroesophageal reflux disease without esophagitis  Controlled on omeprazole 40 mg daily. 7. Moderate persistent asthma without complication  Controlled on Symbicort inhaler. 8. Primary osteoarthritis of both knees  Controlled using Voltaren gel. Follow-up and Dispositions    · Return in about 4 months (around 7/2/2022), or if symptoms worsen or fail to improve, for DM, HTN, POC A1c. I have discussed the diagnosis with the patient and the intended plan as seen in the above orders. Patient is in agreement. The patient has received an after-visit summary and questions were answered concerning future plans. I have discussed medication side effects and warnings with the patient as well.

## 2022-03-02 NOTE — PROGRESS NOTES
Nayana Nails  Identified pt with two pt identifiers(name and ). Chief Complaint   Patient presents with    Hypertension     Room 3A //     Diabetes    Back Pain       Reviewed record In preparation for visit and have obtained necessary documentation. 1. Have you been to the ER, urgent care clinic or hospitalized since your last visit? No     2. Have you seen or consulted any other health care providers outside of the [address] [place] [address] since your last visit? Include any pap smears or colon screening. No    Patient does not have an advance directive. Vitals reviewed with provider. Health Maintenance reviewed:     Health Maintenance Due   Topic    COVID-19 Vaccine (3 - Booster for Pfizer series)          Wt Readings from Last 3 Encounters:   22 174 lb (78.9 kg)   22 178 lb 3.2 oz (80.8 kg)   22 173 lb 1 oz (78.5 kg)        Temp Readings from Last 3 Encounters:   22 98.1 °F (36.7 °C) (Oral)   22 98.1 °F (36.7 °C) (Oral)   22 98.4 °F (36.9 °C)        BP Readings from Last 3 Encounters:   22 125/79   22 137/85   22 (!) 162/75        Pulse Readings from Last 3 Encounters:   22 85   22 92   22 97        Vitals:    22 0758   BP: 125/79   Pulse: 85   Resp: 16   Temp: 98.1 °F (36.7 °C)   TempSrc: Oral   SpO2: 94%   Weight: 174 lb (78.9 kg)   Height: 5' 2\" (1.575 m)   PainSc:   0 - No pain   LMP: 2011          Learning Assessment:   :       Learning Assessment 3/25/2019   PRIMARY LEARNER Patient   PRIMARY LANGUAGE ENGLISH   LEARNER PREFERENCE PRIMARY READING   ANSWERED BY patient   RELATIONSHIP SELF        Depression Screening:   :       3 most recent PHQ Screens 3/2/2022   Little interest or pleasure in doing things Not at all   Feeling down, depressed, irritable, or hopeless Not at all   Total Score PHQ 2 0        Fall Risk Assessment:   :       Fall Risk Assessment, last 12 mths 2021   Able to walk?  Yes Fall in past 12 months? 1   Do you feel unsteady? 0   Are you worried about falling 0   Fall with injury? 1        Abuse Screening:   :       Abuse Screening Questionnaire 6/28/2021 1/8/2021 4/1/2020 3/25/2019   Do you ever feel afraid of your partner? N N N N   Are you in a relationship with someone who physically or mentally threatens you? N N N N   Is it safe for you to go home?  Y Y Y Y        ADL Screening:   :       ADL Assessment 1/8/2021   Feeding yourself No Help Needed   Getting from bed to chair No Help Needed   Getting dressed No Help Needed   Bathing or showering No Help Needed   Walk across the room (includes cane/walker) No Help Needed   Using the telphone No Help Needed   Taking your medications No Help Needed   Preparing meals No Help Needed   Managing money (expenses/bills) No Help Needed   Moderately strenuous housework (laundry) No Help Needed   Shopping for personal items (toiletries/medicines) No Help Needed   Shopping for groceries No Help Needed   Driving No Help Needed   Climbing a flight of stairs No Help Needed   Getting to places beyond walking distances No Help Needed

## 2022-03-02 NOTE — PATIENT INSTRUCTIONS
Managing Your Allergies: Care Instructions  Your Care Instructions     Managing your allergies is an important part of staying healthy. Your doctor will help you find out what may be the cause of the allergies. Common causes of symptoms are house dust and dust mites, animal dander, mold, and pollen. As soon as you know what triggers your symptoms, try to avoid those things. This can help prevent allergy symptoms, asthma, and other health problems. Ask your doctor about allergy medicine or immunotherapy. These treatments may help reduce or prevent allergy symptoms. Follow-up care is a key part of your treatment and safety. Be sure to make and go to all appointments, and call your doctor if you are having problems. It's also a good idea to know your test results and keep a list of the medicines you take. How can you care for yourself at home? · If you have been told by your doctor that dust or dust mites are causing your allergy, decrease the dust around your bed:  ? Wash sheets, pillowcases, and other bedding in hot water every week. ? Use dust-proof covers for pillows, duvets, and mattresses. Avoid plastic covers because they tear easily and do not \"breathe. \" Wash as instructed on the label. ? Do not use any blankets and pillows that you do not need. ? Use blankets that you can wash in your washing machine. ? Consider removing drapes and carpets, which attract and hold dust, from your bedroom. · If you are allergic to house dust and mites, do not use home humidifiers. Your doctor can suggest ways you can control dust and mites. · Look for signs of cockroaches. Cockroaches cause allergic reactions. Use cockroach baits to get rid of them. Then, clean your home well. Cockroaches like areas where grocery bags, newspapers, empty bottles, or cardboard boxes are stored. Do not keep these inside your home, and keep trash and food containers sealed.  Seal off any spots where cockroaches might enter your home.  · If you are allergic to mold, get rid of furniture, rugs, and drapes that smell musty. Check for mold in the bathroom. · If you are allergic to outdoor pollen or mold spores, use air-conditioning. Change or clean all filters every month. Keep windows closed. · If you are allergic to pollen, stay inside when pollen counts are high. Use a vacuum  with a HEPA filter or a double-thickness filter at least two times each week. · Stay inside when air pollution is bad. Avoid paint fumes, perfumes, and other strong odors. · Avoid conditions that make your allergies worse. Stay away from smoke. Do not smoke or let anyone else smoke in your house. Do not use fireplaces or wood-burning stoves. · If you are allergic to your pets, change the air filter in your furnace every month. Use high-efficiency filters. · If you are allergic to pet dander, keep pets outside or out of your bedroom. Old carpet and cloth furniture can hold a lot of animal dander. You may need to replace them. When should you call for help? Give an epinephrine shot if:    · You think you are having a severe allergic reaction. After giving an epinephrine shot call 911, even if you feel better. Call 911 if:    · You have symptoms of a severe allergic reaction. These may include:  ? Sudden raised, red areas (hives) all over your body. ? Swelling of the throat, mouth, lips, or tongue. ? Trouble breathing. ? Passing out (losing consciousness). Or you may feel very lightheaded or suddenly feel weak, confused, or restless.     · You have been given an epinephrine shot, even if you feel better. Call your doctor now or seek immediate medical care if:    · You have symptoms of an allergic reaction, such as:  ? A rash or hives (raised, red areas on the skin). ? Itching. ? Swelling. ? Belly pain, nausea, or vomiting.    Watch closely for changes in your health, and be sure to contact your doctor if:    · Your allergies get worse.     · You need help controlling your allergies.     · You have questions about allergy testing.     · You do not get better as expected. Where can you learn more? Go to http://www.gray.com/  Enter L249 in the search box to learn more about \"Managing Your Allergies: Care Instructions. \"  Current as of: February 10, 2021               Content Version: 13.0  © 2006-2021 "[employer]. Care instructions adapted under license by [employer] (which disclaims liability or warranty for this information). If you have questions about a medical condition or this instruction, always ask your healthcare professional. [place] [address] any warranty or liability for your use of this information. Medicines to Avoid With Kidney Disease: Care Instructions  Overview     Kidney disease means that your kidneys are not able to get rid of waste from the blood. So they can't keep your body's fluids and chemicals in balance. Usually, the kidneys get rid of waste from the blood through the urine. And they balance the fluids in the body. When your kidneys don't work as they should, you have to be careful about some medicines. They may harm your kidneys. Your doctor may tell you not to take them or may change the dose. Medicines for pain and swelling, such as ibuprofen (Advil or Motrin) or naproxen (Aleve), can cause harm. So can some antibiotics and antacids. And you need to be careful about some drugs that treat cancer, lower blood pressure, or get rid of water from the body. Some herbal products could cause harm too. Follow-up care is a key part of your treatment and safety. Be sure to make and go to all appointments, and call your doctor if you are having problems. It's also a good idea to know your test results and keep a list of the medicines you take. How can you care for yourself at home?   · Tell your doctor all the prescription, herbal, or over-the-counter medicines you take. Do not take any new ones unless you talk to your doctor first.  · Do not take anti-inflammatory medicines. These include ibuprofen (Advil, Motrin) and naproxen (Aleve). You can use acetaminophen (Tylenol) for pain. · Do not take two or more pain medicines at the same time unless the doctor told you to. Many pain medicines have acetaminophen, which is Tylenol. Too much acetaminophen (Tylenol) can be harmful. · Tell all doctors and others who work with your health care that you have kidney disease. · Wear medical alert jewelry that lists your health problem. You can buy this at most drugstores. Where can you learn more? Go to http://www.gray.com/  Enter F519 in the search box to learn more about \"Medicines to Avoid With Kidney Disease: Care Instructions. \"  Current as of: December 17, 2020               Content Version: 13.0  © 5880-3208 [employer]. Care instructions adapted under license by [employer] (which disclaims liability or warranty for this information). If you have questions about a medical condition or this instruction, always ask your healthcare professional. [place] [address] any warranty or liability for your use of this information.

## 2022-03-02 NOTE — PROGRESS NOTES
Will discuss results at 3/2/22 appt. A1c 7.1%, was 6.8% in 10/21. Glucose high at 220. Unchanged CKD stage 3a. Avoid NSAID's. Normal liver tests, blood counts, lipid panel (tot chol 123, LDL 50),urine microalbumin, and test for Sjogren's syndrome.

## 2022-03-15 ENCOUNTER — TELEPHONE (OUTPATIENT)
Dept: INTERNAL MEDICINE CLINIC | Age: 62
End: 2022-03-15
Payer: COMMERCIAL

## 2022-03-15 DIAGNOSIS — N18.31 TYPE 2 DIABETES MELLITUS WITH STAGE 3A CHRONIC KIDNEY DISEASE, WITHOUT LONG-TERM CURRENT USE OF INSULIN (HCC): Primary | ICD-10-CM

## 2022-03-15 DIAGNOSIS — E11.22 TYPE 2 DIABETES MELLITUS WITH STAGE 3A CHRONIC KIDNEY DISEASE, WITHOUT LONG-TERM CURRENT USE OF INSULIN (HCC): Primary | ICD-10-CM

## 2022-03-15 PROCEDURE — 3051F HG A1C>EQUAL 7.0%<8.0%: CPT | Performed by: INTERNAL MEDICINE

## 2022-03-15 RX ORDER — INSULIN PUMP SYRINGE, 3 ML
EACH MISCELLANEOUS
Qty: 1 KIT | Refills: 0 | Status: SHIPPED | OUTPATIENT
Start: 2022-03-15

## 2022-03-15 RX ORDER — IBUPROFEN 200 MG
CAPSULE ORAL
Qty: 100 STRIP | Refills: 3 | Status: SHIPPED | OUTPATIENT
Start: 2022-03-15

## 2022-03-15 RX ORDER — LANCETS
EACH MISCELLANEOUS
Qty: 100 EACH | Refills: 3 | Status: SHIPPED | OUTPATIENT
Start: 2022-03-15

## 2022-03-15 NOTE — TELEPHONE ENCOUNTER
PCP: Shaheed Downs MD     Last appt: 3/2/2022   Future Appointments   Date Time Provider Terrence Ruth   7/7/2022  7:50 AM Shaheed Downs MD [facility] BS AMB        Requested Prescriptions     Pending Prescriptions Disp Refills    Blood-Glucose Meter monitoring kit 1 Kit 0     Sig: Test once daily DX E11.22    glucose blood VI test strips (blood glucose test) strip 100 Strip 3     Sig: Test once daily DX E11.22    lancets misc 100 Each 3     Sig: Test once daily DX E11.22

## 2022-03-18 PROBLEM — J45.40 MODERATE PERSISTENT ASTHMA WITHOUT COMPLICATION: Status: ACTIVE | Noted: 2019-08-16

## 2022-03-18 PROBLEM — E27.9 ADRENAL NODULE (HCC): Status: ACTIVE | Noted: 2020-01-09

## 2022-03-18 PROBLEM — E11.3293 MILD NONPROLIFERATIVE DIABETIC RETINOPATHY OF BOTH EYES WITHOUT MACULAR EDEMA ASSOCIATED WITH TYPE 2 DIABETES MELLITUS (HCC): Status: ACTIVE | Noted: 2021-04-11

## 2022-03-18 PROBLEM — D24.1 FIBROADENOMA OF RIGHT BREAST: Status: ACTIVE | Noted: 2019-06-17

## 2022-03-18 PROBLEM — E27.8 ADRENAL NODULE (HCC): Status: ACTIVE | Noted: 2020-01-09

## 2022-03-19 PROBLEM — E55.9 VITAMIN D DEFICIENCY: Status: ACTIVE | Noted: 2019-03-27

## 2022-03-19 PROBLEM — Z78.9 ACE INHIBITOR INTOLERANCE: Status: ACTIVE | Noted: 2021-06-28

## 2022-03-19 PROBLEM — E11.51 DIABETES MELLITUS WITH PERIPHERAL VASCULAR DISEASE (HCC): Status: ACTIVE | Noted: 2020-10-20

## 2022-03-20 PROBLEM — J30.9 ALLERGIC RHINITIS DUE TO ALLERGEN: Status: ACTIVE | Noted: 2020-01-09

## 2022-03-23 DIAGNOSIS — J30.89 NON-SEASONAL ALLERGIC RHINITIS DUE TO OTHER ALLERGIC TRIGGER: ICD-10-CM

## 2022-03-23 DIAGNOSIS — E55.9 VITAMIN D DEFICIENCY: ICD-10-CM

## 2022-03-23 RX ORDER — FLUTICASONE PROPIONATE 50 MCG
SPRAY, SUSPENSION (ML) NASAL
Qty: 16 G | Refills: 11 | Status: SHIPPED | OUTPATIENT
Start: 2022-03-23

## 2022-03-23 RX ORDER — ERGOCALCIFEROL 1.25 MG/1
50000 CAPSULE ORAL
Qty: 4 CAPSULE | Refills: 3 | Status: SHIPPED | OUTPATIENT
Start: 2022-03-23 | End: 2022-07-07 | Stop reason: SDUPTHER

## 2022-04-12 ENCOUNTER — HOSPITAL ENCOUNTER (EMERGENCY)
Age: 62
Discharge: HOME | End: 2022-04-12
Attending: STUDENT IN AN ORGANIZED HEALTH CARE EDUCATION/TRAINING PROGRAM
Payer: COMMERCIAL

## 2022-04-12 VITALS
HEIGHT: 62 IN | RESPIRATION RATE: 16 BRPM | HEART RATE: 81 BPM | SYSTOLIC BLOOD PRESSURE: 156 MMHG | DIASTOLIC BLOOD PRESSURE: 78 MMHG | TEMPERATURE: 98.1 F | BODY MASS INDEX: 31.81 KG/M2 | OXYGEN SATURATION: 99 % | WEIGHT: 172.84 LBS

## 2022-04-12 DIAGNOSIS — M54.2 NECK PAIN: Primary | ICD-10-CM

## 2022-04-12 PROCEDURE — 99283 EMERGENCY DEPT VISIT LOW MDM: CPT

## 2022-04-12 RX ORDER — ACETAMINOPHEN 325 MG/1
650 TABLET ORAL
Qty: 20 TABLET | Refills: 0 | Status: SHIPPED | OUTPATIENT
Start: 2022-04-12

## 2022-04-12 RX ORDER — LIDOCAINE 4 G/100G
PATCH TOPICAL
Qty: 10 PATCH | Refills: 0 | Status: SHIPPED | OUTPATIENT
Start: 2022-04-12 | End: 2022-04-26 | Stop reason: CLARIF

## 2022-04-12 RX ORDER — CYCLOBENZAPRINE HCL 10 MG
10 TABLET ORAL
Qty: 15 TABLET | Refills: 0 | Status: SHIPPED | OUTPATIENT
Start: 2022-04-12 | End: 2022-04-17

## 2022-04-12 RX ORDER — ACETAMINOPHEN 325 MG/1
650 TABLET ORAL
Status: DISCONTINUED | OUTPATIENT
Start: 2022-04-12 | End: 2022-04-12 | Stop reason: HOSPADM

## 2022-04-12 RX ORDER — LIDOCAINE 4 G/100G
1 PATCH TOPICAL EVERY 24 HOURS
Status: DISCONTINUED | OUTPATIENT
Start: 2022-04-12 | End: 2022-04-12 | Stop reason: HOSPADM

## 2022-04-12 NOTE — DISCHARGE INSTRUCTIONS
As we discussed, the most likely cause of your symptoms is a neck strain. However, if you symptoms worsen or you have any new or concerning symptoms, you should return to the emergency department. Follow-up with your primary care doctor in 2 days if your symptoms are not improving by then.

## 2022-04-12 NOTE — ED PROVIDER NOTES
EMERGENCY DEPARTMENT HISTORY AND PHYSICAL EXAM      Date: 4/12/2022  Patient Name: Casey Daigle    History of Presenting Illness     Chief Complaint   Patient presents with    Neck Pain     pt woke up this morning with right-sided neck pain. no injury or trauma        History Provided By: Patient    HPI: Casey Daigle, 58 y.o. female with a past medical history of hypertension, hyperlipidemia, diabetes, asthma, seasonal allergies who presents to the emergency department with neck pain. She woke up early this morning with pain and stiffness on the right side of her neck extending down to her shoulder. It is worse with movement of her neck and right shoulder. It is relieved with rest.  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 She denies any headache, new vision changes, dizziness, tinnitus, difficulty swallowing, nausea, vomiting, chest pain, shortness of breath, palpitations, abdominal pain, rash. There are no other complaints, changes, or physical findings at this time. PCP: Fabrizio Shell MD    No current facility-administered medications on file prior to encounter. Current Outpatient Medications on File Prior to Encounter   Medication Sig Dispense Refill    ergocalciferol (ERGOCALCIFEROL) 1,250 mcg (50,000 unit) capsule TAKE 1 CAP BY MOUTH EVERY SEVEN (7) DAYS. INDICATIONS: VITAMIN D DEFICIENCY 4 Capsule 3    fluticasone propionate (FLONASE) 50 mcg/actuation nasal spray INHALE 2 SPRAYS IN EACH NOSTRIL ONCE A DAY.  INDICATIONS: INFLAMMATION OF THE NOSE DUE TO AN ALLERGY 16 g 11    Blood-Glucose Meter monitoring kit Test once daily DX E11.22 1 Kit 0    glucose blood VI test strips (blood glucose test) strip Test once daily DX E11.22 100 Strip 3    lancets misc Test once daily DX E11.22 100 Each 3    rosuvastatin (CRESTOR) 5 mg tablet TAKE 1 TABLET BY MOUTH EVERY DAY AT NIGHT 90 Tablet 3    triamterene-hydroCHLOROthiazide (MAXZIDE) 37.5-25 mg per tablet TAKE 1 TABLET BY MOUTH EVERY DAY IN THE MORNING 90 Tablet 3    mupirocin (BACTROBAN) 2 % ointment Apply  to affected area daily. 22 g 0    Symbicort 160-4.5 mcg/actuation HFAA INHALE 2 PUFFS BY MOUTH TWICE A DAY 10.2 Each 5    diclofenac (VOLTAREN) 1 % gel Apply thin amount to the R wrist 4 times daily 20 g 0    ibuprofen (MOTRIN) 600 mg tablet Take 1 Tablet by mouth every six (6) hours as needed for Pain. Take with food. 60 Tablet 2    amLODIPine (NORVASC) 10 mg tablet TAKE 1 TABLET BY MOUTH EVERY DAY 90 Tablet 3    Janumet 50-1,000 mg per tablet TAKE 1 TAB BY MOUTH TWO (2) TIMES DAILY (WITH MEALS). DX: E11.42 180 Tablet 3    omeprazole (PRILOSEC) 40 mg capsule Take 40 mg by mouth two (2) times a day.  albuterol (PROVENTIL VENTOLIN) 2.5 mg /3 mL (0.083 %) nebu 3 mL by Nebulization route three (3) times daily as needed for Wheezing. Dx: J45.41 60 Nebule 5    Nebulizer & Compressor machine With accessories. Use as instructed 1-3 times daily as needed. Dx:J45.41 1 Each 0    aspirin delayed-release 81 mg tablet Take  by mouth daily. (Patient not taking: Reported on 3/2/2022)         Past History     Past Medical History:  Past Medical History:   Diagnosis Date    Arrhythmia     Arthritis     GENERALIZED TO JOINTS-WRISTS & KNEES.  Asthma     INHALERS    Asthma, chronic 10/8/2009    Diabetes ([facility] [address]     USES PILLS TO CONTROLL    GERD (gastroesophageal reflux disease)     Gout     Hypertension     CONTROLLED BY MEDS.  Nausea & vomiting     Obesity        Past Surgical History:  Past Surgical History:   Procedure Laterality Date    COLONOSCOPY N/A 5/31/2019    COLONOSCOPY performed by Lisa Herbert MD at [facility] ENDOSCOPY. 6 mm tubular adenoma in sigmoid polyp.  REpeat in 6 months due to poor prep    COLONOSCOPY N/A 2/21/2020    COLONOSCOPY performed by Lisa Herbert MD at [facility] ENDOSCOPY. 2 tubular adenomas, diverticulosis, internal hemorrhoids    HX BREAST BIOPSY Right 2018    benign    HX CHOLECYSTECTOMY      HX HEART CATHETERIZATION  ,    CARDIAC CATH X2    HX HEENT  196    CLEFT PALATE REPAIR.    HX HYSTERECTOMY  2011    HX ORTHOPAEDIC      carpal tunnel, trigger finger, hand surgery left arm    HX OTHER SURGICAL      CLeft palate repair, piloneidal cyst, hand surgery,     HX TUBAL LIGATION         Family History:  Family History   Problem Relation Age of Onset    Asthma Mother     Hypertension Mother     Diabetes Father     Heart Disease Father         MI    Stroke Brother         CEREBRAL ANEURYSM    Heart Attack Sister     Hypertension Sister     No Known Problems Sister     Breast Cancer Cousin        Social History:  Social History     Tobacco Use    Smoking status: Former Smoker     Years: 10.00     Quit date: 2010     Years since quittin.7    Smokeless tobacco: Never Used   Vaping Use    Vaping Use: Never used   Substance Use Topics    Alcohol use: No    Drug use: No       Allergies: Allergies   Allergen Reactions    Latex Rash     Severe rash    Ampicillin Anaphylaxis    Betadine Antiseptic Gauze Hives    Contrast Dye [Iodine] Hives and Swelling     Pt. Reports throat swelling      Other Medication Rash     Betadine soap/blisters    Fd And C Blue No.1 Anaphylaxis    Penicillins Hives    Keflex [Cephalexin] Other (comments)    Lisinopril Swelling         Review of Systems   Review of Systems   Constitutional: Negative for chills and fever. HENT: Negative for congestion and sore throat. Respiratory: Negative for cough and shortness of breath. Cardiovascular: Negative for chest pain. Gastrointestinal: Negative for abdominal pain, diarrhea, nausea and vomiting. Genitourinary: Negative for dysuria and hematuria. Musculoskeletal: Positive for neck pain and neck stiffness. Skin: Negative for rash. Neurological: Negative for headaches.    All other systems reviewed and are negative. Physical Exam   Physical Exam  Vitals and nursing note reviewed. Constitutional:       General: She is not in acute distress. Appearance: Normal appearance. She is not ill-appearing, toxic-appearing or diaphoretic. HENT:      Head: Atraumatic. Right Ear: External ear normal.      Left Ear: External ear normal.      Nose: Nose normal.      Mouth/Throat:      Mouth: Mucous membranes are moist.   Eyes:      Extraocular Movements: Extraocular movements intact. Conjunctiva/sclera: Conjunctivae normal.   Neck:      Vascular: No carotid bruit. Comments: Neck normal to inspection with no swelling, erythema or fluctuance. Patient able to laterally rotate head beyond 45 degrees bilaterally, though pain provoked with rotation. Generalized soft tissue tenderness of right paraspinous muscles extending down to upper trapezius and posterior shoulder. Pain exacerbated with active and passive range of motion of right shoulder. Cardiovascular:      Rate and Rhythm: Normal rate and regular rhythm. Pulses: Normal pulses. Heart sounds: Normal heart sounds. No murmur heard. No friction rub. No gallop. Pulmonary:      Effort: Pulmonary effort is normal. No respiratory distress. Breath sounds: Normal breath sounds. No stridor. No wheezing, rhonchi or rales. Abdominal:      General: Abdomen is flat. There is no distension. Palpations: Abdomen is soft. Tenderness: There is no abdominal tenderness. There is no guarding or rebound. Musculoskeletal:         General: No swelling. Cervical back: Neck supple. Tenderness present. No rigidity. Lymphadenopathy:      Cervical: No cervical adenopathy. Skin:     General: Skin is warm. Neurological:      Mental Status: She is alert and oriented to person, place, and time. Comments: Cranial nerves II through XII intact. 5/5 strength at shoulders, elbows wrist and fingers bilaterally. Patient neurovascularly intact. No decreased sensation. No ataxia   Psychiatric:         Mood and Affect: Mood normal.         Behavior: Behavior normal.         Thought Content: Thought content normal.         Judgment: Judgment normal.         Diagnostic Study Results     Labs -   No results found for this or any previous visit (from the past 12 hour(s)). Radiologic Studies -   No orders to display     CT Results  (Last 48 hours)    None        CXR Results  (Last 48 hours)    None            Medical Decision Making   I am the first provider for this patient. I reviewed the vital signs, available nursing notes, past medical history, past surgical history, family history and social history. Vital Signs-Reviewed the patient's vital signs. Patient Vitals for the past 12 hrs:   Temp Pulse Resp BP SpO2   04/12/22 1637 98.1 °F (36.7 °C) 81 16 (!) 156/78 99 %       Records Reviewed: Nursing Notes    Provider Notes (Medical Decision Making):   Patient evaluated for a 1 day history of right-sided neck pain that was atraumatic in nature with no associated symptoms. Her symptoms appear musculoskeletal in nature. She is not have any concerning features of a cervical/vertebral dissection given the absence of trauma, no concerning symptoms, and normal neurologic exam.  Patient treated presumptively for cervical strain. She was advised on symptomatic care and follow-up with her PCP. She was advised on return precautions to the emergency department. Patient expressed understanding and agreement with the discharge instructions and treatment plan. ED Course:   Initial assessment performed. The patients presenting problems have been discussed, and they are in agreement with the care plan formulated and outlined with them. I have encouraged them to ask questions as they arise throughout their visit. Critical Care Time: None    Disposition:  discharged    PLAN:  1.    Current Discharge Medication List START taking these medications    Details   cyclobenzaprine (FLEXERIL) 10 mg tablet Take 1 Tablet by mouth three (3) times daily as needed for Muscle Spasm(s) for up to 5 days. Qty: 15 Tablet, Refills: 0  Start date: 4/12/2022, End date: 4/17/2022      lidocaine 4 % patch 12 hours on, 12 hours off to affected area  Qty: 10 Patch, Refills: 0  Start date: 4/12/2022      acetaminophen (TYLENOL) 325 mg tablet Take 2 Tablets by mouth every four (4) hours as needed for Pain. Qty: 20 Tablet, Refills: 0  Start date: 4/12/2022           2. Follow-up Information     Follow up With Specialties Details Why Contact Info    Nimesh Rosas MD Internal Medicine In 2 days  [place] [id number] [phone]      [facility] EMERGENCY DEPT Emergency Medicine  If symptoms worsen [address] [place] [address] Drive  [address] [facility]  [phone]        Return to ED if worse     Diagnosis     Clinical Impression:   1. Neck pain          Please note that this dictation was completed with [employer], the computer voice recognition software. Quite often unanticipated grammatical, syntax, homophones, and other interpretive errors are inadvertently transcribed by the computer software. Please disregards these errors. Please excuse any errors that have escaped final proofreading.

## 2022-04-12 NOTE — Clinical Note
[address]  [facility] EMERGENCY DEPT  [address] [facility]  [place] [address]  [phone]    Work/School Note    Date: 4/12/2022    To Whom It May concern:      Hung Munoz was seen and treated today in the emergency room by the following provider(s):  Attending Provider: Debra Leigh MD  Physician Assistant: COLTON Rojas. Hung Munoz is excused from work/school on 04/12/22. She is clear to return to work/school on 04/13/22.         Sincerely,          COLTON Redmond

## 2022-04-12 NOTE — Clinical Note
[address]  [facility] EMERGENCY DEPT  [address] [facility] [address] [facility]  [place] [id number] [phone]    Work/School Note    Date: 4/12/2022    To Whom It May concern:    Hina Steward was seen and treated today in the emergency room by the following provider(s):  Attending Provider: Guillermina Bell MD  Physician Assistant: COLTON Costa. Hina Steward is excused from work/school on 4/12/2022 through 4/14/2022. She is medically clear to return to work/school on 4/15/2022.          Sincerely,          COLTON Brown

## 2022-04-27 ENCOUNTER — ANESTHESIA EVENT (OUTPATIENT)
Dept: ENDOSCOPY | Age: 62
End: 2022-04-27
Payer: COMMERCIAL

## 2022-04-27 ENCOUNTER — ANESTHESIA (OUTPATIENT)
Dept: ENDOSCOPY | Age: 62
End: 2022-04-27
Payer: COMMERCIAL

## 2022-04-27 ENCOUNTER — HOSPITAL ENCOUNTER (OUTPATIENT)
Age: 62
Setting detail: OUTPATIENT SURGERY
Discharge: HOME | End: 2022-04-27
Attending: INTERNAL MEDICINE | Admitting: INTERNAL MEDICINE
Payer: COMMERCIAL

## 2022-04-27 VITALS
HEART RATE: 84 BPM | DIASTOLIC BLOOD PRESSURE: 66 MMHG | WEIGHT: 172 LBS | OXYGEN SATURATION: 100 % | TEMPERATURE: 98 F | BODY MASS INDEX: 31.65 KG/M2 | HEIGHT: 62 IN | RESPIRATION RATE: 18 BRPM | SYSTOLIC BLOOD PRESSURE: 142 MMHG

## 2022-04-27 PROCEDURE — 74011250636 HC RX REV CODE- 250/636: Performed by: INTERNAL MEDICINE

## 2022-04-27 PROCEDURE — 74011250636 HC RX REV CODE- 250/636: Performed by: NURSE ANESTHETIST, CERTIFIED REGISTERED

## 2022-04-27 PROCEDURE — 76060000031 HC ANESTHESIA FIRST 0.5 HR: Performed by: INTERNAL MEDICINE

## 2022-04-27 PROCEDURE — 2709999900 HC NON-CHARGEABLE SUPPLY: Performed by: INTERNAL MEDICINE

## 2022-04-27 PROCEDURE — 76040000019: Performed by: INTERNAL MEDICINE

## 2022-04-27 PROCEDURE — 74011000250 HC RX REV CODE- 250: Performed by: NURSE ANESTHETIST, CERTIFIED REGISTERED

## 2022-04-27 RX ORDER — FLUMAZENIL 0.1 MG/ML
0.2 INJECTION INTRAVENOUS
Status: DISCONTINUED | OUTPATIENT
Start: 2022-04-27 | End: 2022-04-27 | Stop reason: HOSPADM

## 2022-04-27 RX ORDER — LIDOCAINE HYDROCHLORIDE 20 MG/ML
INJECTION, SOLUTION EPIDURAL; INFILTRATION; INTRACAUDAL; PERINEURAL AS NEEDED
Status: DISCONTINUED | OUTPATIENT
Start: 2022-04-27 | End: 2022-04-27 | Stop reason: HOSPADM

## 2022-04-27 RX ORDER — ATROPINE SULFATE 0.1 MG/ML
0.5 INJECTION INTRAVENOUS
Status: DISCONTINUED | OUTPATIENT
Start: 2022-04-27 | End: 2022-04-27 | Stop reason: HOSPADM

## 2022-04-27 RX ORDER — SODIUM CHLORIDE 0.9 % (FLUSH) 0.9 %
5-40 SYRINGE (ML) INJECTION AS NEEDED
Status: DISCONTINUED | OUTPATIENT
Start: 2022-04-27 | End: 2022-04-27 | Stop reason: HOSPADM

## 2022-04-27 RX ORDER — MIDAZOLAM HYDROCHLORIDE 1 MG/ML
.25-5 INJECTION, SOLUTION INTRAMUSCULAR; INTRAVENOUS
Status: DISCONTINUED | OUTPATIENT
Start: 2022-04-27 | End: 2022-04-27 | Stop reason: HOSPADM

## 2022-04-27 RX ORDER — SODIUM CHLORIDE 9 MG/ML
75 INJECTION, SOLUTION INTRAVENOUS CONTINUOUS
Status: DISCONTINUED | OUTPATIENT
Start: 2022-04-27 | End: 2022-04-27 | Stop reason: HOSPADM

## 2022-04-27 RX ORDER — DEXTROMETHORPHAN/PSEUDOEPHED 2.5-7.5/.8
1.2 DROPS ORAL
Status: DISCONTINUED | OUTPATIENT
Start: 2022-04-27 | End: 2022-04-27 | Stop reason: HOSPADM

## 2022-04-27 RX ORDER — PROPOFOL 10 MG/ML
INJECTION, EMULSION INTRAVENOUS AS NEEDED
Status: DISCONTINUED | OUTPATIENT
Start: 2022-04-27 | End: 2022-04-27 | Stop reason: HOSPADM

## 2022-04-27 RX ORDER — SODIUM CHLORIDE 0.9 % (FLUSH) 0.9 %
5-40 SYRINGE (ML) INJECTION EVERY 8 HOURS
Status: DISCONTINUED | OUTPATIENT
Start: 2022-04-27 | End: 2022-04-27 | Stop reason: HOSPADM

## 2022-04-27 RX ORDER — EPINEPHRINE 0.1 MG/ML
1 INJECTION INTRACARDIAC; INTRAVENOUS
Status: DISCONTINUED | OUTPATIENT
Start: 2022-04-27 | End: 2022-04-27 | Stop reason: HOSPADM

## 2022-04-27 RX ORDER — NALOXONE HYDROCHLORIDE 0.4 MG/ML
0.4 INJECTION, SOLUTION INTRAMUSCULAR; INTRAVENOUS; SUBCUTANEOUS
Status: DISCONTINUED | OUTPATIENT
Start: 2022-04-27 | End: 2022-04-27 | Stop reason: HOSPADM

## 2022-04-27 RX ADMIN — LIDOCAINE HYDROCHLORIDE 40 MG: 20 INJECTION, SOLUTION EPIDURAL; INFILTRATION; INTRACAUDAL; PERINEURAL at 08:31

## 2022-04-27 RX ADMIN — PROPOFOL 100 MG: 10 INJECTION, EMULSION INTRAVENOUS at 08:31

## 2022-04-27 RX ADMIN — PROPOFOL 20 MG: 10 INJECTION, EMULSION INTRAVENOUS at 08:32

## 2022-04-27 RX ADMIN — PROPOFOL 30 MG: 10 INJECTION, EMULSION INTRAVENOUS at 08:37

## 2022-04-27 RX ADMIN — SODIUM CHLORIDE 75 ML/HR: 9 INJECTION, SOLUTION INTRAVENOUS at 08:22

## 2022-04-27 RX ADMIN — PROPOFOL 50 MG: 10 INJECTION, EMULSION INTRAVENOUS at 08:34

## 2022-04-27 RX ADMIN — PROPOFOL 20 MG: 10 INJECTION, EMULSION INTRAVENOUS at 08:40

## 2022-04-27 NOTE — ANESTHESIA POSTPROCEDURE EVALUATION
Procedure(s):  COLONOSCOPY. MAC    Anesthesia Post Evaluation      Multimodal analgesia: multimodal analgesia used between 6 hours prior to anesthesia start to PACU discharge  Patient location during evaluation: PACU  Patient participation: complete - patient participated  Level of consciousness: sleepy but conscious and responsive to verbal stimuli  Pain score: 2  Pain management: adequate  Airway patency: patent  Anesthetic complications: no  Cardiovascular status: acceptable  Respiratory status: acceptable  Hydration status: acceptable  Comments: +Post-Anesthesia Evaluation and Assessment    Patient: Hina Steward MRN: [id number]  SSN: [id number]   YOB: 1960  Age: 58 y.o. Sex: female      Cardiovascular Function/Vital Signs    /62   Pulse 97   Temp 36.7 °C (98 °F)   Resp 21   Ht 5' 2\" (1.575 m)   Wt 78 kg (172 lb)   SpO2 100%   Breastfeeding No   BMI 31.46 kg/m²     Patient is status post Procedure(s):  COLONOSCOPY. Nausea/Vomiting: Controlled. Postoperative hydration reviewed and adequate. Pain:  Pain Scale 1: Numeric (0 - 10) (04/27/22 0851)  Pain Intensity 1: 0 (04/27/22 0851)   Managed. Neurological Status: At baseline. Mental Status and Level of Consciousness: Arousable. Pulmonary Status:   O2 Device: None (Room air) (04/27/22 0851)   Adequate oxygenation and airway patent. Complications related to anesthesia: None    Post-anesthesia assessment completed. No concerns. Signed By: Miryam Huggnis MD    4/27/2022  Post anesthesia nausea and vomiting:  controlled  Final Post Anesthesia Temperature Assessment:  Normothermia (36.0-37.5 degrees C)      INITIAL Post-op Vital signs:   Vitals Value Taken Time   /61 04/27/22 0854   Temp 36.7 °C (98 °F) 04/27/22 0851   Pulse 89 04/27/22 0856   Resp 18 04/27/22 0856   SpO2 100 % 04/27/22 0856   Vitals shown include unvalidated device data.

## 2022-04-27 NOTE — DISCHARGE INSTRUCTIONS
[place] Office: [phone]    Naheed Ca  [id number]  1960    EGD/COLONOSCOPY DISCHARGE INSTRUCTIONS  Discomfort:  Sore throat- throat lozenges or warm salt water gargle  redness at IV site- apply warm compress to area; if redness or soreness persist- contact your physician  Gaseous discomfort- walking, belching will help relieve any discomfort  You may not operate a vehicle for 12 hours  You may not engage in an occupation involving machinery or appliances for rest of today. You may not drink alcoholic beverages for at least 12 hours  Avoid making any critical decisions for at least 24 hour  DIET  You may resume your regular diet - however -  remember your colon is empty and a heavy meal will produce gas. Avoid these foods:  fried / greasy foods, excessive carbonated drinks or too much caffeine  MEDICATIONS   Regarding Aspirin or Nonsteroidal medications specifically, please see below. ACTIVITY  You may resume your normal daily activities. Spend the remainder of the day resting -  avoid any strenuous activity. CALL M.D. ANY SIGN OF   Increasing pain, nausea, vomiting  Abdominal distension (swelling)  New increased bleeding (oral or rectal)  Fever (chills)  Pain in chest area  Bloody discharge from nose or mouth  Shortness of breath    You may  take any Advil, Aspirin, Ibuprofen, Motrin, Aleve, or  Tylenol as needed for pain. Follow-up Instructions:   Call  Adam Sheikh MD for any questions or concerns     Telephone # [phone]      Follow-up Information    None

## 2022-04-27 NOTE — PROGRESS NOTES
[place] [facility]  1960  [id number]    Situation:  Verbal report received from: Teresa Lopez RN  Procedure: Procedure(s):  COLONOSCOPY    Background:    Preoperative diagnosis: FAMILY HX OF COLONIC POLYPS  Postoperative diagnosis: Diverticulosis, hemorrhoids, inadequate prep    :  Dr. Agustina Garcia  Assistant(s): Endoscopy Technician-1: Génesis Whitlock  Endoscopy RN-1: Rubi Ames    Specimens: * No specimens in log *  H. Pylori  no    Assessment:  Intra-procedure medications     Anesthesia gave intra-procedure sedation and medications, see anesthesia flow sheet yes    Intravenous fluids: NS@ KVO     Vital signs stable      Abdominal assessment: round and soft      Recommendation:  Discharge patient per MD order .   Family or Friend    Permission to share finding with family or friend yes

## 2022-04-27 NOTE — PROGRESS NOTES
Endoscope was pre-cleaned at the bedside immediately following procedure by Gama NASCIMENTO For medications administered by anesthesia, see anesthesia chart.

## 2022-04-27 NOTE — PROCEDURES
Colonoscopy Procedure Note    Miles Bustamante  1960  [id number]    Indications:  Please see below. Pre-operative Diagnosis: PERSONAL HX OF COLONIC POLYPS    Post-operative Diagnosis: Diverticulosis, hemorrhoids, inadequate prep      : Adam Garcia MD    Referring Provider: Eran Randall MD    Sedation:  MAC anesthesia Propofol        Procedure Details:    After detailed informed consent was obtained with all risks and benefits of procedure explained and preoperative exam completed, the patient was taken to the endoscopy suite and placed in the left lateral decubitus position. Upon sequential sedation as per above, a digital rectal exam was performed  and was normal.  The Olympus videocolonoscope  was inserted in the rectum and carefully advanced to the cecum, which was identified by the ileocecal valve. The quality of preparation was inadequate. The colonoscope was slowly withdrawn with careful evaluation between folds. Retroflexion in the rectum was performed. Findings:   · The Olympus video high-definition colonoscope was advanced to the cecum, identified by its typical land marks, with ease. · Colon is again not well prepped. 50% of the cecum/ascending colon and 30% of the sigmoid colon mucosa cannot  be seen(stool/prep mix which cannot be cleared). · No large mass seen but small/sessile lesions can be missed. · Mild sigmoid diverticulosis. · Small internal hemorrhoids        Therapies:  none    Specimen/s:  none     Complications: None were encountered during the procedure. EBL:  None. Recommendations:     -Repeat colonoscopy w/ a better prep. [place] Tommy MADIHA Garcia MD  4/27/2022  8:45 AM

## 2022-04-27 NOTE — H&P
Pre-endoscopy H and P    The patient was seen and examined in the room/pre-op holding area. The airway was assessed and documented. The problem list, past medical history, and medications were reviewed.      Patient Active Problem List   Diagnosis Code    Type 2 diabetes mellitus with stage 3a chronic kidney disease, without long-term current use of insulin ([facility]) E11.22, N18.31    HTN (hypertension) I10    Primary osteoarthritis of both knees M17.0    GERD (gastroesophageal reflux disease) K21.9    Vitamin D deficiency E55.9    Fibroadenoma of right breast D24.1    Moderate persistent asthma without complication K50.07    Adrenal nodule ([facility]) E27.8    Allergic rhinitis due to allergen J30.9    Diabetes mellitus with peripheral vascular disease ([facility]) E11.51    Mild nonproliferative diabetic retinopathy of both eyes without macular edema associated with type 2 diabetes mellitus ([facility]) X06.7399    ACE inhibitor intolerance Z78.9     Social History     Socioeconomic History    Marital status:      Spouse name: Not on file    Number of children: Not on file    Years of education: Not on file    Highest education level: Not on file   Occupational History    Not on file   Tobacco Use    Smoking status: Former Smoker     Years: 10.00     Quit date: 2010     Years since quittin.8    Smokeless tobacco: Never Used   Vaping Use    Vaping Use: Never used   Substance and Sexual Activity    Alcohol use: No    Drug use: No    Sexual activity: Yes     Partners: Male     Birth control/protection: None   Other Topics Concern    Not on file   Social History Narrative    Not on file     Social Determinants of Health     Financial Resource Strain:     Difficulty of Paying Living Expenses: Not on file   Food Insecurity:     Worried About Running Out of Food in the Last Year: Not on file    Cristina of Food in the Last Year: Not on file   Transportation Needs:     Lack of Transportation (Medical): Not on file    Lack of Transportation (Non-Medical): Not on file   Physical Activity:     Days of Exercise per Week: Not on file    Minutes of Exercise per Session: Not on file   Stress:     Feeling of Stress : Not on file   Social Connections:     Frequency of Communication with Friends and Family: Not on file    Frequency of Social Gatherings with Friends and Family: Not on file    Attends [employer] Services: Not on file    Active Member of [address] [place] [address] [employer] or Organizations: Not on file    Attends Club or Organization Meetings: Not on file    Marital Status: Not on file   Intimate Partner Violence:     Fear of Current or Ex-Partner: Not on file    Emotionally Abused: Not on file    Physically Abused: Not on file    Sexually Abused: Not on file   Housing Stability:     Unable to Pay for Housing in the Last Year: Not on file    Number of [place] in the Last Year: Not on file    Unstable Housing in the Last Year: Not on file     Past Medical History:   Diagnosis Date    Arrhythmia     Arthritis     GENERALIZED TO [facility].  Asthma     INHALERS    Asthma, chronic 10/8/2009    Diabetes ([facility]     USES PILLS TO CONTROLL    GERD (gastroesophageal reflux disease)     Gout     Hypertension     CONTROLLED BY MEDS.  Nausea & vomiting     Obesity          Prior to Admission Medications   Prescriptions Last Dose Informant Patient Reported? Taking? Blood-Glucose Meter monitoring kit   No No   Sig: Test once daily DX E11.22   Janumet 50-1,000 mg per tablet 4/26/2022  No No   Sig: TAKE 1 TAB BY MOUTH TWO (2) TIMES DAILY (WITH MEALS). DX: E11.42   Nebulizer & Compressor machine 4/20/2022 at Unknown time  No Yes   Sig: With accessories. Use as instructed 1-3 times daily as needed.  Dx:J45.41   Symbicort 160-4.5 mcg/actuation HFAA 4/26/2022 at Unknown time  No Yes   Sig: INHALE 2 PUFFS BY MOUTH TWICE A DAY   acetaminophen (TYLENOL) 325 mg tablet Unknown at Unknown time  No No   Sig: Take 2 Tablets by mouth every four (4) hours as needed for Pain. albuterol (PROVENTIL VENTOLIN) 2.5 mg /3 mL (0.083 %) nebu 4/26/2022 at Unknown time  No Yes   Sig: 3 mL by Nebulization route three (3) times daily as needed for Wheezing. Dx: J45.41   amLODIPine (NORVASC) 10 mg tablet 4/25/2022  No No   Sig: TAKE 1 TABLET BY MOUTH EVERY DAY   aspirin delayed-release 81 mg tablet 4/22/2022 at Unknown time  Yes Yes   Sig: Take  by mouth daily. diclofenac (VOLTAREN) 1 % gel 4/20/2022 at Unknown time  No Yes   Sig: Apply thin amount to the R wrist 4 times daily   ergocalciferol (ERGOCALCIFEROL) 1,250 mcg (50,000 unit) capsule 4/18/2022  No No   Sig: TAKE 1 CAP BY MOUTH EVERY SEVEN (7) DAYS. INDICATIONS: VITAMIN D DEFICIENCY   fluticasone propionate (FLONASE) 50 mcg/actuation nasal spray 4/26/2022 at Unknown time  No Yes   Sig: INHALE 2 SPRAYS IN EACH NOSTRIL ONCE A DAY. INDICATIONS: INFLAMMATION OF THE NOSE DUE TO AN ALLERGY   glucose blood VI test strips (blood glucose test) strip 4/26/2022 at Unknown time  No Yes   Sig: Test once daily DX E11.22   ibuprofen (MOTRIN) 600 mg tablet 4/23/2022  No No   Sig: Take 1 Tablet by mouth every six (6) hours as needed for Pain. Take with food. lancets misc   No No   Sig: Test once daily DX E11.22   mupirocin (BACTROBAN) 2 % ointment Unknown at Unknown time  No No   Sig: Apply  to affected area daily. omeprazole (PRILOSEC) 40 mg capsule 4/26/2022 at Unknown time  Yes Yes   Sig: Take 40 mg by mouth two (2) times a day.    rosuvastatin (CRESTOR) 5 mg tablet   No No   Sig: TAKE 1 TABLET BY MOUTH EVERY DAY AT NIGHT   triamterene-hydroCHLOROthiazide (MAXZIDE) 37.5-25 mg per tablet 4/26/2022 at Unknown time  No Yes   Sig: TAKE 1 TABLET BY MOUTH EVERY DAY IN THE MORNING      Facility-Administered Medications: None           The review of systems is:  Negative  for shortness of breath or chest pain      The heart, lungs, and mental status were satisfactory for the administration of deep sedation and for the procedure. I discussed with the patient the objectives, risks, consequences and alternatives to the procedure.       Miladis Blancas MD  4/27/2022  8:25 AM

## 2022-04-27 NOTE — ANESTHESIA PREPROCEDURE EVALUATION
Relevant Problems   RESPIRATORY SYSTEM   (+) Moderate persistent asthma without complication      CARDIOVASCULAR   (+) HTN (hypertension)      GASTROINTESTINAL   (+) GERD (gastroesophageal reflux disease)      ENDOCRINE   (+) Diabetes mellitus with peripheral vascular disease (HCC)   (+) Type 2 diabetes mellitus with stage 3a chronic kidney disease, without long-term current use of insulin (HCC)       Anesthetic History     PONV          Review of Systems / Medical History  Patient summary reviewed, nursing notes reviewed and pertinent labs reviewed    Pulmonary            Asthma        Neuro/Psych   Within defined limits           Cardiovascular    Hypertension        Dysrhythmias       Exercise tolerance: >4 METS     GI/Hepatic/Renal     GERD    Renal disease: CRI       Endo/Other    Diabetes    Morbid obesity and arthritis     Other Findings              Physical Exam    Airway  Mallampati: II  TM Distance: 4 - 6 cm  Neck ROM: normal range of motion   Mouth opening: Normal     Cardiovascular  Regular rate and rhythm,  S1 and S2 normal,  no murmur, click, rub, or gallop  Rhythm: regular  Rate: normal         Dental  No notable dental hx       Pulmonary  Breath sounds clear to auscultation               Abdominal  GI exam deferred       Other Findings            Anesthetic Plan    ASA: 3  Anesthesia type: MAC          Induction: Intravenous  Anesthetic plan and risks discussed with: Patient

## 2022-04-29 DIAGNOSIS — M51.36 DDD (DEGENERATIVE DISC DISEASE), LUMBAR: ICD-10-CM

## 2022-04-29 RX ORDER — IBUPROFEN 600 MG/1
600 TABLET ORAL
Qty: 60 TABLET | Refills: 2 | Status: SHIPPED | OUTPATIENT
Start: 2022-04-29 | End: 2022-10-12

## 2022-07-07 ENCOUNTER — OFFICE VISIT (OUTPATIENT)
Dept: INTERNAL MEDICINE CLINIC | Age: 62
End: 2022-07-07
Payer: COMMERCIAL

## 2022-07-07 VITALS
BODY MASS INDEX: 31.93 KG/M2 | HEART RATE: 87 BPM | HEIGHT: 62 IN | TEMPERATURE: 98.1 F | SYSTOLIC BLOOD PRESSURE: 130 MMHG | WEIGHT: 173.5 LBS | OXYGEN SATURATION: 98 % | RESPIRATION RATE: 12 BRPM | DIASTOLIC BLOOD PRESSURE: 82 MMHG

## 2022-07-07 DIAGNOSIS — M47.812 CERVICAL SPONDYLOSIS: ICD-10-CM

## 2022-07-07 DIAGNOSIS — I10 PRIMARY HYPERTENSION: ICD-10-CM

## 2022-07-07 DIAGNOSIS — E11.51 DIABETES MELLITUS WITH PERIPHERAL VASCULAR DISEASE (HCC): ICD-10-CM

## 2022-07-07 DIAGNOSIS — N18.31 TYPE 2 DIABETES MELLITUS WITH STAGE 3A CHRONIC KIDNEY DISEASE, WITHOUT LONG-TERM CURRENT USE OF INSULIN (HCC): Primary | ICD-10-CM

## 2022-07-07 DIAGNOSIS — E55.9 VITAMIN D DEFICIENCY: ICD-10-CM

## 2022-07-07 DIAGNOSIS — M79.661 RIGHT CALF PAIN: ICD-10-CM

## 2022-07-07 DIAGNOSIS — E11.22 TYPE 2 DIABETES MELLITUS WITH STAGE 3A CHRONIC KIDNEY DISEASE, WITHOUT LONG-TERM CURRENT USE OF INSULIN (HCC): Primary | ICD-10-CM

## 2022-07-07 LAB — HBA1C MFR BLD HPLC: 7.3 %

## 2022-07-07 PROCEDURE — 99214 OFFICE O/P EST MOD 30 MIN: CPT | Performed by: INTERNAL MEDICINE

## 2022-07-07 PROCEDURE — 83036 HEMOGLOBIN GLYCOSYLATED A1C: CPT | Performed by: INTERNAL MEDICINE

## 2022-07-07 PROCEDURE — 3051F HG A1C>EQUAL 7.0%<8.0%: CPT | Performed by: INTERNAL MEDICINE

## 2022-07-07 RX ORDER — ERGOCALCIFEROL 1.25 MG/1
50000 CAPSULE ORAL
Qty: 4 CAPSULE | Refills: 3 | Status: SHIPPED | OUTPATIENT
Start: 2022-07-07

## 2022-07-07 RX ORDER — DICLOFENAC SODIUM 10 MG/G
GEL TOPICAL
Qty: 50 G | Refills: 0 | Status: SHIPPED | OUTPATIENT
Start: 2022-07-07 | End: 2022-08-14

## 2022-07-07 NOTE — PROGRESS NOTES
Linn Aldana  Identified pt with two pt identifiers(name and ). Chief Complaint   Patient presents with    Diabetes    Hypertension       Reviewed record In preparation for visit and have obtained necessary documentation. 1. Have you been to the ER, urgent care clinic or hospitalized since your last visit? No     2. Have you seen or consulted any other health care providers outside of the [address] [place] [address] since your last visit? Include any pap smears or colon screening. No    Vitals reviewed with provider. Health Maintenance reviewed: There are no preventive care reminders to display for this patient. Wt Readings from Last 3 Encounters:   22 173 lb 8 oz (78.7 kg)   22 172 lb (78 kg)   22 172 lb 13.5 oz (78.4 kg)        Temp Readings from Last 3 Encounters:   22 98.1 °F (36.7 °C) (Oral)   22 98 °F (36.7 °C)   22 98.1 °F (36.7 °C)        BP Readings from Last 3 Encounters:   22 130/82   22 (!) 142/66   22 (!) 156/78        Pulse Readings from Last 3 Encounters:   22 87   22 84   22 81        Vitals:    22 0745   BP: 130/82   Pulse: 87   Resp: 12   Temp: 98.1 °F (36.7 °C)   TempSrc: Oral   SpO2: 98%   Weight: 173 lb 8 oz (78.7 kg)   Height: 5' 2\" (1.575 m)   PainSc:   0 - No pain   LMP: 2011          Learning Assessment:   :       Learning Assessment 3/25/2019   PRIMARY LEARNER Patient   PRIMARY LANGUAGE ENGLISH   LEARNER PREFERENCE PRIMARY READING   ANSWERED BY patient   RELATIONSHIP SELF        Depression Screening:   :       3 most recent PHQ Screens 2022   Little interest or pleasure in doing things Not at all   Feeling down, depressed, irritable, or hopeless Not at all   Total Score PHQ 2 0        Fall Risk Assessment:   :       Fall Risk Assessment, last 12 mths 2021   Able to walk? Yes   Fall in past 12 months? 1   Do you feel unsteady?  0   Are you worried about falling 0   Fall with injury? 1        Abuse Screening:   :       Abuse Screening Questionnaire 7/7/2022 6/28/2021 1/8/2021 4/1/2020 3/25/2019   Do you ever feel afraid of your partner? N N N N N   Are you in a relationship with someone who physically or mentally threatens you? N N N N N   Is it safe for you to go home?  Y Y Y Y Y        ADL Screening:   :       ADL Assessment 1/8/2021   Feeding yourself No Help Needed   Getting from bed to chair No Help Needed   Getting dressed No Help Needed   Bathing or showering No Help Needed   Walk across the room (includes cane/walker) No Help Needed   Using the telphone No Help Needed   Taking your medications No Help Needed   Preparing meals No Help Needed   Managing money (expenses/bills) No Help Needed   Moderately strenuous housework (laundry) No Help Needed   Shopping for personal items (toiletries/medicines) No Help Needed   Shopping for groceries No Help Needed   Driving No Help Needed   Climbing a flight of stairs No Help Needed   Getting to places beyond walking distances No Help Needed

## 2022-07-07 NOTE — PATIENT INSTRUCTIONS
Calf Strain: Rehab Exercises  Introduction  Here are some examples of exercises for you to try. The exercises may be suggested for a condition or for rehabilitation. Start each exercise slowly. Ease off the exercises if you start to have pain. You will be told when to start these exercises and which ones will work best for you. How to do the exercises  Calf wall stretch (back knee straight)    1. Stand facing a wall with your hands on the wall at about eye level. Put your affected leg about a step behind your other leg. 2. Keeping your back leg straight and your back heel on the floor, bend your front knee and gently bring your hip and chest toward the wall until you feel a stretch in the calf of your back leg. 3. Hold the stretch for at least 15 to 30 seconds. 4. Repeat 2 to 4 times. Calf wall stretch (knees bent)    1. Stand facing a wall with your hands on the wall at about eye level. Put your affected leg about a step behind your other leg. 2. Keeping both heels on the floor, bend both knees. Then gently bring your hip and chest toward the wall until you feel a stretch in the calf of your back leg. 3. Hold the stretch for at least 15 to 30 seconds. 4. Repeat 2 to 4 times. Bilateral calf stretch (knees straight)    1. Place a book on the floor a few inches from a wall or countertop, and put the balls of your feet on it. Your heels should be on the floor. The book needs to be thick enough so that you can feel a gentle stretch in each calf. If you are not steady on your feet, hold on to a chair, counter, or wall while you do this stretch. 2. Keep your knees straight, and lean forward until you feel a stretch in each calf. 3. To get more stretch, add another book or use a thicker book, such as a phone book, a dictionary, or an encyclopedia. 4. Hold the stretch for at least 15 to 30 seconds. 5. Repeat 2 to 4 times. Bilateral calf stretch (knees bent)    1.  Place a book on the floor a few inches from a wall or countertop, and put the balls of your feet on it. Your heels should be on the floor. The book needs to be thick enough so that you can feel a gentle stretch in each calf. If you are not steady on your feet, hold on to a chair, counter, or wall while you do this stretch. 2. Bend your knees, and lean forward until you feel a stretch in each calf. 3. To get more stretch, add another book or use a thicker book, such as a phone book, a dictionary, or an encyclopedia. 4. Hold the stretch for at least 15 to 30 seconds. 5. Repeat 2 to 4 times. Ankle plantarflexion    1. Sit with your affected leg straight and supported on the floor. Your other leg should be bent, with that foot flat on the floor. 2. Keeping your affected leg straight, gently flex your foot downward so your toes are pointed away from your body. Then slowly relax your foot to the starting position. 3. Repeat 8 to 12 times. Ankle dorsiflexion    1. Sit with your affected leg straight and supported on the floor. Your other leg should be bent, with that foot flat on the floor. 2. Keeping your leg straight, gently flex your foot back so your toes point upward. Then slowly relax your foot to the starting position. 3. Repeat 8 to 12 times. Bilateral heel raises on step    1. Stand on the bottom step of a staircase, facing up toward the stairs. Put the balls of your feet on the step. If you are not steady on your feet, hold on to the banister or wall. 2. Keeping both knees straight, slowly lift your heels above the step so that you are standing on your toes. Then slowly lower your heels below the step and toward the floor. 3. Return to the starting position, with your feet even with the step. 4. Repeat 8 to 12 times. Follow-up care is a key part of your treatment and safety. Be sure to make and go to all appointments, and call your doctor if you are having problems.  It's also a good idea to know your test results and keep a list of the medicines you take. Where can you learn more? Go to http://www.gray.com/  Enter C438 in the search box to learn more about \"Calf Strain: Rehab Exercises. \"  Current as of: July 1, 2021               Content Version: 13.2  © 0259-6173 [employer], [employer]. Care instructions adapted under license by [employer] (which disclaims liability or warranty for this information). If you have questions about a medical condition or this instruction, always ask your healthcare professional. [address] any warranty or liability for your use of this information.

## 2022-07-07 NOTE — PROGRESS NOTES
CC:   Chief Complaint   Patient presents with    Diabetes    Hypertension       HISTORY OF PRESENT ILLNESS  Shannan Zamora is a 58 y.o. female. Presents for 4 month follow up evaluation. She has type 2 DM with CKD stage 3a, HTN, PAD, hyperlipidemia, asthma, and allergic rhinitis.       Yesterday she had pain at left flank, now resolved. Complains of pain at right calf that occurs at work when she is up and moving. Works cleaning houses. Seen at ED twice [facility] then [facility]) in April for neck pain. Seen at [place] [address] on 5/10/22; diagnosed with cervical spondylosis. X-rays showed \"significant decreased disc space height at C4-5 and C5-6\". Referred for PT that she starts next week. Admits to doing some heavy lifting that could have strained her neck. Cares for a 79 yo woman every other weekend and started having to lift her to get her on the bedside commode. Patient weighs about 85 lbs when stiffens her body and feels heavier. Her sister was recently diagnosed with multiple myeloma. Living in a nursing home, about to start chemotherapy. Patient helps her 79 yo mother regularly by doing groceries and other things for her. Her mother and her live in the same apartment building. Denies polyuria, polydipsia, or hypoglycemia. Home FBS: less than 140's. Takes Janumet  mg twice daily. Non-compliant sometimes with diabetic diet; has been drinking sodas. Lab review: A1c 7.3% today (7/722), was 7.1% on 2/22/22 and 6.8% on 10/25/21. Denies CP, SOB, heart palpitations, or dizziness. Home BP monitoring: not done. ROS  A complete review of systems was performed and is negative except for those mentioned in the HPI.        Patient Active Problem List   Diagnosis Code    Type 2 diabetes mellitus with stage 3a chronic kidney disease, without long-term current use of insulin (HCC) E11.22, N18.31    HTN (hypertension) I10    Primary osteoarthritis of both knees M17.0    GERD (gastroesophageal reflux disease) K21.9    Vitamin D deficiency E55.9    Fibroadenoma of right breast D24.1    Moderate persistent asthma without complication W19.17    Adrenal nodule (HCC) E27.8    Allergic rhinitis due to allergen J30.9    Diabetes mellitus with peripheral vascular disease (HCC) E11.51    Mild nonproliferative diabetic retinopathy of both eyes without macular edema associated with type 2 diabetes mellitus ([facility] B36.8299    ACE inhibitor intolerance Z78.9     Past Medical History:   Diagnosis Date    Arrhythmia     Arthritis     GENERALIZED TO JOINTS-WRISTS & KNEES.  Asthma     INHALERS    Asthma, chronic 10/8/2009    Diabetes ([facility]     USES PILLS TO CONTROLL    GERD (gastroesophageal reflux disease)     Gout     Hypertension     CONTROLLED BY MEDS.  Nausea & vomiting     Obesity      Allergies   Allergen Reactions    Latex Rash     Severe rash    Ampicillin Anaphylaxis    Betadine Antiseptic Gauze Hives    Contrast Dye [Iodine] Hives and Swelling     Pt. Reports throat swelling      Other Medication Rash     Betadine soap/blisters    Fd And C Blue No.1 Anaphylaxis    Penicillins Hives    Keflex [Cephalexin] Other (comments)    Lisinopril Swelling       Current Outpatient Medications   Medication Sig Dispense Refill    ibuprofen (MOTRIN) 600 mg tablet TAKE 1 TABLET BY MOUTH EVERY SIX (6) HOURS AS NEEDED FOR PAIN. TAKE WITH FOOD. 60 Tablet 2    acetaminophen (TYLENOL) 325 mg tablet Take 2 Tablets by mouth every four (4) hours as needed for Pain. 20 Tablet 0    fluticasone propionate (FLONASE) 50 mcg/actuation nasal spray INHALE 2 SPRAYS IN EACH NOSTRIL ONCE A DAY.  INDICATIONS: INFLAMMATION OF THE NOSE DUE TO AN ALLERGY 16 g 11    Blood-Glucose Meter monitoring kit Test once daily DX E11.22 1 Kit 0    glucose blood VI test strips (blood glucose test) strip Test once daily DX E11.22 100 Strip 3    lancets misc Test once daily DX E11.22 100 Each 3    rosuvastatin (CRESTOR) 5 mg tablet TAKE 1 TABLET BY MOUTH EVERY DAY AT NIGHT 90 Tablet 3    triamterene-hydroCHLOROthiazide (MAXZIDE) 37.5-25 mg per tablet TAKE 1 TABLET BY MOUTH EVERY DAY IN THE MORNING 90 Tablet 3    Symbicort 160-4.5 mcg/actuation HFAA INHALE 2 PUFFS BY MOUTH TWICE A DAY 10.2 Each 5    diclofenac (VOLTAREN) 1 % gel Apply thin amount to the R wrist 4 times daily 20 g 0    amLODIPine (NORVASC) 10 mg tablet TAKE 1 TABLET BY MOUTH EVERY DAY 90 Tablet 3    Janumet 50-1,000 mg per tablet TAKE 1 TAB BY MOUTH TWO (2) TIMES DAILY (WITH MEALS). DX: E11.42 180 Tablet 3    omeprazole (PRILOSEC) 40 mg capsule Take 40 mg by mouth two (2) times a day.  albuterol (PROVENTIL VENTOLIN) 2.5 mg /3 mL (0.083 %) nebu 3 mL by Nebulization route three (3) times daily as needed for Wheezing. Dx: J45.41 60 Nebule 5    Nebulizer & Compressor machine With accessories. Use as instructed 1-3 times daily as needed. Dx:J45.41 1 Each 0    aspirin delayed-release 81 mg tablet Take  by mouth daily.  ergocalciferol (ERGOCALCIFEROL) 1,250 mcg (50,000 unit) capsule TAKE 1 CAP BY MOUTH EVERY SEVEN (7) DAYS. INDICATIONS: VITAMIN D DEFICIENCY (Patient not taking: Reported on 7/7/2022) 4 Capsule 3         PHYSICAL EXAM  Visit Vitals  /82 (BP 1 Location: Left upper arm, BP Patient Position: Sitting)   Pulse 87   Temp 98.1 °F (36.7 °C) (Oral)   Resp 12   Ht 5' 2\" (1.575 m)   Wt 173 lb 8 oz (78.7 kg)   LMP 06/19/2011   SpO2 98%   BMI 31.73 kg/m²       General: Obese, no distress. HEENT:  Head normocephalic/atraumatic, no scleral icterus  Lungs:  Clear to ausculation bilaterally. Good air movement. Heart:  Regular rate and rhythm, normal S1 and S2, no murmur, gallop, or rub  Extremities: No clubbing, cyanosis, or edema. Mild tenderness at lateral aspect of right calf. Musculoskeletal: Normal ROM at neck with increased pain with bilateral horizontal gaze. Neurological: Alert and oriented.  Non-focal exam.  Psychiatric: Normal mood and affect. Behavior is normal.     Results for orders placed or performed in visit on 07/07/22   AMB POC HEMOGLOBIN A1C   Result Value Ref Range    Hemoglobin A1c (POC) 7.3 %         ASSESSMENT AND PLAN    ICD-10-CM ICD-9-CM    1. Type 2 diabetes mellitus with stage 3a chronic kidney disease, without long-term current use of insulin ([facility])  E11.22 250.40 HEMOGLOBIN A1C WITH EAG    N18.31 585.3 HEMOGLOBIN A1C WITH EAG   2. Diabetes mellitus with peripheral vascular disease (HCC)  E11.51 250.70 AMB POC HEMOGLOBIN A1C     443.81    3. Primary hypertension  Z59 216.1 METABOLIC PANEL, BASIC      METABOLIC PANEL, BASIC   4. Cervical spondylosis  M47.812 721.0 diclofenac (VOLTAREN) 1 % gel   5. Right calf pain  M79.661 729.5    6. Vitamin D deficiency  E55.9 268.9 ergocalciferol (ERGOCALCIFEROL) 1,250 mcg (50,000 unit) capsule      VITAMIN D, 25 HYDROXY      VITAMIN D, 25 HYDROXY     Diagnoses and all orders for this visit:    1. Type 2 diabetes mellitus with stage 3a chronic kidney disease, without long-term current use of insulin ([facility])  Worsening control. A1c 7.3% today. Counseled on stopping sodas and drinking more water. Continue Janumet. If A1c is same or higher at next visit, plan to add glipizide or Januvia. -     HEMOGLOBIN A1C WITH EAG; Future    2. Diabetes mellitus with peripheral vascular disease (HCC)  Stable. Continue ASA 81 mg daily.  -     AMB POC HEMOGLOBIN A1C    3. Primary hypertension  Controlled. Continue amlodipine and triamterene-HCTZ. She has ACE-I allergy.  -     METABOLIC PANEL, BASIC; Future    4. Cervical spondylosis  With cervical strain. To start PT next week. -     Refill diclofenac (VOLTAREN) 1 % gel; Apply thin amount to the R wrist and other affected areas 4 times daily    5. Right calf pain  Most likely due to calf muscle strain. Given handout on calf muscle exercises. 6. Vitamin D deficiency  -     Refill ergocalciferol (ERGOCALCIFEROL) 1,250 mcg (50,000 unit) capsule;  Take 1 Capsule by mouth every seven (7) days. Indications: low vitamin D levels  -     VITAMIN D, 25 HYDROXY; Future      Follow-up and Dispositions    · Return in about 4 months (around 11/7/2022), or if symptoms worsen or fail to improve, for DM, HTN; have non-fasting labs 1 week before appointment. I have discussed the diagnosis with the patient and the intended plan as seen in the above orders. Patient is in agreement. The patient has received an after-visit summary and questions were answered concerning future plans. I have discussed medication side effects and warnings with the patient as well.

## 2022-08-11 ENCOUNTER — TRANSCRIBE ORDER (OUTPATIENT)
Dept: SCHEDULING | Age: 62
End: 2022-08-11

## 2022-08-11 ENCOUNTER — TELEPHONE (OUTPATIENT)
Dept: INTERNAL MEDICINE CLINIC | Age: 62
End: 2022-08-11

## 2022-08-11 DIAGNOSIS — Z12.31 SCREENING MAMMOGRAM FOR HIGH-RISK PATIENT: Primary | ICD-10-CM

## 2022-08-11 DIAGNOSIS — Z12.11 SCREENING FOR COLON CANCER: Primary | ICD-10-CM

## 2022-08-11 NOTE — TELEPHONE ENCOUNTER
I left the patient a detailed voicemail informed her that the referral has been approved & she can contact us for the information. I will mail referral as well.

## 2022-08-11 NOTE — TELEPHONE ENCOUNTER
----- Message from [facility] sent at 8/11/2022  9:29 AM EDT -----  Subject: Referral Request    Reason for referral request? Colonoscopy  Provider patient wants to be referred to(if known):     Provider Phone Number(if known): Additional Information for Provider? Patient is calling to see if she can   get another referral for a colonoscopy that she was supposed to have.  She   needs a to have a follow up procedure.   ---------------------------------------------------------------------------  --------------  Elisabeth ALVARENGA    [phone]; OK to leave message on voicemail  ---------------------------------------------------------------------------  --------------

## 2022-08-29 DIAGNOSIS — I10 ESSENTIAL HYPERTENSION: ICD-10-CM

## 2022-08-30 RX ORDER — AMLODIPINE BESYLATE 10 MG/1
TABLET ORAL
Qty: 90 TABLET | Refills: 3 | Status: SHIPPED | OUTPATIENT
Start: 2022-08-30

## 2022-09-08 ENCOUNTER — HOSPITAL ENCOUNTER (OUTPATIENT)
Dept: MAMMOGRAPHY | Age: 62
Discharge: HOME | End: 2022-09-08
Attending: INTERNAL MEDICINE
Payer: COMMERCIAL

## 2022-09-08 DIAGNOSIS — Z12.31 SCREENING MAMMOGRAM FOR HIGH-RISK PATIENT: ICD-10-CM

## 2022-09-08 PROCEDURE — 77067 SCR MAMMO BI INCL CAD: CPT

## 2022-09-09 DIAGNOSIS — E11.42 TYPE 2 DIABETES MELLITUS WITH DIABETIC POLYNEUROPATHY, WITHOUT LONG-TERM CURRENT USE OF INSULIN (HCC): ICD-10-CM

## 2022-09-09 RX ORDER — SITAGLIPTIN AND METFORMIN HYDROCHLORIDE 50; 1000 MG/1; MG/1
1 TABLET, FILM COATED ORAL 2 TIMES DAILY WITH MEALS
Qty: 180 TABLET | Refills: 3 | Status: SHIPPED | OUTPATIENT
Start: 2022-09-09

## 2022-10-06 ENCOUNTER — TELEPHONE (OUTPATIENT)
Dept: INTERNAL MEDICINE CLINIC | Age: 62
End: 2022-10-06

## 2022-10-06 NOTE — TELEPHONE ENCOUNTER
Patient called and is having problems with her right leg and wants to know what can she take for this pain.

## 2022-10-06 NOTE — TELEPHONE ENCOUNTER
Called to pt. Verified pt. Name and date of birth. Pt indicated that ever since she had her heart cath she has noted over the last few months having intermittent right cafe pain. States pain is worse in the evening than in the mornings. Denied:  Lower leg swelling  Tenderness  Redness on the skin  Pt questioned need for an xray to see if there was an injury to her leg during the heart cath. I inquired if pt is don compression stockings. Pt indicated that she was told to ware them but has not been don them. I notified pt that the compression stockings will help prevent some of the pain caused by poor circulation. Also advised the pt to keep legs elevated during the day as much as possible. Per pt's medication chart advised her to take the  tylenol as prescribed. Pt stated understanding and that she would go tomorrow and get some compression stockings.

## 2022-10-12 DIAGNOSIS — M51.36 DDD (DEGENERATIVE DISC DISEASE), LUMBAR: ICD-10-CM

## 2022-10-12 DIAGNOSIS — J45.40 MODERATE PERSISTENT ASTHMA WITHOUT COMPLICATION: ICD-10-CM

## 2022-10-12 RX ORDER — BUDESONIDE AND FORMOTEROL FUMARATE DIHYDRATE 160; 4.5 UG/1; UG/1
AEROSOL RESPIRATORY (INHALATION)
Qty: 10.2 EACH | Refills: 5 | Status: SHIPPED | OUTPATIENT
Start: 2022-10-12

## 2022-10-12 RX ORDER — IBUPROFEN 600 MG/1
600 TABLET ORAL
Qty: 60 TABLET | Refills: 2 | Status: SHIPPED | OUTPATIENT
Start: 2022-10-12

## 2022-10-18 ENCOUNTER — TELEPHONE (OUTPATIENT)
Dept: INTERNAL MEDICINE CLINIC | Age: 62
End: 2022-10-18

## 2022-10-18 NOTE — TELEPHONE ENCOUNTER
Returned call to pt. Verified pt name and date of birth. Notified pt she has an Shantelle Ahumada appointment here 11/03/2022 and she can further discuss leg issues with Dr. Josiah Figueroa at that appointment. Pt stated understanding. I requested pt to keep leg elevated and ware compression socks as discussed in earlier conversation. Pt states understanding.

## 2022-10-18 NOTE — TELEPHONE ENCOUNTER
Patient called she had talk to someone about right leg hurting. She tried the compession socks but its still hurting she would like a referral to see someone else about this.

## 2022-10-27 LAB
25(OH)D3+25(OH)D2 SERPL-MCNC: 103 NG/ML (ref 30–100)
BUN SERPL-MCNC: 16 MG/DL (ref 8–27)
BUN/CREAT SERPL: 16 (ref 12–28)
CALCIUM SERPL-MCNC: 10.2 MG/DL (ref 8.7–10.3)
CHLORIDE SERPL-SCNC: 96 MMOL/L (ref 96–106)
CO2 SERPL-SCNC: 27 MMOL/L (ref 20–29)
CREAT SERPL-MCNC: 1.01 MG/DL (ref 0.57–1)
EGFR: 63 ML/MIN/1.73
EST. AVERAGE GLUCOSE BLD GHB EST-MCNC: 183 MG/DL
GLUCOSE SERPL-MCNC: 206 MG/DL (ref 70–99)
HBA1C MFR BLD: 8 % (ref 4.8–5.6)
POTASSIUM SERPL-SCNC: 4.7 MMOL/L (ref 3.5–5.2)
SODIUM SERPL-SCNC: 138 MMOL/L (ref 134–144)

## 2022-10-30 NOTE — PROGRESS NOTES
Will discuss at 11/3/22 appt. A1c 8.0%, was 7.3% on 7/7/22. . Vitamin D too high. Reduce ergocalciferol to once a month.

## 2022-11-03 ENCOUNTER — OFFICE VISIT (OUTPATIENT)
Dept: INTERNAL MEDICINE CLINIC | Age: 62
End: 2022-11-03
Payer: COMMERCIAL

## 2022-11-03 VITALS
HEIGHT: 62 IN | RESPIRATION RATE: 19 BRPM | DIASTOLIC BLOOD PRESSURE: 83 MMHG | OXYGEN SATURATION: 98 % | SYSTOLIC BLOOD PRESSURE: 138 MMHG | TEMPERATURE: 98 F | HEART RATE: 95 BPM | BODY MASS INDEX: 32.68 KG/M2 | WEIGHT: 177.6 LBS

## 2022-11-03 DIAGNOSIS — M79.604 RIGHT LEG PAIN: ICD-10-CM

## 2022-11-03 DIAGNOSIS — E55.9 VITAMIN D DEFICIENCY: ICD-10-CM

## 2022-11-03 DIAGNOSIS — Z23 NEEDS FLU SHOT: ICD-10-CM

## 2022-11-03 DIAGNOSIS — N18.31 TYPE 2 DIABETES MELLITUS WITH STAGE 3A CHRONIC KIDNEY DISEASE, WITHOUT LONG-TERM CURRENT USE OF INSULIN (HCC): Primary | ICD-10-CM

## 2022-11-03 DIAGNOSIS — I10 PRIMARY HYPERTENSION: ICD-10-CM

## 2022-11-03 DIAGNOSIS — E11.22 TYPE 2 DIABETES MELLITUS WITH STAGE 3A CHRONIC KIDNEY DISEASE, WITHOUT LONG-TERM CURRENT USE OF INSULIN (HCC): Primary | ICD-10-CM

## 2022-11-03 PROCEDURE — 3052F HG A1C>EQUAL 8.0%<EQUAL 9.0%: CPT | Performed by: INTERNAL MEDICINE

## 2022-11-03 PROCEDURE — 99214 OFFICE O/P EST MOD 30 MIN: CPT | Performed by: INTERNAL MEDICINE

## 2022-11-03 PROCEDURE — 3074F SYST BP LT 130 MM HG: CPT | Performed by: INTERNAL MEDICINE

## 2022-11-03 PROCEDURE — 3078F DIAST BP <80 MM HG: CPT | Performed by: INTERNAL MEDICINE

## 2022-11-03 PROCEDURE — 90686 IIV4 VACC NO PRSV 0.5 ML IM: CPT | Performed by: INTERNAL MEDICINE

## 2022-11-03 RX ORDER — ERGOCALCIFEROL 1.25 MG/1
50000 CAPSULE ORAL
Qty: 4 CAPSULE | Refills: 3 | Status: CANCELLED | OUTPATIENT
Start: 2022-11-03

## 2022-11-03 NOTE — PROGRESS NOTES
CC:   Chief Complaint   Patient presents with    Diabetes    Hypertension       HISTORY OF PRESENT ILLNESS  Edgardo Mireles is a 58 y.o. female. Presents for 4 month follow up on DM and HTN. Complains of right lower leg pain for past few months. Feels like tearing or burning sensation from beneath knee radiating posterolaterally to foot. Relieved a little with diclofenac gel used 3-4 times a day. Denies polyuria, polydipsia, or hypoglycemia. Home FBS: 150's. Takes Janumet  mg twice daily. Has been eating a lot of lasagna and frozen dinners. Lab review: A1c 8.0% on 10/26/22, was 7.3% on 7/722, 7.1% on 2/22/22 and 6.8% on 10/25/21. Denies CP, SOB, heart palpitations, or dizziness. Home BP monitoring: not done. ROS  A complete review of systems was performed and is negative except for those mentioned in the HPI. Patient Active Problem List   Diagnosis Code    Type 2 diabetes mellitus with stage 3a chronic kidney disease, without long-term current use of insulin ([address] E11.22, N18.31    HTN (hypertension) I10    Primary osteoarthritis of both knees M17.0    GERD (gastroesophageal reflux disease) K21.9    Vitamin D deficiency E55.9    Fibroadenoma of right breast D24.1    Moderate persistent asthma without complication I99.32    Adrenal nodule (HCC) E27.8    Allergic rhinitis due to allergen J30.9    Diabetes mellitus with peripheral vascular disease (HCC) E11.51    Mild nonproliferative diabetic retinopathy of both eyes without macular edema associated with type 2 diabetes mellitus ([address] H10.6487    ACE inhibitor intolerance Z78.9     Past Medical History:   Diagnosis Date    Arrhythmia     Arthritis     GENERALIZED TO JOINTS-WRISTS & KNEES. Asthma     INHALERS    Asthma, chronic 10/8/2009    Diabetes ([address]     USES PILLS TO CONTROLL    GERD (gastroesophageal reflux disease)     Gout     Hypertension     CONTROLLED BY MEDS.     Nausea & vomiting     Obesity      Allergies   Allergen Reactions    Latex Rash     Severe rash    Ampicillin Anaphylaxis    Betadine Antiseptic Gauze Hives    Contrast Dye [Iodine] Hives and Swelling     Pt. Reports throat swelling      Other Medication Rash     Betadine soap/blisters    Fd And C Blue No.1 Anaphylaxis    Penicillins Hives    Keflex [Cephalexin] Other (comments)    Lisinopril Swelling       Current Outpatient Medications   Medication Sig Dispense Refill    Symbicort 160-4.5 mcg/actuation HFAA TAKE 2 PUFFS BY MOUTH TWICE A DAY 10.2 Each 5    ibuprofen (MOTRIN) 600 mg tablet TAKE 1 TABLET BY MOUTH EVERY SIX (6) HOURS AS NEEDED FOR PAIN. TAKE WITH FOOD. 60 Tablet 2    Janumet 50-1,000 mg per tablet TAKE 1 TAB BY MOUTH TWO (2) TIMES DAILY (WITH MEALS). DX: E11.42 180 Tablet 3    amLODIPine (NORVASC) 10 mg tablet TAKE 1 TABLET BY MOUTH EVERY DAY 90 Tablet 3    diclofenac (VOLTAREN) 1 % gel APPLY THIN AMOUNT TO THE R WRIST AND OTHER AFFECTED AREAS 4 TIMES DAILY 50 g 3    ergocalciferol (ERGOCALCIFEROL) 1,250 mcg (50,000 unit) capsule Take 1 Capsule by mouth every seven (7) days. Indications: low vitamin D levels 4 Capsule 3    acetaminophen (TYLENOL) 325 mg tablet Take 2 Tablets by mouth every four (4) hours as needed for Pain. 20 Tablet 0    fluticasone propionate (FLONASE) 50 mcg/actuation nasal spray INHALE 2 SPRAYS IN EACH NOSTRIL ONCE A DAY. INDICATIONS: INFLAMMATION OF THE NOSE DUE TO AN ALLERGY 16 g 11    Blood-Glucose Meter monitoring kit Test once daily DX E11.22 1 Kit 0    glucose blood VI test strips (blood glucose test) strip Test once daily DX E11.22 100 Strip 3    lancets misc Test once daily DX E11.22 100 Each 3    rosuvastatin (CRESTOR) 5 mg tablet TAKE 1 TABLET BY MOUTH EVERY DAY AT NIGHT 90 Tablet 3    triamterene-hydroCHLOROthiazide (MAXZIDE) 37.5-25 mg per tablet TAKE 1 TABLET BY MOUTH EVERY DAY IN THE MORNING 90 Tablet 3    omeprazole (PRILOSEC) 40 mg capsule Take 40 mg by mouth two (2) times a day.       albuterol (PROVENTIL VENTOLIN) 2.5 mg /3 mL (0.083 %) nebu 3 mL by Nebulization route three (3) times daily as needed for Wheezing. Dx: J45.41 60 Nebule 5    Nebulizer & Compressor machine With accessories. Use as instructed 1-3 times daily as needed. Dx:J45.41 1 Each 0    aspirin delayed-release 81 mg tablet Take  by mouth daily. PHYSICAL EXAM  Visit Vitals  /83 (BP 1 Location: Left upper arm, BP Patient Position: Sitting, BP Cuff Size: Adult)   Pulse 95   Temp 98 °F (36.7 °C) (Oral)   Resp 19   Ht 5' 2\" (1.575 m)   Wt 177 lb 9.6 oz (80.6 kg)   LMP 06/19/2011   SpO2 98%   BMI 32.48 kg/m²       General: Obese, no distress. HEENT:  Head normocephalic/atraumatic, no scleral icterus  Lungs:  Clear to ausculation bilaterally. Good air movement. Heart:  Regular rate and rhythm, normal S1 and S2, no murmur, gallop, or rub  Extremities: No clubbing, cyanosis, or edema. Marked crepitus at right knee with normal ROM. Neurological: Alert and oriented. Psychiatric: Normal mood and affect. Behavior is normal.   Diabetic foot exam:     Left Foot:   Visual Exam: normal    Pulse DP: 2+ (normal)   Filament test: normal sensation    Vibratory sensation: normal      Right Foot:   Visual Exam: normal    Pulse DP: 2+ (normal)   Filament test: absent sensation    Vibratory sensation: diminished        ASSESSMENT AND PLAN    ICD-10-CM ICD-9-CM    1. Type 2 diabetes mellitus with stage 3a chronic kidney disease, without long-term current use of insulin ([facility])  E11.22 250.40 HEMOGLOBIN A1C WITH EAG    N18.31 585.3 MICROALBUMIN, UR, RAND W/ MICROALB/CREAT RATIO      HEMOGLOBIN A1C WITH EAG      MICROALBUMIN, UR, RAND W/ MICROALB/CREAT RATIO      2. Primary hypertension  H75 594.2 METABOLIC PANEL, COMPREHENSIVE      CBC WITH AUTOMATED DIFF      LIPID PANEL      METABOLIC PANEL, COMPREHENSIVE      CBC WITH AUTOMATED DIFF      LIPID PANEL      3.  Right leg pain  M79.604 729.5 XR SPINE LUMB 2 OR 3 V      XR KNEE RT MAX 2 VWS      4. Needs flu shot  Z23 V04.81 INFLUENZA, FLUARIX, FLULAVAL, FLUZONE (AGE 6 MO+), AFLURIA(AGE 3Y+) IM, PF, 0.5 ML      5. Vitamin D deficiency  E55.9 268.9 VITAMIN D, 25 HYDROXY      VITAMIN D, 25 HYDROXY        Discussed lab results from 10/26/22. A1c 8.0%, was 7.3% on 7/7/22. . Vitamin D too high. Diagnoses and all orders for this visit:    1. Type 2 diabetes mellitus with stage 3a chronic kidney disease, without long-term current use of insulin ([facility])  A1c 8.0%. Worsening control likely due to eating too many high carb foods. Counseled on decreasing high carb foods. Continue Janumet. C  -     HEMOGLOBIN A1C WITH EAG; Future  -     MICROALBUMIN, UR, RAND W/ MICROALB/CREAT RATIO; Future    2. Primary hypertension  Controlled. Continue amlodipine and triamterene-HCTZ. She has ACE-I allergy.  -     METABOLIC PANEL, COMPREHENSIVE; Future  -     CBC WITH AUTOMATED DIFF; Future  -     LIPID PANEL; Future    3. Right leg pain  Lower leg pain. May be radiating from right knee, due to diabetic neuropathy, or due to lumbar radiculopathy. I am more suspicious of the latter based on her having asymmetric sensory changes. -     XR SPINE LUMB 2 OR 3 V; Future  -     XR KNEE RT MAX 2 VWS; Future    4. Needs flu shot  -     INFLUENZA, FLUARIX, FLULAVAL, FLUZONE (AGE 6 MO+), AFLURIA(AGE 3Y+) IM, PF, 0.5 ML    5. Vitamin D deficiency  -     VITAMIN D, 25 HYDROXY; Future      Follow-up and Dispositions    Return in about 3 months (around 2/3/2023), or if symptoms worsen or fail to improve, for DM, HTN, R leg pain; have fasting labs 1 week before appointment. I have discussed the diagnosis with the patient and the intended plan as seen in the above orders. Patient is in agreement. The patient has received an after-visit summary and questions were answered concerning future plans. I have discussed medication side effects and warnings with the patient as well.

## 2022-11-03 NOTE — PATIENT INSTRUCTIONS
Vaccine Information Statement    Influenza (Flu) Vaccine (Inactivated or Recombinant): What You Need to Know    Many vaccine information statements are available in [place] and other languages. See www.immunize.org/vis. Hojas de información sobre vacunas están disponibles en español y en muchos otros idiomas. Visite www.immunize.org/vis. 1. Why get vaccinated? Influenza vaccine can prevent influenza (flu). Flu is a contagious disease that spreads around the [facility] every year, usually between October and May. Anyone can get the flu, but it is more dangerous for some people. Infants and young children, people 72 years and older, pregnant people, and people with certain health conditions or a weakened immune system are at greatest risk of flu complications. Pneumonia, bronchitis, sinus infections, and ear infections are examples of flu-related complications. If you have a medical condition, such as heart disease, cancer, or diabetes, flu can make it worse. Flu can cause fever and chills, sore throat, muscle aches, fatigue, cough, headache, and runny or stuffy nose. Some people may have vomiting and diarrhea, though this is more common in children than adults. In an average year, thousands of people in the [facility] die from flu, and many more are hospitalized. Flu vaccine prevents millions of illnesses and flu-related visits to the doctor each year. 2. Influenza vaccines     CDC recommends everyone 6 months and older get vaccinated every flu season. Children 6 months through 6years of age may need 2 doses during a single flu season. Everyone else needs only 1 dose each flu season. It takes about 2 weeks for protection to develop after vaccination. There are many flu viruses, and they are always changing. Each year a new flu vaccine is made to protect against the influenza viruses believed to be likely to cause disease in the upcoming flu season.  Even when the vaccine doesnt exactly match these viruses, it may still provide some protection. Influenza vaccine does not cause flu. Influenza vaccine may be given at the same time as other vaccines. 3. Talk with your health care provider    Tell your vaccination provider if the person getting the vaccine:  Has had an allergic reaction after a previous dose of influenza vaccine, or has any severe, life-threatening allergies   Has ever had Guillain-Barré Syndrome (also called GBS)    In some cases, your health care provider may decide to postpone influenza vaccination until a future visit. Influenza vaccine can be administered at any time during pregnancy. People who are or will be pregnant during influenza season should receive inactivated influenza vaccine. People with minor illnesses, such as a cold, may be vaccinated. People who are moderately or severely ill should usually wait until they recover before getting influenza vaccine. Your health care provider can give you more information. 4. Risks of a vaccine reaction    Soreness, redness, and swelling where the shot is given, fever, muscle aches, and headache can happen after influenza vaccination. There may be a very small increased risk of Guillain-Barré Syndrome (GBS) after inactivated influenza vaccine (the flu shot). [facility] children who get the flu shot along with pneumococcal vaccine (PCV13) and/or DTaP vaccine at the same time might be slightly more likely to have a seizure caused by fever. Tell your health care provider if a child who is getting flu vaccine has ever had a seizure. People sometimes faint after medical procedures, including vaccination. Tell your provider if you feel dizzy or have vision changes or ringing in the ears. As with any medicine, there is a very remote chance of a vaccine causing a severe allergic reaction, other serious injury, or death. 5. What if there is a serious problem?     An allergic reaction could occur after the vaccinated person leaves the clinic. If you see signs of a severe allergic reaction (hives, swelling of the face and throat, difficulty breathing, a fast heartbeat, dizziness, or weakness), call 9-1-1 and get the person to the nearest hospital.    For other signs that concern you, call your health care provider. Adverse reactions should be reported to the Vaccine Adverse Event Reporting System (VAERS). Your health care provider will usually file this report, or you can do it yourself. Visit the VAERS website at www.vaers. [facility].gov or call 6-316.261.5373. VAERS is only for reporting reactions, and VAERS staff members do not give medical advice. 6. The National Vaccine Injury Compensation Program    The [facility] Vaccine Injury Compensation Program (VICP) is a federal program that was created to compensate people who may have been injured by certain vaccines. Claims regarding alleged injury or death due to vaccination have a time limit for filing, which may be as short as two years. Visit the VICP website at www.[facility].gov/vaccinecompensation or call 3-537.893.2686 to learn about the program and about filing a claim. 7. How can I learn more? Ask your health care provider. Call your local or state health department. Visit the website of the Food and Drug Administration (FDA) for vaccine package inserts and additional information at www.fda.gov/vaccines-blood-biologics/vaccines. Contact the Centers for Disease Control and Prevention (CDC): Call 6-812.583.2577 (1-800-CDC-INFO) or  Visit CDCs influenza website at www.cdc.gov/flu. Vaccine Information Statement   Inactivated Influenza Vaccine   8/6/2021  42 SATINDER Garza [id number]   Department of Health and Human Services  Centers for Disease Control and Prevention    Office Use Only

## 2022-11-03 NOTE — PROGRESS NOTES
Room: 3a  Identified pt with two pt identifiers(name and ). Reviewed record in preparation for visit and have obtained necessary documentation. Chief Complaint   Patient presents with    Diabetes    Hypertension        Vitals:    22 0754 22 0758   BP: (!) 136/94 138/83   Pulse: 94 95   Resp: 19    Temp: 98 °F (36.7 °C)    TempSrc: Oral    SpO2: 98%    Weight: 177 lb 9.6 oz (80.6 kg)    Height: 5' 2\" (1.575 m)    PainSc:   0 - No pain    LMP: 2011       Health Maintenance Due   Topic    COVID-19 Vaccine (4 - Booster for [employer] series)    Flu Vaccine (1)    Foot Exam Q1        1. \"Have you been to the ER, urgent care clinic since your last visit? Hospitalized since your last visit? \" No    2. \"Have you seen or consulted any other health care providers outside of the [address] [place] [address] since your last visit? \" No     3. For patients over 45: Has the patient had a colonoscopy? Yes - no Care Gap present     If the patient is female:    4. For patients over 40: Has the patient had a mammogram? Yes - no Care Gap present    5. For patients over 21: Has the patient had a pap smear? Yes - no Care Gap present    Current Outpatient Medications   Medication Instructions    acetaminophen (TYLENOL) 650 mg, Oral, EVERY 4 HOURS AS NEEDED    albuterol (PROVENTIL VENTOLIN) 2.5 mg, Nebulization, 3 TIMES DAILY AS NEEDED, Dx: J45.41    amLODIPine (NORVASC) 10 mg tablet TAKE 1 TABLET BY MOUTH EVERY DAY    aspirin delayed-release 81 mg tablet Oral, DAILY    Blood-Glucose Meter monitoring kit Test once daily DX E11.22    diclofenac (VOLTAREN) 1 % gel APPLY THIN AMOUNT TO THE R WRIST AND OTHER AFFECTED AREAS 4 TIMES DAILY    ergocalciferol (ERGOCALCIFEROL) 50,000 Units, Oral, EVERY 7 DAYS    fluticasone propionate (FLONASE) 50 mcg/actuation nasal spray INHALE 2 SPRAYS IN EACH NOSTRIL ONCE A DAY.  INDICATIONS: INFLAMMATION OF THE NOSE DUE TO AN ALLERGY    glucose blood VI test strips (blood glucose test) strip Test once daily DX E11.22    ibuprofen (MOTRIN) 600 mg, Oral, EVERY 6 HOURS AS NEEDED, Take with food. Janumet 50-1,000 mg per tablet 1 Tablet, Oral, 2 TIMES DAILY WITH MEALS, Dx: E11.42    lancets misc Test once daily DX E11.22    Nebulizer & Compressor machine With accessories. Use as instructed 1-3 times daily as needed. Dx:J45.41    omeprazole (PRILOSEC) 40 mg, Oral, 2 TIMES DAILY    rosuvastatin (CRESTOR) 5 mg tablet TAKE 1 TABLET BY MOUTH EVERY DAY AT NIGHT    Symbicort 160-4.5 mcg/actuation HFAA TAKE 2 PUFFS BY MOUTH TWICE A DAY    triamterene-hydroCHLOROthiazide (MAXZIDE) 37.5-25 mg per tablet TAKE 1 TABLET BY MOUTH EVERY DAY IN THE MORNING       Allergies   Allergen Reactions    Latex Rash     Severe rash    Ampicillin Anaphylaxis    Betadine Antiseptic Gauze Hives    Contrast Dye [Iodine] Hives and Swelling     Pt. Reports throat swelling      Other Medication Rash     Betadine soap/blisters    Fd And C Blue No.1 Anaphylaxis    Penicillins Hives    Keflex [Cephalexin] Other (comments)    Lisinopril Swelling       Immunization History   Administered Date(s) Administered    COVID-19, PFIZER PURPLE top, DILUTE for use, (age 15 y+), IM, 30mcg/0.3mL 04/13/2021, 05/11/2021, 01/28/2022    Influenza Vaccine 11/07/2018    Influenza, FLUARIX, FLULAVAL, Beatris Balls (age 10 mo+) AND AFLURIA, (age 1 y+), PF, 0.5mL 10/20/2020, 10/25/2021    Tdap 11/15/2013       Past Medical History:   Diagnosis Date    Arrhythmia     Arthritis     GENERALIZED TO JOINTS-WRISTS & KNEES. Asthma     INHALERS    Asthma, chronic 10/8/2009    Diabetes ([facility]     USES PILLS TO CONTROLL    GERD (gastroesophageal reflux disease)     Gout     Hypertension     CONTROLLED BY MEDS.     Nausea & vomiting     Obesity

## 2023-01-09 RX ORDER — ROSUVASTATIN CALCIUM 5 MG/1
5 TABLET, COATED ORAL
Qty: 90 TABLET | Refills: 3 | Status: CANCELLED | OUTPATIENT
Start: 2023-01-09

## 2023-01-11 ENCOUNTER — HOSPITAL ENCOUNTER (EMERGENCY)
Age: 63
Discharge: HOME | End: 2023-01-11
Attending: EMERGENCY MEDICINE
Payer: COMMERCIAL

## 2023-01-11 VITALS
SYSTOLIC BLOOD PRESSURE: 138 MMHG | DIASTOLIC BLOOD PRESSURE: 123 MMHG | BODY MASS INDEX: 31.73 KG/M2 | RESPIRATION RATE: 16 BRPM | TEMPERATURE: 98.1 F | HEIGHT: 62 IN | HEART RATE: 101 BPM | WEIGHT: 172.4 LBS | OXYGEN SATURATION: 100 %

## 2023-01-11 DIAGNOSIS — L50.9 URTICARIA: Primary | ICD-10-CM

## 2023-01-11 PROCEDURE — 99283 EMERGENCY DEPT VISIT LOW MDM: CPT

## 2023-01-11 RX ORDER — HYDROXYZINE 25 MG/1
25 TABLET, FILM COATED ORAL
Qty: 20 TABLET | Refills: 0 | Status: SHIPPED | OUTPATIENT
Start: 2023-01-11 | End: 2023-01-21

## 2023-01-11 NOTE — ED PROVIDER NOTES
[facility] EMERGENCY DEPT  EMERGENCY DEPARTMENT ENCOUNTER       Pt Name: Richar Caldera  MRN: [id number]  [place] 1960  Date of evaluation: 1/11/2023  Provider: Hannah Pina MD   PCP: Fausto Wing MD  Note Started: 6:03 PM 1/11/23     CHIEF COMPLAINT       Chief Complaint   Patient presents with    Allergic Reaction     Patient reports on/off hives since new years despite being on prednisone and benadryl. No difficulty braething        HISTORY OF PRESENT ILLNESS: 1 or more elements      History From: Patient, History limited by: No limitations     Richar Caldera is a 61 y.o. female who presents with chief complaint of intermittent urticaria, wheals, pruritus. Symptoms have been intermittent over the past 1 to 2 weeks. Symptoms started after she ate shellfish on New Year's Екатерина, she has a known shellfish allergy. She has been using as needed Benadryl with some relief of symptoms. She also used about 4 days of prednisone that was prescribed to her which did help with her symptoms. She does not have any active hives at this time but does endorse generalized pruritus and she endorses occasional wheals that will occur. She did recently change soaps and detergents. No other environmental exposure or other food allergies. She denies fevers, chills, or recent illness. Nursing Notes were all reviewed and agreed with or any disagreements were addressed in the HPI. REVIEW OF SYSTEMS        Positives and Pertinent negatives as per HPI. PAST HISTORY     Past Medical History:  Past Medical History:   Diagnosis Date    Arrhythmia     Arthritis     GENERALIZED TO JOINTS-WRISTS & KNEES. Asthma     INHALERS    Asthma, chronic 10/8/2009    Diabetes ([address]     USES PILLS TO CONTROLL    GERD (gastroesophageal reflux disease)     Gout     Hypertension     CONTROLLED BY MEDS.     Nausea & vomiting     Obesity        Past Surgical History:  Past Surgical History:   Procedure Laterality Date    COLONOSCOPY N/A 2019    COLONOSCOPY performed by Camila Claros MD at [facility] ENDOSCOPY. 6 mm tubular adenoma in sigmoid polyp. REpeat in 6 months due to poor prep    COLONOSCOPY N/A 2020    COLONOSCOPY performed by Camila Claros MD at [facility] ENDOSCOPY. 2 tubular adenomas, diverticulosis, internal hemorrhoids    COLONOSCOPY N/A 2022    COLONOSCOPY performed by Camila Claros MD at [facility] ENDOSCOPY. Mild sigmoid diverticulosis, small int hemorrhoids. Poor prep. HX BREAST BIOPSY Right 2018    benign    HX CHOLECYSTECTOMY      HX HEART CATHETERIZATION      CARDIAC CATH X2    HX HEENT      CLEFT PALATE REPAIR. HX HYSTERECTOMY  2011    HX ORTHOPAEDIC      carpal tunnel, trigger finger, hand surgery left arm    HX OTHER SURGICAL      CLeft palate repair, piloneidal cyst, hand surgery,     HX TUBAL LIGATION         Family History:  Family History   Problem Relation Age of Onset    Asthma Mother     Hypertension Mother     Diabetes Father     Heart Disease Father         MI    Stroke Brother         CEREBRAL ANEURYSM    Heart Attack Sister     Hypertension Sister     Cancer Sister         multiple myaloma    Hypertension Sister     No Known Problems Sister     Breast Cancer Cousin        Social History:  Social History     Tobacco Use    Smoking status: Former     Years: 10.00     Types: Cigarettes     Quit date: 2010     Years since quittin.5    Smokeless tobacco: Never   Vaping Use    Vaping Use: Never used   Substance Use Topics    Alcohol use: No    Drug use: No       Allergies: Allergies   Allergen Reactions    Latex Rash     Severe rash    Ampicillin Anaphylaxis    Betadine Antiseptic Gauze Hives    Contrast Dye [Iodine] Hives and Swelling     Pt.  Reports throat swelling      Other Medication Rash     Betadine soap/blisters    Fd And C Blue No.1 Anaphylaxis    Penicillins Hives    Keflex [Cephalexin] Other (comments)    Lisinopril Swelling       CURRENT MEDICATIONS      Discharge Medication List as of 1/11/2023  6:22 PM        CONTINUE these medications which have NOT CHANGED    Details   diclofenac (VOLTAREN) 1 % gel APPLY THIN AMOUNT TO THE R WRIST AND OTHER AFFECTED AREAS 4 TIMES DAILY, Normal, Disp-100 g, R-2      rosuvastatin (CRESTOR) 5 mg tablet TAKE 1 TABLET BY MOUTH EVERY DAY AT NIGHT, Normal, Disp-90 Tablet, R-3      Symbicort 160-4.5 mcg/actuation HFAA TAKE 2 PUFFS BY MOUTH TWICE A DAY, Normal, Disp-10.2 Each, R-5      ibuprofen (MOTRIN) 600 mg tablet TAKE 1 TABLET BY MOUTH EVERY SIX (6) HOURS AS NEEDED FOR PAIN. TAKE WITH FOOD., Normal, Disp-60 Tablet, R-2      Janumet 50-1,000 mg per tablet TAKE 1 TAB BY MOUTH TWO (2) TIMES DAILY (WITH MEALS). DX: E11.42, Normal, Disp-180 Tablet, R-3      amLODIPine (NORVASC) 10 mg tablet TAKE 1 TABLET BY MOUTH EVERY DAY, Normal, Disp-90 Tablet, R-3      ergocalciferol (ERGOCALCIFEROL) 1,250 mcg (50,000 unit) capsule Take 1 Capsule by mouth every seven (7) days. Indications: low vitamin D levels, Normal, Disp-4 Capsule, R-3      acetaminophen (TYLENOL) 325 mg tablet Take 2 Tablets by mouth every four (4) hours as needed for Pain., Normal, Disp-20 Tablet, R-0      fluticasone propionate (FLONASE) 50 mcg/actuation nasal spray INHALE 2 SPRAYS IN EACH NOSTRIL ONCE A DAY.  INDICATIONS: INFLAMMATION OF THE NOSE DUE TO AN ALLERGY, Normal, Disp-16 g, R-11      Blood-Glucose Meter monitoring kit Test once daily DX E11.22, Normal, Disp-1 Kit, R-0      glucose blood VI test strips (blood glucose test) strip Test once daily DX E11.22, Normal, Disp-100 Strip, R-3      lancets misc Test once daily DX E11.22, Normal, Disp-100 Each, R-3      triamterene-hydroCHLOROthiazide (MAXZIDE) 37.5-25 mg per tablet TAKE 1 TABLET BY MOUTH EVERY DAY IN THE MORNING, Normal, Disp-90 Tablet, R-3      omeprazole (PRILOSEC) 40 mg capsule Take 40 mg by mouth two (2) times a day., Historical Med      albuterol (PROVENTIL VENTOLIN) 2.5 mg /3 mL (0.083 %) nebu 3 mL by Nebulization route three (3) times daily as needed for Wheezing. Dx: J45.41, Normal, Disp-60 Nebule, R-5      Nebulizer & Compressor machine With accessories. Use as instructed 1-3 times daily as needed. Dx:J45.41, Print, Disp-1 Each, R-0      aspirin delayed-release 81 mg tablet Take  by mouth daily. , Historical Med             SCREENINGS               No data recorded         PHYSICAL EXAM      ED Triage Vitals [01/11/23 1624]   ED Encounter Vitals Group      BP (!) 138/123      Pulse (Heart Rate) (!) 101      Resp Rate 16      Temp 98.1 °F (36.7 °C)      Temp src       O2 Sat (%) 100 %      Weight 172 lb 6.4 oz      Height 5' 2\"        Physical Exam  Vitals and nursing note reviewed. Constitutional:       General: She is not in acute distress. Appearance: Normal appearance. She is not ill-appearing. Cardiovascular:      Rate and Rhythm: Normal rate and regular rhythm. Heart sounds: No murmur heard. Pulmonary:      Effort: Pulmonary effort is normal. No respiratory distress. Breath sounds: Normal breath sounds. Skin:     General: Skin is warm and dry. Findings: No erythema or rash. Neurological:      Mental Status: She is alert. DIAGNOSTIC RESULTS   LABS:     No results found for this or any previous visit (from the past 12 hour(s)). EKG: If performed, independent interpretation documented below in the MDM section     RADIOLOGY:  Non-plain film images such as CT, Ultrasound and MRI are read by the radiologist. Plain radiographic images are visualized and preliminarily interpreted by the ED Provider with the findings documented in the MDM section. Interpretation per the Radiologist below, if available at the time of this note:     No results found.       PROCEDURES   Unless otherwise noted below, none  Procedures     CRITICAL CARE TIME   0    EMERGENCY DEPARTMENT COURSE and DIFFERENTIAL DIAGNOSIS/MDM   Vitals:    Vitals:    01/11/23 1624   BP: (!) 138/123   Pulse: (!) 101   Resp: 16 Temp: 98.1 °F (36.7 °C)   SpO2: 100%   Weight: 78.2 kg (172 lb 6.4 oz)   Height: 5' 2\" (1.575 m)        Patient was given the following medications:  Medications - No data to display    Medical Decision Making  Risk  OTC drugs. Prescription drug management. 59-year-old female presenting to the emergency department with intermittent urticaria, hives, pruritus, and wheals onset after she ate shellfish which is a known food allergy to her on New Year's [place]катерина. She has not eaten shellfish since then. She is afebrile and vital signs are stable. No active skin lesions at this time. No evidence of active urticaria, wheals, she does endorse occasional pruritus but appears comfortable on my exam.  She has no other medical complaints today. Differential diagnosis includes environmental exposures such as new laundry detergent, soaps, shampoos, outdoor exposures, other food allergy. There is no airway swelling, lip swelling, tongue swelling, stridor, or difficulty breathing or swallowing. I encourage patient to use hydroxyzine instead of Benadryl as it is a stronger antihistamine. Considered starting her on another course of steroids but since she does not have any active urticaria or wheals on my exam we will hold on a second course of steroids. Encourage close follow-up with her PCP and patient was given strict return ED precautions. FINAL IMPRESSION     1. Urticaria          DISPOSITION/PLAN     Discharge Note:  The patient has been re-evaluated and is ready for discharge. Reviewed available results with patient. Counseled patient on diagnosis and care plan. Patient has expressed understanding, and all questions have been answered. Patient agrees with plan and agrees to follow up as recommended, or to return to the ED if their symptoms worsen. Discharge instructions have been provided and explained to the patient, along with reasons to return to the ED. CLINICAL IMPRESSION    1.  Urticaria DISPOSITION  Discharge     PATIENT REFERRED TO:  Follow-up Information       Follow up With Specialties Details Why Contact Info    Donald Gonzalez MD Internal Medicine Physician Schedule an appointment as soon as possible for a visit   [address] [place] [address]  [phone]      [facility] EMERGENCY DEPT Emergency Medicine Go to  As needed, If symptoms worsen [address] [place] [address]  [phone]              DISCHARGE MEDICATIONS:  Discharge Medication List as of 1/11/2023  6:22 PM        START taking these medications    Details   hydrOXYzine HCL (ATARAX) 25 mg tablet Take 1 Tablet by mouth three (3) times daily as needed for Itching for up to 10 days. , Normal, Disp-20 Tablet, R-0           CONTINUE these medications which have NOT CHANGED    Details   diclofenac (VOLTAREN) 1 % gel APPLY THIN AMOUNT TO THE R WRIST AND OTHER AFFECTED AREAS 4 TIMES DAILY, Normal, Disp-100 g, R-2      rosuvastatin (CRESTOR) 5 mg tablet TAKE 1 TABLET BY MOUTH EVERY DAY AT NIGHT, Normal, Disp-90 Tablet, R-3      Symbicort 160-4.5 mcg/actuation HFAA TAKE 2 PUFFS BY MOUTH TWICE A DAY, Normal, Disp-10.2 Each, R-5      ibuprofen (MOTRIN) 600 mg tablet TAKE 1 TABLET BY MOUTH EVERY SIX (6) HOURS AS NEEDED FOR PAIN. TAKE WITH FOOD., Normal, Disp-60 Tablet, R-2      Janumet 50-1,000 mg per tablet TAKE 1 TAB BY MOUTH TWO (2) TIMES DAILY (WITH MEALS). DX: E11.42, Normal, Disp-180 Tablet, R-3      amLODIPine (NORVASC) 10 mg tablet TAKE 1 TABLET BY MOUTH EVERY DAY, Normal, Disp-90 Tablet, R-3      ergocalciferol (ERGOCALCIFEROL) 1,250 mcg (50,000 unit) capsule Take 1 Capsule by mouth every seven (7) days. Indications: low vitamin D levels, Normal, Disp-4 Capsule, R-3      acetaminophen (TYLENOL) 325 mg tablet Take 2 Tablets by mouth every four (4) hours as needed for Pain., Normal, Disp-20 Tablet, R-0      fluticasone propionate (FLONASE) 50 mcg/actuation nasal spray INHALE 2 SPRAYS IN EACH NOSTRIL ONCE A DAY. INDICATIONS: INFLAMMATION OF THE NOSE DUE TO AN ALLERGY, Normal, Disp-16 g, R-11      Blood-Glucose Meter monitoring kit Test once daily DX E11.22, Normal, Disp-1 Kit, R-0      glucose blood VI test strips (blood glucose test) strip Test once daily DX E11.22, Normal, Disp-100 Strip, R-3      lancets misc Test once daily DX E11.22, Normal, Disp-100 Each, R-3      triamterene-hydroCHLOROthiazide (MAXZIDE) 37.5-25 mg per tablet TAKE 1 TABLET BY MOUTH EVERY DAY IN THE MORNING, Normal, Disp-90 Tablet, R-3      omeprazole (PRILOSEC) 40 mg capsule Take 40 mg by mouth two (2) times a day., Historical Med      albuterol (PROVENTIL VENTOLIN) 2.5 mg /3 mL (0.083 %) nebu 3 mL by Nebulization route three (3) times daily as needed for Wheezing. Dx: J45.41, Normal, Disp-60 Nebule, R-5      Nebulizer & Compressor machine With accessories. Use as instructed 1-3 times daily as needed. Dx:J45.41, Print, Disp-1 Each, R-0      aspirin delayed-release 81 mg tablet Take  by mouth daily. , Historical Med               DISCONTINUED MEDICATIONS:  Discharge Medication List as of 1/11/2023  6:22 PM          I am the Primary Clinician of Record. Jacquelin Friend MD (electronically signed)    (Please note that parts of this dictation were completed with voice recognition software. Quite often unanticipated grammatical, syntax, homophones, and other interpretive errors are inadvertently transcribed by the computer software. Please disregards these errors.  Please excuse any errors that have escaped final proofreading.)

## 2023-01-12 ENCOUNTER — TELEPHONE (OUTPATIENT)
Dept: INTERNAL MEDICINE CLINIC | Age: 63
End: 2023-01-12

## 2023-01-12 DIAGNOSIS — L50.9 URTICARIA: Primary | ICD-10-CM

## 2023-01-12 NOTE — TELEPHONE ENCOUNTER
----- Message from Harmeet Aguayo sent at 1/12/2023 12:26 PM EST -----  Subject: Referral Request    Reason for referral request? Patient was in the ER 1/11 for an allergic   reaction and it maybe from fish. ER doctor told her that she needs to see   a dermatologist because she is still breaking out in hives. The patient is   currently taking a medication in the [employer] family. Please call patient   back and advise. The medication is Hydroxyzine 25 mg.  Provider patient wants to be referred to(if known):     Provider Phone Number(if known):     Additional Information for Provider?   ---------------------------------------------------------------------------  --------------  [address] [facility]    [phone]; OK to leave message on voicemail  ---------------------------------------------------------------------------  --------------

## 2023-01-13 NOTE — TELEPHONE ENCOUNTER
Contacted pt verified name and . Informed pt that Dr. Sheryl Cabrera signed orders for Dermatology and Allergist. Pt said she will  orders on Monday.

## 2023-01-13 NOTE — TELEPHONE ENCOUNTER
Referral orders to Dermatology and Allergy placed. You can call her with information and mail referral orders.

## 2023-02-08 ENCOUNTER — TELEPHONE (OUTPATIENT)
Dept: INTERNAL MEDICINE CLINIC | Age: 63
End: 2023-02-08

## 2023-02-08 ENCOUNTER — OFFICE VISIT (OUTPATIENT)
Dept: INTERNAL MEDICINE CLINIC | Age: 63
End: 2023-02-08
Payer: COMMERCIAL

## 2023-02-08 VITALS
DIASTOLIC BLOOD PRESSURE: 63 MMHG | WEIGHT: 171.8 LBS | SYSTOLIC BLOOD PRESSURE: 125 MMHG | RESPIRATION RATE: 20 BRPM | HEART RATE: 91 BPM | HEIGHT: 62 IN | OXYGEN SATURATION: 97 % | BODY MASS INDEX: 31.62 KG/M2 | TEMPERATURE: 98.2 F

## 2023-02-08 DIAGNOSIS — M17.0 PRIMARY OSTEOARTHRITIS OF BOTH KNEES: ICD-10-CM

## 2023-02-08 DIAGNOSIS — I10 PRIMARY HYPERTENSION: ICD-10-CM

## 2023-02-08 DIAGNOSIS — E11.9 TYPE 2 DIABETES MELLITUS WITHOUT COMPLICATION, WITHOUT LONG-TERM CURRENT USE OF INSULIN (HCC): Primary | ICD-10-CM

## 2023-02-08 DIAGNOSIS — L50.9 URTICARIA: ICD-10-CM

## 2023-02-08 PROCEDURE — 3078F DIAST BP <80 MM HG: CPT | Performed by: INTERNAL MEDICINE

## 2023-02-08 PROCEDURE — 3074F SYST BP LT 130 MM HG: CPT | Performed by: INTERNAL MEDICINE

## 2023-02-08 PROCEDURE — 99214 OFFICE O/P EST MOD 30 MIN: CPT | Performed by: INTERNAL MEDICINE

## 2023-02-08 PROCEDURE — 3052F HG A1C>EQUAL 8.0%<EQUAL 9.0%: CPT | Performed by: INTERNAL MEDICINE

## 2023-02-08 RX ORDER — CETIRIZINE HYDROCHLORIDE 10 MG/1
TABLET ORAL
COMMUNITY
Start: 2023-01-27

## 2023-02-08 NOTE — TELEPHONE ENCOUNTER
Called to pt. Verified pt name and date of birth  Notified pt of medication change and to go to pharmacy to get new medication  Pt stated understanding.

## 2023-02-08 NOTE — PROGRESS NOTES
CC:   Chief Complaint   Patient presents with    Diabetes    Hypertension    Leg Pain     right       HISTORY OF PRESENT ILLNESS  Maciel Alejandro is a 61 y.o. female. Presents for 3 month follow up evaluation. Complains of hives on arms that started New Year's Екатерина. Seen at [facility] ED on 1/11/23. Took prednisone and Benadryl. Saw Allergy doctor; allergy testing was unrevealing according to her. Next appt. 2/24/23. Sees dermatologist on 2/15/23. Denies polyuria, polydipsia, or hypoglycemia. Home BS's: has not been checking because does not know how to use her new digital glucometer. Reports compliance with Janumet. Thought she was avoiding sweets but on questioning, admits she eats ice cream sandwiches every other night, uses regular sugar in coffee, and drinks fruity drinks and cranberry juice regularly. Lab review: A1c 9.0% on 1/27/23, 8.0% on 10/26/22, 7.3% on 7/722, 7.1% on 2/22/2    Took prednisone in Dec and Jan. Was on glipizide years ago but dropped her BS's too low. No longer having right lower leg pain. Uses diclofenac gel as needed. Denies CP, SOB, heart palpitations, or dizziness. Home BP monitoring: not done.     COVID vaccine: due for 2nd booster vaccine, does not plan to get      Patient Active Problem List   Diagnosis Code    Type 2 diabetes mellitus with stage 3a chronic kidney disease, without long-term current use of insulin (HCC) E11.22, N18.31    HTN (hypertension) I10    Primary osteoarthritis of both knees M17.0    GERD (gastroesophageal reflux disease) K21.9    Vitamin D deficiency E55.9    Fibroadenoma of right breast D24.1    Moderate persistent asthma without complication R60.76    Adrenal nodule (HCC) E27.8    Allergic rhinitis due to allergen J30.9    Diabetes mellitus with peripheral vascular disease (HCC) E11.51    Mild nonproliferative diabetic retinopathy of both eyes without macular edema associated with type 2 diabetes mellitus ([facility] W31.7033    ACE inhibitor intolerance Z78.9     Past Medical History:   Diagnosis Date    Arrhythmia     Arthritis     GENERALIZED TO JOINTS-WRISTS & KNEES. Asthma     INHALERS    Asthma, chronic 10/8/2009    Diabetes ([facility]     USES PILLS TO CONTROLL    GERD (gastroesophageal reflux disease)     Gout     Hypertension     CONTROLLED BY MEDS. Nausea & vomiting     Obesity      Allergies   Allergen Reactions    Latex Rash     Severe rash    Ampicillin Anaphylaxis    Betadine Antiseptic Gauze Hives    Contrast Dye [Iodine] Hives and Swelling     Pt. Reports throat swelling      Other Medication Rash     Betadine soap/blisters    Fd And C Blue No.1 Anaphylaxis    Penicillins Hives    Keflex [Cephalexin] Other (comments)    Lisinopril Swelling       Current Outpatient Medications   Medication Sig Dispense Refill    cetirizine (ZYRTEC) 10 mg tablet TAKE 1 TABLET BY MOUTH TWICE A DAY TO PREVENT HIVES      diclofenac (VOLTAREN) 1 % gel APPLY THIN AMOUNT TO THE R WRIST AND OTHER AFFECTED AREAS 4 TIMES DAILY 100 g 2    rosuvastatin (CRESTOR) 5 mg tablet TAKE 1 TABLET BY MOUTH EVERY DAY AT NIGHT 90 Tablet 3    Symbicort 160-4.5 mcg/actuation HFAA TAKE 2 PUFFS BY MOUTH TWICE A DAY 10.2 Each 5    ibuprofen (MOTRIN) 600 mg tablet TAKE 1 TABLET BY MOUTH EVERY SIX (6) HOURS AS NEEDED FOR PAIN. TAKE WITH FOOD. 60 Tablet 2    Janumet 50-1,000 mg per tablet TAKE 1 TAB BY MOUTH TWO (2) TIMES DAILY (WITH MEALS). DX: E11.42 180 Tablet 3    amLODIPine (NORVASC) 10 mg tablet TAKE 1 TABLET BY MOUTH EVERY DAY 90 Tablet 3    acetaminophen (TYLENOL) 325 mg tablet Take 2 Tablets by mouth every four (4) hours as needed for Pain. 20 Tablet 0    fluticasone propionate (FLONASE) 50 mcg/actuation nasal spray INHALE 2 SPRAYS IN EACH NOSTRIL ONCE A DAY.  INDICATIONS: INFLAMMATION OF THE NOSE DUE TO AN ALLERGY 16 g 11    triamterene-hydroCHLOROthiazide (MAXZIDE) 37.5-25 mg per tablet TAKE 1 TABLET BY MOUTH EVERY DAY IN THE MORNING 90 Tablet 3    omeprazole (PRILOSEC) 40 mg capsule Take 40 mg by mouth two (2) times a day. albuterol (PROVENTIL VENTOLIN) 2.5 mg /3 mL (0.083 %) nebu 3 mL by Nebulization route three (3) times daily as needed for Wheezing. Dx: J45.41 60 Nebule 5    Nebulizer & Compressor machine With accessories. Use as instructed 1-3 times daily as needed. Dx:J45.41 1 Each 0    aspirin delayed-release 81 mg tablet Take  by mouth daily. ergocalciferol (ERGOCALCIFEROL) 1,250 mcg (50,000 unit) capsule Take 1 Capsule by mouth every seven (7) days. Indications: low vitamin D levels 4 Capsule 3    Blood-Glucose Meter monitoring kit Test once daily DX E11.22 (Patient not taking: Reported on 2/8/2023) 1 Kit 0    glucose blood VI test strips (blood glucose test) strip Test once daily DX E11.22 (Patient not taking: Reported on 2/8/2023) 100 Strip 3    lancets misc Test once daily DX E11.22 (Patient not taking: Reported on 2/8/2023) 100 Each 3         PHYSICAL EXAM  Visit Vitals  /63 (BP 1 Location: Left upper arm, BP Patient Position: Sitting, BP Cuff Size: Large adult)   Pulse 91   Temp 98.2 °F (36.8 °C) (Oral)   Resp 20   Ht 5' 2\" (1.575 m)   Wt 171 lb 12.8 oz (77.9 kg)   LMP 06/19/2011   SpO2 97%   BMI 31.42 kg/m²       General: Obese, no distress. HEENT:  Head normocephalic/atraumatic, no scleral icterus  Lungs:  Clear to ausculation bilaterally. Good air movement. Heart:  Regular rate and rhythm, normal S1 and S2, no murmur, gallop, or rub  Extremities: No clubbing, cyanosis, or edema. Normal ROM at both knees with mild crepitus at both knees. Neurological: Alert and oriented. Psychiatric: Normal mood and affect.  Behavior is normal.     Labs collected 1/27/23:  Results for orders placed or performed in visit on 66/50/42   METABOLIC PANEL, COMPREHENSIVE   Result Value Ref Range    Glucose 315 (H) 70 - 99 mg/dL    BUN 9 8 - 27 mg/dL    Creatinine 0.89 0.57 - 1.00 mg/dL    eGFR 73 >59 mL/min/1.73    BUN/Creatinine ratio 10 (L) 12 - 28    Sodium 141 134 - 144 mmol/L Potassium 4.6 3.5 - 5.2 mmol/L    Chloride 97 96 - 106 mmol/L    CO2 23 20 - 29 mmol/L    Calcium 10.0 8.7 - 10.3 mg/dL    Protein, total 7.1 6.0 - 8.5 g/dL    Albumin 4.3 3.8 - 4.8 g/dL    GLOBULIN, TOTAL 2.8 1.5 - 4.5 g/dL    A-G Ratio 1.5 1.2 - 2.2    Bilirubin, total 0.3 0.0 - 1.2 mg/dL    Alk. phosphatase 77 44 - 121 IU/L    AST (SGOT) 9 0 - 40 IU/L    ALT (SGPT) 11 0 - 32 IU/L   HEMOGLOBIN A1C WITH EAG   Result Value Ref Range    Hemoglobin A1c 9.0 (H) 4.8 - 5.6 %    Estimated average glucose 212 mg/dL   CBC WITH AUTOMATED DIFF   Result Value Ref Range    WBC 5.5 3.4 - 10.8 x10E3/uL    RBC 4.01 3.77 - 5.28 x10E6/uL    HGB 11.5 11.1 - 15.9 g/dL    HCT 35.5 34.0 - 46.6 %    MCV 89 79 - 97 fL    MCH 28.7 26.6 - 33.0 pg    MCHC 32.4 31.5 - 35.7 g/dL    RDW 12.4 11.7 - 15.4 %    PLATELET 264 734 - 217 x10E3/uL    NEUTROPHILS 60 Not Estab. %    Lymphocytes 32 Not Estab. %    MONOCYTES 6 Not Estab. %    EOSINOPHILS 2 Not Estab. %    BASOPHILS 0 Not Estab. %    ABS. NEUTROPHILS 3.3 1.4 - 7.0 x10E3/uL    Abs Lymphocytes 1.8 0.7 - 3.1 x10E3/uL    ABS. MONOCYTES 0.3 0.1 - 0.9 x10E3/uL    ABS. EOSINOPHILS 0.1 0.0 - 0.4 x10E3/uL    ABS. BASOPHILS 0.0 0.0 - 0.2 x10E3/uL    IMMATURE GRANULOCYTES 0 Not Estab. %    ABS. IMM. GRANS. 0.0 0.0 - 0.1 x10E3/uL   LIPID PANEL   Result Value Ref Range    Cholesterol, total 114 100 - 199 mg/dL    Triglyceride 100 0 - 149 mg/dL    HDL Cholesterol 49 >39 mg/dL    VLDL, calculated 19 5 - 40 mg/dL    LDL, calculated 46 0 - 99 mg/dL   VITAMIN D, 25 HYDROXY   Result Value Ref Range    VITAMIN D, 25-HYDROXY 63.2 30.0 - 100.0 ng/mL   MICROALBUMIN, UR, RAND W/ MICROALB/CREAT RATIO   Result Value Ref Range    Creatinine, urine random 158.3 Not Estab. mg/dL    Microalbumin, urine 6.3 Not Estab. ug/mL    Microalb/Creat ratio (ug/mg creat.) 4 0 - 29 mg/g creat         ASSESSMENT AND PLAN    ICD-10-CM ICD-9-CM    1.  Type 2 diabetes mellitus without complication, without long-term current use of insulin (HCC)  E11.9 250.00 semaglutide (RYBELSUS) 3 mg tablet      REFERRAL TO DIABETIC EDUCATION      2. Primary hypertension  I10 401.9       3. Urticaria  L50.9 708.9       4. Primary osteoarthritis of both knees  M17.0 715.16         Diagnoses and all orders for this visit:    1. Type 2 diabetes mellitus without complication, without long-term current use of insulin (HCC)  Not controlled. A1c 9.0% on 1/27/23. High due to combination of prednisone use and not adhering to diabetic diet. She needs dietary counseling. Continue Janumet and add Rybelsus. If not covered, plan to start Januvia. She declined any injectable GLP-1 agonists. -     semaglutide (RYBELSUS) 3 mg tablet; Take 1 Tablet by mouth Daily (before breakfast). -     REFERRAL TO DIABETIC EDUCATION    2. Primary hypertension  Controlled. Continue amlodipine and triamterene-HCTZ. She has ACE-I allergy. 3. Urticaria  Etiology unknown. Follow up with Allergy Medicine doctor and dermatologist.    4. Primary osteoarthritis of both knees  Right lower leg pain she had was most likely radiation from knee OA. She never had R knee X-ray and lumbar spine X-rays done. Follow-up and Dispositions    Return in about 3 months (around 5/8/2023), or if symptoms worsen or fail to improve, for DM, HTN, POC A1c. I have discussed the diagnosis with the patient and the intended plan as seen in the above orders. Patient is in agreement. The patient has received an after-visit summary and questions were answered concerning future plans. I have discussed medication side effects and warnings with the patient as well.

## 2023-02-08 NOTE — PROGRESS NOTES
Mary Ellen Nam  Identified pt with two pt identifiers(name and ). Chief Complaint   Patient presents with    Diabetes    Hypertension    Leg Pain     right       Reviewed record In preparation for visit and have obtained necessary documentation. 1. Have you been to the ER, urgent care clinic or hospitalized since your last visit? no     2. Have you seen or consulted any other health care providers outside of the [address] [place] [address] since your last visit? Include any pap smears or colon screening. Allergist Dr Kenia Garcia 2023,     Vitals reviewed with provider. Health Maintenance reviewed:     Health Maintenance Due   Topic    COVID-19 Vaccine (4 - Booster for Pfizer series)          Wt Readings from Last 3 Encounters:   23 171 lb 12.8 oz (77.9 kg)   23 172 lb 6.4 oz (78.2 kg)   22 177 lb 9.6 oz (80.6 kg)        Temp Readings from Last 3 Encounters:   23 98.2 °F (36.8 °C) (Oral)   23 98.1 °F (36.7 °C)   22 98 °F (36.7 °C) (Oral)        BP Readings from Last 3 Encounters:   23 125/63   23 (!) 138/123   22 138/83        Pulse Readings from Last 3 Encounters:   23 91   23 (!) 101   22 95        Vitals:    23 0827   BP: 125/63   Pulse: 91   Resp: 20   Temp: 98.2 °F (36.8 °C)   TempSrc: Oral   SpO2: 97%   Weight: 171 lb 12.8 oz (77.9 kg)   Height: 5' 2\" (1.575 m)   PainSc:   0 - No pain   LMP: 2011          Learning Assessment:   :       Learning Assessment 3/25/2019   PRIMARY LEARNER Patient   PRIMARY LANGUAGE ENGLISH   LEARNER PREFERENCE PRIMARY READING   ANSWERED BY patient   RELATIONSHIP SELF        Depression Screening:   :       3 most recent PHQ Screens 2023   Little interest or pleasure in doing things Not at all   Feeling down, depressed, irritable, or hopeless Not at all   Total Score PHQ 2 0        Fall Risk Assessment:   :       Fall Risk Assessment, last 12 mths 2021   Able to walk?  Yes   Fall in past 12 months? 1   Do you feel unsteady? 0   Are you worried about falling 0   Fall with injury? 1        Abuse Screening:   :       Abuse Screening Questionnaire 7/7/2022 6/28/2021 1/8/2021 4/1/2020 3/25/2019   Do you ever feel afraid of your partner? N N N N N   Are you in a relationship with someone who physically or mentally threatens you? N N N N N   Is it safe for you to go home?  Y Y Y Y Y        ADL Screening:   :       ADL Assessment 1/8/2021   Feeding yourself No Help Needed   Getting from bed to chair No Help Needed   Getting dressed No Help Needed   Bathing or showering No Help Needed   Walk across the room (includes cane/walker) No Help Needed   Using the telphone No Help Needed   Taking your medications No Help Needed   Preparing meals No Help Needed   Managing money (expenses/bills) No Help Needed   Moderately strenuous housework (laundry) No Help Needed   Shopping for personal items (toiletries/medicines) No Help Needed   Shopping for groceries No Help Needed   Driving No Help Needed   Climbing a flight of stairs No Help Needed   Getting to places beyond walking distances No Help Needed

## 2023-02-11 ENCOUNTER — HOSPITAL ENCOUNTER (EMERGENCY)
Age: 63
Discharge: HOME | End: 2023-02-11
Attending: EMERGENCY MEDICINE
Payer: COMMERCIAL

## 2023-02-11 ENCOUNTER — APPOINTMENT (OUTPATIENT)
Dept: GENERAL RADIOLOGY | Age: 63
End: 2023-02-11
Attending: PHYSICIAN ASSISTANT
Payer: COMMERCIAL

## 2023-02-11 VITALS
SYSTOLIC BLOOD PRESSURE: 145 MMHG | TEMPERATURE: 97.9 F | HEIGHT: 62 IN | RESPIRATION RATE: 14 BRPM | DIASTOLIC BLOOD PRESSURE: 78 MMHG | WEIGHT: 175.04 LBS | BODY MASS INDEX: 32.21 KG/M2 | HEART RATE: 94 BPM | OXYGEN SATURATION: 100 %

## 2023-02-11 DIAGNOSIS — M25.512 ACUTE PAIN OF LEFT SHOULDER: ICD-10-CM

## 2023-02-11 DIAGNOSIS — R07.81 RIB PAIN ON LEFT SIDE: ICD-10-CM

## 2023-02-11 DIAGNOSIS — M25.532 ACUTE PAIN OF LEFT WRIST: Primary | ICD-10-CM
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
## 2023-02-11 PROCEDURE — 74011250637 HC RX REV CODE- 250/637: Performed by: PHYSICIAN ASSISTANT

## 2023-02-11 PROCEDURE — 73030 X-RAY EXAM OF SHOULDER: CPT

## 2023-02-11 PROCEDURE — 71101 X-RAY EXAM UNILAT RIBS/CHEST: CPT

## 2023-02-11 PROCEDURE — 73090 X-RAY EXAM OF FOREARM: CPT

## 2023-02-11 PROCEDURE — 73110 X-RAY EXAM OF WRIST: CPT

## 2023-02-11 PROCEDURE — 99283 EMERGENCY DEPT VISIT LOW MDM: CPT

## 2023-02-11 RX ORDER — TRAMADOL HYDROCHLORIDE 50 MG/1
50 TABLET ORAL
Qty: 9 TABLET | Refills: 0 | Status: SHIPPED | OUTPATIENT
Start: 2023-02-11 | End: 2023-02-14

## 2023-02-11 RX ORDER — IBUPROFEN 600 MG/1
600 TABLET ORAL
Status: COMPLETED | OUTPATIENT
Start: 2023-02-11 | End: 2023-02-11

## 2023-02-11 RX ORDER — TRAMADOL HYDROCHLORIDE 50 MG/1
50 TABLET ORAL
Qty: 9 TABLET | Refills: 0 | Status: SHIPPED | OUTPATIENT
Start: 2023-02-11 | End: 2023-02-11 | Stop reason: SDUPTHER

## 2023-02-11 RX ADMIN — IBUPROFEN 600 MG: 600 TABLET, FILM COATED ORAL at 18:56

## 2023-02-11 NOTE — ED PROVIDER NOTES
[facility] EMERGENCY DEPT  EMERGENCY DEPARTMENT ENCOUNTER       Pt Name: Jason Reyes  MRN: [id number]  [place] 1960  Date of evaluation: 2/11/2023  Provider: COLTON Horn   PCP: Lindalee Goodpasture, MD  Note Started: 4:16 PM 2/11/23     CHIEF COMPLAINT       Chief Complaint   Patient presents with    Wrist Pain    Fall     About 230am she was getting up to go to the bathroom, she tripped over a cord and caught her fall on a ceramic elephant in the floor, fell on her left side , pain left wrist, left arm left side. No loc. No head injury. HISTORY OF PRESENT ILLNESS: 1 or more elements      History From: Patient  HPI Limitations : None     Jason Reyes is a 61 y.o. female who presents ambulatory with about a day of 9 out of 10 constant, achy left wrist pain, left forearm pain, left shoulder pain and left rib pain that is much worse with movement and palpation. She tells me she got up from bed around 2:30 in the morning last night to go to the bathroom when she accidentally stumbled over the cord of the recliner. This caused her to stumble and she fell into a ceramic elephant on the floor. She tells me all her weight went onto the left side. She denies head or neck pain. She tells me she was able to make it back into bed and sleep, however during the day her pain has persisted and she arrives for evaluation. She denies taking any blood thinners. Past surgical history is significant for left elbow surgery. Nursing Notes were all reviewed and agreed with or any disagreements were addressed in the HPI. REVIEW OF SYSTEMS      Review of Systems   Musculoskeletal:  Negative for back pain and neck pain. Left wrist pain, left forearm pain, left shoulder pain and left rib pain   Neurological:  Negative for dizziness, weakness and headaches. Positives and Pertinent negatives as per HPI.     PAST HISTORY     Past Medical History:  Past Medical History:   Diagnosis Date    Arrhythmia Arthritis     GENERALIZED TO JOINTS-WRISTS & KNEES. Asthma     INHALERS    Asthma, chronic 10/8/2009    Diabetes ([address]     USES PILLS TO CONTROLL    GERD (gastroesophageal reflux disease)     Gout     Hypertension     CONTROLLED BY MEDS. Nausea & vomiting     Obesity        Past Surgical History:  Past Surgical History:   Procedure Laterality Date    COLONOSCOPY N/A 2019    COLONOSCOPY performed by Joselyn Álvarez MD at [facility] ENDOSCOPY. 6 mm tubular adenoma in sigmoid polyp. REpeat in 6 months due to poor prep    COLONOSCOPY N/A 2020    COLONOSCOPY performed by Joselyn Álvarez MD at [facility] ENDOSCOPY. 2 tubular adenomas, diverticulosis, internal hemorrhoids    COLONOSCOPY N/A 2022    COLONOSCOPY performed by Joselyn Álvarez MD at [facility] ENDOSCOPY. Mild sigmoid diverticulosis, small int hemorrhoids. Poor prep. HX BREAST BIOPSY Right 2018    benign    HX CHOLECYSTECTOMY      HX HEART CATHETERIZATION      CARDIAC CATH X2    HX HEENT      CLEFT PALATE REPAIR. HX HYSTERECTOMY  2011    HX ORTHOPAEDIC      carpal tunnel, trigger finger, hand surgery left arm    HX OTHER SURGICAL      CLeft palate repair, piloneidal cyst, hand surgery,     HX TUBAL LIGATION         Family History:  Family History   Problem Relation Age of Onset    Asthma Mother     Hypertension Mother     Diabetes Father     Heart Disease Father         MI    Stroke Brother         CEREBRAL ANEURYSM    Heart Attack Sister     Hypertension Sister     Cancer Sister         multiple myaloma    Hypertension Sister     No Known Problems Sister     Breast Cancer Cousin        Social History:  Social History     Tobacco Use    Smoking status: Former     Years: 10.00     Types: Cigarettes     Quit date: 2010     Years since quittin.5    Smokeless tobacco: Never   Vaping Use    Vaping Use: Never used   Substance Use Topics    Alcohol use: No    Drug use: No       Allergies:   Allergies   Allergen Reactions Latex Rash     Severe rash    Ampicillin Anaphylaxis    Betadine Antiseptic Gauze Hives    Contrast Dye [Iodine] Hives and Swelling     Pt. Reports throat swelling      Other Medication Rash     Betadine soap/blisters    Fd And C Blue No.1 Anaphylaxis    Penicillins Hives    Keflex [Cephalexin] Other (comments)    Lisinopril Swelling       CURRENT MEDICATIONS      Discharge Medication List as of 2/11/2023  6:51 PM        CONTINUE these medications which have NOT CHANGED    Details   cetirizine (ZYRTEC) 10 mg tablet TAKE 1 TABLET BY MOUTH TWICE A DAY TO PREVENT HIVES, Historical Med      SITagliptin phosphate (JANUVIA) 50 mg tablet Take 1 Tablet by mouth daily. , Normal, Disp-30 Tablet, R-3      diclofenac (VOLTAREN) 1 % gel APPLY THIN AMOUNT TO THE R WRIST AND OTHER AFFECTED AREAS 4 TIMES DAILY, Normal, Disp-100 g, R-2      rosuvastatin (CRESTOR) 5 mg tablet TAKE 1 TABLET BY MOUTH EVERY DAY AT NIGHT, Normal, Disp-90 Tablet, R-3      Symbicort 160-4.5 mcg/actuation HFAA TAKE 2 PUFFS BY MOUTH TWICE A DAY, Normal, Disp-10.2 Each, R-5      ibuprofen (MOTRIN) 600 mg tablet TAKE 1 TABLET BY MOUTH EVERY SIX (6) HOURS AS NEEDED FOR PAIN. TAKE WITH FOOD., Normal, Disp-60 Tablet, R-2      Janumet 50-1,000 mg per tablet TAKE 1 TAB BY MOUTH TWO (2) TIMES DAILY (WITH MEALS). DX: E11.42, Normal, Disp-180 Tablet, R-3      amLODIPine (NORVASC) 10 mg tablet TAKE 1 TABLET BY MOUTH EVERY DAY, Normal, Disp-90 Tablet, R-3      acetaminophen (TYLENOL) 325 mg tablet Take 2 Tablets by mouth every four (4) hours as needed for Pain., Normal, Disp-20 Tablet, R-0      fluticasone propionate (FLONASE) 50 mcg/actuation nasal spray INHALE 2 SPRAYS IN EACH NOSTRIL ONCE A DAY.  INDICATIONS: INFLAMMATION OF THE NOSE DUE TO AN ALLERGY, Normal, Disp-16 g, R-11      Blood-Glucose Meter monitoring kit Test once daily DX E11.22, Normal, Disp-1 Kit, R-0      glucose blood VI test strips (blood glucose test) strip Test once daily DX E11.22, Normal, Disp-100 Strip, R-3      lancets misc Test once daily DX E11.22, Normal, Disp-100 Each, R-3      triamterene-hydroCHLOROthiazide (MAXZIDE) 37.5-25 mg per tablet TAKE 1 TABLET BY MOUTH EVERY DAY IN THE MORNING, Normal, Disp-90 Tablet, R-3      omeprazole (PRILOSEC) 40 mg capsule Take 40 mg by mouth two (2) times a day., Historical Med      albuterol (PROVENTIL VENTOLIN) 2.5 mg /3 mL (0.083 %) nebu 3 mL by Nebulization route three (3) times daily as needed for Wheezing. Dx: J45.41, Normal, Disp-60 Nebule, R-5      Nebulizer & Compressor machine With accessories. Use as instructed 1-3 times daily as needed. Dx:J45.41, Print, Disp-1 Each, R-0      aspirin delayed-release 81 mg tablet Take  by mouth daily. , Historical Med             PHYSICAL EXAM      ED Triage Vitals [02/11/23 1613]   ED Encounter Vitals Group      BP (!) 145/78      Pulse (Heart Rate) 94      Resp Rate 14      Temp 97.9 °F (36.6 °C)      Temp src       O2 Sat (%) 100 %      Weight 175 lb 0.7 oz      Height 5' 2\"        Physical Exam  Vitals and nursing note reviewed. Constitutional:       General: She is not in acute distress. HENT:      Head: Normocephalic and atraumatic. Right Ear: External ear normal.      Left Ear: External ear normal.      Nose: Nose normal.   Eyes:      General: Vision grossly intact. Conjunctiva/sclera: Conjunctivae normal.   Cardiovascular:      Rate and Rhythm: Normal rate. Pulmonary:      Effort: Pulmonary effort is normal.   Chest:      Chest wall: Tenderness present. Comments:   Symmetric, full chest wall expansion with deep inspiration. Breathing unlabored; lungs are clear  No bruising, redness or swelling  Left sided rib tenderness  Musculoskeletal:         General: Normal range of motion. Left shoulder: Tenderness present. Left forearm: Tenderness present. Left wrist: Tenderness present. No bony tenderness. Arms:    Skin:     Findings: No rash.    Neurological:      Mental Status: She is oriented to person, place, and time. She is not disoriented. Psychiatric:         Speech: Speech normal.        DIAGNOSTIC RESULTS   LABS:     No results found for this or any previous visit (from the past 12 hour(s)). EKG: When ordered, EKG's are interpreted by the Emergency Department Physician in the absence of a cardiologist.  Please see their note for interpretation of EKG. Interpretation per the Radiologist below, if available at the time of this note:     XR SHOULDER LT AP/LAT MIN 2 V    Result Date: 2/11/2023  EXAM: XR SHOULDER LT AP/LAT MIN 2 V INDICATION: Trauma. COMPARISON: 12/29/2020. FINDINGS: Two views of the left shoulder demonstrate no fracture, dislocation or other acute abnormality. There are degenerative findings. There is no significant change. No acute abnormality. XR FOREARM LT AP/LAT    Result Date: 2/11/2023  EXAM: XR FOREARM LT AP/LAT INDICATION: pain; fall. COMPARISON: None. FINDINGS: Two views of the left radius and ulna demonstrate no fracture or other acute osseous, articular or soft tissue abnormality. No acute abnormality. XR WRIST LT AP/LAT/OBL MIN 3V    Result Date: 2/11/2023  EXAM: XR WRIST LT AP/LAT/OBL MIN 3V INDICATION: pain; fall. COMPARISON: None. FINDINGS: Three views of the left wrist demonstrate no fracture or other acute osseous or articular abnormality. The soft tissues are within normal limits. No acute abnormality. XR RIBS LT W PA CXR MIN 3 V    Result Date: 2/11/2023  INDICATION:  pain; fall Two views of the left ribs and a frontal CXR show no apparent rib fracture or other significant rib lesion. The lungs are free of acute disease. There is no pneumothorax. Heart size is normal.     1. No left rib fracture. 2. No acute cardiopulmonary disease.        PROCEDURES   Unless otherwise noted below, none  Procedures     CRITICAL CARE TIME   None    EMERGENCY DEPARTMENT COURSE and DIFFERENTIAL DIAGNOSIS/MDM   Vitals:    Vitals: 02/11/23 1613   BP: (!) 145/78   Pulse: 94   Resp: 14   Temp: 97.9 °F (36.6 °C)   SpO2: 100%   Weight: 79.4 kg (175 lb 0.7 oz)   Height: 5' 2\" (1.575 m)        Patient was given the following medications:  Medications   ibuprofen (MOTRIN) tablet 600 mg (600 mg Oral Given 2/11/23 1856)       CONSULTS: (Who and What was discussed)  None    Chronic Conditions: Arrhythmia, arthritis, diabetes, gout, HTN     Social Determinants affecting Dx or Tx: None    Records Reviewed (source and summary of external records): Prior medical records    CC/HPI Summary, DDx, ED Course, and Reassessment: Dx: Fracture, sprain, strain, fall; doubt pneumothorax    Afebrile and well-appearing. Patient presents with less than a day of left wrist, forearm, shoulder and left rib pain after she stumbled when she went to get up last night to go to the bathroom. On exam, there is no obvious bruising, redness or swelling of the wrist, forearm or shoulder. She does have some left-sided rib tenderness. There is no posterior back pain. Her breathing is unlabored and lungs are clear. Plain films of the wrist, forearm, shoulder and left ribs and chest are all normal.  Additional testing deferred. Given her complaint of severe pain, tramadol is sent to the pharmacy. Referred to primary care. Disposition Considerations (Tests not done, Shared Decision Making, Pt Expectation of Test or Tx.):      FINAL IMPRESSION     1. Acute pain of left wrist    2. Acute pain of left shoulder    3. Rib pain on left side    4.  Fall on same level from slipping, tripping or stumbling, initial encounter          DISPOSITION/PLAN   Discharged     PATIENT REFERRED TO:  Follow-up Information       Follow up With Specialties Details Why Contact Info    Howard Chapman MD Internal Medicine Physician Call  As needed [address]  [phone]                DISCHARGE MEDICATIONS:  Discharge Medication List as of 2/11/2023  6:51 PM CONTINUE these medications which have CHANGED    Details   traMADoL (Ultram) 50 mg tablet Take 1 Tablet by mouth every eight (8) hours as needed for Pain for up to 3 days. Max Daily Amount: 150 mg. Supervising Physician: Irving Flores MD BRITTNEY: [id number], Normal, Disp-9 Tablet, R-0           CONTINUE these medications which have NOT CHANGED    Details   cetirizine (ZYRTEC) 10 mg tablet TAKE 1 TABLET BY MOUTH TWICE A DAY TO PREVENT HIVES, Historical Med      SITagliptin phosphate (JANUVIA) 50 mg tablet Take 1 Tablet by mouth daily. , Normal, Disp-30 Tablet, R-3      diclofenac (VOLTAREN) 1 % gel APPLY THIN AMOUNT TO THE R WRIST AND OTHER AFFECTED AREAS 4 TIMES DAILY, Normal, Disp-100 g, R-2      rosuvastatin (CRESTOR) 5 mg tablet TAKE 1 TABLET BY MOUTH EVERY DAY AT NIGHT, Normal, Disp-90 Tablet, R-3      Symbicort 160-4.5 mcg/actuation HFAA TAKE 2 PUFFS BY MOUTH TWICE A DAY, Normal, Disp-10.2 Each, R-5      ibuprofen (MOTRIN) 600 mg tablet TAKE 1 TABLET BY MOUTH EVERY SIX (6) HOURS AS NEEDED FOR PAIN. TAKE WITH FOOD., Normal, Disp-60 Tablet, R-2      Janumet 50-1,000 mg per tablet TAKE 1 TAB BY MOUTH TWO (2) TIMES DAILY (WITH MEALS). DX: E11.42, Normal, Disp-180 Tablet, R-3      amLODIPine (NORVASC) 10 mg tablet TAKE 1 TABLET BY MOUTH EVERY DAY, Normal, Disp-90 Tablet, R-3      acetaminophen (TYLENOL) 325 mg tablet Take 2 Tablets by mouth every four (4) hours as needed for Pain., Normal, Disp-20 Tablet, R-0      fluticasone propionate (FLONASE) 50 mcg/actuation nasal spray INHALE 2 SPRAYS IN EACH NOSTRIL ONCE A DAY.  INDICATIONS: INFLAMMATION OF THE NOSE DUE TO AN ALLERGY, Normal, Disp-16 g, R-11      Blood-Glucose Meter monitoring kit Test once daily DX E11.22, Normal, Disp-1 Kit, R-0      glucose blood VI test strips (blood glucose test) strip Test once daily DX E11.22, Normal, Disp-100 Strip, R-3      lancets misc Test once daily DX E11.22, Normal, Disp-100 Each, R-3      triamterene-hydroCHLOROthiazide (MAXZIDE) 37.5-25 mg per tablet TAKE 1 TABLET BY MOUTH EVERY DAY IN THE MORNING, Normal, Disp-90 Tablet, R-3      omeprazole (PRILOSEC) 40 mg capsule Take 40 mg by mouth two (2) times a day., Historical Med      albuterol (PROVENTIL VENTOLIN) 2.5 mg /3 mL (0.083 %) nebu 3 mL by Nebulization route three (3) times daily as needed for Wheezing. Dx: J45.41, Normal, Disp-60 Nebule, R-5      Nebulizer & Compressor machine With accessories. Use as instructed 1-3 times daily as needed. Dx:J45.41, Print, Disp-1 Each, R-0      aspirin delayed-release 81 mg tablet Take  by mouth daily. , Historical Med               DISCONTINUED MEDICATIONS:  Discharge Medication List as of 2/11/2023  6:51 PM          ED Attending Involvement : I have seen and evaluated the patient. My supervision physician was available for consultation. I am the Primary Clinician of Record. COLTON Mccormack (electronically signed)    (Please note that parts of this dictation were completed with voice recognition software. Quite often unanticipated grammatical, syntax, homophones, and other interpretive errors are inadvertently transcribed by the computer software. Please disregards these errors.  Please excuse any errors that have escaped final proofreading.)

## 2023-02-27 DIAGNOSIS — E11.9 TYPE 2 DIABETES MELLITUS WITHOUT COMPLICATION, WITHOUT LONG-TERM CURRENT USE OF INSULIN (HCC): ICD-10-CM

## 2023-02-27 NOTE — TELEPHONE ENCOUNTER
PCP: Raymon Potter MD     Last appt: 2/8/2023     Future Appointments   Date Time Provider Terrence Ruth   4/10/2023  3:45 PM Ihsan Gary RN [facility] BS [facility]   6/20/2023  9:30 AM Raymon Potter MD [facility]          Requested Prescriptions     Pending Prescriptions Disp Refills    SITagliptin phosphate (JANUVIA) 50 mg tablet 30 Tablet 3     Sig: Take 1 Tablet by mouth daily.

## 2023-02-27 NOTE — TELEPHONE ENCOUNTER
Requested Prescriptions     Pending Prescriptions Disp Refills    SITagliptin phosphate (CINTHIAUVIA) 50 mg tablet 30 Tablet 3     Sig: Take 1 Tablet by mouth daily.

## 2023-03-01 DIAGNOSIS — E11.42 TYPE 2 DIABETES MELLITUS WITH DIABETIC POLYNEUROPATHY, WITHOUT LONG-TERM CURRENT USE OF INSULIN (HCC): ICD-10-CM

## 2023-03-01 RX ORDER — SITAGLIPTIN AND METFORMIN HYDROCHLORIDE 50; 1000 MG/1; MG/1
1 TABLET, FILM COATED ORAL 2 TIMES DAILY WITH MEALS
Qty: 180 TABLET | Refills: 3 | Status: SHIPPED | OUTPATIENT
Start: 2023-03-01

## 2023-03-01 NOTE — TELEPHONE ENCOUNTER
PCP: Angeli Arredondo MD     Last appt: 2/8/2023     Future Appointments   Date Time Provider Terrence Ruth   4/10/2023  3:45 PM Axel Wilson RN [facility] AMB   6/20/2023  9:30 AM Angeli Arredondo MD [facility] AMB          Requested Prescriptions     Pending Prescriptions Disp Refills    SITagliptin-metFORMIN (Janumet) 50-1,000 mg per tablet 180 Tablet 3     Sig: Take 1 Tablet by mouth two (2) times daily (with meals).  Dx: H42.31

## 2023-03-19 DIAGNOSIS — E11.22 TYPE 2 DIABETES MELLITUS WITH STAGE 3A CHRONIC KIDNEY DISEASE, WITHOUT LONG-TERM CURRENT USE OF INSULIN (HCC): ICD-10-CM

## 2023-03-19 DIAGNOSIS — N18.31 TYPE 2 DIABETES MELLITUS WITH STAGE 3A CHRONIC KIDNEY DISEASE, WITHOUT LONG-TERM CURRENT USE OF INSULIN (HCC): ICD-10-CM

## 2023-03-19 RX ORDER — BLOOD SUGAR DIAGNOSTIC
STRIP MISCELLANEOUS
Qty: 25 STRIP | Refills: 39 | Status: SHIPPED | OUTPATIENT
Start: 2023-03-19

## 2023-04-06 ENCOUNTER — TELEPHONE (OUTPATIENT)
Dept: INTERNAL MEDICINE CLINIC | Age: 63
End: 2023-04-06

## 2023-04-06 NOTE — TELEPHONE ENCOUNTER
Instruct patient to stop Januvia. Continue on Janumet. Dr. Gianna Solis is adding another medication called empagliflozin, or Jardiance, to control her diabetes.

## 2023-04-06 NOTE — TELEPHONE ENCOUNTER
I called Agustina and informed her on the message from Dr. Hiram Ruggiero. I called the patient and verified them by name and date of birth. I informed her on the changes per Dr. Hiram Ruggiero. She stated understanding and had no further questions. I called the pharmacy and informed them on the the changes and to deactivate the Januvia prescription. They stated understanding and had no further questions.

## 2023-04-06 NOTE — TELEPHONE ENCOUNTER
I called Abdelrahman and informed them that according to her chart, she is on both medications. Agustina wanted to confirm because they are similar. I informed her that I would check with Dr. Mendy Rodriguez to make sure she is supposed to be on both.      Agustina's Direct Line: [phone]

## 2023-04-06 NOTE — TELEPHONE ENCOUNTER
Karissa Whiteside Is calling to see if pt is on both [facility].      Phone: [phone]    Also let pt know as well

## 2023-04-19 ENCOUNTER — TELEPHONE (OUTPATIENT)
Dept: INTERNAL MEDICINE CLINIC | Age: 63
End: 2023-04-19

## 2023-04-19 NOTE — TELEPHONE ENCOUNTER
I called the patient and verified them by name and date of birth. She stated that her blood sugars have been running high & causing her chest to hurt in the morning. She wanted to know if she should just get the medication. I informed her that she should & offered to give her the contact information for patient assistance. She could not write it down at the moment and will go get the prescription.

## 2023-05-02 ENCOUNTER — CLINICAL SUPPORT (OUTPATIENT)
Dept: DIABETES SERVICES | Age: 63
End: 2023-05-02
Payer: COMMERCIAL

## 2023-05-02 ENCOUNTER — TELEPHONE (OUTPATIENT)
Dept: INTERNAL MEDICINE CLINIC | Age: 63
End: 2023-05-02

## 2023-05-02 DIAGNOSIS — E11.9 TYPE 2 DIABETES MELLITUS WITHOUT COMPLICATION, WITHOUT LONG-TERM CURRENT USE OF INSULIN (HCC): Primary | ICD-10-CM

## 2023-05-02 PROCEDURE — G0109 DIAB MANAGE TRN IND/GROUP: HCPCS

## 2023-05-08 ENCOUNTER — ANESTHESIA EVENT (OUTPATIENT)
Facility: HOSPITAL | Age: 63
End: 2023-05-08
Payer: COMMERCIAL

## 2023-05-08 ENCOUNTER — HOSPITAL ENCOUNTER (OUTPATIENT)
Facility: HOSPITAL | Age: 63
Setting detail: OUTPATIENT SURGERY
Discharge: HOME | End: 2023-05-08
Attending: INTERNAL MEDICINE | Admitting: INTERNAL MEDICINE
Payer: COMMERCIAL

## 2023-05-08 ENCOUNTER — ANESTHESIA (OUTPATIENT)
Facility: HOSPITAL | Age: 63
End: 2023-05-08
Payer: COMMERCIAL

## 2023-05-08 VITALS
OXYGEN SATURATION: 96 % | WEIGHT: 169 LBS | SYSTOLIC BLOOD PRESSURE: 131 MMHG | BODY MASS INDEX: 31.1 KG/M2 | HEIGHT: 62 IN | TEMPERATURE: 98 F | DIASTOLIC BLOOD PRESSURE: 60 MMHG | HEART RATE: 80 BPM | RESPIRATION RATE: 16 BRPM

## 2023-05-08 PROCEDURE — 7100000010 HC PHASE II RECOVERY - FIRST 15 MIN: Performed by: INTERNAL MEDICINE

## 2023-05-08 PROCEDURE — 7100000000 HC PACU RECOVERY - FIRST 15 MIN: Performed by: INTERNAL MEDICINE

## 2023-05-08 PROCEDURE — 3700000000 HC ANESTHESIA ATTENDED CARE: Performed by: INTERNAL MEDICINE

## 2023-05-08 PROCEDURE — 3700000001 HC ADD 15 MINUTES (ANESTHESIA): Performed by: INTERNAL MEDICINE

## 2023-05-08 PROCEDURE — 3600007511: Performed by: INTERNAL MEDICINE

## 2023-05-08 PROCEDURE — 2709999900 HC NON-CHARGEABLE SUPPLY: Performed by: INTERNAL MEDICINE

## 2023-05-08 PROCEDURE — 6360000002 HC RX W HCPCS

## 2023-05-08 PROCEDURE — 88305 TISSUE EXAM BY PATHOLOGIST: CPT

## 2023-05-08 PROCEDURE — 3600007501: Performed by: INTERNAL MEDICINE

## 2023-05-08 PROCEDURE — 2500000003 HC RX 250 WO HCPCS

## 2023-05-08 PROCEDURE — 2580000003 HC RX 258: Performed by: INTERNAL MEDICINE

## 2023-05-08 RX ORDER — SODIUM CHLORIDE 0.9 % (FLUSH) 0.9 %
5-40 SYRINGE (ML) INJECTION EVERY 12 HOURS SCHEDULED
Status: DISCONTINUED | OUTPATIENT
Start: 2023-05-08 | End: 2023-05-08 | Stop reason: HOSPADM

## 2023-05-08 RX ORDER — SODIUM CHLORIDE 0.9 % (FLUSH) 0.9 %
5-40 SYRINGE (ML) INJECTION PRN
Status: DISCONTINUED | OUTPATIENT
Start: 2023-05-08 | End: 2023-05-08 | Stop reason: HOSPADM

## 2023-05-08 RX ORDER — LIDOCAINE HYDROCHLORIDE 20 MG/ML
INJECTION, SOLUTION EPIDURAL; INFILTRATION; INTRACAUDAL; PERINEURAL PRN
Status: DISCONTINUED | OUTPATIENT
Start: 2023-05-08 | End: 2023-05-08 | Stop reason: SDUPTHER

## 2023-05-08 RX ORDER — SODIUM CHLORIDE 9 MG/ML
25 INJECTION, SOLUTION INTRAVENOUS PRN
Status: DISCONTINUED | OUTPATIENT
Start: 2023-05-08 | End: 2023-05-08 | Stop reason: HOSPADM

## 2023-05-08 RX ADMIN — SODIUM CHLORIDE 25 ML: 9 INJECTION, SOLUTION INTRAVENOUS at 11:17

## 2023-05-08 RX ADMIN — PROPOFOL 180 MG: 10 INJECTION, EMULSION INTRAVENOUS at 11:35

## 2023-05-08 RX ADMIN — LIDOCAINE HYDROCHLORIDE 40 MG: 20 INJECTION, SOLUTION EPIDURAL; INFILTRATION; INTRACAUDAL; PERINEURAL at 11:26

## 2023-05-08 ASSESSMENT — PAIN - FUNCTIONAL ASSESSMENT
PAIN_FUNCTIONAL_ASSESSMENT: NONE - DENIES PAIN

## 2023-05-08 NOTE — PROGRESS NOTES
1122: patient and all staff in room. H&P completed, consents signed. Patient A&Ox4. Abdomen soft and non tender. Respirations even and non labored. 1124: Time out completed. 1126: procedure start, scope in.     1129: cecum reached. Adequate bowel prep. 1135: scope out. Procedure end. Procedures: colonoscopy, polypectomy    Post op diagnosis: polyps, diverticulosis, hemorrhoids    1136: Scope precleaned at bedside by Awais Collier, ET.      3434: report given to [facility] in endoscopy recovery.

## 2023-05-08 NOTE — ANESTHESIA PRE PROCEDURE
file to calculate BMI.    CBC:   Lab Results   Component Value Date/Time    WBC 5.5 01/27/2023 12:24 PM    RBC 4.01 01/27/2023 12:24 PM    HGB 11.5 01/27/2023 12:24 PM    HCT 35.5 01/27/2023 12:24 PM    MCV 89 01/27/2023 12:24 PM    RDW 12.4 01/27/2023 12:24 PM     01/27/2023 12:24 PM       CMP:   Lab Results   Component Value Date/Time     01/27/2023 12:24 PM    K 4.6 01/27/2023 12:24 PM    CL 97 01/27/2023 12:24 PM    CO2 23 01/27/2023 12:24 PM    BUN 9 01/27/2023 12:24 PM    CREATININE 0.89 01/27/2023 12:24 PM    GFRAA 61 02/22/2022 03:44 PM    AGRATIO 1.5 01/27/2023 12:24 PM    LABGLOM 73 01/27/2023 12:24 PM    GLUCOSE 315 01/27/2023 12:24 PM    PROT 7.1 01/27/2023 12:24 PM    CALCIUM 10.0 01/27/2023 12:24 PM    BILITOT 0.3 01/27/2023 12:24 PM    ALKPHOS 77 01/27/2023 12:24 PM    AST 9 01/27/2023 12:24 PM    ALT 11 01/27/2023 12:24 PM       POC Tests: No results for input(s): POCGLU, POCNA, POCK, POCCL, POCBUN, POCHEMO, POCHCT in the last 72 hours.     Coags: No results found for: PROTIME, INR, APTT    HCG (If Applicable): No results found for: PREGTESTUR, PREGSERUM, HCG, HCGQUANT     ABGs: No results found for: PHART, PO2ART, QHZ7PCW, YFR4IPD, BEART, F8BLWWTM     Type & Screen (If Applicable):  No results found for: LABABO, LABRH    Drug/Infectious Status (If Applicable):  Lab Results   Component Value Date/Time    HEPCAB <0.1 03/25/2019 11:53 AM       COVID-19 Screening (If Applicable): No results found for: COVID19        Anesthesia Evaluation  Patient summary reviewed and Nursing notes reviewed  Airway: Mallampati: II  TM distance: >3 FB     Mouth opening: > = 3 FB   Dental:    (+) upper dentures      Pulmonary:normal exam  breath sounds clear to auscultation  (+) asthma:                            Cardiovascular:  Exercise tolerance: good (>4 METS),   (+) hypertension:, hyperlipidemia      ECG reviewed  Rhythm: regular  Rate: normal    Stress test reviewed                Neuro/Psych:   Negative

## 2023-05-08 NOTE — PROCEDURES
Colonoscopy Procedure Note    Ann-Marie Jaime  1960  [id number]    Indications:  Please see below. Pre-operative Diagnosis: Family history of colonic polyps [Z83.71], Personal hx of colon polyps    Post-operative Diagnosis: Colon polyps, internal hemorrhoids,diverticulosis      : Ezequiel Elizalde MD    Referring Provider: Ronald Sharif MD    Sedation:  MAC anesthesia Propofol        Procedure Details:    After detailed informed consent was obtained with all risks and benefits of procedure explained and preoperative exam completed, the patient was taken to the endoscopy suite and placed in the left lateral decubitus position. Upon sequential sedation as per above, a digital rectal exam was performed  and was normal.  The Olympus videocolonoscope  was inserted in the rectum and carefully advanced to the cecum, which was identified by the ileocecal valve and appendiceal orifice. The quality of preparation was fair  [place] Bowel Preparation Score: 2/3/2: 7. The colonoscope was slowly withdrawn with careful evaluation between folds. Retroflexion in the rectum was performed. Findings:   The Olympus video high-definition colonoscope was advanced to the cecum, identified by its typical land marks, with ease. A single 5 mm cecal sessile and another 5 mm sigmoid colon polyp was noted. Both polyps were removed with a cold snare and sent off in separate jars. Mild sigmoid, transverse and ascending colon diverticulosis  Small non bleeding internal hemorrhoids. Therapies:  2 complete polypectomies were performed using cold snare  and the polyps were  retrieved    Specimen/s:      Specimens were collected as described above and sent to pathology. Complications: None were encountered during the procedure. EBL:  None. Recommendations:     -Await pathology. -If adenoma is present, repeat colonoscopy in 3-4 years.     Naturally, for new bleeding, unexplained weight loss,change in bowel

## 2023-05-08 NOTE — ANESTHESIA POSTPROCEDURE EVALUATION
Department of Anesthesiology  Postprocedure Note    Patient: Ardis Castleman  MRN: [id number]  YOB: 1960  Date of evaluation: 5/8/2023      Procedure Summary     Date: 05/08/23 Room / [facility] / [facility] ENDOSCOPY    Anesthesia Start: 1122 Anesthesia Stop: 1138    Procedures:       COLONOSCOPY DIAGNOSTIC (Lower GI Region)      COLONOSCOPY POLYPECTOMY SNARE/COLD (Lower GI Region) Diagnosis:       Family history of colonic polyps      (Family history of colonic polyps [Z83.71])    Providers: Jaxson Freeman MD Responsible Provider: Blythe Nageotte, MD    Anesthesia Type: MAC ASA Status: 2          Anesthesia Type: MAC    Becca Phase I: Becca Score: 10    Becca Phase II: Becca Score: 9      Anesthesia Post Evaluation    Patient location during evaluation: bedside  Patient participation: complete - patient cannot participate  Level of consciousness: awake  Airway patency: patent  Nausea & Vomiting: no nausea and no vomiting  Complications: no  Cardiovascular status: hemodynamically stable  Respiratory status: acceptable  Hydration status: euvolemic

## 2023-05-08 NOTE — DISCHARGE INSTRUCTIONS
often.  Follow-up care is a key part of your treatment and safety. Be sure to make and go to all appointments, and call your doctor if you are having problems. It's also a good idea to know your test results and keep a list of the medicines you take. How can you care for yourself at home? Regular exams to look for colon polyps are the best way to prevent polyps from turning into colon cancer. These can include stool tests, sigmoidoscopy, colonoscopy, and CT colonography. Talk with your doctor about a testing schedule that is right for you. To prevent polyps  There is no home treatment that can prevent colon polyps. But these steps may help lower your risk for cancer. Stay active. Being active can help you get to and stay at a healthy weight. Try to exercise on most days of the week. Walking is a good choice. Eat well. Choose a variety of vegetables, fruits, legumes (such as peas and beans), fish, poultry, and whole grains. Do not smoke. If you need help quitting, talk to your doctor about stop-smoking programs and medicines. These can increase your chances of quitting for good. If you drink alcohol, limit how much you drink. Limit alcohol to 2 drinks a day for men and 1 drink a day for women. When should you call for help? Call your doctor now or seek immediate medical care if:    You have severe belly pain. Your stools are maroon or very bloody. Watch closely for changes in your health, and be sure to contact your doctor if:    You have a fever. You have nausea or vomiting. You have a change in bowel habits (new constipation or diarrhea). Your symptoms get worse or are not improving as expected. Where can you learn more? Go to http://www.woods.com/ and enter C571 to learn more about \"Colon Polyps: Care Instructions. \"  Current as of: June 6, 2022               Content Version: 13.6  © 8927-9005 [employer], [employer].    Care instructions adapted under license by [id number] [employer]

## 2023-05-08 NOTE — H&P
Pre-endoscopy H and P    The patient was seen and examined in the room/pre-op holding area. The airway was assessed and documented. The problem list, past medical history, and medications were reviewed. Patient Active Problem List   Diagnosis    Moderate persistent asthma without complication    Primary osteoarthritis of both knees    Mild nonproliferative diabetic retinopathy of both eyes without macular edema associated with type 2 diabetes mellitus (HCC)    Adrenal nodule (HCC)    Fibroadenoma of right breast    Vitamin D deficiency    GERD (gastroesophageal reflux disease)    ACE inhibitor intolerance    Diabetes mellitus with peripheral vascular disease (HCC)    Allergic rhinitis due to allergen    HTN (hypertension)    Type 2 diabetes mellitus without complication, without long-term current use of insulin (HCC)     Social History     Socioeconomic History    Marital status:      Spouse name: Not on file    Number of children: Not on file    Years of education: Not on file    Highest education level: Not on file   Occupational History    Not on file   Tobacco Use    Smoking status: Former     Types: Cigarettes     Quit date: 2010     Years since quittin.8    Smokeless tobacco: Never   Substance and Sexual Activity    Alcohol use: No    Drug use: No    Sexual activity: Not on file   Other Topics Concern    Not on file   Social History Narrative    Not on file     Social Determinants of Health     Financial Resource Strain: Not on file   Food Insecurity: Not on file   Transportation Needs: Not on file   Physical Activity: Not on file   Stress: Not on file   Social Connections: Not on file   Intimate Partner Violence: Not on file   Housing Stability: Not on file     Past Medical History:   Diagnosis Date    Arrhythmia     Arthritis     GENERALIZED TO [facility].     Asthma     INHALERS    Asthma, chronic 10/8/2009    Diabetes ([facility] [address]     USES PILLS TO CONTROLL    GERD

## 2023-05-09 ENCOUNTER — OFFICE VISIT (OUTPATIENT)
Age: 63
End: 2023-05-09
Payer: COMMERCIAL

## 2023-05-09 DIAGNOSIS — E11.9 TYPE 2 DIABETES MELLITUS WITHOUT COMPLICATION, WITHOUT LONG-TERM CURRENT USE OF INSULIN (HCC): Primary | ICD-10-CM

## 2023-05-09 PROCEDURE — G0109 DIAB MANAGE TRN IND/GROUP: HCPCS | Performed by: INTERNAL MEDICINE

## 2023-05-11 NOTE — PROGRESS NOTES
[facility] Diabetes [facility]  Diabetes Self-Management Education & Support Program  Encounter Note      SUMMARY  Diabetes self-care management training was completed related to healthy eating and monitoring. The participant will return on May 16 to continue DSMES related to taking medications and physical activity. The participant did identify SMART Goal(s) and will practice knowledge and skills related to reducing risks and healthy eating and monitoring to improve diabetes self-management. EVALUATION:  Ms Ariana Dubois expressed understanding of using healthy plate to build balanced meals & manage portions, counting CHO's & reading nutrition facts labels to build balanced, CHO controlled meals. She participated in reading nutrition facts labels & using healthy plate with the group. Frieda Law states understanding of the importance of BG monitoring in managing diabetes, including impact of food type & amount on BG and how stress & physical activity affect BG. RECOMMENDATIONS:  Focus on SMART goal: Use healthy plate for 1-2 meals a day      TOPICS DISCUSSED TODAY:  WHAT CAN I EAT? 60  HOW CAN BLOOD GLUCOSE MONITORING HELP ME? 60      Next provider visit is scheduled for 6/20/23       SMART GOAL(S)   Use healthy plate for 1-2 meals a week  (goal set 5/9/23)  ACHIEVEMENT OF GOAL(S) : 0-24%       DATE DSMES TOPIC EVALUATION     5/11/2023 WHAT CAN I EAT? General principles   Determining a healthy weight   Nutritional terms & tools   Healthy Plate method   Carbohydrate Counting   Reading food labels   Free apps   Pregnancy recommendations      The participant   Uses Healthy Plate principles in constructing meals: Yes  Reads food labels in choosing acceptable foods: Yes         DATE DSMES TOPIC EVALUATION     5/11/2023 HOW CAN BLOOD GLUCOSE MONITORING HELP ME?    Value of blood glucose monitoring   Realistic expectations   Blood glucose monitoring targets   Target adjustments   Setting a1c & blood glucose

## 2023-05-16 ENCOUNTER — NURSE ONLY (OUTPATIENT)
Age: 63
End: 2023-05-16
Payer: COMMERCIAL

## 2023-05-16 ENCOUNTER — TELEPHONE (OUTPATIENT)
Facility: CLINIC | Age: 63
End: 2023-05-16

## 2023-05-16 DIAGNOSIS — E11.9 TYPE 2 DIABETES MELLITUS WITHOUT COMPLICATION, WITHOUT LONG-TERM CURRENT USE OF INSULIN (HCC): Primary | ICD-10-CM

## 2023-05-16 PROCEDURE — G0109 DIAB MANAGE TRN IND/GROUP: HCPCS | Performed by: INTERNAL MEDICINE

## 2023-05-16 NOTE — TELEPHONE ENCOUNTER
Verified pt name and date of birth. Pt stated that Dr Aleyda Nation office sent her the report from her colonoscopy and she doesn't understand the results listed on the note. Notified the pt that she should follow up with Dr Aleyda hart for results as we have not yet seen the report. Pt stated understanding.

## 2023-05-16 NOTE — TELEPHONE ENCOUNTER
Pt called and stated that she just received the result of her colon test and would like for the provider to explain it to her due to it came back positive for something's

## 2023-05-22 NOTE — PROGRESS NOTES
[facility]  Diabetes Self-Management Education & Support Program  Encounter Note      SUMMARY  Diabetes self-care management training was completed related to taking medications and physical activity. The participant will return on May 23 to continue DSMES related to healthy coping and problem solving. The participant did identify SMART Goal(s) and will practice knowledge and skills related to reducing risks, healthy eating and monitoring, and being active and medications to improve diabetes self-management. EVALUATION:  Ms Shola Goodman expressed understanding of the role medications play in diabetes management. She expressed understanding of the expected action & side effects of her diabetes medications, Jardiance & Janumet. She shared tries not to miss doses & is not experiencing side effects. Faiza Garcia expressed understanding of the benefits of physical activity including decreasing BG, LDL cholesterol & stress reduction. She participated in resistance chair exercises with the group & verbalized understanding of easy ways to get exercise into lifestyle. RECOMMENDATIONS:  Focus on SMART goals & continue DSMES. TOPICS DISCUSSED TODAY:  HOW DO MY DIABETES MEDICATIONS WORK? 61  HOW DOES PHYSICAL ACTIVITY AFFECT MY DIABETES? 60      Next provider visit is scheduled for 6/20/23       SMART GOAL(S)   Use healthy plate for 1-2 meals a week  ACHIEVEMENT OF GOAL(S) : %    2. Walk for 10 minutes 2-3 times a week  ( goal set 5/16/23)  ACHIEVEMENT OF GOAL(S) :  0-24%       DATE DSMES TOPIC EVALUATION     5/22/2023 HOW DO MY DIABETES MEDICATIONS WORK?    Type 1 medications & methods   Insulin injections   Injection sites   Type 2 medications   Oral agents   GLP-1 agonists   Hypoglycemia symptoms & treatment   Glucagon emergency kits   General guidance regarding insulin use whether Type 1, 2 or gestational diabetes   Storage of insulin   Disposal    Traveling with medications

## 2023-05-23 ENCOUNTER — NURSE ONLY (OUTPATIENT)
Age: 63
End: 2023-05-23
Payer: COMMERCIAL

## 2023-05-23 DIAGNOSIS — E11.9 TYPE 2 DIABETES MELLITUS WITHOUT COMPLICATION, WITHOUT LONG-TERM CURRENT USE OF INSULIN (HCC): Primary | ICD-10-CM

## 2023-05-23 PROCEDURE — G0109 DIAB MANAGE TRN IND/GROUP: HCPCS | Performed by: INTERNAL MEDICINE

## 2023-05-24 NOTE — PROGRESS NOTES
[facility] for Diabetes Health  Diabetes Self-Management Education & Support Program  Encounter Note      SUMMARY  Diabetes self-care management training was completed related to healthy coping and problem solving. The participant will return on July 11 for post program evaluation. The participant did not identify SMART Goal(s) and will practice knowledge and skills related to reducing risks, healthy eating and monitoring, being active and medications, and healthy coping and problem solving to improve diabetes self-management. EVALUATION:  Ms Jesica Bo shared she feels supported in her diabetes self management by family members. She listens to music for stress reduction. Charlotte Batista plans to use [employer] as part of her diabetes self management plan going forward. She expressed understanding of using problem solving strategies to overcome barriers to diabetes self management. She was able to identify BG patterns on a sample log & relationships to eating patterns & physical activity. RECOMMENDATIONS:  Increase physical activity. Continue using healthy plate to build balanced, CHO controlled meals. Meditate/deep breathe for stress reduction. TOPICS DISCUSSED TODAY:  HOW DO I FIND SUPPORT TO TACKLE THIS CONDITION? 60  HOW DO I FIGURE OUT WHAT'S INFLUENCING MY BLOOD GLUCOSES? 60      Next provider visit is scheduled for 7/11/23       SMART GOAL(S)   Use healthy plate for 1-2 meals a week  ACHIEVEMENT OF GOAL(S) : %    2. Walk 10 minutes 2-3 times a week  ACHIEVEMENT OF GOAL(S) :  %       DATE DSMES TOPIC EVALUATION     5/24/2023 HOW DO I FIND SUPPORT TO TACKLE THIS CONDITION?    Normal responses to diabetes diagnosis or complication   Shock   Anger & resentment   Guilt/self-blame   Sadness & worry   Depression    Anxiety   Pregnancy   Constructive strategies to normal responses    Exploring feelings & attitudes   Motivation: Cost versus benefits analysis   Problem-solving: Chain analysis

## 2023-05-28 DIAGNOSIS — M47.812 SPONDYLOSIS WITHOUT MYELOPATHY OR RADICULOPATHY, CERVICAL REGION: ICD-10-CM

## 2023-05-30 RX ORDER — LANCETS 30 GAUGE
EACH MISCELLANEOUS
Qty: 100 EACH | Refills: 3 | Status: SHIPPED | OUTPATIENT
Start: 2023-05-30

## 2023-05-30 NOTE — TELEPHONE ENCOUNTER
PCP: Darnlel Rivers MD     Last appt:  2/8/2023      Future Appointments   Date Time Provider [place]   6/20/2023  9:30 AM Darnell Rivers MD [facility] BS AMB   7/11/2023  9:30 AM Ronney Pallas, RN [facility] BS AMB          Requested Prescriptions     Pending Prescriptions Disp Refills    Lancets MISC 100 each 3     Sig: Test once daily DX E11.22

## 2023-05-30 NOTE — TELEPHONE ENCOUNTER
----- Message from Michelemariela Zepeda sent at 5/30/2023  8:52 AM EDT -----  Subject: Refill Request    QUESTIONS  Name of Medication? Lancets MISC  Patient-reported dosage and instructions? One Touch Berio test strips  How many days do you have left? 7  Preferred Pharmacy? [id number]  Pharmacy phone number (if available)? [phone]  Additional Information for Provider? Patient is needing a new prescription   for the test strips One Touch Berio  ---------------------------------------------------------------------------  --------------  CALL BACK INFO  What is the best way for the office to contact you? OK to leave message on   voicemail  Preferred Call Back Phone Number? [phone]  ---------------------------------------------------------------------------  --------------  SCRIPT ANSWERS  Relationship to Patient?  Self

## 2023-06-07 ENCOUNTER — HOSPITAL ENCOUNTER (EMERGENCY)
Facility: HOSPITAL | Age: 63
Discharge: HOME | End: 2023-06-07
Attending: EMERGENCY MEDICINE
Payer: COMMERCIAL

## 2023-06-07 VITALS
DIASTOLIC BLOOD PRESSURE: 71 MMHG | TEMPERATURE: 98.1 F | SYSTOLIC BLOOD PRESSURE: 132 MMHG | HEART RATE: 88 BPM | OXYGEN SATURATION: 95 % | RESPIRATION RATE: 18 BRPM

## 2023-06-07 DIAGNOSIS — L50.9 URTICARIA: Primary | ICD-10-CM

## 2023-06-07 LAB
ANION GAP SERPL CALC-SCNC: 9 MMOL/L (ref 5–15)
BASOPHILS # BLD: 0 K/UL (ref 0–0.1)
BASOPHILS NFR BLD: 0 % (ref 0–1)
BUN SERPL-MCNC: 22 MG/DL (ref 6–20)
BUN/CREAT SERPL: 16 (ref 12–20)
CALCIUM SERPL-MCNC: 9.3 MG/DL (ref 8.5–10.1)
CHLORIDE SERPL-SCNC: 102 MMOL/L (ref 97–108)
CO2 SERPL-SCNC: 26 MMOL/L (ref 21–32)
CREAT SERPL-MCNC: 1.41 MG/DL (ref 0.55–1.02)
DIFFERENTIAL METHOD BLD: ABNORMAL
EOSINOPHIL # BLD: 0.1 K/UL (ref 0–0.4)
EOSINOPHIL NFR BLD: 1 % (ref 0–7)
ERYTHROCYTE [DISTWIDTH] IN BLOOD BY AUTOMATED COUNT: 13 % (ref 11.5–14.5)
GLUCOSE SERPL-MCNC: 133 MG/DL (ref 65–100)
HCT VFR BLD AUTO: 32.7 % (ref 35–47)
HGB BLD-MCNC: 10.9 G/DL (ref 11.5–16)
IMM GRANULOCYTES # BLD AUTO: 0 K/UL (ref 0–0.04)
IMM GRANULOCYTES NFR BLD AUTO: 0 % (ref 0–0.5)
LYMPHOCYTES # BLD: 2 K/UL (ref 0.8–3.5)
LYMPHOCYTES NFR BLD: 26 % (ref 12–49)
MCH RBC QN AUTO: 28.6 PG (ref 26–34)
MCHC RBC AUTO-ENTMCNC: 33.3 G/DL (ref 30–36.5)
MCV RBC AUTO: 85.8 FL (ref 80–99)
MONOCYTES # BLD: 0.3 K/UL (ref 0–1)
MONOCYTES NFR BLD: 4 % (ref 5–13)
NEUTS SEG # BLD: 5.3 K/UL (ref 1.8–8)
NEUTS SEG NFR BLD: 69 % (ref 32–75)
NRBC # BLD: 0 K/UL (ref 0–0.01)
NRBC BLD-RTO: 0 PER 100 WBC
PLATELET # BLD AUTO: 387 K/UL (ref 150–400)
PMV BLD AUTO: 9.8 FL (ref 8.9–12.9)
POTASSIUM SERPL-SCNC: 3.3 MMOL/L (ref 3.5–5.1)
RBC # BLD AUTO: 3.81 M/UL (ref 3.8–5.2)
SODIUM SERPL-SCNC: 137 MMOL/L (ref 136–145)
TROPONIN I SERPL HS-MCNC: 5 NG/L (ref 0–51)
WBC # BLD AUTO: 7.8 K/UL (ref 3.6–11)

## 2023-06-07 PROCEDURE — 99284 EMERGENCY DEPT VISIT MOD MDM: CPT

## 2023-06-07 PROCEDURE — 96375 TX/PRO/DX INJ NEW DRUG ADDON: CPT

## 2023-06-07 PROCEDURE — 96374 THER/PROPH/DIAG INJ IV PUSH: CPT

## 2023-06-07 PROCEDURE — 80048 BASIC METABOLIC PNL TOTAL CA: CPT

## 2023-06-07 PROCEDURE — 84484 ASSAY OF TROPONIN QUANT: CPT

## 2023-06-07 PROCEDURE — 36415 COLL VENOUS BLD VENIPUNCTURE: CPT

## 2023-06-07 PROCEDURE — 6360000002 HC RX W HCPCS: Performed by: EMERGENCY MEDICINE

## 2023-06-07 PROCEDURE — 85025 COMPLETE CBC W/AUTO DIFF WBC: CPT

## 2023-06-07 RX ORDER — DIPHENHYDRAMINE HYDROCHLORIDE 50 MG/ML
12.5 INJECTION INTRAMUSCULAR; INTRAVENOUS
Status: COMPLETED | OUTPATIENT
Start: 2023-06-07 | End: 2023-06-07

## 2023-06-07 RX ORDER — PREDNISONE 20 MG/1
60 TABLET ORAL DAILY
Qty: 18 TABLET | Refills: 0 | Status: SHIPPED | OUTPATIENT
Start: 2023-06-07 | End: 2023-06-13

## 2023-06-07 RX ORDER — TRIAMCINOLONE ACETONIDE 0.25 MG/G
OINTMENT TOPICAL
Qty: 15 G | Refills: 1 | Status: SHIPPED | OUTPATIENT
Start: 2023-06-07 | End: 2023-06-14

## 2023-06-07 RX ORDER — METHYLPREDNISOLONE SODIUM SUCCINATE 40 MG/ML
40 INJECTION, POWDER, LYOPHILIZED, FOR SOLUTION INTRAMUSCULAR; INTRAVENOUS DAILY
Status: DISCONTINUED | OUTPATIENT
Start: 2023-06-07 | End: 2023-06-07 | Stop reason: HOSPADM

## 2023-06-07 RX ADMIN — DIPHENHYDRAMINE HYDROCHLORIDE 12.5 MG: 50 INJECTION, SOLUTION INTRAMUSCULAR; INTRAVENOUS at 16:08

## 2023-06-07 RX ADMIN — METHYLPREDNISOLONE SODIUM SUCCINATE 40 MG: 40 INJECTION, POWDER, FOR SOLUTION INTRAMUSCULAR; INTRAVENOUS at 16:07

## 2023-06-07 ASSESSMENT — LIFESTYLE VARIABLES
HOW OFTEN DO YOU HAVE A DRINK CONTAINING ALCOHOL: NEVER
HOW MANY STANDARD DRINKS CONTAINING ALCOHOL DO YOU HAVE ON A TYPICAL DAY: PATIENT DOES NOT DRINK

## 2023-06-07 ASSESSMENT — PAIN DESCRIPTION - LOCATION: LOCATION: CHEST

## 2023-06-07 ASSESSMENT — PAIN DESCRIPTION - ORIENTATION: ORIENTATION: RIGHT

## 2023-06-07 ASSESSMENT — PAIN SCALES - GENERAL: PAINLEVEL_OUTOF10: 7

## 2023-06-07 NOTE — ED PROVIDER NOTES
Axis 31 degrees    T Axis 106 degrees    Diagnosis       Normal sinus rhythm  Low voltage QRS  T wave abnormality, consider lateral ischemia  When compared with ECG of 14-AUG-2021 13:19,  No significant change was found         EKG: If performed, independent interpretation documented below in the MDM section     RADIOLOGY:  Non-plain film images such as CT, Ultrasound and MRI are read by the radiologist. Plain radiographic images are visualized and preliminarily interpreted by the ED Provider with the findings documented in the MDM section. Interpretation per the Radiologist below, if available at the time of this note:     No orders to display        PROCEDURES   Unless otherwise noted below, none  Procedures     CRITICAL CARE TIME       EMERGENCY DEPARTMENT COURSE and DIFFERENTIAL DIAGNOSIS/MDM   Vitals:    Vitals:    06/07/23 1601 06/07/23 1641 06/07/23 1656   BP: (!) 144/70 128/86 132/71   Pulse: 88     Resp: 18     Temp: 98.1 °F (36.7 °C)     TempSrc: Oral     SpO2: 98% 97% 95%        Patient was given the following medications:  Medications   diphenhydrAMINE (BENADRYL) injection 12.5 mg (12.5 mg IntraVENous Given 6/7/23 1608)       Medical Decision Making  61year-old history of multiple allergies and urticaria presenting with urticarial rash    She reports chest pain relocked relieved with her albuterol. No wheezing no stridor on my exam, doubt anaphylaxis, 5 days after presentation. Will obtain CBC CMP troponin EKG to evaluate for coronary ischemia. We will treat urticarial rash with IV Solu-Medrol and Benadryl. Plan discharge with steroid course. Amount and/or Complexity of Data Reviewed  Labs: ordered. Decision-making details documented in ED Course. ECG/medicine tests: ordered and independent interpretation performed. Decision-making details documented in ED Course. Risk  Prescription drug management.         ED Course as of 06/07/23 2200   Wed Jun 07, 2023   1654 CBC shows relatively stable

## 2023-06-08 LAB
EKG ATRIAL RATE: 90 BPM
EKG DIAGNOSIS: NORMAL
EKG P AXIS: 59 DEGREES
EKG P-R INTERVAL: 142 MS
EKG Q-T INTERVAL: 378 MS
EKG QRS DURATION: 70 MS
EKG QTC CALCULATION (BAZETT): 462 MS
EKG R AXIS: 31 DEGREES
EKG T AXIS: 106 DEGREES
EKG VENTRICULAR RATE: 90 BPM

## 2023-06-20 ENCOUNTER — OFFICE VISIT (OUTPATIENT)
Facility: CLINIC | Age: 63
End: 2023-06-20
Payer: COMMERCIAL

## 2023-06-20 VITALS
BODY MASS INDEX: 30.84 KG/M2 | SYSTOLIC BLOOD PRESSURE: 117 MMHG | HEIGHT: 62 IN | DIASTOLIC BLOOD PRESSURE: 75 MMHG | WEIGHT: 167.6 LBS | RESPIRATION RATE: 16 BRPM | HEART RATE: 98 BPM | TEMPERATURE: 98.1 F | OXYGEN SATURATION: 95 %

## 2023-06-20 DIAGNOSIS — I10 PRIMARY HYPERTENSION: ICD-10-CM

## 2023-06-20 DIAGNOSIS — E11.9 TYPE 2 DIABETES MELLITUS WITHOUT COMPLICATION, WITHOUT LONG-TERM CURRENT USE OF INSULIN (HCC): Primary | ICD-10-CM

## 2023-06-20 DIAGNOSIS — J45.40 MODERATE PERSISTENT ASTHMA WITHOUT COMPLICATION: ICD-10-CM

## 2023-06-20 DIAGNOSIS — E87.6 HYPOKALEMIA: ICD-10-CM

## 2023-06-20 LAB — HBA1C MFR BLD: 8.1 %

## 2023-06-20 PROCEDURE — 83036 HEMOGLOBIN GLYCOSYLATED A1C: CPT | Performed by: INTERNAL MEDICINE

## 2023-06-20 PROCEDURE — 3052F HG A1C>EQUAL 8.0%<EQUAL 9.0%: CPT | Performed by: INTERNAL MEDICINE

## 2023-06-20 PROCEDURE — 3074F SYST BP LT 130 MM HG: CPT | Performed by: INTERNAL MEDICINE

## 2023-06-20 PROCEDURE — 3078F DIAST BP <80 MM HG: CPT | Performed by: INTERNAL MEDICINE

## 2023-06-20 PROCEDURE — 99214 OFFICE O/P EST MOD 30 MIN: CPT | Performed by: INTERNAL MEDICINE

## 2023-06-20 RX ORDER — ALBUTEROL SULFATE 2.5 MG/3ML
2.5 SOLUTION RESPIRATORY (INHALATION) 3 TIMES DAILY PRN
Qty: 60 EACH | Refills: 5 | Status: SHIPPED | OUTPATIENT
Start: 2023-06-20

## 2023-06-20 RX ORDER — BLOOD-GLUCOSE METER
KIT MISCELLANEOUS
COMMUNITY
Start: 2022-03-15 | End: 2023-06-20 | Stop reason: ALTCHOICE

## 2023-06-20 RX ORDER — PREDNISONE 20 MG/1
60 TABLET ORAL DAILY
COMMUNITY
Start: 2023-01-01

## 2023-06-20 RX ORDER — GLIPIZIDE 5 MG/1
5 TABLET, FILM COATED, EXTENDED RELEASE ORAL DAILY
Qty: 30 TABLET | Refills: 3 | Status: SHIPPED | OUTPATIENT
Start: 2023-06-20

## 2023-06-20 RX ORDER — BLOOD-GLUCOSE METER
KIT MISCELLANEOUS
COMMUNITY
Start: 2020-01-09 | End: 2023-06-20

## 2023-06-20 RX ORDER — CLOBETASOL PROPIONATE 0.5 MG/G
CREAM TOPICAL
COMMUNITY
Start: 2023-05-28

## 2023-06-20 RX ORDER — HYDROXYZINE HYDROCHLORIDE 25 MG/1
TABLET, FILM COATED ORAL
COMMUNITY
Start: 2023-01-11

## 2023-06-20 RX ORDER — POTASSIUM CHLORIDE 750 MG/1
10 TABLET, EXTENDED RELEASE ORAL DAILY
Qty: 30 TABLET | Refills: 3 | Status: SHIPPED | OUTPATIENT
Start: 2023-06-20

## 2023-06-20 RX ORDER — POLYETHYLENE GLYCOL-3350 AND ELECTROLYTES 236; 6.74; 5.86; 2.97; 22.74 G/274.31G; G/274.31G; G/274.31G; G/274.31G; G/274.31G
POWDER, FOR SOLUTION ORAL
COMMUNITY
Start: 2023-05-02

## 2023-06-20 RX ORDER — EMPAGLIFLOZIN 10 MG/1
10 TABLET, FILM COATED ORAL DAILY
COMMUNITY
Start: 2023-05-16 | End: 2023-06-20 | Stop reason: ALTCHOICE

## 2023-06-20 RX ORDER — CYCLOBENZAPRINE HCL 10 MG
TABLET ORAL
COMMUNITY
Start: 2022-04-12

## 2023-06-20 RX ORDER — BLOOD SUGAR DIAGNOSTIC
STRIP MISCELLANEOUS
COMMUNITY
Start: 2023-06-02

## 2023-06-20 SDOH — ECONOMIC STABILITY: FOOD INSECURITY: WITHIN THE PAST 12 MONTHS, YOU WORRIED THAT YOUR FOOD WOULD RUN OUT BEFORE YOU GOT MONEY TO BUY MORE.: PATIENT DECLINED

## 2023-06-20 SDOH — ECONOMIC STABILITY: FOOD INSECURITY: WITHIN THE PAST 12 MONTHS, THE FOOD YOU BOUGHT JUST DIDN'T LAST AND YOU DIDN'T HAVE MONEY TO GET MORE.: SOMETIMES TRUE

## 2023-06-20 SDOH — ECONOMIC STABILITY: INCOME INSECURITY: HOW HARD IS IT FOR YOU TO PAY FOR THE VERY BASICS LIKE FOOD, HOUSING, MEDICAL CARE, AND HEATING?: PATIENT DECLINED

## 2023-06-20 SDOH — ECONOMIC STABILITY: HOUSING INSECURITY
IN THE LAST 12 MONTHS, WAS THERE A TIME WHEN YOU DID NOT HAVE A STEADY PLACE TO SLEEP OR SLEPT IN A SHELTER (INCLUDING NOW)?: NO

## 2023-06-20 NOTE — PROGRESS NOTES
Daniel Acosta  Identified pt with two pt identifiers(name and ). Chief Complaint   Patient presents with    Diabetes    Hypertension       Reviewed record In preparation for visit and have obtained necessary documentation. 1. Have you been to the ER, urgent care clinic or hospitalized since your last visit? No     2. Have you seen or consulted any other health care providers outside of the [address] [place] [address] since your last visit? Include any pap smears or colon screening. No    Vitals reviewed with provider. Health Maintenance reviewed:     Health Maintenance Due   Topic    Pneumococcal 0-64 years Vaccine (1 - PCV)    Shingles vaccine (1 of 2)    COVID-19 Vaccine (4 - Booster for Pfizer series)    Diabetic retinal exam           Wt Readings from Last 3 Encounters:   23 167 lb 9.6 oz (76 kg)   23 169 lb (76.7 kg)   23 171 lb 12.8 oz (77.9 kg)        Temp Readings from Last 3 Encounters:   23 98.1 °F (36.7 °C) (Oral)   23 98.1 °F (36.7 °C) (Oral)   23 98 °F (36.7 °C) (Temporal)        BP Readings from Last 3 Encounters:   23 117/75   23 132/71   23 131/60        Pulse Readings from Last 3 Encounters:   23 98   23 88   23 80          Learning Assessment:   :     No flowsheet data found. Fall Risk Assessment:   :       Amb Fall Risk Assessment and TUG Test 2021   Fall in past 12 months? - - 1   Able to walk? - - Yes   Total Score 0 0 -        Abuse Screening:   :     No flowsheet data found. ADL Screening:   :     No flowsheet data found.
fluticasone (FLONASE) 50 MCG/ACT nasal spray INHALE 2 SPRAYS IN EACH NOSTRIL ONCE A DAY. INDICATIONS: INFLAMMATION OF THE NOSE DUE TO AN ALLERGY      ibuprofen (ADVIL;MOTRIN) 600 MG tablet Take 1 tablet by mouth every 6 hours as needed      omeprazole (PRILOSEC) 40 MG delayed release capsule Take 1 capsule by mouth 2 times daily      rosuvastatin (CRESTOR) 5 MG tablet TAKE 1 TABLET BY MOUTH EVERY DAY AT NIGHT      SITagliptin (JANUVIA) 50 MG tablet Take 1 tablet by mouth daily      sitaGLIPtan-metFORMIN (JANUMET)  MG per tablet Take 1 tablet by mouth 2 times daily (with meals)      triamterene-hydroCHLOROthiazide (MAXZIDE-25) 37.5-25 MG per tablet TAKE 1 TABLET BY MOUTH EVERY DAY IN THE MORNING      acetaminophen (TYLENOL) 325 MG tablet Take 2 tablets by mouth every 4 hours as needed (Patient not taking: Reported on 6/20/2023)       No current facility-administered medications for this visit. PHYSICAL EXAM  /75 (Site: Left Upper Arm, Position: Sitting, Cuff Size: Large Adult)   Pulse 98   Temp 98.1 °F (36.7 °C) (Oral)   Resp 16   Ht 5' 2\" (1.575 m)   Wt 167 lb 9.6 oz (76 kg)   SpO2 95%   BMI 30.65 kg/m²   8 lb weight loss since last clinic visit 4 months ago    General: Well-developed and well-nourished, no distress. HEENT:  Head normocephalic/atraumatic, no scleral icterus  Neck: Supple. No carotid bruits, JVD, lymphadenopathy, or thyromegaly. Lungs:  Clear to ausculation bilaterally. Good air movement. Heart:  Regular rate and rhythm, normal S1 and S2, no murmur, gallop, or rub  Extremities: No clubbing, cyanosis, or edema. 2+ pedal pulses bilaterally. Neurological: Alert and oriented. No focal deficits. Psychiatric: Normal mood and affect. Behavior is normal.        Results for orders placed or performed in visit on 06/20/23   AMB POC HEMOGLOBIN A1C   Result Value Ref Range    Hemoglobin A1C, POC 8.1 %          ASSESSMENT AND PLAN      ICD-10-CM    1.  Type 2 diabetes mellitus

## 2023-07-10 DIAGNOSIS — I10 PRIMARY HYPERTENSION: Primary | ICD-10-CM

## 2023-07-11 ENCOUNTER — NURSE ONLY (OUTPATIENT)
Age: 63
End: 2023-07-11

## 2023-07-11 DIAGNOSIS — E11.9 TYPE 2 DIABETES MELLITUS WITHOUT COMPLICATION, WITHOUT LONG-TERM CURRENT USE OF INSULIN (HCC): Primary | ICD-10-CM

## 2023-07-11 RX ORDER — AMLODIPINE BESYLATE 10 MG/1
TABLET ORAL
Qty: 90 TABLET | Refills: 3 | Status: SHIPPED | OUTPATIENT
Start: 2023-07-11

## 2023-07-11 NOTE — PROGRESS NOTES
[facility]  Diabetes Self-Management Education & Support Program  Post-program Evaluation    EVALUATION @ [address] completed 9 hours of diabetes self-management education. The participant acquired new knowledge and demonstrated new skills during the program.     The participant worked on the following SMART GOAL(s) to improve their diabetes self-management:     Use healthy plate for 1-2 meals a week  ACHIEVEMENT OF GOAL(S) : %    2. Walk 10 minutes 2-3 times a week  ACHIEVEMENT OF GOAL(S) : 50-74%    The participant's pre-program A1c was   Lab Results   Component Value Date/Time    YZO6FRWZ 8.1 06/20/2023 09:53 AM    LABA1C 9.0 01/27/2023 12:24 PM   . The post-evaluation A1c is 8.1. The participant improved their Diabetes Skills, Confidence and Preparedness Index (scored out of 7): Total score: 6.4  Skills: 6.2  Confidence: 6.3  Preparedness: 6.7    FINAL RECOMMENDATIONS:  Ms Mary Ann Barreto shared her FBG are improved since starting Glipizide ER & stopping Jardiance. She has lost 10 pounds and lowered her A1c. I recommended that River Frazier increase physical activity by walking more & that she continue to eat balanced, CHO controlled meals. Next provider visit is scheduled for 10/3/23       Chon Luis RN on 7/11/2023     Metric Patient's responses (7/11/2023) Areas for improvement     Healthy Eating       The participant is using the Healthy Plate to control carbohydrate intake - Yes    The participant reads food labels accurately - Yes          Being Active       The participant performs physical activity where your heart beats faster and your breathing is harder than normal for 30 minutes or more? 0 day(s)     In a typical week, the participant performs physical activity 0 day(s)          Monitoring   The participant is monitoring their blood sugars?  Yes    The participant is monitoring 1x/day    Blood sugar ranges are 96 mg/dL -114

## 2023-07-11 NOTE — TELEPHONE ENCOUNTER
PCP: Kriss Orr MD     Last appt:  6/20/2023      Future Appointments   Date Time Provider [address]   7/11/2023  9:30 AM Jessica Gaxiola RN [facility] BS AMB   10/3/2023  8:10 AM Kriss Orr MD [facility] BS AMB          Requested Prescriptions     Pending Prescriptions Disp Refills    amLODIPine (NORVASC) 10 MG tablet [Pharmacy Med Name: AMLODIPINE BESYLATE 10 MG TAB] 90 tablet 7     Sig: TAKE 1 TABLET BY MOUTH EVERY DAY

## 2023-08-07 DIAGNOSIS — E11.9 TYPE 2 DIABETES MELLITUS WITHOUT COMPLICATION, WITHOUT LONG-TERM CURRENT USE OF INSULIN (HCC): ICD-10-CM

## 2023-08-07 DIAGNOSIS — M47.812 SPONDYLOSIS WITHOUT MYELOPATHY OR RADICULOPATHY, CERVICAL REGION: ICD-10-CM

## 2023-08-07 RX ORDER — GLIPIZIDE 5 MG/1
TABLET, FILM COATED, EXTENDED RELEASE ORAL
Qty: 90 TABLET | Refills: 1 | Status: SHIPPED | OUTPATIENT
Start: 2023-08-07

## 2023-08-07 RX ORDER — IBUPROFEN 600 MG/1
TABLET ORAL
Qty: 60 TABLET | Refills: 1 | Status: SHIPPED | OUTPATIENT
Start: 2023-08-07

## 2023-08-07 NOTE — TELEPHONE ENCOUNTER
PCP: Venkat Starr MD     Last appt: 6/20/2023    Future Appointments   Date Time Provider [address]   10/3/2023  8:10 AM Venkat Starr MD [facility] BS AMB          Requested Prescriptions     Pending Prescriptions Disp Refills    diclofenac sodium (VOLTAREN) 1 % GEL [Pharmacy Med Name: DICLOFENAC SODIUM 1% GEL] 100 g 2     Sig: APPLY THIN AMOUNT TO THE RIGHT WRIST AND OTHER AFFECTED AREAS 4 TIMES DAILY

## 2023-08-07 NOTE — TELEPHONE ENCOUNTER
PCP: Kriss Orr MD     Last appt: 6/20/2023    Future Appointments   Date Time Provider [address]   10/3/2023  8:10 AM Kriss Orr MD [facility] AMB          Requested Prescriptions     Pending Prescriptions Disp Refills    glipiZIDE (GLUCOTROL XL) 5 MG extended release tablet [Pharmacy Med Name: GLIPIZIDE ER 5 MG TABLET] 90 tablet 1     Sig: TAKE 1 TABLET BY MOUTH EVERY DAY    ibuprofen (ADVIL;MOTRIN) 600 MG tablet [Pharmacy Med Name: IBUPROFEN 600 MG TABLET] 60 tablet 1     Sig: TAKE 1 TABLET BY MOUTH EVERY SIX (6) HOURS AS NEEDED FOR PAIN. TAKE WITH FOOD.

## 2023-08-18 DIAGNOSIS — E87.6 HYPOKALEMIA: ICD-10-CM

## 2023-08-18 RX ORDER — POTASSIUM CHLORIDE 750 MG/1
TABLET, EXTENDED RELEASE ORAL
Qty: 30 TABLET | Refills: 3 | Status: SHIPPED | OUTPATIENT
Start: 2023-08-18

## 2023-08-18 NOTE — TELEPHONE ENCOUNTER
PCP: Tiera Ray MD     Last appt:  6/20/2023    Future Appointments   Date Time Provider [address]   10/3/2023  8:10 AM Tiera Ray MD [facility] AMB          Requested Prescriptions     Pending Prescriptions Disp Refills    KLOR-CON M10 10 MEQ extended release tablet [Pharmacy Med Name: KLOR-CON M10 TABLET] 30 tablet 3     Sig: TAKE 1 TABLET BY MOUTH EVERY DAY

## 2023-08-29 ENCOUNTER — TRANSCRIBE ORDERS (OUTPATIENT)
Facility: HOSPITAL | Age: 63
End: 2023-08-29

## 2023-08-29 DIAGNOSIS — Z12.31 VISIT FOR SCREENING MAMMOGRAM: Primary | ICD-10-CM

## 2023-09-18 ENCOUNTER — APPOINTMENT (OUTPATIENT)
Facility: HOSPITAL | Age: 63
End: 2023-09-18
Payer: COMMERCIAL

## 2023-09-18 ENCOUNTER — HOSPITAL ENCOUNTER (EMERGENCY)
Facility: HOSPITAL | Age: 63
Discharge: HOME | End: 2023-09-18
Attending: EMERGENCY MEDICINE
Payer: COMMERCIAL

## 2023-09-18 VITALS
DIASTOLIC BLOOD PRESSURE: 76 MMHG | SYSTOLIC BLOOD PRESSURE: 157 MMHG | TEMPERATURE: 98.4 F | BODY MASS INDEX: 32.58 KG/M2 | WEIGHT: 178.13 LBS | HEART RATE: 105 BPM | OXYGEN SATURATION: 99 % | RESPIRATION RATE: 16 BRPM

## 2023-09-18 DIAGNOSIS — J06.9 URI WITH COUGH AND CONGESTION: Primary | ICD-10-CM

## 2023-09-18 DIAGNOSIS — E11.65 TYPE 2 DIABETES MELLITUS WITH HYPERGLYCEMIA, WITHOUT LONG-TERM CURRENT USE OF INSULIN (HCC): ICD-10-CM

## 2023-09-18 LAB
ALBUMIN SERPL-MCNC: 3.8 G/DL (ref 3.5–5)
ALBUMIN/GLOB SERPL: 0.8 (ref 1.1–2.2)
ALP SERPL-CCNC: 86 U/L (ref 45–117)
ALT SERPL-CCNC: 21 U/L (ref 12–78)
ANION GAP SERPL CALC-SCNC: 6 MMOL/L (ref 5–15)
AST SERPL-CCNC: 25 U/L (ref 15–37)
BASOPHILS # BLD: 0 K/UL (ref 0–0.1)
BASOPHILS NFR BLD: 0 % (ref 0–1)
BILIRUB SERPL-MCNC: 0.3 MG/DL (ref 0.2–1)
BUN SERPL-MCNC: 12 MG/DL (ref 6–20)
BUN/CREAT SERPL: 9 (ref 12–20)
CALCIUM SERPL-MCNC: 9.9 MG/DL (ref 8.5–10.1)
CHLORIDE SERPL-SCNC: 100 MMOL/L (ref 97–108)
CO2 SERPL-SCNC: 28 MMOL/L (ref 21–32)
CREAT SERPL-MCNC: 1.37 MG/DL (ref 0.55–1.02)
DIFFERENTIAL METHOD BLD: ABNORMAL
EKG ATRIAL RATE: 107 BPM
EKG DIAGNOSIS: NORMAL
EKG P AXIS: 58 DEGREES
EKG P-R INTERVAL: 148 MS
EKG Q-T INTERVAL: 346 MS
EKG QRS DURATION: 68 MS
EKG QTC CALCULATION (BAZETT): 461 MS
EKG R AXIS: 32 DEGREES
EKG T AXIS: 122 DEGREES
EKG VENTRICULAR RATE: 107 BPM
EOSINOPHIL # BLD: 0 K/UL (ref 0–0.4)
EOSINOPHIL NFR BLD: 0 % (ref 0–7)
ERYTHROCYTE [DISTWIDTH] IN BLOOD BY AUTOMATED COUNT: 13.4 % (ref 11.5–14.5)
GLOBULIN SER CALC-MCNC: 4.5 G/DL (ref 2–4)
GLUCOSE BLD STRIP.AUTO-MCNC: 223 MG/DL (ref 65–117)
GLUCOSE SERPL-MCNC: 337 MG/DL (ref 65–100)
HCT VFR BLD AUTO: 36.4 % (ref 35–47)
HGB BLD-MCNC: 11.8 G/DL (ref 11.5–16)
IMM GRANULOCYTES # BLD AUTO: 0.1 K/UL (ref 0–0.04)
IMM GRANULOCYTES NFR BLD AUTO: 1 % (ref 0–0.5)
LYMPHOCYTES # BLD: 0.7 K/UL (ref 0.8–3.5)
LYMPHOCYTES NFR BLD: 10 % (ref 12–49)
MAGNESIUM SERPL-MCNC: 1.8 MG/DL (ref 1.6–2.4)
MCH RBC QN AUTO: 28.4 PG (ref 26–34)
MCHC RBC AUTO-ENTMCNC: 32.4 G/DL (ref 30–36.5)
MCV RBC AUTO: 87.5 FL (ref 80–99)
MONOCYTES # BLD: 0.1 K/UL (ref 0–1)
MONOCYTES NFR BLD: 2 % (ref 5–13)
NEUTS SEG # BLD: 5.8 K/UL (ref 1.8–8)
NEUTS SEG NFR BLD: 87 % (ref 32–75)
NRBC # BLD: 0 K/UL (ref 0–0.01)
NRBC BLD-RTO: 0 PER 100 WBC
PLATELET # BLD AUTO: 411 K/UL (ref 150–400)
PMV BLD AUTO: 9.5 FL (ref 8.9–12.9)
POTASSIUM SERPL-SCNC: 5.1 MMOL/L (ref 3.5–5.1)
PROT SERPL-MCNC: 8.3 G/DL (ref 6.4–8.2)
RBC # BLD AUTO: 4.16 M/UL (ref 3.8–5.2)
SARS-COV-2 RDRP RESP QL NAA+PROBE: NOT DETECTED
SERVICE CMNT-IMP: ABNORMAL
SODIUM SERPL-SCNC: 134 MMOL/L (ref 136–145)
SOURCE: NORMAL
TROPONIN I SERPL HS-MCNC: 6 NG/L (ref 0–51)
WBC # BLD AUTO: 6.7 K/UL (ref 3.6–11)

## 2023-09-18 PROCEDURE — 2580000003 HC RX 258: Performed by: EMERGENCY MEDICINE

## 2023-09-18 PROCEDURE — 83735 ASSAY OF MAGNESIUM: CPT

## 2023-09-18 PROCEDURE — 87635 SARS-COV-2 COVID-19 AMP PRB: CPT

## 2023-09-18 PROCEDURE — 93005 ELECTROCARDIOGRAM TRACING: CPT | Performed by: EMERGENCY MEDICINE

## 2023-09-18 PROCEDURE — 84484 ASSAY OF TROPONIN QUANT: CPT

## 2023-09-18 PROCEDURE — 82962 GLUCOSE BLOOD TEST: CPT

## 2023-09-18 PROCEDURE — 80053 COMPREHEN METABOLIC PANEL: CPT

## 2023-09-18 PROCEDURE — 96374 THER/PROPH/DIAG INJ IV PUSH: CPT

## 2023-09-18 PROCEDURE — 6370000000 HC RX 637 (ALT 250 FOR IP): Performed by: EMERGENCY MEDICINE

## 2023-09-18 PROCEDURE — 85025 COMPLETE CBC W/AUTO DIFF WBC: CPT

## 2023-09-18 PROCEDURE — 96375 TX/PRO/DX INJ NEW DRUG ADDON: CPT

## 2023-09-18 PROCEDURE — 71045 X-RAY EXAM CHEST 1 VIEW: CPT

## 2023-09-18 PROCEDURE — 99285 EMERGENCY DEPT VISIT HI MDM: CPT

## 2023-09-18 PROCEDURE — 96361 HYDRATE IV INFUSION ADD-ON: CPT

## 2023-09-18 PROCEDURE — 36415 COLL VENOUS BLD VENIPUNCTURE: CPT

## 2023-09-18 PROCEDURE — 6360000002 HC RX W HCPCS: Performed by: EMERGENCY MEDICINE

## 2023-09-18 RX ORDER — PREDNISONE 20 MG/1
60 TABLET ORAL
Status: COMPLETED | OUTPATIENT
Start: 2023-09-18 | End: 2023-09-18

## 2023-09-18 RX ORDER — KETOROLAC TROMETHAMINE 30 MG/ML
30 INJECTION, SOLUTION INTRAMUSCULAR; INTRAVENOUS
Status: COMPLETED | OUTPATIENT
Start: 2023-09-18 | End: 2023-09-18

## 2023-09-18 RX ORDER — 0.9 % SODIUM CHLORIDE 0.9 %
1000 INTRAVENOUS SOLUTION INTRAVENOUS
Status: COMPLETED | OUTPATIENT
Start: 2023-09-18 | End: 2023-09-18

## 2023-09-18 RX ORDER — PREDNISONE 20 MG/1
40 TABLET ORAL DAILY
Qty: 10 TABLET | Refills: 0 | Status: SHIPPED | OUTPATIENT
Start: 2023-09-18 | End: 2023-09-23

## 2023-09-18 RX ADMIN — SODIUM CHLORIDE 1000 ML: 9 INJECTION, SOLUTION INTRAVENOUS at 18:44

## 2023-09-18 RX ADMIN — KETOROLAC TROMETHAMINE 30 MG: 30 INJECTION, SOLUTION INTRAMUSCULAR; INTRAVENOUS at 18:42

## 2023-09-18 RX ADMIN — PREDNISONE 60 MG: 20 TABLET ORAL at 18:41

## 2023-09-18 RX ADMIN — INSULIN HUMAN 8 UNITS: 100 INJECTION, SOLUTION PARENTERAL at 18:41

## 2023-09-18 ASSESSMENT — ENCOUNTER SYMPTOMS
SHORTNESS OF BREATH: 1
COUGH: 1
ABDOMINAL PAIN: 0
CHEST TIGHTNESS: 1
WHEEZING: 1

## 2023-09-18 ASSESSMENT — PAIN SCALES - GENERAL: PAINLEVEL_OUTOF10: 0

## 2023-09-18 NOTE — ED PROVIDER NOTES
EMERGENCY DEPARTMENT HISTORY AND PHYSICAL EXAM    Date: 2023  Patient Name: Dianna Gilbert  Patient Age and Sex: 61 y.o. female  MRN:  [id number]  CSN:  [id number]    History of Present Illness     Chief Complaint   Patient presents with    Cough    Shortness of Breath    Wheezing     Pt went out of town , for a one day bus trip to [place] Friday. Since coming home, has developed wheezing and shortness of breath. Has used her inhaler but it is not helping and she doesn't feel well. History Provided By: Patient    Ability to gather history was limited by:     HPI: Dianna Gilbert, 61 y.o. female   Complains of generalized fatigue, URI symptoms including sinus congestion, dry cough, chest congestion, wheezing, mild shortness of breath. She reports that she was just traveling with a large group of people on the bus over the last couple days and is concerned that she may have caught an infection or tired herself out too much. Tobacco Use      Smoking status: Former        Packs/day: 0.50        Years: 5.00        Additional pack years: 0.00        Total pack years: 2.50        Types: Cigarettes        Start date: 2005        Quit date: 2010        Years since quittin.2      Smokeless tobacco: Never     Past History   The patient's medical, surgical, and social history were reviewed by me today. Current Medications:  No current facility-administered medications on file prior to encounter.      Current Outpatient Medications on File Prior to Encounter   Medication Sig Dispense Refill    KLOR-CON M10 10 MEQ extended release tablet TAKE 1 TABLET BY MOUTH EVERY DAY 30 tablet 3    glipiZIDE (GLUCOTROL XL) 5 MG extended release tablet TAKE 1 TABLET BY MOUTH EVERY DAY 90 tablet 1    diclofenac sodium (VOLTAREN) 1 % GEL APPLY THIN AMOUNT TO THE RIGHT WRIST AND OTHER AFFECTED AREAS 4 TIMES DAILY 100 g 3    ibuprofen (ADVIL;MOTRIN) 600 MG tablet TAKE 1 TABLET BY MOUTH EVERY SIX (6) HOURS

## 2023-09-19 RX ORDER — FLUTICASONE PROPIONATE 50 MCG
SPRAY, SUSPENSION (ML) NASAL
Qty: 16 G | Refills: 3 | Status: SHIPPED | OUTPATIENT
Start: 2023-09-19

## 2023-09-19 NOTE — ED NOTES
Patient discharged from the ED by Leah Cohen RN. Diagnosis, medications, precautions and follow-ups were reviewed with the patient/family. Questions were asked and answered prior to departure.  Patient departed the ED via FELIPE Escobar  09/18/23 [id number]

## 2023-09-19 NOTE — TELEPHONE ENCOUNTER
PCP: Drew Millard MD     Last appt:  6/20/2023    Future Appointments   Date Time Provider [address]   10/3/2023  8:10 AM Drew Millard MD [facility]          Requested Prescriptions     Pending Prescriptions Disp Refills    fluticasone (FLONASE) 50 MCG/ACT nasal spray 16 g 3     Sig: INHALE 2 SPRAYS IN EACH NOSTRIL ONCE A DAY.  INDICATIONS: INFLAMMATION OF THE NOSE DUE TO AN ALLERGY

## 2023-10-03 ENCOUNTER — OFFICE VISIT (OUTPATIENT)
Facility: CLINIC | Age: 63
End: 2023-10-03
Payer: COMMERCIAL

## 2023-10-03 VITALS
SYSTOLIC BLOOD PRESSURE: 129 MMHG | TEMPERATURE: 98.1 F | RESPIRATION RATE: 16 BRPM | DIASTOLIC BLOOD PRESSURE: 79 MMHG | OXYGEN SATURATION: 97 % | HEART RATE: 85 BPM | BODY MASS INDEX: 32.72 KG/M2 | HEIGHT: 62 IN | WEIGHT: 177.8 LBS

## 2023-10-03 DIAGNOSIS — N64.4 BREAST PAIN, LEFT: ICD-10-CM

## 2023-10-03 DIAGNOSIS — M17.0 PRIMARY OSTEOARTHRITIS OF BOTH KNEES: ICD-10-CM

## 2023-10-03 DIAGNOSIS — I10 PRIMARY HYPERTENSION: ICD-10-CM

## 2023-10-03 DIAGNOSIS — E55.9 VITAMIN D DEFICIENCY: ICD-10-CM

## 2023-10-03 DIAGNOSIS — L50.9 URTICARIA, UNSPECIFIED: ICD-10-CM

## 2023-10-03 DIAGNOSIS — J45.40 MODERATE PERSISTENT ASTHMA WITHOUT COMPLICATION: ICD-10-CM

## 2023-10-03 DIAGNOSIS — E11.9 TYPE 2 DIABETES MELLITUS WITHOUT COMPLICATION, WITHOUT LONG-TERM CURRENT USE OF INSULIN (HCC): Primary | ICD-10-CM

## 2023-10-03 PROCEDURE — 3078F DIAST BP <80 MM HG: CPT | Performed by: INTERNAL MEDICINE

## 2023-10-03 PROCEDURE — 3051F HG A1C>EQUAL 7.0%<8.0%: CPT | Performed by: INTERNAL MEDICINE

## 2023-10-03 PROCEDURE — 99214 OFFICE O/P EST MOD 30 MIN: CPT | Performed by: INTERNAL MEDICINE

## 2023-10-03 PROCEDURE — 3074F SYST BP LT 130 MM HG: CPT | Performed by: INTERNAL MEDICINE

## 2023-10-03 RX ORDER — CLOBETASOL PROPIONATE 0.5 MG/G
CREAM TOPICAL
Qty: 30 G | Refills: 1 | Status: SHIPPED | OUTPATIENT
Start: 2023-10-03

## 2023-10-03 RX ORDER — BUDESONIDE AND FORMOTEROL FUMARATE DIHYDRATE 160; 4.5 UG/1; UG/1
2 AEROSOL RESPIRATORY (INHALATION) 2 TIMES DAILY
Qty: 10.2 G | Refills: 5 | Status: SHIPPED | OUTPATIENT
Start: 2023-10-03

## 2023-10-03 ASSESSMENT — PATIENT HEALTH QUESTIONNAIRE - PHQ9
2. FEELING DOWN, DEPRESSED OR HOPELESS: 0
1. LITTLE INTEREST OR PLEASURE IN DOING THINGS: 0
SUM OF ALL RESPONSES TO PHQ9 QUESTIONS 1 & 2: 0
SUM OF ALL RESPONSES TO PHQ QUESTIONS 1-9: 0

## 2023-10-05 LAB
ALBUMIN SERPL-MCNC: 4.6 G/DL (ref 3.9–4.9)
ALBUMIN/GLOB SERPL: 1.8 {RATIO} (ref 1.2–2.2)
ALP SERPL-CCNC: 86 IU/L (ref 44–121)
ALT SERPL-CCNC: 10 IU/L (ref 0–32)
AST SERPL-CCNC: 12 IU/L (ref 0–40)
BILIRUB SERPL-MCNC: 0.2 MG/DL (ref 0–1.2)
BUN SERPL-MCNC: 13 MG/DL (ref 8–27)
BUN/CREAT SERPL: 14 (ref 12–28)
CALCIUM SERPL-MCNC: 10 MG/DL (ref 8.7–10.3)
CHLORIDE SERPL-SCNC: 97 MMOL/L (ref 96–106)
CO2 SERPL-SCNC: 26 MMOL/L (ref 20–29)
CREAT SERPL-MCNC: 0.96 MG/DL (ref 0.57–1)
EGFRCR SERPLBLD CKD-EPI 2021: 66 ML/MIN/1.73
EST. AVERAGE GLUCOSE BLD GHB EST-MCNC: 157 MG/DL
GLOBULIN SER CALC-MCNC: 2.5 G/DL (ref 1.5–4.5)
GLUCOSE SERPL-MCNC: 189 MG/DL (ref 70–99)
HBA1C MFR BLD: 7.1 %HB
POTASSIUM SERPL-SCNC: 4.8 MMOL/L (ref 3.5–5.2)
PROT SERPL-MCNC: 7.1 G/DL (ref 6–8.5)
SODIUM SERPL-SCNC: 137 MMOL/L (ref 134–144)

## 2023-10-06 ENCOUNTER — OFFICE VISIT (OUTPATIENT)
Age: 63
End: 2023-10-06
Payer: COMMERCIAL

## 2023-10-06 VITALS
RESPIRATION RATE: 15 BRPM | TEMPERATURE: 96.5 F | HEART RATE: 90 BPM | DIASTOLIC BLOOD PRESSURE: 66 MMHG | OXYGEN SATURATION: 99 % | WEIGHT: 177 LBS | SYSTOLIC BLOOD PRESSURE: 130 MMHG | BODY MASS INDEX: 32.57 KG/M2 | HEIGHT: 62 IN

## 2023-10-06 DIAGNOSIS — M25.561 RIGHT KNEE PAIN, UNSPECIFIED CHRONICITY: ICD-10-CM

## 2023-10-06 DIAGNOSIS — M17.0 BILATERAL PRIMARY OSTEOARTHRITIS OF KNEE: ICD-10-CM

## 2023-10-06 DIAGNOSIS — M25.562 LEFT KNEE PAIN, UNSPECIFIED CHRONICITY: Primary | ICD-10-CM

## 2023-10-06 PROCEDURE — 3078F DIAST BP <80 MM HG: CPT | Performed by: ORTHOPAEDIC SURGERY

## 2023-10-06 PROCEDURE — 3075F SYST BP GE 130 - 139MM HG: CPT | Performed by: ORTHOPAEDIC SURGERY

## 2023-10-06 PROCEDURE — 99203 OFFICE O/P NEW LOW 30 MIN: CPT | Performed by: ORTHOPAEDIC SURGERY

## 2023-10-06 RX ORDER — DICLOFENAC SODIUM 75 MG/1
75 TABLET, DELAYED RELEASE ORAL 2 TIMES DAILY PRN
Qty: 60 TABLET | Refills: 0 | Status: SHIPPED | OUTPATIENT
Start: 2023-10-06

## 2023-10-06 ASSESSMENT — PATIENT HEALTH QUESTIONNAIRE - PHQ9
2. FEELING DOWN, DEPRESSED OR HOPELESS: 0
1. LITTLE INTEREST OR PLEASURE IN DOING THINGS: 0
SUM OF ALL RESPONSES TO PHQ QUESTIONS 1-9: 0
SUM OF ALL RESPONSES TO PHQ9 QUESTIONS 1 & 2: 0

## 2023-10-14 DIAGNOSIS — M17.0 BILATERAL PRIMARY OSTEOARTHRITIS OF KNEE: ICD-10-CM

## 2023-10-16 RX ORDER — DICLOFENAC SODIUM 75 MG/1
75 TABLET, DELAYED RELEASE ORAL 2 TIMES DAILY
Qty: 60 TABLET | Refills: 0 | OUTPATIENT
Start: 2023-10-16

## 2023-10-27 ENCOUNTER — HOSPITAL ENCOUNTER (OUTPATIENT)
Facility: HOSPITAL | Age: 63
End: 2023-10-27
Payer: COMMERCIAL

## 2023-10-27 DIAGNOSIS — N64.4 BREAST PAIN, LEFT: ICD-10-CM

## 2023-10-27 PROCEDURE — 76642 ULTRASOUND BREAST LIMITED: CPT

## 2023-10-27 PROCEDURE — G0279 TOMOSYNTHESIS, MAMMO: HCPCS

## 2023-11-08 DIAGNOSIS — M17.0 BILATERAL PRIMARY OSTEOARTHRITIS OF KNEE: ICD-10-CM

## 2023-11-08 RX ORDER — DICLOFENAC SODIUM 75 MG/1
75 TABLET, DELAYED RELEASE ORAL 2 TIMES DAILY
Qty: 60 TABLET | Refills: 0 | Status: SHIPPED | OUTPATIENT
Start: 2023-11-08

## 2023-11-08 RX ORDER — ROSUVASTATIN CALCIUM 5 MG/1
TABLET, COATED ORAL
Qty: 90 TABLET | Refills: 3 | Status: SHIPPED | OUTPATIENT
Start: 2023-11-08

## 2023-11-08 NOTE — TELEPHONE ENCOUNTER
PCP: Keesha Araujo MD     Last appt:  10/3/2023      Future Appointments   Date Time Provider [address]   2/6/2024  7:50 AM Keesha Araujo MD [facility] BS AMB          Requested Prescriptions     Pending Prescriptions Disp Refills    rosuvastatin (CRESTOR) 5 MG tablet [Pharmacy Med Name: ROSUVASTATIN CALCIUM 5 MG TAB] 90 tablet 3     Sig: TAKE 1 TABLET BY MOUTH EVERY DAY AT NIGHT

## 2023-11-09 ENCOUNTER — TELEPHONE (OUTPATIENT)
Age: 63
End: 2023-11-09

## 2023-12-06 ENCOUNTER — OFFICE VISIT (OUTPATIENT)
Age: 63
End: 2023-12-06
Payer: COMMERCIAL

## 2023-12-06 VITALS — HEIGHT: 62 IN | BODY MASS INDEX: 32.37 KG/M2

## 2023-12-06 DIAGNOSIS — M17.0 BILATERAL PRIMARY OSTEOARTHRITIS OF KNEE: Primary | ICD-10-CM

## 2023-12-06 PROCEDURE — 99213 OFFICE O/P EST LOW 20 MIN: CPT | Performed by: ORTHOPAEDIC SURGERY

## 2023-12-06 PROCEDURE — 20610 DRAIN/INJ JOINT/BURSA W/O US: CPT | Performed by: ORTHOPAEDIC SURGERY

## 2023-12-06 RX ORDER — BETAMETHASONE SODIUM PHOSPHATE AND BETAMETHASONE ACETATE 3; 3 MG/ML; MG/ML
6 INJECTION, SUSPENSION INTRA-ARTICULAR; INTRALESIONAL; INTRAMUSCULAR; SOFT TISSUE ONCE
Status: COMPLETED | OUTPATIENT
Start: 2023-12-06 | End: 2023-12-06

## 2023-12-06 RX ADMIN — BETAMETHASONE SODIUM PHOSPHATE AND BETAMETHASONE ACETATE 6 MG: 3; 3 INJECTION, SUSPENSION INTRA-ARTICULAR; INTRALESIONAL; INTRAMUSCULAR; SOFT TISSUE at 10:16

## 2023-12-06 RX ADMIN — BETAMETHASONE SODIUM PHOSPHATE AND BETAMETHASONE ACETATE 6 MG: 3; 3 INJECTION, SUSPENSION INTRA-ARTICULAR; INTRALESIONAL; INTRAMUSCULAR; SOFT TISSUE at 10:15

## 2023-12-06 ASSESSMENT — PATIENT HEALTH QUESTIONNAIRE - PHQ9
SUM OF ALL RESPONSES TO PHQ QUESTIONS 1-9: 0
1. LITTLE INTEREST OR PLEASURE IN DOING THINGS: 0
SUM OF ALL RESPONSES TO PHQ QUESTIONS 1-9: 0
2. FEELING DOWN, DEPRESSED OR HOPELESS: 0
SUM OF ALL RESPONSES TO PHQ QUESTIONS 1-9: 0
SUM OF ALL RESPONSES TO PHQ9 QUESTIONS 1 & 2: 0
SUM OF ALL RESPONSES TO PHQ QUESTIONS 1-9: 0

## 2023-12-06 NOTE — PROGRESS NOTES
Diabetes Father     No Known Problems Sister     Breast Cancer Cousin     Hypertension Mother     Asthma Mother     Hypertension Sister          Review of Systems:       General: Denies headache, lethargy, fever, weight loss  Ears/Nose/Throat: Denies ear discharge, drainage, nosebleeds, hoarse voice, dental problems  Cardiovascular: Denies chest pain, shortness of breath  Lungs: Denies chest pain, breathing problems, wheezing, pneumonia  Stomach: Denies stomach pain, heartburn, constipation, irritable bowel  Skin: Denies rash, sores, open wounds  Musculoskeletal: bilateral knee pain  Genitourinary: Denies dysuria, hematuria, polyuria  Gastrointestinal: Denies constipation, obstipation, diarrhea  Neurological: Denies changes in sight, smell, hearing, taste, seizures. Denies loss of consciousness. Psychiatric: Denies depression, sleep pattern changes, anxiety, change in personality  Endocrine: Denies mood swings, heat or cold intolerance  Hematologic/Lymphatic: Denies anemia, purpura, petechia  Allergic/Immunologic: Denies swelling of throat, pain or swelling at lymph nodes      Physical Examination:    There were no vitals filed for this visit. General: AOX3, no apparent distress  Psychiatric: mood and affect appropriate  Lungs: breathing is symmetric and unlabored bilaterally  Heart: regular rate and rhythm  Abdomen: no guarding  Head: normocephalic, atraumatic  Skin: No significant abnormalities, good turgor  Sensation intact to light touch: C5-T1 dermatomes  Muscular exam: 5/5 strength in all major muscle groups unless noted in specialty exam.    Extremities        Left lower extremity:  Knee is noted to have a mild effusion. medial joint line tenderness to palpation with a negative deformity. Patellar crepitus with range of motion is noted. Range of motion is 0-120. Sensation is intact to light touch L1-S1 dermatomes.   Knee flexion and extension strength is 5/5 with Tibialis anterior, EHL, FHL being

## 2023-12-07 ENCOUNTER — HOSPITAL ENCOUNTER (EMERGENCY)
Facility: HOSPITAL | Age: 63
Discharge: HOME | End: 2023-12-07
Payer: COMMERCIAL

## 2023-12-07 VITALS
HEART RATE: 100 BPM | OXYGEN SATURATION: 100 % | DIASTOLIC BLOOD PRESSURE: 102 MMHG | RESPIRATION RATE: 18 BRPM | SYSTOLIC BLOOD PRESSURE: 149 MMHG | TEMPERATURE: 98.2 F

## 2023-12-07 DIAGNOSIS — T38.0X5A STEROID-INDUCED HYPERGLYCEMIA: ICD-10-CM

## 2023-12-07 DIAGNOSIS — R73.9 STEROID-INDUCED HYPERGLYCEMIA: ICD-10-CM

## 2023-12-07 DIAGNOSIS — Z56.6 STRESS AT WORK: Primary | ICD-10-CM

## 2023-12-07 DIAGNOSIS — K21.9 GASTROESOPHAGEAL REFLUX DISEASE WITHOUT ESOPHAGITIS: ICD-10-CM

## 2023-12-07 LAB
DEPRECATED S PYO AG THROAT QL EIA: NEGATIVE
FLUAV AG NPH QL IA: NEGATIVE
FLUBV AG NOSE QL IA: NEGATIVE
SARS-COV-2 RDRP RESP QL NAA+PROBE: NOT DETECTED
SOURCE: NORMAL

## 2023-12-07 PROCEDURE — 87804 INFLUENZA ASSAY W/OPTIC: CPT

## 2023-12-07 PROCEDURE — 6370000000 HC RX 637 (ALT 250 FOR IP)

## 2023-12-07 PROCEDURE — 87070 CULTURE OTHR SPECIMN AEROBIC: CPT

## 2023-12-07 PROCEDURE — 87635 SARS-COV-2 COVID-19 AMP PRB: CPT

## 2023-12-07 PROCEDURE — 87880 STREP A ASSAY W/OPTIC: CPT

## 2023-12-07 PROCEDURE — 93005 ELECTROCARDIOGRAM TRACING: CPT

## 2023-12-07 RX ORDER — ACETAMINOPHEN 325 MG/1
650 TABLET ORAL
Status: COMPLETED | OUTPATIENT
Start: 2023-12-07 | End: 2023-12-07

## 2023-12-07 RX ORDER — HYDROXYZINE HYDROCHLORIDE 25 MG/1
25 TABLET, FILM COATED ORAL EVERY 8 HOURS PRN
Qty: 30 TABLET | Refills: 0 | Status: SHIPPED | OUTPATIENT
Start: 2023-12-07 | End: 2023-12-17

## 2023-12-07 RX ADMIN — ACETAMINOPHEN 650 MG: 325 TABLET ORAL at 17:43

## 2023-12-07 SDOH — HEALTH STABILITY - MENTAL HEALTH: OTHER PHYSICAL AND MENTAL STRAIN RELATED TO WORK: Z56.6

## 2023-12-07 ASSESSMENT — PAIN SCALES - GENERAL: PAINLEVEL_OUTOF10: 3

## 2023-12-07 NOTE — ED PROVIDER NOTES
was discussed)  None    Chronic Conditions: Diabetes, hypertension, others as listed above    Social Determinants affecting Dx or Tx: None    Records Reviewed (source and summary of external records): Nursing Notes, Old Medical Records, Previous electrocardiograms, Previous Radiology Studies, and Previous Laboratory Studies    MDM: CC/HPI Summary, DDx, ED Course, and Reassessment, Disposition Considerations (Tests not done, Shared Decision Making, Pt Expectation of Test or Tx.):     29-year-old female presenting to the emergency department for evaluation of throat pain. Patient reports cough, denies additional symptoms at this time. Patient states the throat pain has improved significantly since initiating omeprazole. In addition patient expresses concern about elevated blood sugar levels, however states that she received bilateral cortisone injections of the knees yesterday, and was told by her provider that this could elevate her blood sugar. ED Course as of 12/08/23 0147   Thu Dec 07, 2023   1749 Rapid swabs in the emergency department today not indicative of COVID-19, influenza, or strep. [CC]   1828 EKG obtained at 6:18 PM interpreted by me, normal sinus rhythm, rate 97, normal axis/ID/QRS, nonspecific ST changes. [JH]      ED Course User Index  [CC] Paradise Spicer PA-C  [JH] Joanna Medina, DO     Patient endorses under significant amount of stress lately, and wonders if her symptoms may be due to this. Patient denies additional symptoms in the emergency department, and rapid testing/EKG is not indicative of acute COVID or flu infection, strep pharyngitis, or myocardial infarction. Patient has history of GERD, improving on omeprazole. No additional testing indicated in the emergency department at this time. Patient discharged. FINAL IMPRESSION     1. Stress at work    2. Gastroesophageal reflux disease without esophagitis    3.  Steroid-induced hyperglycemia          DISPOSITION/PLAN as: ATARAX  Take 1 tablet by mouth every 8 hours as needed for Anxiety            CONTINUE taking these medications      Lancets Misc  Test once daily DX E11.22            ASK your doctor about these medications      acetaminophen 325 MG tablet  Commonly known as: TYLENOL     albuterol (2.5 MG/3ML) 0.083% nebulizer solution  Commonly known as: PROVENTIL  Take 3 mLs by nebulization 3 times daily as needed for Shortness of Breath or Wheezing     amLODIPine 10 MG tablet  Commonly known as: NORVASC  TAKE 1 TABLET BY MOUTH EVERY DAY     aspirin 81 MG EC tablet     budesonide-formoterol 160-4.5 MCG/ACT Aero  Commonly known as: SYMBICORT  Inhale 2 puffs into the lungs 2 times daily     clobetasol 0.05 % cream  Commonly known as: TEMOVATE  APPLY TO RASH TWICE DAILY AS NEEDED     diclofenac 75 MG EC tablet  Commonly known as: VOLTAREN  TAKE 1 TABLET BY MOUTH TWICE A DAY AS NEEDED FOR PAIN     diclofenac sodium 1 % Gel  Commonly known as: VOLTAREN  APPLY THIN AMOUNT TO THE RIGHT WRIST AND OTHER AFFECTED AREAS 4 TIMES DAILY     fluticasone 50 MCG/ACT nasal spray  Commonly known as: FLONASE  INHALE 2 SPRAYS IN EACH NOSTRIL ONCE A DAY.  INDICATIONS: INFLAMMATION OF THE NOSE DUE TO AN ALLERGY     glipiZIDE 5 MG extended release tablet  Commonly known as: GLUCOTROL XL  TAKE 1 TABLET BY MOUTH EVERY DAY     Janumet  MG per tablet  Generic drug: sitaGLIPtan-metFORMIN     Klor-Con M10 10 MEQ extended release tablet  Generic drug: potassium chloride  TAKE 1 TABLET BY MOUTH EVERY DAY     omeprazole 40 MG delayed release capsule  Commonly known as: PRILOSEC     * OneTouch Verio strip  Generic drug: blood glucose test strips     * blood glucose test strips strip  Commonly known as: ASCENSIA AUTODISC VI;ONE TOUCH ULTRA TEST VI  TEST ONCE DAILY DX E11.22     rosuvastatin 5 MG tablet  Commonly known as: CRESTOR  TAKE 1 TABLET BY MOUTH EVERY DAY AT NIGHT     triamterene-hydroCHLOROthiazide 37.5-25 MG per tablet  Commonly known as:

## 2023-12-08 NOTE — ED NOTES
I have reviewed discharge instructions with the patient at this time. The Patient verbalized understanding and denies any further questions. Patient has been made aware of prescription(s) called into pharmacy for . Patient ambulatory out to waiting room at this time.       Tao Odom RN  12/07/23 0438

## 2023-12-09 LAB
BACTERIA SPEC CULT: NORMAL
EKG ATRIAL RATE: 97 BPM
EKG DIAGNOSIS: NORMAL
EKG P AXIS: 64 DEGREES
EKG P-R INTERVAL: 130 MS
EKG Q-T INTERVAL: 364 MS
EKG QRS DURATION: 76 MS
EKG QTC CALCULATION (BAZETT): 462 MS
EKG R AXIS: 20 DEGREES
EKG T AXIS: 106 DEGREES
EKG VENTRICULAR RATE: 97 BPM
SERVICE CMNT-IMP: NORMAL

## 2023-12-11 DIAGNOSIS — M17.0 BILATERAL PRIMARY OSTEOARTHRITIS OF KNEE: ICD-10-CM

## 2023-12-11 RX ORDER — DICLOFENAC SODIUM 75 MG/1
75 TABLET, DELAYED RELEASE ORAL 2 TIMES DAILY
Qty: 60 TABLET | Refills: 0 | Status: SHIPPED | OUTPATIENT
Start: 2023-12-11

## 2023-12-14 ENCOUNTER — SCHEDULED TELEPHONE ENCOUNTER (OUTPATIENT)
Age: 63
End: 2023-12-14
Payer: COMMERCIAL

## 2023-12-14 DIAGNOSIS — M17.0 BILATERAL PRIMARY OSTEOARTHRITIS OF KNEE: Primary | ICD-10-CM

## 2023-12-14 PROCEDURE — 99441 PR PHYS/QHP TELEPHONE EVALUATION 5-10 MIN: CPT | Performed by: ORTHOPAEDIC SURGERY

## 2023-12-15 NOTE — PROGRESS NOTES
Sister          Review of Systems:       General: Denies headache, lethargy, fever, weight loss  Ears/Nose/Throat: Denies ear discharge, drainage, nosebleeds, hoarse voice, dental problems  Cardiovascular: Denies chest pain, shortness of breath  Lungs: Denies chest pain, breathing problems, wheezing, pneumonia  Stomach: Denies stomach pain, heartburn, constipation, irritable bowel  Skin: Denies rash, sores, open wounds  Musculoskeletal: bilateral knee pain - resolved  Genitourinary: Denies dysuria, hematuria, polyuria  Gastrointestinal: Denies constipation, obstipation, diarrhea  Neurological: Denies changes in sight, smell, hearing, taste, seizures. Denies loss of consciousness. Psychiatric: Denies depression, sleep pattern changes, anxiety, change in personality  Endocrine: Denies mood swings, heat or cold intolerance  Hematologic/Lymphatic: Denies anemia, purpura, petechia  Allergic/Immunologic: Denies swelling of throat, pain or swelling at lymph nodes      Physical Examination:    There were no vitals filed for this visit. General: AOX3, no apparent distress  Psychiatric: mood and affect appropriate        Diagnostics:    Pertinent Diagnostics: none    Assessment: Status post bilateral knee injection  Plan: This patient and I discussed the normal course for injections, we discussed that pain relief will likely be temporary to some degree, but I cannot predict the longevity of relief. We also discussed the limitations of injections, and that I cannot inject the same area any more often than every three months. We will proceed with continued observation, follow up prn. Patient was in [place] at the time of consultation. I was in the office while conducting this encounter. Consent:  She and/or her healthcare decision maker is aware that this patient-initiated Telehealth encounter is a billable service, with coverage as determined by her insurance carrier.  She is aware that she may receive a bill

## 2024-01-08 RX ORDER — FLUTICASONE PROPIONATE 50 MCG
SPRAY, SUSPENSION (ML) NASAL
Qty: 48 G | Refills: 1 | Status: SHIPPED | OUTPATIENT
Start: 2024-01-08

## 2024-01-08 NOTE — TELEPHONE ENCOUNTER
PCP: Tali Danielle MD     Last appt:  10/3/2023      Future Appointments   Date Time Provider Department Center   3/13/2024  8:30 AM Tali Danielle MD [facility] AMB          Requested Prescriptions     Pending Prescriptions Disp Refills    fluticasone (FLONASE) 50 MCG/ACT nasal spray [Pharmacy Med Name: FLUTICASONE PROP 50 MCG SPRAY] 48 g 1     Sig: INHALE 2 SPRAYS IN EACH NOSTRIL ONCE A DAY. INDICATIONS: INFLAMMATION OF THE NOSE DUE TO AN ALLERGY

## 2024-01-23 DIAGNOSIS — E11.9 TYPE 2 DIABETES MELLITUS WITHOUT COMPLICATION, WITHOUT LONG-TERM CURRENT USE OF INSULIN (HCC): ICD-10-CM

## 2024-01-23 NOTE — TELEPHONE ENCOUNTER
PCP: Tali Danielle MD     Last appt:  10/3/2023      Future Appointments   Date Time Provider Department Center   3/13/2024  8:30 AM Tali Danielle MD [facility] BS AMB          Requested Prescriptions     Pending Prescriptions Disp Refills    blood glucose test strips (ASCENSIA AUTODISC VI;ONE TOUCH ULTRA TEST VI) strip 100 each 5     Sig: TEST ONCE DAILY DX E11.22

## 2024-02-02 DIAGNOSIS — M47.812 SPONDYLOSIS WITHOUT MYELOPATHY OR RADICULOPATHY, CERVICAL REGION: ICD-10-CM

## 2024-02-02 NOTE — TELEPHONE ENCOUNTER
PCP: Tali Danielle MD     Last appt:  10/3/2023      Future Appointments   Date Time Provider Department Center   3/13/2024  8:30 AM Tali Danielle MD [facility] BS AMB          Requested Prescriptions     Pending Prescriptions Disp Refills    diclofenac sodium (VOLTAREN) 1 % GEL [Pharmacy Med Name: DICLOFENAC SODIUM 1% GEL] 100 g 3     Sig: APPLY THIN AMOUNT TO THE RIGHT WRIST AND OTHER AFFECTED AREAS 4 TIMES DAILY

## 2024-03-06 DIAGNOSIS — E55.9 VITAMIN D DEFICIENCY: ICD-10-CM

## 2024-03-06 DIAGNOSIS — E11.9 TYPE 2 DIABETES MELLITUS WITHOUT COMPLICATION, WITHOUT LONG-TERM CURRENT USE OF INSULIN (HCC): ICD-10-CM

## 2024-03-06 DIAGNOSIS — I10 PRIMARY HYPERTENSION: ICD-10-CM

## 2024-03-07 ENCOUNTER — APPOINTMENT (OUTPATIENT)
Facility: HOSPITAL | Age: 64
End: 2024-03-07
Payer: COMMERCIAL

## 2024-03-07 ENCOUNTER — HOSPITAL ENCOUNTER (EMERGENCY)
Facility: HOSPITAL | Age: 64
Discharge: HOME | End: 2024-03-07
Payer: COMMERCIAL

## 2024-03-07 VITALS
DIASTOLIC BLOOD PRESSURE: 78 MMHG | RESPIRATION RATE: 16 BRPM | TEMPERATURE: 98 F | WEIGHT: 175.04 LBS | HEART RATE: 92 BPM | OXYGEN SATURATION: 98 % | BODY MASS INDEX: 32.02 KG/M2 | SYSTOLIC BLOOD PRESSURE: 159 MMHG

## 2024-03-07 DIAGNOSIS — M25.561 ACUTE PAIN OF RIGHT KNEE: Primary | ICD-10-CM

## 2024-03-07 DIAGNOSIS — I10 ESSENTIAL HYPERTENSION: ICD-10-CM

## 2024-03-07 DIAGNOSIS — M17.11 OSTEOARTHRITIS OF RIGHT KNEE, UNSPECIFIED OSTEOARTHRITIS TYPE: ICD-10-CM

## 2024-03-07 LAB
BASOPHILS # BLD AUTO: 0 X10E3/UL (ref 0–0.2)
BASOPHILS NFR BLD AUTO: 0 %
EOSINOPHIL # BLD AUTO: 0.2 X10E3/UL (ref 0–0.4)
EOSINOPHIL NFR BLD AUTO: 3 %
ERYTHROCYTE [DISTWIDTH] IN BLOOD BY AUTOMATED COUNT: 12.9 % (ref 11.7–15.4)
HCT VFR BLD AUTO: 36.6 % (ref 34–46.6)
HGB BLD-MCNC: 11.8 G/DL (ref 11.1–15.9)
IMM GRANULOCYTES # BLD AUTO: 0 X10E3/UL (ref 0–0.1)
IMM GRANULOCYTES NFR BLD AUTO: 0 %
LYMPHOCYTES # BLD AUTO: 1.8 X10E3/UL (ref 0.7–3.1)
LYMPHOCYTES NFR BLD AUTO: 34 %
MCH RBC QN AUTO: 28.4 PG (ref 26.6–33)
MCHC RBC AUTO-ENTMCNC: 32.2 G/DL (ref 31.5–35.7)
MCV RBC AUTO: 88 FL (ref 79–97)
MONOCYTES # BLD AUTO: 0.5 X10E3/UL (ref 0.1–0.9)
MONOCYTES NFR BLD AUTO: 9 %
NEUTROPHILS # BLD AUTO: 2.9 X10E3/UL (ref 1.4–7)
NEUTROPHILS NFR BLD AUTO: 54 %
PLATELET # BLD AUTO: 375 X10E3/UL (ref 150–450)
RBC # BLD AUTO: 4.15 X10E6/UL (ref 3.77–5.28)
WBC # BLD AUTO: 5.3 X10E3/UL (ref 3.4–10.8)

## 2024-03-07 PROCEDURE — 73562 X-RAY EXAM OF KNEE 3: CPT

## 2024-03-07 PROCEDURE — 99283 EMERGENCY DEPT VISIT LOW MDM: CPT

## 2024-03-07 RX ORDER — TRAMADOL HYDROCHLORIDE 50 MG/1
50 TABLET ORAL EVERY 4 HOURS PRN
Qty: 18 TABLET | Refills: 0 | Status: SHIPPED | OUTPATIENT
Start: 2024-03-07 | End: 2024-03-10

## 2024-03-07 ASSESSMENT — PAIN SCALES - GENERAL: PAINLEVEL_OUTOF10: 8

## 2024-03-07 ASSESSMENT — VISUAL ACUITY: OU: 1

## 2024-03-07 NOTE — DISCHARGE INSTRUCTIONS
Thank You!    It was a pleasure taking care of you in our Emergency Department today. We know that when you come to our Emergency Department, you are entrusting us with your health, comfort, and safety. Our physicians and nurses honor that trust, and truly appreciate the opportunity to care for you and your loved ones.      We also value your feedback. If you receive a survey about your Emergency Department experience today, please fill it out.  We care about our patients' feedback, and we listen to what you have to say.  Thank you.    Yamil Vanegas PA-C      ________________________________________________________________________  I have included a copy of your lab results and/or radiologic studies from today's visit so you can have them easily available at your follow-up visit. We hope you feel better and please do not hesitate to contact the ED if you have any questions at all!    No results found for this or any previous visit (from the past 12 hour(s)).    XR KNEE RIGHT (3 VIEWS)   Final Result   No acute abnormality.        [unfilled]  The exam and treatment you received in the Emergency Department were for an urgent problem and are not intended as complete care. It is important that you follow up with a doctor, nurse practitioner, or physician assistant for ongoing care. If your symptoms become worse or you do not improve as expected and you are unable to reach your usual health care provider, you should return to the Emergency Department. We are available 24 hours a day.    Please take your discharge instructions with you when you go to your follow-up appointment.     If a prescription has been provided, please have it filled as soon as possible to prevent a delay in treatment. Read the entire medication instruction sheet provided to you by the pharmacy. If you have any questions or reservations about taking the medication due to side effects or interactions with other medications, please call your primary care 
alert and awake

## 2024-03-07 NOTE — ED PROVIDER NOTES
[facility] EMERGENCY DEPT  EMERGENCY DEPARTMENT ENCOUNTER       Pt Name: Janeth Ford  MRN: [id number]  Birthdate 1960  Date of evaluation: 3/7/2024  Provider: ELMO Jiménez   PCP: Tali Danielle MD  Note Started: 1:04 PM EST 3/7/24     CHIEF COMPLAINT       Chief Complaint   Patient presents with    Knee Pain     Hit R knee against table this morning, pt already has chronic arthritis in that leg and experiencing worsening pain        HISTORY OF PRESENT ILLNESS: 1 or more elements      History From: Patient  HPI Limitations: None     Janeth Ford is a 64 y.o. female who presents ambulatory with less than a day of moderately severe and constant right anteromedial knee pain that is worse with palpation and weightbearing.  She tells me she stumbled this morning in her house and hit her right knee on the coffee table.  Since then she been having pain.  She tells me she \"already has a bad knee\" and has had injections by Dr. Choi upstairs.  She did not fall.  She denies any other injuries.  She takes no blood thinners.  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     REVIEW OF SYSTEMS      Review of Systems   Musculoskeletal:         Right knee pain        Positives and Pertinent negatives as per HPI.    PAST HISTORY     Past Medical History:  Past Medical History:   Diagnosis Date    Arrhythmia     Arthritis     GENERALIZED TO JOINTS-WRISTS & KNEES.    Asthma     INHALERS    Asthma, chronic 10/8/2009    Diabetes (HCC)     USES PILLS TO CONTROLL    GERD (gastroesophageal reflux disease)     Gout     Hypertension     CONTROLLED BY MEDS.    Nausea & vomiting     Obesity        Past Surgical History:  Past Surgical History:   Procedure Laterality Date    BREAST BIOPSY Right 2018    benign    CARDIAC CATHETERIZATION  98,2011    CARDIAC CATH X2    CHOLECYSTECTOMY

## 2024-03-08 LAB
25(OH)D3+25(OH)D2 SERPL-MCNC: 46.2 NG/ML (ref 30–100)
ALBUMIN SERPL-MCNC: 4.6 G/DL (ref 3.9–4.9)
ALBUMIN/GLOB SERPL: 1.6 {RATIO} (ref 1.2–2.2)
ALP SERPL-CCNC: 83 IU/L (ref 44–121)
ALT SERPL-CCNC: 10 IU/L (ref 0–32)
AST SERPL-CCNC: 14 IU/L (ref 0–40)
BILIRUB SERPL-MCNC: 0.4 MG/DL (ref 0–1.2)
BUN SERPL-MCNC: 12 MG/DL (ref 8–27)
BUN/CREAT SERPL: 14 (ref 12–28)
CALCIUM SERPL-MCNC: 9.9 MG/DL (ref 8.7–10.3)
CHLORIDE SERPL-SCNC: 101 MMOL/L (ref 96–106)
CHOLEST SERPL-MCNC: 139 MG/DL (ref 100–199)
CO2 SERPL-SCNC: 22 MMOL/L (ref 20–29)
CREAT SERPL-MCNC: 0.84 MG/DL (ref 0.57–1)
EGFRCR SERPLBLD CKD-EPI 2021: 78 ML/MIN/1.73
GLOBULIN SER CALC-MCNC: 2.9 G/DL (ref 1.5–4.5)
GLUCOSE SERPL-MCNC: 124 MG/DL (ref 70–99)
HBA1C MFR BLD: 7.6 % (ref 4.8–5.6)
HDLC SERPL-MCNC: 49 MG/DL
LDLC SERPL CALC-MCNC: 68 MG/DL (ref 0–99)
POTASSIUM SERPL-SCNC: 4.4 MMOL/L (ref 3.5–5.2)
PROT SERPL-MCNC: 7.5 G/DL (ref 6–8.5)
SODIUM SERPL-SCNC: 143 MMOL/L (ref 134–144)
TRIGL SERPL-MCNC: 121 MG/DL (ref 0–149)
VLDLC SERPL CALC-MCNC: 22 MG/DL (ref 5–40)

## 2024-03-09 LAB
ALBUMIN/CREAT UR: 7 MG/G CREAT (ref 0–29)
CREAT UR-MCNC: 117.7 MG/DL
MICROALBUMIN UR-MCNC: 8.6 UG/ML

## 2024-03-13 ENCOUNTER — OFFICE VISIT (OUTPATIENT)
Facility: CLINIC | Age: 64
End: 2024-03-13
Payer: COMMERCIAL

## 2024-03-13 VITALS
HEART RATE: 88 BPM | RESPIRATION RATE: 16 BRPM | DIASTOLIC BLOOD PRESSURE: 79 MMHG | HEIGHT: 62 IN | TEMPERATURE: 97.9 F | SYSTOLIC BLOOD PRESSURE: 148 MMHG | WEIGHT: 172.4 LBS | BODY MASS INDEX: 31.73 KG/M2 | OXYGEN SATURATION: 96 %

## 2024-03-13 DIAGNOSIS — I10 PRIMARY HYPERTENSION: ICD-10-CM

## 2024-03-13 DIAGNOSIS — E11.9 TYPE 2 DIABETES MELLITUS WITHOUT COMPLICATION, WITHOUT LONG-TERM CURRENT USE OF INSULIN (HCC): Primary | ICD-10-CM

## 2024-03-13 DIAGNOSIS — M17.0 PRIMARY OSTEOARTHRITIS OF BOTH KNEES: ICD-10-CM

## 2024-03-13 DIAGNOSIS — Z78.9 ACE INHIBITOR INTOLERANCE: ICD-10-CM

## 2024-03-13 PROCEDURE — 99214 OFFICE O/P EST MOD 30 MIN: CPT | Performed by: INTERNAL MEDICINE

## 2024-03-13 PROCEDURE — 3078F DIAST BP <80 MM HG: CPT | Performed by: INTERNAL MEDICINE

## 2024-03-13 PROCEDURE — 3051F HG A1C>EQUAL 7.0%<8.0%: CPT | Performed by: INTERNAL MEDICINE

## 2024-03-13 PROCEDURE — 3077F SYST BP >= 140 MM HG: CPT | Performed by: INTERNAL MEDICINE

## 2024-03-13 RX ORDER — TRAMADOL HYDROCHLORIDE 25 MG/1
TABLET, COATED ORAL
COMMUNITY

## 2024-03-13 RX ORDER — CARVEDILOL 6.25 MG/1
6.25 TABLET ORAL 2 TIMES DAILY
Qty: 60 TABLET | Refills: 3 | Status: SHIPPED | OUTPATIENT
Start: 2024-03-13

## 2024-03-13 ASSESSMENT — PATIENT HEALTH QUESTIONNAIRE - PHQ9
SUM OF ALL RESPONSES TO PHQ QUESTIONS 1-9: 0
1. LITTLE INTEREST OR PLEASURE IN DOING THINGS: 0
SUM OF ALL RESPONSES TO PHQ9 QUESTIONS 1 & 2: 0
SUM OF ALL RESPONSES TO PHQ QUESTIONS 1-9: 0
2. FEELING DOWN, DEPRESSED OR HOPELESS: 0

## 2024-03-13 NOTE — PROGRESS NOTES
Chief Complaint   Patient presents with    Hypertension    Diabetes       HISTORY OF PRESENT ILLNESS  Janeth Ford is a 64 y.o. female    Presents for 4 month follow up evaluation of DM and HTN.    Admits to eating sweets and unhealthy foods after work in the evenings. Gets off work at 9:30 pm. Denies polydipsia, polyuria, hypoglycemia, and numbness, burning, or tingling at feet. Takes Janumet  mg 1 tab BID and glipizide ER 5 mg daily. Home FBS: <140. Checks BS very other day due to costs of test strips. Lab review: A1c 7.6% on 3/7/24, 7.1% on 9/29/23, 8.1% on 6/20/23, and 9.0% on 1/27/23. Eye exam: Feb 2024, Dr. Knutson.    Reports compliance with HTN medications. Denies headaches, CP, SOB, dizziness, heart palpitations, muscle cramps, or leg swelling. No regular exercise due to bilateral knee pain.    Soc Hx  . Works as a  for [employer] (new job). Former smoker; quit in 2010    Patient Active Problem List   Diagnosis    Moderate persistent asthma without complication    Primary osteoarthritis of both knees    Mild nonproliferative diabetic retinopathy of both eyes without macular edema associated with type 2 diabetes mellitus (HCC)    Adrenal nodule (HCC)    Fibroadenoma of right breast    Vitamin D deficiency    GERD (gastroesophageal reflux disease)    ACE inhibitor intolerance    Diabetes mellitus with peripheral vascular disease (HCC)    Allergic rhinitis due to allergen    HTN (hypertension)    Type 2 diabetes mellitus without complication, without long-term current use of insulin ([facility])     Past Medical History:   Diagnosis Date    Arrhythmia     Arthritis     GENERALIZED TO JOINTS-WRISTS & KNEES.    Asthma     INHALERS    Asthma, chronic 10/8/2009    Diabetes ([facility])     USES PILLS TO CONTROLL    GERD (gastroesophageal reflux disease)     Gout     Hypertension     CONTROLLED BY MEDS.    Nausea & vomiting     Obesity      Allergies   Allergen Reactions    Latex Rash     Severe

## 2024-03-13 NOTE — PROGRESS NOTES
Janeth Ford  Identified pt with two pt identifiers(name and ).  Chief Complaint   Patient presents with    Hypertension    Diabetes       1. Have you been to the ER, urgent care clinic since your last visit?  Hospitalized since your last visit? [facility] for knee pain    2. Have you seen or consulted any other health care providers outside of the [facility] since your last visit?  Include any pap smears or colon screening. Dr. Childs's in February.   Pt had Flu vaccine.     Provider notified of reason for visit, vitals and flowsheets obtained on patients.     Patient received paperwork for advance directive during previous visit but has not completed at this time     Reviewed record In preparation for visit, huddled with provider and have obtained necessary documentation      Health Maintenance Due   Topic    Diabetic retinal exam     Flu vaccine (1)    Diabetic foot exam     DTaP/Tdap/Td vaccine (2 - Td or Tdap)       Wt Readings from Last 3 Encounters:   24 78.2 kg (172 lb 6.4 oz)   24 79.4 kg (175 lb 0.7 oz)   10/06/23 80.3 kg (177 lb)     Temp Readings from Last 3 Encounters:   24 97.9 °F (36.6 °C) (Oral)   24 98 °F (36.7 °C)   23 98.2 °F (36.8 °C)     BP Readings from Last 3 Encounters:   24 (!) 148/79   24 (!) 159/78   23 (!) 149/102     Pulse Readings from Last 3 Encounters:   24 88   24 92   23 100          No data to display                  Learning Assessment:  :         3/13/2024     8:30 AM   [facility] AMB LEARNING ASSESSMENT   Primary Learner Patient   level of education GRADUATED HIGH SCHOOL OR GED   Primary Language ENGLISH   Learning Preference DEMONSTRATION   Answered By Patient   Relationship to Learner SELF       Fall Risk Assessment:  :         2023     9:56 AM 10/6/2023     8:48 AM 2022     8:18 AM 2021     6:59 PM 2021     9:14 AM   Amb Fall Risk Assessment and TUG Test   Do you feel unsteady

## 2024-03-25 ENCOUNTER — TELEPHONE (OUTPATIENT)
Facility: CLINIC | Age: 64
End: 2024-03-25

## 2024-03-26 ENCOUNTER — TELEPHONE (OUTPATIENT)
Facility: CLINIC | Age: 64
End: 2024-03-26

## 2024-03-26 DIAGNOSIS — H61.20 IMPACTED CERUMEN, UNSPECIFIED LATERALITY: Primary | ICD-10-CM

## 2024-03-26 NOTE — TELEPHONE ENCOUNTER
The patient is requesting a referral for:    Provider:    Feng Brumfield  Phone:    Fax:   [phone]  Specialty:   Ear nose and throat doctor  Appt date:  3/26/2024  Diagnosis:        Advised patient of 48-72 hour turnaround for insurance referrals.     Patient has already had the appt .

## 2024-03-27 ENCOUNTER — OFFICE VISIT (OUTPATIENT)
Facility: CLINIC | Age: 64
End: 2024-03-27
Payer: COMMERCIAL

## 2024-03-27 VITALS
HEART RATE: 84 BPM | SYSTOLIC BLOOD PRESSURE: 144 MMHG | WEIGHT: 175.8 LBS | TEMPERATURE: 97.9 F | RESPIRATION RATE: 16 BRPM | HEIGHT: 62 IN | BODY MASS INDEX: 32.35 KG/M2 | OXYGEN SATURATION: 99 % | DIASTOLIC BLOOD PRESSURE: 80 MMHG

## 2024-03-27 DIAGNOSIS — G56.01 CARPAL TUNNEL SYNDROME OF RIGHT WRIST: ICD-10-CM

## 2024-03-27 DIAGNOSIS — M79.641 RIGHT HAND PAIN: Primary | ICD-10-CM

## 2024-03-27 PROCEDURE — 3077F SYST BP >= 140 MM HG: CPT | Performed by: INTERNAL MEDICINE

## 2024-03-27 PROCEDURE — 99213 OFFICE O/P EST LOW 20 MIN: CPT | Performed by: INTERNAL MEDICINE

## 2024-03-27 PROCEDURE — 3078F DIAST BP <80 MM HG: CPT | Performed by: INTERNAL MEDICINE

## 2024-03-27 NOTE — PROGRESS NOTES
Chief Complaint   Patient presents with    Carpal Tunnel       HISTORY OF PRESENT ILLNESS  Janeth Ford is a 64 y.o. female    Complains of right wrist and hand pain for the past week. Does heavy lifting at work. Denies numbness or tingling at right hand. Has hx of carpal tunnel syndrome, s/p left carpal tunnel release surgery. Pain 5/10 today. Takes Tylenol with mild relief.    Patient Active Problem List   Diagnosis    Moderate persistent asthma without complication    Primary osteoarthritis of both knees    Mild nonproliferative diabetic retinopathy of both eyes without macular edema associated with type 2 diabetes mellitus (HCC)    Adrenal nodule (HCC)    Fibroadenoma of right breast    Vitamin D deficiency    GERD (gastroesophageal reflux disease)    ACE inhibitor intolerance    Diabetes mellitus with peripheral vascular disease (HCC)    Allergic rhinitis due to allergen    HTN (hypertension)    Type 2 diabetes mellitus without complication, without long-term current use of insulin ([facility])     Past Medical History:   Diagnosis Date    Arrhythmia     Arthritis     GENERALIZED TO JOINTS-WRISTS & KNEES.    Asthma     INHALERS    Asthma, chronic 10/8/2009    Diabetes ([facility])     USES PILLS TO CONTROLL    GERD (gastroesophageal reflux disease)     Gout     Hypertension     CONTROLLED BY MEDS.    Nausea & vomiting     Obesity      Allergies   Allergen Reactions    Latex Rash     Severe rash    Fd&C Blue #1 (Brilliant Blue) Anaphylaxis    Iodinated Contrast Media Hives and Swelling     Pt. Reports throat swelling  Pt. Reports throat swelling      Iodine Hives and Swelling     Pt. Reports throat swelling    Other Rash     Betadine soap/blisters    Penicillins Anaphylaxis and Hives    Povidone Iodine Hives    Beef-Derived Products     Cephalexin Other (See Comments)    Lisinopril Swelling    Pork-Derived Products Itching       Current Outpatient Medications   Medication Sig Dispense Refill    traMADol HCl 25 MG TABS Take

## 2024-03-27 NOTE — PROGRESS NOTES
Janeth Ford  Identified pt with two pt identifiers(name and ).  Chief Complaint   Patient presents with    Carpal Tunnel       1. Have you been to the ER, urgent care clinic since your last visit?  Hospitalized since your last visit? 2 weeks ago in ER    2. Have you seen or consulted any other health care providers outside of the [facility] since your last visit?  Include any pap smears or colon screening. NO  Pt had Flu vaccine.     Provider notified of reason for visit, vitals and flowsheets obtained on patients.     Patient received paperwork for advance directive during previous visit but has not completed at this time     Reviewed record In preparation for visit, huddled with provider and have obtained necessary documentation      Health Maintenance Due   Topic    Diabetic retinal exam     Flu vaccine (1)    DTaP/Tdap/Td vaccine (2 - Td or Tdap)       Wt Readings from Last 3 Encounters:   24 78.2 kg (172 lb 6.4 oz)   24 79.4 kg (175 lb 0.7 oz)   10/06/23 80.3 kg (177 lb)     Temp Readings from Last 3 Encounters:   24 97.9 °F (36.6 °C) (Oral)   24 98 °F (36.7 °C)   23 98.2 °F (36.8 °C)     BP Readings from Last 3 Encounters:   24 (!) 148/79   24 (!) 159/78   23 (!) 149/102     Pulse Readings from Last 3 Encounters:   24 88   24 92   23 100          No data to display                  Learning Assessment:  :         3/13/2024     8:30 AM   [facility] AMB LEARNING ASSESSMENT   Primary Learner Patient   level of education GRADUATED HIGH SCHOOL OR GED   Primary Language ENGLISH   Learning Preference DEMONSTRATION   Answered By Patient   Relationship to Learner SELF       Fall Risk Assessment:  :         2023     9:56 AM 10/6/2023     8:48 AM 2022     8:18 AM 2021     6:59 PM 2021     9:14 AM   Amb Fall Risk Assessment and TUG Test   Do you feel unsteady or are you worried about falling?  no no      2 or more falls

## 2024-03-28 DIAGNOSIS — M17.0 BILATERAL PRIMARY OSTEOARTHRITIS OF KNEE: ICD-10-CM

## 2024-03-28 DIAGNOSIS — E11.42 TYPE 2 DIABETES MELLITUS WITH DIABETIC POLYNEUROPATHY (HCC): ICD-10-CM

## 2024-03-28 RX ORDER — DICLOFENAC SODIUM 75 MG/1
75 TABLET, DELAYED RELEASE ORAL 2 TIMES DAILY
Qty: 60 TABLET | Refills: 0 | Status: SHIPPED | OUTPATIENT
Start: 2024-03-28

## 2024-03-28 RX ORDER — SITAGLIPTIN AND METFORMIN HYDROCHLORIDE 1000; 50 MG/1; MG/1
1 TABLET, FILM COATED, EXTENDED RELEASE ORAL 2 TIMES DAILY
Qty: 90 TABLET | Refills: 3 | Status: SHIPPED | OUTPATIENT
Start: 2024-03-28

## 2024-03-28 RX ORDER — IBUPROFEN 600 MG/1
TABLET ORAL
Qty: 60 TABLET | Refills: 1 | OUTPATIENT
Start: 2024-03-28

## 2024-03-28 NOTE — TELEPHONE ENCOUNTER
PCP: Tali Danielle MD     Last appt:  3/27/2024      Future Appointments   Date Time Provider Department Center   7/2/2024  8:50 AM Tali Danielle MD [facility] AMB          Requested Prescriptions     Pending Prescriptions Disp Refills    ibuprofen (ADVIL;MOTRIN) 600 MG tablet [Pharmacy Med Name: IBUPROFEN 600 MG TABLET] 60 tablet 1     Sig: TAKE 1 TABLET BY MOUTH EVERY SIX (6) HOURS AS NEEDED FOR PAIN. TAKE WITH FOOD.    JANUMET  MG per tablet [Pharmacy Med Name: JANUMET 50-1,000 MG TABLET] 60 tablet 11     Sig: TAKE 1 TABLET BY MOUTH TWO (2) TIMES DAILY (WITH MEALS). DX: E11.42

## 2024-04-02 ENCOUNTER — APPOINTMENT (OUTPATIENT)
Facility: HOSPITAL | Age: 64
End: 2024-04-02
Payer: COMMERCIAL

## 2024-04-02 ENCOUNTER — HOSPITAL ENCOUNTER (EMERGENCY)
Facility: HOSPITAL | Age: 64
Discharge: HOME | End: 2024-04-02
Attending: EMERGENCY MEDICINE
Payer: COMMERCIAL

## 2024-04-02 VITALS
BODY MASS INDEX: 31.47 KG/M2 | WEIGHT: 171 LBS | SYSTOLIC BLOOD PRESSURE: 136 MMHG | RESPIRATION RATE: 17 BRPM | OXYGEN SATURATION: 96 % | DIASTOLIC BLOOD PRESSURE: 65 MMHG | TEMPERATURE: 98.7 F | HEIGHT: 62 IN | HEART RATE: 77 BPM

## 2024-04-02 DIAGNOSIS — J45.901 EXACERBATION OF ASTHMA, UNSPECIFIED ASTHMA SEVERITY, UNSPECIFIED WHETHER PERSISTENT: Primary | ICD-10-CM

## 2024-04-02 LAB
ALBUMIN SERPL-MCNC: 3.4 G/DL (ref 3.5–5)
ALBUMIN/GLOB SERPL: 1 (ref 1.1–2.2)
ALP SERPL-CCNC: 82 U/L (ref 45–117)
ALT SERPL-CCNC: 14 U/L (ref 12–78)
ANION GAP SERPL CALC-SCNC: 5 MMOL/L (ref 5–15)
AST SERPL-CCNC: 10 U/L (ref 15–37)
BASOPHILS # BLD: 0 K/UL (ref 0–0.1)
BASOPHILS NFR BLD: 0 % (ref 0–1)
BILIRUB SERPL-MCNC: 0.2 MG/DL (ref 0.2–1)
BUN SERPL-MCNC: 13 MG/DL (ref 6–20)
BUN/CREAT SERPL: 14 (ref 12–20)
CALCIUM SERPL-MCNC: 8.6 MG/DL (ref 8.5–10.1)
CHLORIDE SERPL-SCNC: 110 MMOL/L (ref 97–108)
CO2 SERPL-SCNC: 26 MMOL/L (ref 21–32)
COMMENT:: NORMAL
CREAT SERPL-MCNC: 0.91 MG/DL (ref 0.55–1.02)
DIFFERENTIAL METHOD BLD: ABNORMAL
EKG ATRIAL RATE: 82 BPM
EKG DIAGNOSIS: NORMAL
EKG P AXIS: 60 DEGREES
EKG P-R INTERVAL: 158 MS
EKG Q-T INTERVAL: 390 MS
EKG QRS DURATION: 66 MS
EKG QTC CALCULATION (BAZETT): 455 MS
EKG R AXIS: 17 DEGREES
EKG T AXIS: 131 DEGREES
EKG VENTRICULAR RATE: 82 BPM
EOSINOPHIL # BLD: 0.2 K/UL (ref 0–0.4)
EOSINOPHIL NFR BLD: 4 % (ref 0–7)
ERYTHROCYTE [DISTWIDTH] IN BLOOD BY AUTOMATED COUNT: 12.5 % (ref 11.5–14.5)
GLOBULIN SER CALC-MCNC: 3.4 G/DL (ref 2–4)
GLUCOSE SERPL-MCNC: 204 MG/DL (ref 65–100)
HCT VFR BLD AUTO: 32.3 % (ref 35–47)
HGB BLD-MCNC: 10.9 G/DL (ref 11.5–16)
IMM GRANULOCYTES # BLD AUTO: 0 K/UL (ref 0–0.04)
IMM GRANULOCYTES NFR BLD AUTO: 0 % (ref 0–0.5)
LYMPHOCYTES # BLD: 1.5 K/UL (ref 0.8–3.5)
LYMPHOCYTES NFR BLD: 30 % (ref 12–49)
MCH RBC QN AUTO: 29.5 PG (ref 26–34)
MCHC RBC AUTO-ENTMCNC: 33.7 G/DL (ref 30–36.5)
MCV RBC AUTO: 87.5 FL (ref 80–99)
MONOCYTES # BLD: 0.5 K/UL (ref 0–1)
MONOCYTES NFR BLD: 9 % (ref 5–13)
NEUTS SEG # BLD: 3 K/UL (ref 1.8–8)
NEUTS SEG NFR BLD: 57 % (ref 32–75)
NRBC # BLD: 0 K/UL (ref 0–0.01)
NRBC BLD-RTO: 0 PER 100 WBC
PLATELET # BLD AUTO: 330 K/UL (ref 150–400)
PMV BLD AUTO: 9.4 FL (ref 8.9–12.9)
POTASSIUM SERPL-SCNC: 3.8 MMOL/L (ref 3.5–5.1)
PROT SERPL-MCNC: 6.8 G/DL (ref 6.4–8.2)
RBC # BLD AUTO: 3.69 M/UL (ref 3.8–5.2)
SARS-COV-2 RDRP RESP QL NAA+PROBE: NOT DETECTED
SODIUM SERPL-SCNC: 141 MMOL/L (ref 136–145)
SOURCE: NORMAL
SPECIMEN HOLD: NORMAL
TROPONIN I SERPL HS-MCNC: 6 NG/L (ref 0–51)
WBC # BLD AUTO: 5.2 K/UL (ref 3.6–11)

## 2024-04-02 PROCEDURE — 85025 COMPLETE CBC W/AUTO DIFF WBC: CPT

## 2024-04-02 PROCEDURE — 84484 ASSAY OF TROPONIN QUANT: CPT

## 2024-04-02 PROCEDURE — 6370000000 HC RX 637 (ALT 250 FOR IP): Performed by: EMERGENCY MEDICINE

## 2024-04-02 PROCEDURE — 93005 ELECTROCARDIOGRAM TRACING: CPT | Performed by: EMERGENCY MEDICINE

## 2024-04-02 PROCEDURE — 99285 EMERGENCY DEPT VISIT HI MDM: CPT

## 2024-04-02 PROCEDURE — 87635 SARS-COV-2 COVID-19 AMP PRB: CPT

## 2024-04-02 PROCEDURE — 71045 X-RAY EXAM CHEST 1 VIEW: CPT

## 2024-04-02 PROCEDURE — 80053 COMPREHEN METABOLIC PANEL: CPT

## 2024-04-02 PROCEDURE — 36415 COLL VENOUS BLD VENIPUNCTURE: CPT

## 2024-04-02 RX ORDER — PREDNISONE 20 MG/1
60 TABLET ORAL DAILY
Qty: 12 TABLET | Refills: 0 | Status: SHIPPED | OUTPATIENT
Start: 2024-04-03 | End: 2024-04-07

## 2024-04-02 RX ORDER — PREDNISONE 20 MG/1
60 TABLET ORAL DAILY
Status: DISCONTINUED | OUTPATIENT
Start: 2024-04-02 | End: 2024-04-02

## 2024-04-02 RX ORDER — PREDNISONE 20 MG/1
60 TABLET ORAL
Status: COMPLETED | OUTPATIENT
Start: 2024-04-02 | End: 2024-04-02

## 2024-04-02 RX ADMIN — PREDNISONE 60 MG: 20 TABLET ORAL at 07:03

## 2024-04-02 ASSESSMENT — PAIN DESCRIPTION - DIRECTION: RADIATING_TOWARDS: LEFT SIDE OF HEAD

## 2024-04-02 ASSESSMENT — PAIN DESCRIPTION - FREQUENCY: FREQUENCY: INTERMITTENT

## 2024-04-02 ASSESSMENT — PAIN DESCRIPTION - PAIN TYPE: TYPE: CHRONIC PAIN

## 2024-04-02 ASSESSMENT — PAIN DESCRIPTION - ONSET: ONSET: ON-GOING

## 2024-04-02 ASSESSMENT — PAIN DESCRIPTION - LOCATION: LOCATION: HEAD

## 2024-04-02 ASSESSMENT — PAIN DESCRIPTION - DESCRIPTORS: DESCRIPTORS: ACHING

## 2024-04-02 ASSESSMENT — PAIN SCALES - GENERAL: PAINLEVEL_OUTOF10: 5

## 2024-04-02 ASSESSMENT — PAIN - FUNCTIONAL ASSESSMENT
PAIN_FUNCTIONAL_ASSESSMENT: ACTIVITIES ARE NOT PREVENTED
PAIN_FUNCTIONAL_ASSESSMENT: 0-10

## 2024-04-02 ASSESSMENT — PAIN DESCRIPTION - ORIENTATION: ORIENTATION: LEFT

## 2024-04-02 NOTE — ED PROVIDER NOTES
warm and dry. No rash noted. No pallor.             EMERGENCY DEPARTMENT COURSE and DIFFERENTIAL DIAGNOSIS/MDM:         Medical Decision Making  Amount and/or Complexity of Data Reviewed  Labs: ordered.  Radiology: ordered.  ECG/medicine tests: ordered.    Risk  Prescription drug management.        64y F here with shortness of breath. Differential includes asthma exacerbation, pneumonia, PE, ACS, and others. She feels it is consistent with her asthma. No wheezing or distress on arrival. Workup included CXR, labs, ECG, COVID testing. All reassuring. Will give prednisone and rx for same. She is comfortable with the plan to go home.      ED EKG interpretation:  Rhythm: normal sinus rhythm; and regular . Rate (approx.): 82; Axis: normal; P wave: normal; QRS interval: normal ; ST/T wave: normal;  This EKG was interpreted by Bob Peterson MD,ED Provider.        Procedures  Unless otherwise noted below, none     Procedures                (Please note that portions of this note were completed with a voice recognition program.  Efforts were made to edit the dictations but occasionally words are mis-transcribed.)    Bob Peterson MD (electronically signed)  Emergency Attending Physician / Physician Assistant / Nurse Practitioner             Bob Peterson MD  04/02/24 1954       Bob Peterson MD  04/02/24 [id number]

## 2024-04-07 DIAGNOSIS — E11.9 TYPE 2 DIABETES MELLITUS WITHOUT COMPLICATION, WITHOUT LONG-TERM CURRENT USE OF INSULIN (HCC): ICD-10-CM

## 2024-04-07 RX ORDER — GLIPIZIDE 5 MG/1
TABLET, FILM COATED, EXTENDED RELEASE ORAL
Qty: 90 TABLET | Refills: 1 | Status: SHIPPED | OUTPATIENT
Start: 2024-04-07

## 2024-04-22 ENCOUNTER — TELEPHONE (OUTPATIENT)
Facility: CLINIC | Age: 64
End: 2024-04-22

## 2024-04-22 NOTE — TELEPHONE ENCOUNTER
Spoke to pt verified name and . Pt stated about 3 weeks ago she had to call 911 due to asthma attack. She stated EMS could not get into the building for 20 mins due to not having a \"code\". She stated she is moving May 1st to a different apartment because she feels she can not get the help she needs if she has another asthma attack. She wanted to know if Dr. Danielle could write a letter in order for her to get off the lease. Pt needs to be able to have EMS access due to asthma issues.

## 2024-04-25 NOTE — TELEPHONE ENCOUNTER
Spoke to pt verified name and . Pt is aware of Dr. Danielle's note and will come by later to . Letter was placed in the  bin up front.

## 2024-04-26 DIAGNOSIS — I10 PRIMARY HYPERTENSION: Primary | ICD-10-CM

## 2024-04-26 NOTE — TELEPHONE ENCOUNTER
PCP: Tali Danielle MD     Last appt:  3/27/2024      Future Appointments   Date Time Provider Department Center   7/2/2024  8:50 AM Tali Danielle MD [facility] AMB          Requested Prescriptions     Pending Prescriptions Disp Refills    ibuprofen (ADVIL;MOTRIN) 600 MG tablet [Pharmacy Med Name: IBUPROFEN 600 MG TABLET] 60 tablet 1     Sig: TAKE 1 TABLET BY MOUTH EVERY SIX (6) HOURS AS NEEDED FOR PAIN. TAKE WITH FOOD.    triamterene-hydroCHLOROthiazide (MAXZIDE-25) 37.5-25 MG per tablet [Pharmacy Med Name: TRIAMTERENE-HCTZ 37.5-25 MG TB] 90 tablet 3     Sig: TAKE 1 TABLET BY MOUTH EVERY DAY IN THE MORNING

## 2024-04-27 RX ORDER — IBUPROFEN 600 MG/1
TABLET ORAL
Qty: 60 TABLET | Refills: 1 | OUTPATIENT
Start: 2024-04-27

## 2024-04-27 RX ORDER — TRIAMTERENE AND HYDROCHLOROTHIAZIDE 37.5; 25 MG/1; MG/1
TABLET ORAL
Qty: 90 TABLET | Refills: 3 | Status: SHIPPED | OUTPATIENT
Start: 2024-04-27

## 2024-05-08 ENCOUNTER — TELEPHONE (OUTPATIENT)
Facility: CLINIC | Age: 64
End: 2024-05-08

## 2024-05-08 NOTE — TELEPHONE ENCOUNTER
Caller requests Refill of:  triamterene-hydroCHLOROthiazide (MAXZIDE-25) 37.5-25 MG per tablet       Please send to:  [facility]/PHARMACY #[id number] - [place], [place] - [address] - P [phone] - F [phone] [[id number]]       Visit Appointment History:  Future Appointments:  Future Appointments   Date Time Provider Department Center   7/2/2024  8:50 AM Tali Danielle MD [facility] BS AMB         Last Appointment With Me:  3/27/2024         Caller confirmed instructions and dosages as correct.    Caller was advised that Meds will be refilled as soon as possible, however there can be a 48-72 business hour turn around on refill requests.

## 2024-05-08 NOTE — TELEPHONE ENCOUNTER
Spoke to pt verified name and . I let pt know she should still have refills on this prescription. She understood and stated she would call the pharmacy.

## 2024-05-17 ENCOUNTER — HOSPITAL ENCOUNTER (EMERGENCY)
Facility: HOSPITAL | Age: 64
Discharge: HOME | End: 2024-05-17
Attending: STUDENT IN AN ORGANIZED HEALTH CARE EDUCATION/TRAINING PROGRAM
Payer: COMMERCIAL

## 2024-05-17 VITALS
TEMPERATURE: 97.9 F | RESPIRATION RATE: 20 BRPM | WEIGHT: 176.81 LBS | DIASTOLIC BLOOD PRESSURE: 69 MMHG | HEIGHT: 62 IN | OXYGEN SATURATION: 100 % | BODY MASS INDEX: 32.54 KG/M2 | HEART RATE: 82 BPM | SYSTOLIC BLOOD PRESSURE: 134 MMHG

## 2024-05-17 DIAGNOSIS — S39.012A STRAIN OF LUMBAR REGION, INITIAL ENCOUNTER: Primary | ICD-10-CM

## 2024-05-17 DIAGNOSIS — M54.50 ACUTE RIGHT-SIDED LOW BACK PAIN WITHOUT SCIATICA: ICD-10-CM

## 2024-05-17 PROCEDURE — 96372 THER/PROPH/DIAG INJ SC/IM: CPT

## 2024-05-17 PROCEDURE — 99284 EMERGENCY DEPT VISIT MOD MDM: CPT

## 2024-05-17 PROCEDURE — 6370000000 HC RX 637 (ALT 250 FOR IP): Performed by: STUDENT IN AN ORGANIZED HEALTH CARE EDUCATION/TRAINING PROGRAM

## 2024-05-17 PROCEDURE — 6360000002 HC RX W HCPCS: Performed by: STUDENT IN AN ORGANIZED HEALTH CARE EDUCATION/TRAINING PROGRAM

## 2024-05-17 RX ORDER — CYCLOBENZAPRINE HCL 10 MG
10 TABLET ORAL 3 TIMES DAILY PRN
Qty: 21 TABLET | Refills: 0 | Status: SHIPPED | OUTPATIENT
Start: 2024-05-17 | End: 2024-05-27

## 2024-05-17 RX ORDER — ACETAMINOPHEN 325 MG/1
650 TABLET ORAL
Status: COMPLETED | OUTPATIENT
Start: 2024-05-17 | End: 2024-05-17

## 2024-05-17 RX ORDER — KETOROLAC TROMETHAMINE 30 MG/ML
30 INJECTION, SOLUTION INTRAMUSCULAR; INTRAVENOUS ONCE
Status: COMPLETED | OUTPATIENT
Start: 2024-05-17 | End: 2024-05-17

## 2024-05-17 RX ORDER — ACETAMINOPHEN 500 MG
500 TABLET ORAL 4 TIMES DAILY PRN
Qty: 60 TABLET | Refills: 1 | Status: SHIPPED | OUTPATIENT
Start: 2024-05-17

## 2024-05-17 RX ORDER — LIDOCAINE 50 MG/G
1 PATCH TOPICAL DAILY
Qty: 10 PATCH | Refills: 0 | Status: SHIPPED | OUTPATIENT
Start: 2024-05-17 | End: 2024-05-27

## 2024-05-17 RX ORDER — IBUPROFEN 600 MG/1
600 TABLET ORAL 4 TIMES DAILY PRN
Qty: 360 TABLET | Refills: 1 | Status: SHIPPED | OUTPATIENT
Start: 2024-05-17

## 2024-05-17 RX ORDER — LIDOCAINE 4 G/G
1 PATCH TOPICAL
Status: DISCONTINUED | OUTPATIENT
Start: 2024-05-17 | End: 2024-05-17 | Stop reason: HOSPADM

## 2024-05-17 RX ORDER — CYCLOBENZAPRINE HCL 10 MG
10 TABLET ORAL ONCE
Status: COMPLETED | OUTPATIENT
Start: 2024-05-17 | End: 2024-05-17

## 2024-05-17 RX ADMIN — KETOROLAC TROMETHAMINE 30 MG: 30 INJECTION, SOLUTION INTRAMUSCULAR at 02:22

## 2024-05-17 RX ADMIN — CYCLOBENZAPRINE 10 MG: 10 TABLET, FILM COATED ORAL at 02:20

## 2024-05-17 RX ADMIN — ACETAMINOPHEN 650 MG: 325 TABLET ORAL at 02:20

## 2024-05-17 ASSESSMENT — PAIN DESCRIPTION - ORIENTATION: ORIENTATION: LOWER

## 2024-05-17 ASSESSMENT — PAIN SCALES - GENERAL: PAINLEVEL_OUTOF10: 8

## 2024-05-17 ASSESSMENT — PAIN DESCRIPTION - LOCATION: LOCATION: BACK

## 2024-05-17 ASSESSMENT — PAIN DESCRIPTION - DESCRIPTORS: DESCRIPTORS: ACHING

## 2024-05-17 NOTE — ED NOTES
Patient able to ambulate with standby assistance under own strength. MD at bedside during ambulation.       Condition Stable  Patient discharged to home  Patient education was completed  Education taught to patient  Teaching method used was handout and verbal  Understanding of teaching was good  Patient was discharged ambulatory  Discharged with patient is waiting for son to finish paper route before he can pick her up  Valuables were given to patient remained in possession of belongings during stay

## 2024-05-17 NOTE — DISCHARGE INSTRUCTIONS
Take the prescribed medications as directed for pain.  Return to the emergency department if symptoms change, worsen he develop any new symptoms or have any other concerns.  Follow-up with the orthopedic specialist using the number provided if symptoms do not improve.

## 2024-05-17 NOTE — ED PROVIDER NOTES
[facility]  EMERGENCY DEPARTMENT ENCOUNTER      Pt Name: Janeth Ford  MRN: [id number]  Birthdate 1960  Date of evaluation: 5/17/2024  Provider: Cory Killian MD    CHIEF COMPLAINT       Chief Complaint   Patient presents with    Back Pain       HISTORY OF PRESENT ILLNESS    HPI    64-year-old female history of osteoarthritis, diabetes, hypertension presenting for right lower back pain.  Started about 8 hours ago after finishing work.  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  States she feels like she overeats exerted her back.  She tried tramadol at home without relief.  Pain stays in her right lower back and does not radiate.  It is worse with positional movements and ambulation.  She denies numbness tingling weakness bowel or bladder incontinence.  No fevers or chills.  No prior spinal instrumentation.    Nursing notes reviewed.    REVIEW OF SYSTEMS     Review of Systems  Unless otherwise stated, a complete review of systems was asked of the patient. Pertinent positives are noted in the HPI section.    PAST MEDICAL HISTORY     Past Medical History:   Diagnosis Date    Arrhythmia     Arthritis     GENERALIZED TO JOINTS-WRISTS & KNEES.    Asthma     INHALERS    Asthma, chronic 10/8/2009    Diabetes (HCC)     USES PILLS TO CONTROLL    GERD (gastroesophageal reflux disease)     Gout     Hypertension     CONTROLLED BY MEDS.    Nausea & vomiting     Obesity        SURGICAL HISTORY       Past Surgical History:   Procedure Laterality Date    BREAST BIOPSY Right 2018    benign    CARDIAC CATHETERIZATION  98,2011    CARDIAC CATH X2    CHOLECYSTECTOMY      COLONOSCOPY N/A 2/21/2020    COLONOSCOPY performed by Ezequiel Aiken MD at \[facility]"" ENDOSCOPY. 2 tubular adenomas, diverticulosis, internal hemorrhoids    COLONOSCOPY N/A 5/31/2019    COLONOSCOPY performed by Ezequiel Aiken MD at \[facility]\"" ENDOSCOPY. 6 mm tubular adenoma in sigmoid polyp. REpeat in 6

## 2024-05-17 NOTE — ED TRIAGE NOTES
Patient arrives from home via EMS with complaint of Right lower back pain. 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 Patient had a Tramadol around 1930 with minimal relief.

## 2024-05-26 DIAGNOSIS — I10 PRIMARY HYPERTENSION: ICD-10-CM

## 2024-05-28 RX ORDER — CARVEDILOL 6.25 MG/1
6.25 TABLET ORAL 2 TIMES DAILY
Qty: 180 TABLET | Refills: 1 | Status: SHIPPED | OUTPATIENT
Start: 2024-05-28

## 2024-05-28 NOTE — TELEPHONE ENCOUNTER
PCP: Tali Danielle MD     Last appt:  3/27/2024      Future Appointments   Date Time Provider Department Center   7/2/2024  8:50 AM Tali Danielle MD [facility] BS AMB          Requested Prescriptions     Pending Prescriptions Disp Refills    carvedilol (COREG) 6.25 MG tablet [Pharmacy Med Name: CARVEDILOL 6.25 MG TABLET] 180 tablet 1     Sig: TAKE 1 TABLET BY MOUTH TWICE A DAY

## 2024-06-28 DIAGNOSIS — M47.812 SPONDYLOSIS WITHOUT MYELOPATHY OR RADICULOPATHY, CERVICAL REGION: ICD-10-CM

## 2024-06-28 DIAGNOSIS — J45.40 MODERATE PERSISTENT ASTHMA WITHOUT COMPLICATION: ICD-10-CM

## 2024-06-28 RX ORDER — BUDESONIDE AND FORMOTEROL FUMARATE DIHYDRATE 160; 4.5 UG/1; UG/1
2 AEROSOL RESPIRATORY (INHALATION) 2 TIMES DAILY
Qty: 10.2 EACH | Refills: 2 | Status: SHIPPED | OUTPATIENT
Start: 2024-06-28

## 2024-06-28 NOTE — TELEPHONE ENCOUNTER
PCP: Tali Danielle MD     Last appt:  3/27/2024      Future Appointments   Date Time Provider Department Center   10/16/2024  8:10 AM Tali Danielle MD [facility] AMB          Requested Prescriptions     Pending Prescriptions Disp Refills    diclofenac sodium (VOLTAREN) 1 % GEL [Pharmacy Med Name: DICLOFENAC SODIUM 1% GEL] 100 g 3     Sig: APPLY THIN AMOUNT TO THE RIGHT WRIST AND OTHER AFFECTED AREAS 4 TIMES DAILY    budesonide-formoterol (SYMBICORT) 160-4.5 MCG/ACT AERO [Pharmacy Med Name: BUDESONIDE-FORMOTEROL 160-4.5] 10.2 each 2     Sig: INHALE 2 PUFFS INTO THE LUNGS TWICE A DAY

## 2024-07-05 DIAGNOSIS — E11.9 TYPE 2 DIABETES MELLITUS WITHOUT COMPLICATION, WITHOUT LONG-TERM CURRENT USE OF INSULIN (HCC): ICD-10-CM

## 2024-07-05 RX ORDER — GLIPIZIDE 5 MG/1
TABLET, FILM COATED, EXTENDED RELEASE ORAL
Qty: 90 TABLET | Refills: 1 | Status: SHIPPED | OUTPATIENT
Start: 2024-07-05

## 2024-07-09 ENCOUNTER — APPOINTMENT (OUTPATIENT)
Facility: HOSPITAL | Age: 64
End: 2024-07-09
Payer: COMMERCIAL

## 2024-07-09 ENCOUNTER — HOSPITAL ENCOUNTER (EMERGENCY)
Facility: HOSPITAL | Age: 64
Discharge: HOME | End: 2024-07-09
Attending: STUDENT IN AN ORGANIZED HEALTH CARE EDUCATION/TRAINING PROGRAM
Payer: COMMERCIAL

## 2024-07-09 VITALS
BODY MASS INDEX: 32.19 KG/M2 | TEMPERATURE: 98.1 F | WEIGHT: 176 LBS | DIASTOLIC BLOOD PRESSURE: 55 MMHG | SYSTOLIC BLOOD PRESSURE: 136 MMHG | OXYGEN SATURATION: 98 % | HEART RATE: 90 BPM | RESPIRATION RATE: 18 BRPM

## 2024-07-09 DIAGNOSIS — N93.9 VAGINAL BLEEDING: ICD-10-CM

## 2024-07-09 DIAGNOSIS — N39.0 URINARY TRACT INFECTION WITHOUT HEMATURIA, SITE UNSPECIFIED: Primary | ICD-10-CM

## 2024-07-09 LAB
ABO + RH BLD: NORMAL
ANION GAP SERPL CALC-SCNC: 5 MMOL/L (ref 5–15)
APPEARANCE UR: CLEAR
BACTERIA URNS QL MICRO: NEGATIVE /HPF
BASOPHILS # BLD: 0 K/UL (ref 0–0.1)
BASOPHILS NFR BLD: 0 % (ref 0–1)
BILIRUB UR QL: NEGATIVE
BLOOD GROUP ANTIBODIES SERPL: NORMAL
BUN SERPL-MCNC: 17 MG/DL (ref 6–20)
BUN/CREAT SERPL: 14 (ref 12–20)
CALCIUM SERPL-MCNC: 9.8 MG/DL (ref 8.5–10.1)
CHLORIDE SERPL-SCNC: 100 MMOL/L (ref 97–108)
CO2 SERPL-SCNC: 30 MMOL/L (ref 21–32)
COLOR UR: ABNORMAL
CREAT SERPL-MCNC: 1.2 MG/DL (ref 0.55–1.02)
DIFFERENTIAL METHOD BLD: NORMAL
EOSINOPHIL # BLD: 0.1 K/UL (ref 0–0.4)
EOSINOPHIL NFR BLD: 2 % (ref 0–7)
EPITH CASTS URNS QL MICRO: ABNORMAL /LPF
ERYTHROCYTE [DISTWIDTH] IN BLOOD BY AUTOMATED COUNT: 12.5 % (ref 11.5–14.5)
GLUCOSE SERPL-MCNC: 182 MG/DL (ref 65–100)
GLUCOSE UR STRIP.AUTO-MCNC: NEGATIVE MG/DL
HCT VFR BLD AUTO: 35.5 % (ref 35–47)
HGB BLD-MCNC: 11.6 G/DL (ref 11.5–16)
HGB UR QL STRIP: NEGATIVE
HYALINE CASTS URNS QL MICRO: ABNORMAL /LPF (ref 0–5)
IMM GRANULOCYTES # BLD AUTO: 0 K/UL (ref 0–0.04)
IMM GRANULOCYTES NFR BLD AUTO: 0 % (ref 0–0.5)
KETONES UR QL STRIP.AUTO: NEGATIVE MG/DL
LEUKOCYTE ESTERASE UR QL STRIP.AUTO: ABNORMAL
LYMPHOCYTES # BLD: 2.3 K/UL (ref 0.8–3.5)
LYMPHOCYTES NFR BLD: 29 % (ref 12–49)
MCH RBC QN AUTO: 29 PG (ref 26–34)
MCHC RBC AUTO-ENTMCNC: 32.7 G/DL (ref 30–36.5)
MCV RBC AUTO: 88.8 FL (ref 80–99)
MONOCYTES # BLD: 0.6 K/UL (ref 0–1)
MONOCYTES NFR BLD: 7 % (ref 5–13)
NEUTS SEG # BLD: 4.9 K/UL (ref 1.8–8)
NEUTS SEG NFR BLD: 62 % (ref 32–75)
NITRITE UR QL STRIP.AUTO: POSITIVE
NRBC # BLD: 0 K/UL (ref 0–0.01)
NRBC BLD-RTO: 0 PER 100 WBC
PH UR STRIP: 6.5 (ref 5–8)
PLATELET # BLD AUTO: 363 K/UL (ref 150–400)
PMV BLD AUTO: 9.6 FL (ref 8.9–12.9)
POTASSIUM SERPL-SCNC: 4 MMOL/L (ref 3.5–5.1)
PROT UR STRIP-MCNC: NEGATIVE MG/DL
RBC # BLD AUTO: 4 M/UL (ref 3.8–5.2)
RBC #/AREA URNS HPF: ABNORMAL /HPF (ref 0–5)
SODIUM SERPL-SCNC: 135 MMOL/L (ref 136–145)
SP GR UR REFRACTOMETRY: 1.01
SPECIMEN EXP DATE BLD: NORMAL
URINE CULTURE IF INDICATED: ABNORMAL
UROBILINOGEN UR QL STRIP.AUTO: 1 EU/DL (ref 0.2–1)
WBC # BLD AUTO: 8 K/UL (ref 3.6–11)
WBC URNS QL MICRO: ABNORMAL /HPF (ref 0–4)

## 2024-07-09 PROCEDURE — 6360000002 HC RX W HCPCS: Performed by: STUDENT IN AN ORGANIZED HEALTH CARE EDUCATION/TRAINING PROGRAM

## 2024-07-09 PROCEDURE — 81001 URINALYSIS AUTO W/SCOPE: CPT

## 2024-07-09 PROCEDURE — 86850 RBC ANTIBODY SCREEN: CPT

## 2024-07-09 PROCEDURE — 87088 URINE BACTERIA CULTURE: CPT

## 2024-07-09 PROCEDURE — 80048 BASIC METABOLIC PNL TOTAL CA: CPT

## 2024-07-09 PROCEDURE — 99284 EMERGENCY DEPT VISIT MOD MDM: CPT

## 2024-07-09 PROCEDURE — 87086 URINE CULTURE/COLONY COUNT: CPT

## 2024-07-09 PROCEDURE — 86900 BLOOD TYPING SEROLOGIC ABO: CPT

## 2024-07-09 PROCEDURE — 87186 SC STD MICRODIL/AGAR DIL: CPT

## 2024-07-09 PROCEDURE — 6370000000 HC RX 637 (ALT 250 FOR IP): Performed by: STUDENT IN AN ORGANIZED HEALTH CARE EDUCATION/TRAINING PROGRAM

## 2024-07-09 PROCEDURE — 86901 BLOOD TYPING SEROLOGIC RH(D): CPT

## 2024-07-09 PROCEDURE — 76856 US EXAM PELVIC COMPLETE: CPT

## 2024-07-09 PROCEDURE — 76830 TRANSVAGINAL US NON-OB: CPT

## 2024-07-09 PROCEDURE — 96374 THER/PROPH/DIAG INJ IV PUSH: CPT

## 2024-07-09 PROCEDURE — 85025 COMPLETE CBC W/AUTO DIFF WBC: CPT

## 2024-07-09 PROCEDURE — 74176 CT ABD & PELVIS W/O CONTRAST: CPT

## 2024-07-09 PROCEDURE — 36415 COLL VENOUS BLD VENIPUNCTURE: CPT

## 2024-07-09 RX ORDER — LEVOFLOXACIN 750 MG/1
750 TABLET, FILM COATED ORAL DAILY
Qty: 5 TABLET | Refills: 0 | Status: SHIPPED | OUTPATIENT
Start: 2024-07-09 | End: 2024-07-14

## 2024-07-09 RX ORDER — LEVOFLOXACIN 750 MG/1
750 TABLET, FILM COATED ORAL ONCE
Status: COMPLETED | OUTPATIENT
Start: 2024-07-09 | End: 2024-07-09

## 2024-07-09 RX ORDER — KETOROLAC TROMETHAMINE 30 MG/ML
15 INJECTION, SOLUTION INTRAMUSCULAR; INTRAVENOUS
Status: COMPLETED | OUTPATIENT
Start: 2024-07-09 | End: 2024-07-09

## 2024-07-09 RX ADMIN — LEVOFLOXACIN 750 MG: 750 TABLET, FILM COATED ORAL at 21:15

## 2024-07-09 RX ADMIN — KETOROLAC TROMETHAMINE 15 MG: 30 INJECTION, SOLUTION INTRAMUSCULAR at 20:16

## 2024-07-09 ASSESSMENT — PAIN - FUNCTIONAL ASSESSMENT: PAIN_FUNCTIONAL_ASSESSMENT: NONE - DENIES PAIN

## 2024-07-10 NOTE — ED PROVIDER NOTES
[facility] EMERGENCY DEPT  EMERGENCY DEPARTMENT ENCOUNTER       Pt Name: Janeth Ford  MRN: [id number]  Birthdate 1960  Date of evaluation: 7/9/2024  Provider: Don Chapa MD   PCP: Tali Danielle MD  Note Started: 9:03 PM EDT 7/9/24     CHIEF COMPLAINT       Chief Complaint   Patient presents with   • Vaginal Bleeding   • Abdominal Cramping     Patient ambulatory into triage complaining of lower abdominal cramping and vaginal bleeding since Saturday. Patient reports just a couple drops of blood and seen on tissue when wiping. Patient reports she was lifting someone Saturday when this all began.      HISTORY OF PRESENT ILLNESS: 1 or more elements      History From: patient, History limited by: none     Janeth Ford is a 64 y.o. female w hx of below who presents to the ED with lower abdominal pain that has been ongoing for the past few days.  She has been having pain with urination.  A few days ago after urinating she noticed some blood on tissue paper when wiping that she felt was vaginal bleeding.  She has been taking Azo and now her urine is orangeish, but she has not had any bleeding over the past two days.  Her lower abdominal pain has persistent.  No fevers or chills.  She has had multiple UTIs in the past and states that symptoms are similar.  She has had a complete hysterecotmy.  She does not endorse any recent vaginal dryness, itching, discharge, or lesions.    Please See MDM for Additional Details of the HPI/PMH  Nursing Notes were all reviewed and agreed with or any disagreements were addressed in the HPI.     REVIEW OF SYSTEMS        Positives and Pertinent negatives as per HPI.    PAST HISTORY     Past Medical History:  Past Medical History:   Diagnosis Date   • Arrhythmia    • Arthritis     GENERALIZED TO JOINTS-WRISTS & KNEES.   • Asthma     INHALERS   • Asthma, chronic 10/8/2009   • Diabetes (HCC)     USES PILLS TO CONTROLL   • GERD (gastroesophageal reflux disease)    • Gout    •

## 2024-07-10 NOTE — DISCHARGE INSTRUCTIONS
Please make a followup with your primary care physician, women's health specialist, and urologist.  If you have any new or worsening concerning medical symptoms please return to the emergency department.

## 2024-07-11 LAB
BACTERIA SPEC CULT: ABNORMAL
BACTERIA SPEC CULT: ABNORMAL
CC UR VC: ABNORMAL
SERVICE CMNT-IMP: ABNORMAL

## 2024-07-26 ENCOUNTER — APPOINTMENT (OUTPATIENT)
Facility: HOSPITAL | Age: 64
End: 2024-07-26
Payer: COMMERCIAL

## 2024-07-26 ENCOUNTER — HOSPITAL ENCOUNTER (EMERGENCY)
Facility: HOSPITAL | Age: 64
Discharge: HOME | End: 2024-07-26
Attending: EMERGENCY MEDICINE
Payer: COMMERCIAL

## 2024-07-26 VITALS
OXYGEN SATURATION: 99 % | BODY MASS INDEX: 36.92 KG/M2 | WEIGHT: 200.62 LBS | SYSTOLIC BLOOD PRESSURE: 134 MMHG | TEMPERATURE: 98 F | RESPIRATION RATE: 16 BRPM | DIASTOLIC BLOOD PRESSURE: 106 MMHG | HEART RATE: 79 BPM | HEIGHT: 62 IN

## 2024-07-26 DIAGNOSIS — R07.89 CHEST WALL PAIN: Primary | ICD-10-CM

## 2024-07-26 LAB
ALBUMIN SERPL-MCNC: 3.5 G/DL (ref 3.5–5)
ALBUMIN/GLOB SERPL: 0.9 (ref 1.1–2.2)
ALP SERPL-CCNC: 76 U/L (ref 45–117)
ALT SERPL-CCNC: 17 U/L (ref 12–78)
ANION GAP SERPL CALC-SCNC: 9 MMOL/L (ref 5–15)
AST SERPL-CCNC: 12 U/L (ref 15–37)
BASOPHILS # BLD: 0 K/UL (ref 0–0.1)
BASOPHILS NFR BLD: 0 % (ref 0–1)
BILIRUB SERPL-MCNC: 0.4 MG/DL (ref 0.2–1)
BUN SERPL-MCNC: 16 MG/DL (ref 6–20)
BUN/CREAT SERPL: 18 (ref 12–20)
CALCIUM SERPL-MCNC: 9.7 MG/DL (ref 8.5–10.1)
CHLORIDE SERPL-SCNC: 104 MMOL/L (ref 97–108)
CO2 SERPL-SCNC: 26 MMOL/L (ref 21–32)
COMMENT:: NORMAL
CREAT SERPL-MCNC: 0.91 MG/DL (ref 0.55–1.02)
DIFFERENTIAL METHOD BLD: ABNORMAL
EKG ATRIAL RATE: 77 BPM
EKG DIAGNOSIS: NORMAL
EKG P AXIS: 38 DEGREES
EKG P-R INTERVAL: 162 MS
EKG Q-T INTERVAL: 398 MS
EKG QRS DURATION: 70 MS
EKG QTC CALCULATION (BAZETT): 450 MS
EKG R AXIS: 14 DEGREES
EKG T AXIS: 103 DEGREES
EKG VENTRICULAR RATE: 77 BPM
EOSINOPHIL # BLD: 0.2 K/UL (ref 0–0.4)
EOSINOPHIL NFR BLD: 4 % (ref 0–7)
ERYTHROCYTE [DISTWIDTH] IN BLOOD BY AUTOMATED COUNT: 12.6 % (ref 11.5–14.5)
GLOBULIN SER CALC-MCNC: 4 G/DL (ref 2–4)
GLUCOSE SERPL-MCNC: 114 MG/DL (ref 65–100)
HCT VFR BLD AUTO: 34 % (ref 35–47)
HGB BLD-MCNC: 11.6 G/DL (ref 11.5–16)
IMM GRANULOCYTES # BLD AUTO: 0 K/UL (ref 0–0.04)
IMM GRANULOCYTES NFR BLD AUTO: 0 % (ref 0–0.5)
LYMPHOCYTES # BLD: 1.4 K/UL (ref 0.8–3.5)
LYMPHOCYTES NFR BLD: 28 % (ref 12–49)
MCH RBC QN AUTO: 29.7 PG (ref 26–34)
MCHC RBC AUTO-ENTMCNC: 34.1 G/DL (ref 30–36.5)
MCV RBC AUTO: 87.2 FL (ref 80–99)
MONOCYTES # BLD: 0.4 K/UL (ref 0–1)
MONOCYTES NFR BLD: 8 % (ref 5–13)
NEUTS SEG # BLD: 3 K/UL (ref 1.8–8)
NEUTS SEG NFR BLD: 60 % (ref 32–75)
NRBC # BLD: 0 K/UL (ref 0–0.01)
NRBC BLD-RTO: 0 PER 100 WBC
PLATELET # BLD AUTO: 393 K/UL (ref 150–400)
PMV BLD AUTO: 8.9 FL (ref 8.9–12.9)
POTASSIUM SERPL-SCNC: 3.7 MMOL/L (ref 3.5–5.1)
PROT SERPL-MCNC: 7.5 G/DL (ref 6.4–8.2)
RBC # BLD AUTO: 3.9 M/UL (ref 3.8–5.2)
SODIUM SERPL-SCNC: 139 MMOL/L (ref 136–145)
SPECIMEN HOLD: NORMAL
TROPONIN I SERPL HS-MCNC: 4 NG/L (ref 0–51)
WBC # BLD AUTO: 5 K/UL (ref 3.6–11)

## 2024-07-26 PROCEDURE — 80053 COMPREHEN METABOLIC PANEL: CPT

## 2024-07-26 PROCEDURE — 93005 ELECTROCARDIOGRAM TRACING: CPT | Performed by: EMERGENCY MEDICINE

## 2024-07-26 PROCEDURE — 36415 COLL VENOUS BLD VENIPUNCTURE: CPT

## 2024-07-26 PROCEDURE — 6370000000 HC RX 637 (ALT 250 FOR IP): Performed by: EMERGENCY MEDICINE

## 2024-07-26 PROCEDURE — 85025 COMPLETE CBC W/AUTO DIFF WBC: CPT

## 2024-07-26 PROCEDURE — 84484 ASSAY OF TROPONIN QUANT: CPT

## 2024-07-26 PROCEDURE — 99285 EMERGENCY DEPT VISIT HI MDM: CPT

## 2024-07-26 PROCEDURE — 93010 ELECTROCARDIOGRAM REPORT: CPT | Performed by: SPECIALIST

## 2024-07-26 PROCEDURE — 71045 X-RAY EXAM CHEST 1 VIEW: CPT

## 2024-07-26 RX ORDER — CYCLOBENZAPRINE HCL 10 MG
10 TABLET ORAL
Status: COMPLETED | OUTPATIENT
Start: 2024-07-26 | End: 2024-07-26

## 2024-07-26 RX ORDER — IBUPROFEN 600 MG/1
600 TABLET ORAL
Status: COMPLETED | OUTPATIENT
Start: 2024-07-26 | End: 2024-07-26

## 2024-07-26 RX ORDER — CYCLOBENZAPRINE HCL 10 MG
10 TABLET ORAL 3 TIMES DAILY PRN
Qty: 21 TABLET | Refills: 0 | Status: SHIPPED | OUTPATIENT
Start: 2024-07-26 | End: 2024-08-05

## 2024-07-26 RX ADMIN — IBUPROFEN 600 MG: 600 TABLET, FILM COATED ORAL at 08:00

## 2024-07-26 RX ADMIN — CYCLOBENZAPRINE 10 MG: 10 TABLET, FILM COATED ORAL at 08:00

## 2024-07-26 NOTE — ED TRIAGE NOTES
Patient arrives via EMS from home w/ CC left sided chest pain since 1900 last night. Thought it was asthma at first but pain persisted this morning. Denies SOB.Hx of having this pain before and a heart murmur.

## 2024-07-26 NOTE — DISCHARGE INSTRUCTIONS
Return with any new or worsening symptoms.  In addition to taking the muscle relaxer, you can alternate Motrin and Tylenol every 4 hours as needed for pain.  Follow-up with your primary care doctor as needed

## 2024-07-26 NOTE — ED PROVIDER NOTES
[facility] EMERGENCY DEP  EMERGENCY DEPARTMENT ENCOUNTER      Pt Name: Janeth Ford  MRN: [id number]  Birthdate 1960  Date of evaluation: 7/26/2024  Provider: Manuel Guzman MD      HISTORY OF PRESENT ILLNESS      HPI  64-year-old female presenting due to chest pain.  Patient says she developed the symptoms yesterday.  She describes a sharp intermittent pain on the left side of her chest.  She says it radiates to her left shoulder.  Pain made worse with movement.  No other exacerbating or alleviating factors.  Denies shortness of breath, nausea, or diaphoresis.  No trauma.  She does state that she recently moved to a new apartment.  She has been cleaning a lot over the last several days and lifting.  She says she has had similar pain in the past for which she states she was having muscle spasms.      Nursing Notes were reviewed.    REVIEW OF SYSTEMS         Review of Systems  All systems reviewed were negative unless otherwise document in the HPI      PAST MEDICAL HISTORY     Past Medical History:   Diagnosis Date    Arrhythmia     Arthritis     GENERALIZED TO JOINTS-WRISTS & KNEES.    Asthma     INHALERS    Asthma, chronic 10/8/2009    Diabetes (HCC)     USES PILLS TO CONTROLL    GERD (gastroesophageal reflux disease)     Gout     Hypertension     CONTROLLED BY MEDS.    Nausea & vomiting     Obesity          SURGICAL HISTORY       Past Surgical History:   Procedure Laterality Date    BREAST BIOPSY Right 2018    benign    CARDIAC CATHETERIZATION  98,2011    CARDIAC CATH X2    CHOLECYSTECTOMY      COLONOSCOPY N/A 2/21/2020    COLONOSCOPY performed by Ezequiel Aiken MD at [facility] ENDOSCOPY. 2 tubular adenomas, diverticulosis, internal hemorrhoids    COLONOSCOPY N/A 5/31/2019    COLONOSCOPY performed by Ezequiel Aiken MD at [facility] ENDOSCOPY. 6 mm tubular adenoma in sigmoid polyp. REpeat in 6 months due to poor prep    COLONOSCOPY N/A 05/08/2023    COLONOSCOPY DIAGNOSTIC performed by Bipin Aiken MD at

## 2024-07-28 DIAGNOSIS — E87.6 HYPOKALEMIA: ICD-10-CM

## 2024-07-29 RX ORDER — POTASSIUM CHLORIDE 750 MG/1
TABLET, EXTENDED RELEASE ORAL
Qty: 30 TABLET | Refills: 3 | OUTPATIENT
Start: 2024-07-29

## 2024-08-04 DIAGNOSIS — M17.0 BILATERAL PRIMARY OSTEOARTHRITIS OF KNEE: ICD-10-CM

## 2024-08-05 RX ORDER — FLUTICASONE PROPIONATE 50 MCG
SPRAY, SUSPENSION (ML) NASAL
Qty: 48 G | Refills: 1 | Status: SHIPPED | OUTPATIENT
Start: 2024-08-05

## 2024-08-05 RX ORDER — DICLOFENAC SODIUM 75 MG/1
75 TABLET, DELAYED RELEASE ORAL 2 TIMES DAILY
Qty: 60 TABLET | Refills: 0 | Status: SHIPPED | OUTPATIENT
Start: 2024-08-05

## 2024-08-05 NOTE — TELEPHONE ENCOUNTER
PCP: Tali Danielle MD     Last appt:  3/27/2024      Future Appointments   Date Time Provider Department Center   10/16/2024  8:10 AM Tali Danielle MD [facility] DEP          Requested Prescriptions     Pending Prescriptions Disp Refills    fluticasone (FLONASE) 50 MCG/ACT nasal spray [Pharmacy Med Name: FLUTICASONE PROP 50 MCG SPRAY]  1     Sig: INHALE 2 SPRAYS IN EACH NOSTRIL ONCE A DAY. INDICATIONS: INFLAMMATION OF THE NOSE DUE TO AN ALLERGY

## 2024-08-09 ENCOUNTER — HOSPITAL ENCOUNTER (OUTPATIENT)
Facility: HOSPITAL | Age: 64
Discharge: HOME | End: 2024-08-09
Payer: COMMERCIAL

## 2024-08-09 DIAGNOSIS — Z12.31 ENCOUNTER FOR SCREENING MAMMOGRAM FOR MALIGNANT NEOPLASM OF BREAST: ICD-10-CM

## 2024-08-09 PROCEDURE — 77067 SCR MAMMO BI INCL CAD: CPT

## 2024-08-18 ENCOUNTER — HOSPITAL ENCOUNTER (EMERGENCY)
Facility: HOSPITAL | Age: 64
Discharge: HOME | End: 2024-08-18
Attending: EMERGENCY MEDICINE
Payer: COMMERCIAL

## 2024-08-18 ENCOUNTER — APPOINTMENT (OUTPATIENT)
Facility: HOSPITAL | Age: 64
End: 2024-08-18
Payer: COMMERCIAL

## 2024-08-18 VITALS
RESPIRATION RATE: 18 BRPM | HEIGHT: 62 IN | BODY MASS INDEX: 31.97 KG/M2 | OXYGEN SATURATION: 100 % | DIASTOLIC BLOOD PRESSURE: 80 MMHG | TEMPERATURE: 98.3 F | WEIGHT: 173.72 LBS | HEART RATE: 87 BPM | SYSTOLIC BLOOD PRESSURE: 163 MMHG

## 2024-08-18 DIAGNOSIS — M25.522 LEFT ELBOW PAIN: Primary | ICD-10-CM

## 2024-08-18 LAB
ALBUMIN SERPL-MCNC: 3.7 G/DL (ref 3.5–5)
ALBUMIN/GLOB SERPL: 0.8 (ref 1.1–2.2)
ALP SERPL-CCNC: 92 U/L (ref 45–117)
ALT SERPL-CCNC: 13 U/L (ref 12–78)
ANION GAP SERPL CALC-SCNC: 2 MMOL/L (ref 5–15)
AST SERPL-CCNC: 18 U/L (ref 15–37)
BASOPHILS # BLD: 0 K/UL (ref 0–0.1)
BASOPHILS NFR BLD: 0 % (ref 0–1)
BILIRUB SERPL-MCNC: 0.4 MG/DL (ref 0.2–1)
BUN SERPL-MCNC: 11 MG/DL (ref 6–20)
BUN/CREAT SERPL: 11 (ref 12–20)
CALCIUM SERPL-MCNC: 9.6 MG/DL (ref 8.5–10.1)
CHLORIDE SERPL-SCNC: 102 MMOL/L (ref 97–108)
CO2 SERPL-SCNC: 31 MMOL/L (ref 21–32)
CREAT SERPL-MCNC: 0.99 MG/DL (ref 0.55–1.02)
DIFFERENTIAL METHOD BLD: ABNORMAL
EOSINOPHIL # BLD: 0.2 K/UL (ref 0–0.4)
EOSINOPHIL NFR BLD: 3 % (ref 0–7)
ERYTHROCYTE [DISTWIDTH] IN BLOOD BY AUTOMATED COUNT: 12.2 % (ref 11.5–14.5)
GLOBULIN SER CALC-MCNC: 4.4 G/DL (ref 2–4)
GLUCOSE SERPL-MCNC: 197 MG/DL (ref 65–100)
HCT VFR BLD AUTO: 36.8 % (ref 35–47)
HGB BLD-MCNC: 12 G/DL (ref 11.5–16)
IMM GRANULOCYTES # BLD AUTO: 0 K/UL (ref 0–0.04)
IMM GRANULOCYTES NFR BLD AUTO: 0 % (ref 0–0.5)
LYMPHOCYTES # BLD: 1.6 K/UL (ref 0.8–3.5)
LYMPHOCYTES NFR BLD: 26 % (ref 12–49)
MCH RBC QN AUTO: 28.6 PG (ref 26–34)
MCHC RBC AUTO-ENTMCNC: 32.6 G/DL (ref 30–36.5)
MCV RBC AUTO: 87.8 FL (ref 80–99)
MONOCYTES # BLD: 0.5 K/UL (ref 0–1)
MONOCYTES NFR BLD: 8 % (ref 5–13)
NEUTS SEG # BLD: 3.9 K/UL (ref 1.8–8)
NEUTS SEG NFR BLD: 63 % (ref 32–75)
NRBC # BLD: 0 K/UL (ref 0–0.01)
NRBC BLD-RTO: 0 PER 100 WBC
PLATELET # BLD AUTO: 410 K/UL (ref 150–400)
PMV BLD AUTO: 9.4 FL (ref 8.9–12.9)
POTASSIUM SERPL-SCNC: 4 MMOL/L (ref 3.5–5.1)
PROT SERPL-MCNC: 8.1 G/DL (ref 6.4–8.2)
RBC # BLD AUTO: 4.19 M/UL (ref 3.8–5.2)
SODIUM SERPL-SCNC: 135 MMOL/L (ref 136–145)
TROPONIN I SERPL HS-MCNC: 5 NG/L (ref 0–51)
WBC # BLD AUTO: 6.2 K/UL (ref 3.6–11)

## 2024-08-18 PROCEDURE — 85025 COMPLETE CBC W/AUTO DIFF WBC: CPT

## 2024-08-18 PROCEDURE — 93005 ELECTROCARDIOGRAM TRACING: CPT

## 2024-08-18 PROCEDURE — 80053 COMPREHEN METABOLIC PANEL: CPT

## 2024-08-18 PROCEDURE — 84484 ASSAY OF TROPONIN QUANT: CPT

## 2024-08-18 PROCEDURE — 6370000000 HC RX 637 (ALT 250 FOR IP)

## 2024-08-18 PROCEDURE — 71046 X-RAY EXAM CHEST 2 VIEWS: CPT

## 2024-08-18 PROCEDURE — 36415 COLL VENOUS BLD VENIPUNCTURE: CPT

## 2024-08-18 PROCEDURE — 99285 EMERGENCY DEPT VISIT HI MDM: CPT

## 2024-08-18 PROCEDURE — 73070 X-RAY EXAM OF ELBOW: CPT

## 2024-08-18 RX ORDER — NAPROXEN 250 MG/1
500 TABLET ORAL ONCE
Status: COMPLETED | OUTPATIENT
Start: 2024-08-18 | End: 2024-08-18

## 2024-08-18 RX ORDER — LIDOCAINE 4 G/G
1 PATCH TOPICAL
Status: DISCONTINUED | OUTPATIENT
Start: 2024-08-18 | End: 2024-08-18 | Stop reason: HOSPADM

## 2024-08-18 RX ADMIN — NAPROXEN 500 MG: 250 TABLET ORAL at 15:17

## 2024-08-18 ASSESSMENT — PAIN DESCRIPTION - LOCATION
LOCATION: ARM;ELBOW
LOCATION: ARM

## 2024-08-18 ASSESSMENT — PAIN - FUNCTIONAL ASSESSMENT
PAIN_FUNCTIONAL_ASSESSMENT: PREVENTS OR INTERFERES WITH MANY ACTIVE NOT PASSIVE ACTIVITIES
PAIN_FUNCTIONAL_ASSESSMENT: ACTIVITIES ARE NOT PREVENTED

## 2024-08-18 ASSESSMENT — PAIN DESCRIPTION - FREQUENCY: FREQUENCY: INTERMITTENT

## 2024-08-18 ASSESSMENT — PAIN SCALES - GENERAL
PAINLEVEL_OUTOF10: 8
PAINLEVEL_OUTOF10: 8

## 2024-08-18 ASSESSMENT — PAIN DESCRIPTION - DESCRIPTORS
DESCRIPTORS: ACHING
DESCRIPTORS: ACHING;SORE;THROBBING

## 2024-08-18 ASSESSMENT — HEART SCORE: ECG: NON-SPECIFC REPOLARIZATION DISTURBANCE/LBTB/PM

## 2024-08-18 ASSESSMENT — PAIN DESCRIPTION - ORIENTATION
ORIENTATION: LEFT;LOWER
ORIENTATION: LEFT;LOWER

## 2024-08-18 ASSESSMENT — PAIN DESCRIPTION - PAIN TYPE: TYPE: ACUTE PAIN

## 2024-08-18 ASSESSMENT — PAIN DESCRIPTION - ONSET: ONSET: PROGRESSIVE

## 2024-08-18 NOTE — DISCHARGE INSTRUCTIONS
Thank you for allowing us to provide you with medical care today.  We realize that you have many choices for your emergency care needs.  We thank you for choosing [facility].  Please choose us in the future for any continued health care needs.     We hope we addressed all of your medical concerns. We strive to provide excellent quality care in the Emergency Department.  Anything less than excellent does not meet our expectations.     The exam and treatment you received in the Emergency Department were for an emergent problem and are not intended as complete care. It is important that you follow up with a doctor, nurse practitioner, or physician’s assistant for ongoing care. If your symptoms worsen or you do not improve as expected and you are unable to reach your usual health care provider, you should return to the Emergency Department. We are available 24 hours a day.     Take this sheet with you when you go to your follow-up visit.     If you have any problem arranging the follow-up visit, contact the Emergency Department immediately.     Make an appointment your family doctor for follow up of this visit. Return to the ER if you are unable to be seen in a timely manner.     Below is a summary of your results.    Labs  Recent Results (from the past 12 hour(s))   EKG 12 Lead (SOB)    Collection Time: 08/18/24  2:36 PM   Result Value Ref Range    Ventricular Rate 89 BPM    Atrial Rate 89 BPM    P-R Interval 136 ms    QRS Duration 70 ms    Q-T Interval 364 ms    QTc Calculation (Bazett) 442 ms    P Axis 63 degrees    R Axis 51 degrees    T Axis 78 degrees    Diagnosis       Normal sinus rhythm  Nonspecific T wave abnormality  Abnormal ECG  When compared with ECG of 26-JUL-2024 07:35,  Non-specific change in ST segment in Anterior leads  T wave inversion more evident in Lateral leads     CBC with Auto Differential    Collection Time: 08/18/24  2:47 PM   Result Value Ref Range    WBC 6.2 3.6 -

## 2024-08-18 NOTE — ED TRIAGE NOTES
TRIAGE: Pt arrives with c/o left lower arm and elbow pain that has worsened over the past two weeks, more severe over the past 2 days. Pt states she had tendons removed in the past. 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 Denies numbness/tingling, fever, cp. +sob and heart palpitations.     No

## 2024-08-18 NOTE — FLOWSHEET NOTE
Pt discharged per provider order. Discharge instructions reviewed with patient, opportunity for questions provided. Pt verbalized understanding. Pt ambulatory out of ED with strong, steady gait.

## 2024-08-18 NOTE — ED PROVIDER NOTES
other interpretive errors are inadvertently transcribed by the computer software. Efforts were made to edit the dictation but occasionally words remain mis-transcribed.)    JODY Cameron NP (electronically signed)  Emergency Attending Physician / Physician Assistant / Nurse Practitioner     Sangeetha Paula, JODY Reyna NP  08/19/24 0976

## 2024-08-19 LAB
EKG ATRIAL RATE: 89 BPM
EKG DIAGNOSIS: NORMAL
EKG P AXIS: 63 DEGREES
EKG P-R INTERVAL: 136 MS
EKG Q-T INTERVAL: 364 MS
EKG QRS DURATION: 70 MS
EKG QTC CALCULATION (BAZETT): 442 MS
EKG R AXIS: 51 DEGREES
EKG T AXIS: 78 DEGREES
EKG VENTRICULAR RATE: 89 BPM

## 2024-08-19 PROCEDURE — 93010 ELECTROCARDIOGRAM REPORT: CPT | Performed by: SPECIALIST

## 2024-09-01 DIAGNOSIS — M17.0 BILATERAL PRIMARY OSTEOARTHRITIS OF KNEE: ICD-10-CM

## 2024-09-03 RX ORDER — DICLOFENAC SODIUM 75 MG/1
75 TABLET, DELAYED RELEASE ORAL 2 TIMES DAILY
Qty: 60 TABLET | Refills: 0 | Status: SHIPPED | OUTPATIENT
Start: 2024-09-03

## 2024-09-06 DIAGNOSIS — M47.812 SPONDYLOSIS WITHOUT MYELOPATHY OR RADICULOPATHY, CERVICAL REGION: ICD-10-CM

## 2024-09-06 NOTE — TELEPHONE ENCOUNTER
PCP: Tali Danielle MD     Last appt:  3/27/2024      Future Appointments   Date Time Provider Department Center   10/16/2024  8:10 AM Tali Danielle MD [facility] DEP          Requested Prescriptions     Pending Prescriptions Disp Refills    diclofenac sodium (VOLTAREN) 1 %  g 3     Sig: Apply 2 g topically 4 times daily as needed for Pain

## 2024-09-06 NOTE — TELEPHONE ENCOUNTER
Caller requests Refill of:    diclofenac sodium (VOLTAREN) 1 % GEL     Please send to:    [facility]     Visit Appointment History:  Future Appointments:  Future Appointments   Date Time Provider Department Center   10/16/2024  8:10 AM Tali Danielle MD [facility] DEP         Last Appointment With Me:  3/27/2024         Caller confirmed instructions and dosages as correct.    Caller was advised that Meds will be refilled as soon as possible, however there can be a 48-72 business hour turn around on refill requests.

## 2024-09-17 DIAGNOSIS — I10 PRIMARY HYPERTENSION: ICD-10-CM

## 2024-09-17 RX ORDER — AMLODIPINE BESYLATE 10 MG/1
TABLET ORAL
Qty: 90 TABLET | Refills: 0 | Status: SHIPPED | OUTPATIENT
Start: 2024-09-17

## 2024-09-21 ENCOUNTER — APPOINTMENT (OUTPATIENT)
Facility: HOSPITAL | Age: 64
End: 2024-09-21

## 2024-09-21 ENCOUNTER — HOSPITAL ENCOUNTER (EMERGENCY)
Facility: HOSPITAL | Age: 64
Discharge: HOME | End: 2024-09-21
Attending: STUDENT IN AN ORGANIZED HEALTH CARE EDUCATION/TRAINING PROGRAM

## 2024-09-21 VITALS
WEIGHT: 174.6 LBS | OXYGEN SATURATION: 95 % | RESPIRATION RATE: 19 BRPM | BODY MASS INDEX: 31.94 KG/M2 | SYSTOLIC BLOOD PRESSURE: 150 MMHG | TEMPERATURE: 98.1 F | HEART RATE: 79 BPM | DIASTOLIC BLOOD PRESSURE: 79 MMHG

## 2024-09-21 DIAGNOSIS — R07.89 CHEST WALL PAIN: Primary | ICD-10-CM

## 2024-09-21 LAB
ANION GAP SERPL CALC-SCNC: 9 MMOL/L (ref 2–12)
BASOPHILS # BLD: 0 K/UL (ref 0–0.1)
BASOPHILS NFR BLD: 0 % (ref 0–1)
BUN SERPL-MCNC: 14 MG/DL (ref 6–20)
BUN/CREAT SERPL: 13 (ref 12–20)
CALCIUM SERPL-MCNC: 9.3 MG/DL (ref 8.5–10.1)
CHLORIDE SERPL-SCNC: 104 MMOL/L (ref 97–108)
CO2 SERPL-SCNC: 26 MMOL/L (ref 21–32)
CREAT SERPL-MCNC: 1.04 MG/DL (ref 0.55–1.02)
DIFFERENTIAL METHOD BLD: NORMAL
EOSINOPHIL # BLD: 0.2 K/UL (ref 0–0.4)
EOSINOPHIL NFR BLD: 4 % (ref 0–7)
ERYTHROCYTE [DISTWIDTH] IN BLOOD BY AUTOMATED COUNT: 12.7 % (ref 11.5–14.5)
GLUCOSE SERPL-MCNC: 119 MG/DL (ref 65–100)
HCT VFR BLD AUTO: 35.4 % (ref 35–47)
HGB BLD-MCNC: 11.7 G/DL (ref 11.5–16)
IMM GRANULOCYTES # BLD AUTO: 0 K/UL (ref 0–0.04)
IMM GRANULOCYTES NFR BLD AUTO: 0 % (ref 0–0.5)
LYMPHOCYTES # BLD: 2.2 K/UL (ref 0.8–3.5)
LYMPHOCYTES NFR BLD: 33 % (ref 12–49)
MCH RBC QN AUTO: 28.5 PG (ref 26–34)
MCHC RBC AUTO-ENTMCNC: 33.1 G/DL (ref 30–36.5)
MCV RBC AUTO: 86.3 FL (ref 80–99)
MONOCYTES # BLD: 0.6 K/UL (ref 0–1)
MONOCYTES NFR BLD: 9 % (ref 5–13)
NEUTS SEG # BLD: 3.7 K/UL (ref 1.8–8)
NEUTS SEG NFR BLD: 54 % (ref 32–75)
NRBC # BLD: 0 K/UL (ref 0–0.01)
NRBC BLD-RTO: 0 PER 100 WBC
PLATELET # BLD AUTO: 367 K/UL (ref 150–400)
PMV BLD AUTO: 9.2 FL (ref 8.9–12.9)
POTASSIUM SERPL-SCNC: 3.6 MMOL/L (ref 3.5–5.1)
RBC # BLD AUTO: 4.1 M/UL (ref 3.8–5.2)
SODIUM SERPL-SCNC: 139 MMOL/L (ref 136–145)
TROPONIN I SERPL HS-MCNC: 5 NG/L (ref 0–51)
WBC # BLD AUTO: 6.8 K/UL (ref 3.6–11)

## 2024-09-21 PROCEDURE — 84484 ASSAY OF TROPONIN QUANT: CPT

## 2024-09-21 PROCEDURE — 71045 X-RAY EXAM CHEST 1 VIEW: CPT

## 2024-09-21 PROCEDURE — 80048 BASIC METABOLIC PNL TOTAL CA: CPT

## 2024-09-21 PROCEDURE — 6370000000 HC RX 637 (ALT 250 FOR IP): Performed by: STUDENT IN AN ORGANIZED HEALTH CARE EDUCATION/TRAINING PROGRAM

## 2024-09-21 PROCEDURE — 93005 ELECTROCARDIOGRAM TRACING: CPT | Performed by: STUDENT IN AN ORGANIZED HEALTH CARE EDUCATION/TRAINING PROGRAM

## 2024-09-21 PROCEDURE — 99285 EMERGENCY DEPT VISIT HI MDM: CPT

## 2024-09-21 PROCEDURE — 85025 COMPLETE CBC W/AUTO DIFF WBC: CPT

## 2024-09-21 RX ORDER — IBUPROFEN 600 MG/1
600 TABLET, FILM COATED ORAL
Status: COMPLETED | OUTPATIENT
Start: 2024-09-21 | End: 2024-09-21

## 2024-09-21 RX ORDER — ACETAMINOPHEN 500 MG
1000 TABLET ORAL 3 TIMES DAILY PRN
Qty: 100 TABLET | Refills: 0 | Status: SHIPPED | OUTPATIENT
Start: 2024-09-21

## 2024-09-21 RX ORDER — IBUPROFEN 600 MG/1
600 TABLET, FILM COATED ORAL EVERY 6 HOURS PRN
Qty: 50 TABLET | Refills: 1 | Status: SHIPPED | OUTPATIENT
Start: 2024-09-21

## 2024-09-21 RX ORDER — LIDOCAINE 4 G/G
1 PATCH TOPICAL
Status: DISCONTINUED | OUTPATIENT
Start: 2024-09-21 | End: 2024-09-21 | Stop reason: HOSPADM

## 2024-09-21 RX ORDER — METHOCARBAMOL 750 MG/1
750 TABLET, FILM COATED ORAL 4 TIMES DAILY
Qty: 80 TABLET | Refills: 0 | Status: SHIPPED | OUTPATIENT
Start: 2024-09-21 | End: 2024-10-11

## 2024-09-21 RX ORDER — LIDOCAINE 4 G/G
1 PATCH TOPICAL DAILY
Qty: 30 PATCH | Refills: 0 | Status: SHIPPED | OUTPATIENT
Start: 2024-09-21 | End: 2024-10-21

## 2024-09-21 RX ADMIN — IBUPROFEN 600 MG: 600 TABLET, FILM COATED ORAL at 17:06

## 2024-09-21 ASSESSMENT — LIFESTYLE VARIABLES
HOW MANY STANDARD DRINKS CONTAINING ALCOHOL DO YOU HAVE ON A TYPICAL DAY: PATIENT DOES NOT DRINK
HOW OFTEN DO YOU HAVE A DRINK CONTAINING ALCOHOL: NEVER

## 2024-09-21 ASSESSMENT — PAIN DESCRIPTION - DESCRIPTORS
DESCRIPTORS: ACHING
DESCRIPTORS: SHARP

## 2024-09-21 ASSESSMENT — PAIN DESCRIPTION - PAIN TYPE: TYPE: ACUTE PAIN

## 2024-09-21 ASSESSMENT — PAIN DESCRIPTION - LOCATION
LOCATION: CHEST
LOCATION: CHEST

## 2024-09-21 ASSESSMENT — PAIN DESCRIPTION - ORIENTATION
ORIENTATION: LEFT;ANTERIOR
ORIENTATION: LEFT

## 2024-09-21 ASSESSMENT — PAIN - FUNCTIONAL ASSESSMENT
PAIN_FUNCTIONAL_ASSESSMENT: ACTIVITIES ARE NOT PREVENTED
PAIN_FUNCTIONAL_ASSESSMENT: ACTIVITIES ARE NOT PREVENTED

## 2024-09-21 ASSESSMENT — PAIN SCALES - GENERAL
PAINLEVEL_OUTOF10: 9
PAINLEVEL_OUTOF10: 9
PAINLEVEL_OUTOF10: 4

## 2024-09-21 NOTE — ED PROVIDER NOTES
[facility] EMERGENCY DEPT  EMERGENCY DEPARTMENT ENCOUNTER       Pt Name: Janeth Ford  MRN: [id number]  Birthdate 1960  Date of evaluation: 9/21/2024  Provider: Coy Augustin MD   PCP: Tali Danielle MD  Note Started: 5:44 PM EDT 9/21/24     CHIEF COMPLAINT       Chief Complaint   Patient presents with    Chest Pain     Ambulatory c/o muscular chest pain, states 9/10 spasms. Has been seen for this before here and at [facility]. States pain is making it difficult to breathe.        HISTORY OF PRESENT ILLNESS: 1 or more elements      History From: Patient  HPI Limitations: None     Janeth Ford is a 64 y.o. female who presents for chest pain.  She reports a history of muscular chest wall spasms and states that she has had pain like this previously.  She states the pain is on and off and has had little relief today.  She reports pain with deep inspiration.  She denies fever, chills, cough, rash, abdominal pain, nausea vomit, diarrhea.  No falls or injuries.  She reports the spasms started in her left chest wall and radiated to the left shoulder, left neck.  She states she has been seen for the [facility] and had a negative workup in regards to her heart.     Nursing Notes were all reviewed and agreed with or any disagreements were addressed in the HPI.     REVIEW OF SYSTEMS      Review of Systems     Positives and Pertinent negatives as per HPI.    PAST HISTORY     Past Medical History:  Past Medical History:   Diagnosis Date    Arrhythmia     Arthritis     GENERALIZED TO JOINTS-WRISTS & KNEES.    Asthma     INHALERS    Asthma, chronic 10/8/2009    Diabetes (HCC)     USES PILLS TO CONTROLL    GERD (gastroesophageal reflux disease)     Gout     Hypertension     CONTROLLED BY MEDS.    Nausea & vomiting     Obesity          Past Surgical History:  Past Surgical History:   Procedure Laterality Date    BREAST BIOPSY Right 2018    benign    CARDIAC CATHETERIZATION  98,2011    CARDIAC CATH X2    CHOLECYSTECTOMY       2 SPRAYS IN EACH NOSTRIL ONCE A DAY. INDICATIONS: INFLAMMATION OF THE NOSE DUE TO AN ALLERGY     glipiZIDE 5 MG extended release tablet  Commonly known as: GLUCOTROL XL  TAKE 1 TABLET BY MOUTH EVERY DAY     Janumet XR  MG Tb24 per extended release tablet  Generic drug: SITagliptin-metFORMIN  Take 1 tablet by mouth 2 times daily (With meals)     omeprazole 40 MG delayed release capsule  Commonly known as: PRILOSEC     * OneTouch Verio strip  Generic drug: blood glucose test strips     * blood glucose test strips strip  Commonly known as: ASCENSIA AUTODISC VI;ONE TOUCH ULTRA TEST VI  TEST ONCE DAILY DX E11.22     rosuvastatin 5 MG tablet  Commonly known as: CRESTOR  TAKE 1 TABLET BY MOUTH EVERY DAY AT NIGHT     traMADol HCl 25 MG Tabs     triamterene-hydroCHLOROthiazide 37.5-25 MG per tablet  Commonly known as: MAXZIDE-25  TAKE 1 TABLET BY MOUTH EVERY DAY IN THE MORNING           * This list has 2 medication(s) that are the same as other medications prescribed for you. Read the directions carefully, and ask your doctor or other care provider to review them with you.                   Where to Get Your Medications        These medications were sent to [facility]/pharmacy #[id number] [place], [place] - [address] - P [phone] - F [phone]  [facility] [facility] [address]      Phone: [phone]   acetaminophen 500 MG tablet  ibuprofen 600 MG tablet  lidocaine 4 % external patch  methocarbamol 750 MG tablet           DISCONTINUED MEDICATIONS:  Current Discharge Medication List          I am the Primary Clinician of Record.   Coy Augustin MD (electronically signed)      (Please note that parts of this dictation were completed with voice recognition software. Quite often unanticipated grammatical, syntax, homophones, and other interpretive errors are inadvertently transcribed by the computer software. Please disregards these errors. Please excuse any errors that have escaped final

## 2024-09-22 LAB
EKG ATRIAL RATE: 86 BPM
EKG DIAGNOSIS: NORMAL
EKG P AXIS: 56 DEGREES
EKG P-R INTERVAL: 154 MS
EKG Q-T INTERVAL: 360 MS
EKG QRS DURATION: 72 MS
EKG QTC CALCULATION (BAZETT): 430 MS
EKG R AXIS: 41 DEGREES
EKG T AXIS: 129 DEGREES
EKG VENTRICULAR RATE: 86 BPM

## 2024-10-14 ENCOUNTER — HOSPITAL ENCOUNTER (EMERGENCY)
Facility: HOSPITAL | Age: 64
Discharge: HOME | End: 2024-10-14
Attending: STUDENT IN AN ORGANIZED HEALTH CARE EDUCATION/TRAINING PROGRAM
Payer: COMMERCIAL

## 2024-10-14 VITALS
OXYGEN SATURATION: 95 % | DIASTOLIC BLOOD PRESSURE: 83 MMHG | WEIGHT: 174.16 LBS | SYSTOLIC BLOOD PRESSURE: 152 MMHG | BODY MASS INDEX: 31.85 KG/M2 | TEMPERATURE: 97.9 F | HEART RATE: 89 BPM | RESPIRATION RATE: 18 BRPM

## 2024-10-14 DIAGNOSIS — M17.0 BILATERAL PRIMARY OSTEOARTHRITIS OF KNEE: ICD-10-CM

## 2024-10-14 DIAGNOSIS — M54.32 SCIATICA OF LEFT SIDE: Primary | ICD-10-CM

## 2024-10-14 LAB
FLUAV RNA SPEC QL NAA+PROBE: NOT DETECTED
FLUBV RNA SPEC QL NAA+PROBE: NOT DETECTED
GLUCOSE BLD STRIP.AUTO-MCNC: 175 MG/DL (ref 65–117)
SARS-COV-2 RNA RESP QL NAA+PROBE: NOT DETECTED
SERVICE CMNT-IMP: ABNORMAL
SOURCE: NORMAL

## 2024-10-14 PROCEDURE — 82962 GLUCOSE BLOOD TEST: CPT

## 2024-10-14 PROCEDURE — 99283 EMERGENCY DEPT VISIT LOW MDM: CPT

## 2024-10-14 PROCEDURE — 87636 SARSCOV2 & INF A&B AMP PRB: CPT

## 2024-10-14 PROCEDURE — 6370000000 HC RX 637 (ALT 250 FOR IP): Performed by: STUDENT IN AN ORGANIZED HEALTH CARE EDUCATION/TRAINING PROGRAM

## 2024-10-14 RX ORDER — LIDOCAINE 4 G/G
1 PATCH TOPICAL
Status: DISCONTINUED | OUTPATIENT
Start: 2024-10-14 | End: 2024-10-14 | Stop reason: HOSPADM

## 2024-10-14 RX ORDER — ACETAMINOPHEN 500 MG
1000 TABLET ORAL
Status: COMPLETED | OUTPATIENT
Start: 2024-10-14 | End: 2024-10-14

## 2024-10-14 RX ORDER — DICLOFENAC SODIUM 75 MG/1
75 TABLET, DELAYED RELEASE ORAL 2 TIMES DAILY
Qty: 60 TABLET | Refills: 0 | Status: SHIPPED | OUTPATIENT
Start: 2024-10-14 | End: 2024-10-14

## 2024-10-14 RX ORDER — DICLOFENAC SODIUM 75 MG/1
75 TABLET, DELAYED RELEASE ORAL 2 TIMES DAILY
Qty: 60 TABLET | Refills: 0 | Status: SHIPPED | OUTPATIENT
Start: 2024-10-14

## 2024-10-14 RX ADMIN — ACETAMINOPHEN 1000 MG: 500 TABLET ORAL at 14:55

## 2024-10-14 ASSESSMENT — PAIN DESCRIPTION - LOCATION: LOCATION: HIP

## 2024-10-14 ASSESSMENT — PAIN SCALES - GENERAL: PAINLEVEL_OUTOF10: 9

## 2024-10-14 ASSESSMENT — PAIN DESCRIPTION - ORIENTATION: ORIENTATION: LEFT

## 2024-10-14 ASSESSMENT — ENCOUNTER SYMPTOMS
CONSTIPATION: 1
NAUSEA: 1

## 2024-10-14 NOTE — DISCHARGE INSTRUCTIONS
Continue taking your diclofenac as needed for joint aches and pains as well as for your sciatica.  Follow-up with your insurance company regarding refill of your diabetes medication.    Your COVID and flu test will be on your [employer] results, please follow-up for results of these test on [employer], as the results will not be called to you.

## 2024-10-14 NOTE — ED NOTES
Glucose check completed - 175 mg/dl,  reported that she has not been taking her oral glycemic meds for 30 days.

## 2024-10-14 NOTE — ED TRIAGE NOTES
ED triage note: Ambulatory. Patient reports pain in left hip  x 1 week. Reports has also not had any of her diabetes medications in a month because of insurance. No known injury.

## 2024-10-14 NOTE — ED PROVIDER NOTES
([facility])      budesonide-formoterol (SYMBICORT) 160-4.5 MCG/ACT AERO INHALE 2 PUFFS INTO THE LUNGS TWICE A DAY  Qty: 10.2 each, Refills: 2    Associated Diagnoses: Moderate persistent asthma without complication      carvedilol (COREG) 6.25 MG tablet TAKE 1 TABLET BY MOUTH TWICE A DAY  Qty: 180 tablet, Refills: 1    Associated Diagnoses: Primary hypertension      triamterene-hydroCHLOROthiazide (MAXZIDE-25) 37.5-25 MG per tablet TAKE 1 TABLET BY MOUTH EVERY DAY IN THE MORNING  Qty: 90 tablet, Refills: 3    Associated Diagnoses: Primary hypertension      SITagliptin-metFORMIN (JANUMET XR)  MG TB24 per extended release tablet Take 1 tablet by mouth 2 times daily (With meals)  Qty: 90 tablet, Refills: 3    Associated Diagnoses: Type 2 diabetes mellitus with diabetic polyneuropathy ([facility])      traMADol HCl 25 MG TABS Take by mouth      !! blood glucose test strips (ASCENSIA AUTODISC VI;ONE TOUCH ULTRA TEST VI) strip TEST ONCE DAILY DX E11.22  Qty: 100 each, Refills: 5    Associated Diagnoses: Type 2 diabetes mellitus without complication, without long-term current use of insulin ([facility])      rosuvastatin (CRESTOR) 5 MG tablet TAKE 1 TABLET BY MOUTH EVERY DAY AT NIGHT  Qty: 90 tablet, Refills: 3      clobetasol (TEMOVATE) 0.05 % cream APPLY TO RASH TWICE DAILY AS NEEDED  Qty: 30 g, Refills: 1    Associated Diagnoses: Urticaria, unspecified      !! ONETOUCH VERIO strip TEST ONCE DAILY DX E11.22      albuterol (PROVENTIL) (2.5 MG/3ML) 0.083% nebulizer solution Take 3 mLs by nebulization 3 times daily as needed for Shortness of Breath or Wheezing  Qty: 60 each, Refills: 5    Associated Diagnoses: Moderate persistent asthma without complication      Lancets MISC Test once daily DX E11.22  Qty: 100 each, Refills: 3      aspirin 81 MG EC tablet Take by mouth daily      omeprazole (PRILOSEC) 40 MG delayed release capsule Take 1 capsule by mouth 2 times daily       !! - Potential duplicate medications found. Please discuss with

## 2024-10-15 DIAGNOSIS — E11.42 TYPE 2 DIABETES MELLITUS WITH DIABETIC POLYNEUROPATHY (HCC): ICD-10-CM

## 2024-10-15 NOTE — TELEPHONE ENCOUNTER
PCP: Tali Danielle MD     Last appt:  3/27/2024      Future Appointments   Date Time Provider Department Center   1/29/2025  8:10 AM Tali Danielle MD [facility] DEP          Requested Prescriptions     Pending Prescriptions Disp Refills    SITagliptin-metFORMIN (JANUMET XR)  MG TB24 per extended release tablet 90 tablet 3     Sig: Take 1 tablet by mouth 2 times daily (With meals)

## 2024-10-15 NOTE — TELEPHONE ENCOUNTER
Pt called and would like all medications to go to the [facility] pharmacy now.     Caller requests Refill of:  SITagliptin-metFORMIN (JANUMET XR)  MG TB24 per extended release tablet       Please send to:  [id number] - [place], [place] -       Visit / Appointment History:  Future Appointment at [facility]:  1/29/2025   Last Appointment With PCP:  3/27/2024       Caller confirmed instructions and dosages as correct.    Caller was advised that Meds will be refilled as soon as possible, however there can be a 48-72 business hour turn around on refill requests.

## 2024-10-16 RX ORDER — SITAGLIPTIN AND METFORMIN HYDROCHLORIDE 1000; 50 MG/1; MG/1
1 TABLET, FILM COATED, EXTENDED RELEASE ORAL 2 TIMES DAILY
Qty: 90 TABLET | Refills: 3 | Status: SHIPPED | OUTPATIENT
Start: 2024-10-16 | End: 2024-10-18 | Stop reason: SDUPTHER

## 2024-10-17 ENCOUNTER — TELEPHONE (OUTPATIENT)
Facility: CLINIC | Age: 64
End: 2024-10-17

## 2024-10-17 NOTE — TELEPHONE ENCOUNTER
Pt stated she needs provider to sign form for insurance to verify she has diabetes in order to get meds in the mail and get insurance card before January 1st. No availability found for appt

## 2024-10-18 DIAGNOSIS — E11.42 TYPE 2 DIABETES MELLITUS WITH DIABETIC POLYNEUROPATHY (HCC): ICD-10-CM

## 2024-10-18 RX ORDER — SITAGLIPTIN AND METFORMIN HYDROCHLORIDE 1000; 50 MG/1; MG/1
1 TABLET, FILM COATED, EXTENDED RELEASE ORAL 2 TIMES DAILY
Qty: 90 TABLET | Refills: 3 | Status: SHIPPED | OUTPATIENT
Start: 2024-10-18

## 2024-10-18 NOTE — TELEPHONE ENCOUNTER
PCP: Tali Danielle MD     Last appt:  3/27/2024      Future Appointments   Date Time Provider Department Center   1/29/2025  8:10 AM Tali Danielle MD [facility] DEP          Requested Prescriptions     Pending Prescriptions Disp Refills    SITagliptin-metFORMIN (JANUMET XR)  MG TB24 per extended release tablet 90 tablet 3     Sig: Take 1 tablet by mouth 2 times daily (With meals)

## 2024-10-18 NOTE — TELEPHONE ENCOUNTER
Caller requests Refill of:   SITagliptin-metFORMIN (JANUMET XR)  MG         Please send to:  [facility] [address]      Visit Appointment History:  Future Appointments:  Future Appointments   Date Time Provider Department Center   1/29/2025  8:10 AM Tali Danielle MD [facility] DEP         Last Appointment With Me:  3/27/2024         Caller confirmed instructions and dosages as correct.    Caller was advised that Meds will be refilled as soon as possible, however there can be a 48-72 business hour turn around on refill requests.

## 2024-10-21 ENCOUNTER — TELEPHONE (OUTPATIENT)
Facility: CLINIC | Age: 64
End: 2024-10-21

## 2024-10-21 DIAGNOSIS — E55.9 VITAMIN D DEFICIENCY: ICD-10-CM

## 2024-10-21 DIAGNOSIS — I10 PRIMARY HYPERTENSION: ICD-10-CM

## 2024-10-21 DIAGNOSIS — E11.51 DIABETES MELLITUS WITH PERIPHERAL VASCULAR DISEASE (HCC): Primary | ICD-10-CM

## 2024-10-23 NOTE — TELEPHONE ENCOUNTER
Spoke to pt verified name and . She is aware of ordered lab work. She is planning on coming in tomorrow to have them drawn.

## 2024-10-24 ENCOUNTER — LAB (OUTPATIENT)
Facility: CLINIC | Age: 64
End: 2024-10-24

## 2024-10-24 DIAGNOSIS — E55.9 VITAMIN D DEFICIENCY: ICD-10-CM

## 2024-10-24 DIAGNOSIS — E11.51 DIABETES MELLITUS WITH PERIPHERAL VASCULAR DISEASE (HCC): ICD-10-CM

## 2024-10-28 DIAGNOSIS — E11.51 DIABETES MELLITUS WITH PERIPHERAL VASCULAR DISEASE (HCC): Primary | ICD-10-CM

## 2024-10-28 DIAGNOSIS — I10 PRIMARY HYPERTENSION: ICD-10-CM

## 2024-10-29 LAB
ALBUMIN SERPL-MCNC: 3.6 G/DL (ref 3.5–5)
ALBUMIN/GLOB SERPL: 0.9 (ref 1.1–2.2)
ALP SERPL-CCNC: 82 U/L (ref 45–117)
ALT SERPL-CCNC: 14 U/L (ref 12–78)
ANION GAP SERPL CALC-SCNC: 7 MMOL/L (ref 2–12)
AST SERPL-CCNC: 9 U/L (ref 15–37)
BILIRUB SERPL-MCNC: 0.2 MG/DL (ref 0.2–1)
BUN SERPL-MCNC: 11 MG/DL (ref 6–20)
BUN/CREAT SERPL: 11 (ref 12–20)
CALCIUM SERPL-MCNC: 9.5 MG/DL (ref 8.5–10.1)
CHLORIDE SERPL-SCNC: 104 MMOL/L (ref 97–108)
CO2 SERPL-SCNC: 29 MMOL/L (ref 21–32)
CREAT SERPL-MCNC: 1.04 MG/DL (ref 0.55–1.02)
EST. AVERAGE GLUCOSE BLD GHB EST-MCNC: 174 MG/DL
GLOBULIN SER CALC-MCNC: 3.8 G/DL (ref 2–4)
GLUCOSE SERPL-MCNC: 102 MG/DL (ref 65–100)
HBA1C MFR BLD: 7.7 % (ref 4–5.6)
POTASSIUM SERPL-SCNC: 4.1 MMOL/L (ref 3.5–5.1)
PROT SERPL-MCNC: 7.4 G/DL (ref 6.4–8.2)
SODIUM SERPL-SCNC: 140 MMOL/L (ref 136–145)

## 2024-10-31 ENCOUNTER — OFFICE VISIT (OUTPATIENT)
Facility: CLINIC | Age: 64
End: 2024-10-31

## 2024-10-31 VITALS
HEIGHT: 62 IN | RESPIRATION RATE: 16 BRPM | WEIGHT: 172.4 LBS | HEART RATE: 79 BPM | OXYGEN SATURATION: 99 % | TEMPERATURE: 98.1 F | DIASTOLIC BLOOD PRESSURE: 64 MMHG | BODY MASS INDEX: 31.73 KG/M2 | SYSTOLIC BLOOD PRESSURE: 138 MMHG

## 2024-10-31 DIAGNOSIS — N18.2 CHRONIC KIDNEY INSUFFICIENCY, STAGE 2 (MILD): ICD-10-CM

## 2024-10-31 DIAGNOSIS — E11.51 DIABETES MELLITUS WITH PERIPHERAL VASCULAR DISEASE (HCC): Primary | ICD-10-CM

## 2024-10-31 DIAGNOSIS — Z02.89 ENCOUNTER FOR COMPLETION OF FORM WITH PATIENT: ICD-10-CM

## 2024-10-31 DIAGNOSIS — I10 PRIMARY HYPERTENSION: ICD-10-CM

## 2024-10-31 SDOH — ECONOMIC STABILITY: FOOD INSECURITY: WITHIN THE PAST 12 MONTHS, YOU WORRIED THAT YOUR FOOD WOULD RUN OUT BEFORE YOU GOT MONEY TO BUY MORE.: NEVER TRUE

## 2024-10-31 SDOH — ECONOMIC STABILITY: FOOD INSECURITY: WITHIN THE PAST 12 MONTHS, THE FOOD YOU BOUGHT JUST DIDN'T LAST AND YOU DIDN'T HAVE MONEY TO GET MORE.: NEVER TRUE

## 2024-10-31 SDOH — ECONOMIC STABILITY: INCOME INSECURITY: HOW HARD IS IT FOR YOU TO PAY FOR THE VERY BASICS LIKE FOOD, HOUSING, MEDICAL CARE, AND HEATING?: NOT HARD AT ALL

## 2024-10-31 NOTE — PROGRESS NOTES
falls in past year? no no      Fall with injury in past year? no no      Fall in past 12 months?     1   Able to walk?     Yes   Total Score   0 0        Abuse Screening:  :          No data to display                ADL Screening:  :         3/13/2024     8:00 AM   ADL ASSESSMENT   Feeding yourself No Help Needed   Getting from bed to chair No Help Needed   Getting dressed No Help Needed   Bathing or showering No Help Needed   Walk across the room (includes cane/walker) No Help Needed   Using the telphone No Help Needed   Taking your medications No Help Needed   Preparing meals No Help Needed   Managing money (expenses/bills) No Help Needed   Moderately strenuous housework (laundry) No Help Needed   Shopping for personal items (toiletries/medicines) No Help Needed   Shopping for groceries No Help Needed   Driving No Help Needed   Climbing a flight of stairs No Help Needed   Getting to places beyond walking distances No Help Needed         Medication reconciliation up to date and corrected with patient at this time.

## 2024-10-31 NOTE — PROGRESS NOTES
nodule (HCC)    Fibroadenoma of right breast    Vitamin D deficiency    GERD (gastroesophageal reflux disease)    ACE inhibitor intolerance    Diabetes mellitus with peripheral vascular disease (HCC)    Allergic rhinitis due to allergen    HTN (hypertension)    Type 2 diabetes mellitus without complication, without long-term current use of insulin (HCC)     Past Medical History:   Diagnosis Date    Arrhythmia     Arthritis     GENERALIZED TO JOINTS-WRISTS & KNEES.    Asthma     INHALERS    Asthma, chronic 10/8/2009    Diabetes ([facility])     USES PILLS TO CONTROLL    GERD (gastroesophageal reflux disease)     Gout     Hypertension     CONTROLLED BY MEDS.    Nausea & vomiting     Obesity      Allergies   Allergen Reactions    Latex Rash     Severe rash    Fd&C Blue #1 (Brilliant Blue) Anaphylaxis    Iodinated Contrast Media Hives and Swelling     Pt. Reports throat swelling  Pt. Reports throat swelling      Iodine Hives and Swelling     Pt. Reports throat swelling    Other Rash     Betadine soap/blisters    Penicillins Anaphylaxis and Hives    Povidone Iodine Hives    Ampicillin Hives    Beef-Derived Products     Cephalexin Other (See Comments)    Food      Seafood    Lisinopril Swelling    Pork-Derived Products Itching    English      Current Outpatient Medications   Medication Sig Dispense Refill    FEXOFENADINE HCL PO Take by mouth      SITagliptin-metFORMIN (JANUMET XR)  MG TB24 per extended release tablet Take 1 tablet by mouth 2 times daily (With meals) 90 tablet 3    diclofenac (VOLTAREN) 75 MG EC tablet Take 1 tablet by mouth 2 times daily 60 tablet 0    acetaminophen (TYLENOL) 500 MG tablet Take 2 tablets by mouth 3 times daily as needed for Pain 100 tablet 0    ibuprofen (ADVIL;MOTRIN) 600 MG tablet Take 1 tablet by mouth every 6 hours as needed for Pain 50 tablet 1    amLODIPine (NORVASC) 10 MG tablet TAKE 1 TABLET BY MOUTH EVERY DAY 90 tablet 0    diclofenac sodium (VOLTAREN) 1 % GEL Apply 2 g

## 2024-11-18 DIAGNOSIS — J45.40 MODERATE PERSISTENT ASTHMA WITHOUT COMPLICATION: ICD-10-CM

## 2024-11-18 DIAGNOSIS — E87.6 HYPOKALEMIA: ICD-10-CM

## 2024-11-19 RX ORDER — BLOOD SUGAR DIAGNOSTIC
STRIP MISCELLANEOUS
Qty: 25 STRIP | Refills: 23 | Status: SHIPPED | OUTPATIENT
Start: 2024-11-19

## 2024-11-19 RX ORDER — BUDESONIDE AND FORMOTEROL FUMARATE DIHYDRATE 160; 4.5 UG/1; UG/1
2 AEROSOL RESPIRATORY (INHALATION) 2 TIMES DAILY
Qty: 10.2 EACH | Refills: 3 | Status: SHIPPED | OUTPATIENT
Start: 2024-11-19

## 2024-11-19 RX ORDER — ROSUVASTATIN CALCIUM 5 MG/1
TABLET, COATED ORAL
Qty: 90 TABLET | Refills: 3 | Status: SHIPPED | OUTPATIENT
Start: 2024-11-19

## 2024-11-19 RX ORDER — POTASSIUM CHLORIDE 750 MG/1
TABLET, EXTENDED RELEASE ORAL
Qty: 90 TABLET | Refills: 3 | Status: SHIPPED | OUTPATIENT
Start: 2024-11-19

## 2024-11-19 NOTE — TELEPHONE ENCOUNTER
PCP: Tali Danielle MD     Last appt:  10/31/2024      Future Appointments   Date Time Provider Department Center   2/28/2025 11:10 AM Tali Danielle MD [facility] DEP          Requested Prescriptions     Pending Prescriptions Disp Refills    rosuvastatin (CRESTOR) 5 MG tablet [Pharmacy Med Name: ROSUVASTATIN CALCIUM 5 MG TAB] 90 tablet 3     Sig: TAKE 1 TABLET BY MOUTH EVERY DAY AT NIGHT    KLOR-CON M10 10 MEQ extended release tablet [Pharmacy Med Name: KLOR-CON M10 TABLET] 30 tablet 3     Sig: TAKE 1 TABLET BY MOUTH EVERY DAY    ONETOUCH VERIO strip [Pharmacy Med Name: ONE TOUCH VERIO TEST STRIP] 25 strip 23     Sig: TEST ONCE DAILY DX E11.22    SYMBICORT 160-4.5 MCG/ACT AERO [Pharmacy Med Name: SYMBICORT 160-4.5 MCG INHALER] 10.2 each 2     Sig: INHALE 2 PUFFS INTO THE LUNGS TWICE A DAY

## 2024-12-13 ENCOUNTER — HOSPITAL ENCOUNTER (EMERGENCY)
Facility: HOSPITAL | Age: 64
Discharge: HOME | End: 2024-12-13
Attending: EMERGENCY MEDICINE
Payer: MEDICAID

## 2024-12-13 VITALS
HEIGHT: 62 IN | SYSTOLIC BLOOD PRESSURE: 172 MMHG | DIASTOLIC BLOOD PRESSURE: 73 MMHG | HEART RATE: 68 BPM | WEIGHT: 172 LBS | BODY MASS INDEX: 31.65 KG/M2 | RESPIRATION RATE: 16 BRPM | OXYGEN SATURATION: 98 %

## 2024-12-13 DIAGNOSIS — H61.23 BILATERAL IMPACTED CERUMEN: Primary | ICD-10-CM

## 2024-12-13 LAB
FLUAV RNA SPEC QL NAA+PROBE: NOT DETECTED
FLUBV RNA SPEC QL NAA+PROBE: NOT DETECTED
S PYO DNA THROAT QL NAA+PROBE: NOT DETECTED
SARS-COV-2 RNA RESP QL NAA+PROBE: NOT DETECTED
SOURCE: NORMAL

## 2024-12-13 PROCEDURE — 87651 STREP A DNA AMP PROBE: CPT

## 2024-12-13 PROCEDURE — 87636 SARSCOV2 & INF A&B AMP PRB: CPT

## 2024-12-13 PROCEDURE — 6370000000 HC RX 637 (ALT 250 FOR IP): Performed by: EMERGENCY MEDICINE

## 2024-12-13 PROCEDURE — 99283 EMERGENCY DEPT VISIT LOW MDM: CPT

## 2024-12-13 PROCEDURE — 69209 REMOVE IMPACTED EAR WAX UNI: CPT

## 2024-12-13 RX ADMIN — DOCUSATE SODIUM 20 MG: 50 LIQUID ORAL at 13:03

## 2024-12-13 ASSESSMENT — PAIN - FUNCTIONAL ASSESSMENT: PAIN_FUNCTIONAL_ASSESSMENT: NONE - DENIES PAIN

## 2024-12-13 NOTE — ED NOTES
Reviewed discharge instructions with the patient, highlighting home care, the importance of medical follow-up with her ENT and regarding her blood pressure. Patient verbalizes understanding of the instructions given and states that her hearing is much improved in both ears. Gait steady.

## 2024-12-13 NOTE — ED TRIAGE NOTES
ED visit d/t (R) ear ache leading to (R) sided pressure and headache - onset of sxs, 3 days ago - Endorses, reports watching an eldery gentleman at this home 3 days ago - concerned with persistent sxs at home - pt due to return to work and states, \" she wants to get checked out before going back to work \" - also reports general weakness and dizziness exacerbated with quick movements of her head - stroke NIH score of 0 at this time - Denies change in mentation nor gait nor speech nor vision nor sensation / near syncopal / known sick contacts / N / V / D / fall nor trauma / abd pain / sob / cp;;

## 2024-12-13 NOTE — ED NOTES
Right ear irrigated per orders after Ducosate administration. Ducosate then applied to left ear and waiting 15 minutes prior to left ear irrigation. Patient with call bell at bedside.

## 2024-12-13 NOTE — ED PROVIDER NOTES
SOLUTION    Take 3 mLs by nebulization 3 times daily as needed for Shortness of Breath or Wheezing    AMLODIPINE (NORVASC) 10 MG TABLET    TAKE 1 TABLET BY MOUTH EVERY DAY    ASPIRIN 81 MG EC TABLET    Take by mouth daily    BLOOD GLUCOSE TEST STRIPS (ASCENSIA AUTODISC VI;ONE TOUCH ULTRA TEST VI) STRIP    TEST ONCE DAILY DX E11.22    BUDESONIDE-FORMOTEROL (SYMBICORT) 160-4.5 MCG/ACT AERO    INHALE 2 PUFFS INTO THE LUNGS TWICE A DAY    CARVEDILOL (COREG) 6.25 MG TABLET    TAKE 1 TABLET BY MOUTH TWICE A DAY    CLOBETASOL (TEMOVATE) 0.05 % CREAM    APPLY TO RASH TWICE DAILY AS NEEDED    DICLOFENAC (VOLTAREN) 75 MG EC TABLET    Take 1 tablet by mouth 2 times daily    DICLOFENAC SODIUM (VOLTAREN) 1 % GEL    Apply 2 g topically 4 times daily as needed for Pain    FEXOFENADINE HCL PO    Take by mouth    FLUTICASONE (FLONASE) 50 MCG/ACT NASAL SPRAY    INHALE 2 SPRAYS IN EACH NOSTRIL ONCE A DAY. INDICATIONS: INFLAMMATION OF THE NOSE DUE TO AN ALLERGY    GLIPIZIDE (GLUCOTROL XL) 5 MG EXTENDED RELEASE TABLET    TAKE 1 TABLET BY MOUTH EVERY DAY    IBUPROFEN (ADVIL;MOTRIN) 600 MG TABLET    Take 1 tablet by mouth every 6 hours as needed for Pain    LANCETS MISC    Test once daily DX E11.22    OMEPRAZOLE (PRILOSEC) 40 MG DELAYED RELEASE CAPSULE    Take 1 capsule by mouth 2 times daily    ONETOUCH VERIO STRIP    TEST ONCE DAILY DX E11.22    POTASSIUM CHLORIDE (KLOR-CON M10) 10 MEQ EXTENDED RELEASE TABLET    TAKE 1 TABLET BY MOUTH EVERY DAY    ROSUVASTATIN (CRESTOR) 5 MG TABLET    TAKE 1 TABLET BY MOUTH EVERY DAY AT NIGHT    SITAGLIPTIN-METFORMIN (JANUMET XR)  MG TB24 PER EXTENDED RELEASE TABLET    Take 1 tablet by mouth 2 times daily (With meals)    TRAMADOL HCL 25 MG TABS    Take by mouth    TRIAMTERENE-HYDROCHLOROTHIAZIDE (MAXZIDE-25) 37.5-25 MG PER TABLET    TAKE 1 TABLET BY MOUTH EVERY DAY IN THE MORNING       ALLERGIES     Latex, Fd&c blue #1 (brilliant blue), Iodinated contrast media, Iodine, Other, Penicillins,  of Data Reviewed  Labs: ordered.    Risk  OTC drugs.      64-year-old female presents with ear pressure and positional vertiginous type symptoms.  Ears appear clogged with earwax.  Rapid COVID and influenza swabs negative.  Rapid strep swab negative.    Ears were cleaned with Colace and saline with some improvement in her symptoms.  She was recommended ENT follow-up for further evaluation and return precautions were given for worsening or concerns.      REASSESSMENT          CONSULTS:  None    PROCEDURES:     Procedures            (Please note that portions of this note were completed with a voice recognition program.  Efforts were made to edit the dictations but occasionally words are mis-transcribed.)    Dru Vanegas DO (electronically signed)  Emergency Attending Physician              Dru Vanegas DO  12/16/24 [id number]

## 2025-01-07 ENCOUNTER — HOSPITAL ENCOUNTER (EMERGENCY)
Facility: HOSPITAL | Age: 65
Discharge: HOME | End: 2025-01-07
Attending: EMERGENCY MEDICINE
Payer: MEDICARE

## 2025-01-07 ENCOUNTER — APPOINTMENT (OUTPATIENT)
Facility: HOSPITAL | Age: 65
End: 2025-01-07
Payer: MEDICARE

## 2025-01-07 VITALS
TEMPERATURE: 98.2 F | RESPIRATION RATE: 16 BRPM | BODY MASS INDEX: 31.37 KG/M2 | DIASTOLIC BLOOD PRESSURE: 60 MMHG | HEART RATE: 77 BPM | WEIGHT: 171.52 LBS | OXYGEN SATURATION: 98 % | SYSTOLIC BLOOD PRESSURE: 155 MMHG

## 2025-01-07 DIAGNOSIS — R05.1 ACUTE COUGH: Primary | ICD-10-CM

## 2025-01-07 LAB
FLUAV RNA SPEC QL NAA+PROBE: NOT DETECTED
FLUBV RNA SPEC QL NAA+PROBE: NOT DETECTED
SARS-COV-2 RNA RESP QL NAA+PROBE: NOT DETECTED
SOURCE: NORMAL

## 2025-01-07 PROCEDURE — 87636 SARSCOV2 & INF A&B AMP PRB: CPT

## 2025-01-07 PROCEDURE — 99284 EMERGENCY DEPT VISIT MOD MDM: CPT

## 2025-01-07 PROCEDURE — 71046 X-RAY EXAM CHEST 2 VIEWS: CPT

## 2025-01-07 RX ORDER — BENZONATATE 100 MG/1
100 CAPSULE ORAL 3 TIMES DAILY PRN
Qty: 30 CAPSULE | Refills: 0 | Status: SHIPPED | OUTPATIENT
Start: 2025-01-07 | End: 2025-01-17

## 2025-01-07 ASSESSMENT — PAIN - FUNCTIONAL ASSESSMENT: PAIN_FUNCTIONAL_ASSESSMENT: NONE - DENIES PAIN

## 2025-01-07 NOTE — DISCHARGE INSTRUCTIONS
Discussed visit today.  Please continue to use your albuterol inhaler at home as needed.  If you find that this is not working any need to return to the emergency room for shortness of breath or wheezing then please do.  Please take the medication at home to help with your cough.    Return to the emergency room with any worsening of symptoms.

## 2025-01-07 NOTE — ED TRIAGE NOTES
Patient reports symptom onset of stuffy nose, cough, congestion and headache on Friday. Initially had a sore throat, but that went away. Cough is productive with white sputum.

## 2025-01-07 NOTE — ED PROVIDER NOTES
[facility]  EMERGENCY DEPARTMENT ENCOUNTER      Pt Name: Janeth Ford  MRN: [id number]  Birthdate 1960  Date of evaluation: 1/7/2025  Provider: Dmitri Maldonado PA-C    CHIEF COMPLAINT       Chief Complaint   Patient presents with    Cough         HISTORY OF PRESENT ILLNESS    Patient is a 65-year-old female with history of arthritis, asthma, diabetes, hypertension, GERD, gout, and obesity who presents emergency room with reports of a stuffy nose, cough, congestion, and headache on Friday.  Patient reports that she initially had a sore throat, but that went away.  Cough is productive with white sputum.  Patient reports that she has been hearing a few episodes of wheezing in which she is treating with her albuterol inhaler.  Patient reports she feels no short of breath and that her breathing is fine.  Patient reports she is supposed to work on Saturday, but does not want to especially since we are about to get another snow storm and needs a work note.  Patient reports that she does not like Mucinex because it made her feel dizzy so she has not been taking anything. Patient denies chest pain, shortness of breath, abdominal pain, urinary symptoms, nausea or vomiting, diarrhea or constipation, headache, dizziness, lightheadedness, fever or chills.  Patient denies alcohol use, former cigarette smoking, denies vaping or illicit drug use          Nursing Notes were reviewed.    REVIEW OF SYSTEMS       Review of Systems      PAST MEDICAL HISTORY     Past Medical History:   Diagnosis Date    Arrhythmia     Arthritis     GENERALIZED TO JOINTS-WRISTS & KNEES.    Asthma     INHALERS    Asthma, chronic 10/8/2009    Diabetes (HCC)     USES PILLS TO CONTROLL    GERD (gastroesophageal reflux disease)     Gout     Hypertension     CONTROLLED BY MEDS.    Nausea & vomiting     Obesity          SURGICAL HISTORY       Past Surgical History:   Procedure Laterality Date    BREAST BIOPSY Right 2018    benign    CARDIAC

## 2025-01-16 ENCOUNTER — OFFICE VISIT (OUTPATIENT)
Facility: CLINIC | Age: 65
End: 2025-01-16
Payer: MEDICARE

## 2025-01-16 VITALS
OXYGEN SATURATION: 99 % | HEART RATE: 72 BPM | TEMPERATURE: 98.7 F | RESPIRATION RATE: 16 BRPM | SYSTOLIC BLOOD PRESSURE: 136 MMHG | WEIGHT: 173.8 LBS | BODY MASS INDEX: 31.98 KG/M2 | DIASTOLIC BLOOD PRESSURE: 64 MMHG | HEIGHT: 62 IN

## 2025-01-16 DIAGNOSIS — J01.10 ACUTE FRONTAL SINUSITIS, RECURRENCE NOT SPECIFIED: ICD-10-CM

## 2025-01-16 DIAGNOSIS — E11.51 DIABETES MELLITUS WITH PERIPHERAL VASCULAR DISEASE (HCC): ICD-10-CM

## 2025-01-16 DIAGNOSIS — E55.9 VITAMIN D DEFICIENCY: ICD-10-CM

## 2025-01-16 DIAGNOSIS — J20.9 ACUTE BRONCHITIS, UNSPECIFIED ORGANISM: Primary | ICD-10-CM

## 2025-01-16 DIAGNOSIS — I10 PRIMARY HYPERTENSION: ICD-10-CM

## 2025-01-16 DIAGNOSIS — H61.23 BILATERAL IMPACTED CERUMEN: ICD-10-CM

## 2025-01-16 PROCEDURE — 1036F TOBACCO NON-USER: CPT | Performed by: INTERNAL MEDICINE

## 2025-01-16 PROCEDURE — 3078F DIAST BP <80 MM HG: CPT | Performed by: INTERNAL MEDICINE

## 2025-01-16 PROCEDURE — 3046F HEMOGLOBIN A1C LEVEL >9.0%: CPT | Performed by: INTERNAL MEDICINE

## 2025-01-16 PROCEDURE — 1090F PRES/ABSN URINE INCON ASSESS: CPT | Performed by: INTERNAL MEDICINE

## 2025-01-16 PROCEDURE — G8427 DOCREV CUR MEDS BY ELIG CLIN: HCPCS | Performed by: INTERNAL MEDICINE

## 2025-01-16 PROCEDURE — 99213 OFFICE O/P EST LOW 20 MIN: CPT | Performed by: INTERNAL MEDICINE

## 2025-01-16 PROCEDURE — G8417 CALC BMI ABV UP PARAM F/U: HCPCS | Performed by: INTERNAL MEDICINE

## 2025-01-16 PROCEDURE — G8400 PT W/DXA NO RESULTS DOC: HCPCS | Performed by: INTERNAL MEDICINE

## 2025-01-16 PROCEDURE — 3075F SYST BP GE 130 - 139MM HG: CPT | Performed by: INTERNAL MEDICINE

## 2025-01-16 PROCEDURE — 3017F COLORECTAL CA SCREEN DOC REV: CPT | Performed by: INTERNAL MEDICINE

## 2025-01-16 PROCEDURE — 1123F ACP DISCUSS/DSCN MKR DOCD: CPT | Performed by: INTERNAL MEDICINE

## 2025-01-16 PROCEDURE — 2022F DILAT RTA XM EVC RTNOPTHY: CPT | Performed by: INTERNAL MEDICINE

## 2025-01-16 RX ORDER — DOXYCYCLINE HYCLATE 100 MG
100 TABLET ORAL 2 TIMES DAILY
Qty: 20 TABLET | Refills: 0 | Status: SHIPPED | OUTPATIENT
Start: 2025-01-16 | End: 2025-01-26

## 2025-01-16 RX ORDER — GUAIFENESIN 200 MG/10ML
200 LIQUID ORAL 3 TIMES DAILY PRN
Qty: 180 ML | Refills: 0 | Status: SHIPPED | OUTPATIENT
Start: 2025-01-16 | End: 2025-01-23

## 2025-01-16 SDOH — ECONOMIC STABILITY: FOOD INSECURITY: WITHIN THE PAST 12 MONTHS, THE FOOD YOU BOUGHT JUST DIDN'T LAST AND YOU DIDN'T HAVE MONEY TO GET MORE.: NEVER TRUE

## 2025-01-16 SDOH — ECONOMIC STABILITY: FOOD INSECURITY: WITHIN THE PAST 12 MONTHS, YOU WORRIED THAT YOUR FOOD WOULD RUN OUT BEFORE YOU GOT MONEY TO BUY MORE.: NEVER TRUE

## 2025-01-16 ASSESSMENT — PATIENT HEALTH QUESTIONNAIRE - PHQ9
1. LITTLE INTEREST OR PLEASURE IN DOING THINGS: NOT AT ALL
SUM OF ALL RESPONSES TO PHQ QUESTIONS 1-9: 0
SUM OF ALL RESPONSES TO PHQ9 QUESTIONS 1 & 2: 0
2. FEELING DOWN, DEPRESSED OR HOPELESS: NOT AT ALL
SUM OF ALL RESPONSES TO PHQ QUESTIONS 1-9: 0

## 2025-01-16 NOTE — PROGRESS NOTES
Chief Complaint   Patient presents with    Cough     History of Present Illness  The patient is a 65-year-old female who presents for an acute care visit for a cough.    She was seen at [facility] on 01/07/2025 and diagnosed with an acute cough, which was felt to be viral in nature. She had negative COVID and flu tests. She was discharged on benzonatate 100 mg 3 times daily as needed for cough that has not been helping her cough.    She reports experiencing a cough and occasional wheezing, which have been ongoing for the past 2 weeks. The cough is productive, particularly in the early morning, but the sputum is clear. She also reports chest congestion, sinus pressure, and intermittent mild shortness of breath controlled with albuterol inhaler. She has been using her inhaler more frequently than usual. She took Mucinex for over almost 2 weeks but stopped it a few days ago because it was not helping her symptoms. This morning, she suspected a fever and took 2 Tylenol tablets at 5:00 AM. She reports no headaches, ear pain, sore throat, chest tightness, or leg swelling. She has been off work for a week due to her symptoms. She works 3 days a week at [employer]. She is seeking a note for her employer stating that she is not contagious.     She has a history of cerumen impaction and requires regular cleaning by an ENT specialist, Dr. Fritz. She has not had her ears cleaned in over 4 months.    Patient Active Problem List   Diagnosis    Moderate persistent asthma without complication    Primary osteoarthritis of both knees    Mild nonproliferative diabetic retinopathy of both eyes without macular edema associated with type 2 diabetes mellitus (HCC)    Adrenal nodule (HCC)    Fibroadenoma of right breast    Vitamin D deficiency    GERD (gastroesophageal reflux disease)    ACE inhibitor intolerance    Diabetes mellitus with peripheral vascular disease (HCC)    Allergic rhinitis due to allergen    HTN 
you worried about falling?  no no no      2 or more falls in past year? no no no      Fall with injury in past year? no no no      Fall in past 12 months?      1   Able to walk?      Yes   Total Score    0 0        Abuse Screening:  :          No data to display                ADL Screening:  :         3/13/2024     8:00 AM   ADL ASSESSMENT   Feeding yourself No Help Needed   Getting from bed to chair No Help Needed   Getting dressed No Help Needed   Bathing or showering No Help Needed   Walk across the room (includes cane/walker) No Help Needed   Using the telphone No Help Needed   Taking your medications No Help Needed   Preparing meals No Help Needed   Managing money (expenses/bills) No Help Needed   Moderately strenuous housework (laundry) No Help Needed   Shopping for personal items (toiletries/medicines) No Help Needed   Shopping for groceries No Help Needed   Driving No Help Needed   Climbing a flight of stairs No Help Needed   Getting to places beyond walking distances No Help Needed         Medication reconciliation up to date and corrected with patient at this time.

## 2025-01-29 NOTE — TELEPHONE ENCOUNTER
Future Appointments:  Future Appointments   Date Time Provider Department Center   2/19/2025  8:00 AM LAB [facility] DEP   2/28/2025 11:10 AM Tali Danielle MD [facility] DEP        Last Appointment With Me:  1/16/2025     Requested Prescriptions     Pending Prescriptions Disp Refills    fluticasone (FLONASE) 50 MCG/ACT nasal spray [Pharmacy Med Name: FLUTICASONE PROP 50 MCG SPRAY]  1     Sig: INHALE 2 SPRAYS IN EACH NOSTRIL ONCE A DAY. FOR INFLAMMATION OF THE NOSE DUE TO AN ALLERGY

## 2025-01-30 RX ORDER — FLUTICASONE PROPIONATE 50 MCG
SPRAY, SUSPENSION (ML) NASAL
Qty: 48 G | Refills: 1 | Status: SHIPPED | OUTPATIENT
Start: 2025-01-30

## 2025-02-11 DIAGNOSIS — I10 PRIMARY HYPERTENSION: ICD-10-CM

## 2025-02-11 RX ORDER — AMLODIPINE BESYLATE 10 MG/1
TABLET ORAL
Qty: 90 TABLET | Refills: 3 | Status: SHIPPED | OUTPATIENT
Start: 2025-02-11

## 2025-02-11 NOTE — TELEPHONE ENCOUNTER
PCP: Tali Danielle MD     Last appt:  1/16/2025      Future Appointments   Date Time Provider Department Center   2/19/2025  8:00 AM LAB [facility] DEP   2/28/2025 11:10 AM Tali Danielle MD [facility] DEP          Requested Prescriptions     Pending Prescriptions Disp Refills    amLODIPine (NORVASC) 10 MG tablet [Pharmacy Med Name: AMLODIPINE BESYLATE 10 MG TAB] 90 tablet 1     Sig: TAKE 1 TABLET BY MOUTH EVERY DAY

## 2025-02-21 ENCOUNTER — LAB (OUTPATIENT)
Facility: CLINIC | Age: 65
End: 2025-02-21

## 2025-02-21 DIAGNOSIS — E55.9 VITAMIN D DEFICIENCY: ICD-10-CM

## 2025-02-21 DIAGNOSIS — E11.51 DIABETES MELLITUS WITH PERIPHERAL VASCULAR DISEASE (HCC): ICD-10-CM

## 2025-02-21 DIAGNOSIS — I10 PRIMARY HYPERTENSION: ICD-10-CM

## 2025-02-22 LAB
25(OH)D3 SERPL-MCNC: 54.1 NG/ML (ref 30–100)
ALBUMIN SERPL-MCNC: 3.8 G/DL (ref 3.5–5)
ALBUMIN/GLOB SERPL: 1 (ref 1.1–2.2)
ALP SERPL-CCNC: 82 U/L (ref 45–117)
ALT SERPL-CCNC: 12 U/L (ref 12–78)
ANION GAP SERPL CALC-SCNC: 4 MMOL/L (ref 2–12)
AST SERPL-CCNC: 6 U/L (ref 15–37)
BASOPHILS # BLD: 0.02 K/UL (ref 0–0.1)
BASOPHILS NFR BLD: 0.3 % (ref 0–1)
BILIRUB SERPL-MCNC: 0.4 MG/DL (ref 0.2–1)
BUN SERPL-MCNC: 13 MG/DL (ref 6–20)
BUN/CREAT SERPL: 13 (ref 12–20)
CALCIUM SERPL-MCNC: 9.8 MG/DL (ref 8.5–10.1)
CHLORIDE SERPL-SCNC: 100 MMOL/L (ref 97–108)
CHOLEST SERPL-MCNC: 149 MG/DL
CO2 SERPL-SCNC: 31 MMOL/L (ref 21–32)
CREAT SERPL-MCNC: 0.98 MG/DL (ref 0.55–1.02)
DIFFERENTIAL METHOD BLD: ABNORMAL
EOSINOPHIL # BLD: 0.16 K/UL (ref 0–0.4)
EOSINOPHIL NFR BLD: 2.6 % (ref 0–7)
ERYTHROCYTE [DISTWIDTH] IN BLOOD BY AUTOMATED COUNT: 13.5 % (ref 11.5–14.5)
EST. AVERAGE GLUCOSE BLD GHB EST-MCNC: 140 MG/DL
GLOBULIN SER CALC-MCNC: 3.9 G/DL (ref 2–4)
GLUCOSE SERPL-MCNC: 85 MG/DL (ref 65–100)
HBA1C MFR BLD: 6.5 % (ref 4–5.6)
HCT VFR BLD AUTO: 39 % (ref 35–47)
HDLC SERPL-MCNC: 61 MG/DL
HDLC SERPL: 2.4 (ref 0–5)
HGB BLD-MCNC: 12.2 G/DL (ref 11.5–16)
IMM GRANULOCYTES # BLD AUTO: 0.02 K/UL (ref 0–0.04)
IMM GRANULOCYTES NFR BLD AUTO: 0.3 % (ref 0–0.5)
LDLC SERPL CALC-MCNC: 60.2 MG/DL (ref 0–100)
LYMPHOCYTES # BLD: 1.68 K/UL (ref 0.8–3.5)
LYMPHOCYTES NFR BLD: 27.8 % (ref 12–49)
MCH RBC QN AUTO: 28.9 PG (ref 26–34)
MCHC RBC AUTO-ENTMCNC: 31.3 G/DL (ref 30–36.5)
MCV RBC AUTO: 92.4 FL (ref 80–99)
MONOCYTES # BLD: 0.43 K/UL (ref 0–1)
MONOCYTES NFR BLD: 7.1 % (ref 5–13)
NEUTS SEG # BLD: 3.74 K/UL (ref 1.8–8)
NEUTS SEG NFR BLD: 61.9 % (ref 32–75)
NRBC # BLD: 0 K/UL (ref 0–0.01)
NRBC BLD-RTO: 0 PER 100 WBC
PLATELET # BLD AUTO: 408 K/UL (ref 150–400)
PMV BLD AUTO: 9.4 FL (ref 8.9–12.9)
POTASSIUM SERPL-SCNC: 4.6 MMOL/L (ref 3.5–5.1)
PROT SERPL-MCNC: 7.7 G/DL (ref 6.4–8.2)
RBC # BLD AUTO: 4.22 M/UL (ref 3.8–5.2)
SODIUM SERPL-SCNC: 135 MMOL/L (ref 136–145)
TRIGL SERPL-MCNC: 139 MG/DL
VLDLC SERPL CALC-MCNC: 27.8 MG/DL
WBC # BLD AUTO: 6.1 K/UL (ref 3.6–11)

## 2025-02-25 ENCOUNTER — TELEPHONE (OUTPATIENT)
Facility: CLINIC | Age: 65
End: 2025-02-25

## 2025-02-25 NOTE — TELEPHONE ENCOUNTER
Attempted to contact patient regarding upcoming Medicare wellness appointment and completion of HRA questionnaire. LVM for patient to please return call at  [phone].

## 2025-02-27 ENCOUNTER — TELEPHONE (OUTPATIENT)
Facility: CLINIC | Age: 65
End: 2025-02-27

## 2025-02-27 NOTE — TELEPHONE ENCOUNTER
Attempted to contact patient regarding upcoming Medicare wellness appointment and completion of HRA questionnaire. LVM for patient to please return call at  [phone].

## 2025-02-27 NOTE — TELEPHONE ENCOUNTER
----- Message from Carmelo WALLER sent at 2/27/2025  1:01 PM EST -----  Regarding: E[facility] Appointment Request  E[facility] Appointment Request    Patient needs appointment for E[facility] Appointment Type: Annual Visit.    Patient Requested Dates(s): Next Wednesday   Patient Requested Time: 8:00 am   Provider Name:  Tali Danielle MD    Reason for Appointment Request: Other - Reschedule   --------------------------------------------------------------------------------------------------------------------------    Relationship to Patient: Self     Call Back Information: OK to leave message on voicemail  Preferred Call Back Number: Phone [phone]    Patient wants to reschedule her appointment and she wants a sooner appointment.

## 2025-03-01 DIAGNOSIS — I10 PRIMARY HYPERTENSION: ICD-10-CM

## 2025-03-03 ENCOUNTER — TELEPHONE (OUTPATIENT)
Facility: CLINIC | Age: 65
End: 2025-03-03

## 2025-03-03 RX ORDER — TRIAMTERENE AND HYDROCHLOROTHIAZIDE 37.5; 25 MG/1; MG/1
TABLET ORAL
Qty: 90 TABLET | Refills: 0 | Status: SHIPPED | OUTPATIENT
Start: 2025-03-03

## 2025-03-03 NOTE — TELEPHONE ENCOUNTER
PCP: Tali Danielle MD     Last appt:  1/16/2025      Future Appointments   Date Time Provider Department Center   3/6/2025  7:50 AM Tali Danielle MD [facility] DEP          Requested Prescriptions     Pending Prescriptions Disp Refills    triamterene-hydroCHLOROthiazide (MAXZIDE-25) 37.5-25 MG per tablet [Pharmacy Med Name: TRIAMTERENE-HCTZ 37.5-25 MG TB] 90 tablet 3     Sig: TAKE 1 TABLET BY MOUTH EVERY DAY IN THE MORNING

## 2025-03-03 NOTE — TELEPHONE ENCOUNTER
Attempted to contact patient regarding upcoming Medicare wellness appointment and completion of HRA questionnaire. LVM for patient to please return call at  [phone].

## 2025-03-04 NOTE — TELEPHONE ENCOUNTER
Attempted to contact patient regarding upcoming Medicare wellness appointment and completion of HRA questionnaire. LVM for patient to please return call at  [phone].

## 2025-03-06 ENCOUNTER — OFFICE VISIT (OUTPATIENT)
Facility: CLINIC | Age: 65
End: 2025-03-06

## 2025-03-06 VITALS
HEIGHT: 62 IN | SYSTOLIC BLOOD PRESSURE: 134 MMHG | HEART RATE: 73 BPM | DIASTOLIC BLOOD PRESSURE: 70 MMHG | RESPIRATION RATE: 16 BRPM | TEMPERATURE: 98.1 F | WEIGHT: 176 LBS | BODY MASS INDEX: 32.39 KG/M2 | OXYGEN SATURATION: 98 %

## 2025-03-06 DIAGNOSIS — J45.40 MODERATE PERSISTENT ASTHMA WITHOUT COMPLICATION: ICD-10-CM

## 2025-03-06 DIAGNOSIS — E87.6 HYPOKALEMIA: ICD-10-CM

## 2025-03-06 DIAGNOSIS — I10 PRIMARY HYPERTENSION: ICD-10-CM

## 2025-03-06 DIAGNOSIS — E11.3293 MILD NONPROLIFERATIVE DIABETIC RETINOPATHY OF BOTH EYES WITHOUT MACULAR EDEMA ASSOCIATED WITH TYPE 2 DIABETES MELLITUS (HCC): ICD-10-CM

## 2025-03-06 DIAGNOSIS — Z78.0 ASYMPTOMATIC POSTMENOPAUSAL STATUS: ICD-10-CM

## 2025-03-06 DIAGNOSIS — Z00.00 WELCOME TO MEDICARE PREVENTIVE VISIT: Primary | ICD-10-CM

## 2025-03-06 DIAGNOSIS — R07.1 CHEST PAIN ON BREATHING: ICD-10-CM

## 2025-03-06 DIAGNOSIS — E11.51 DIABETES MELLITUS WITH PERIPHERAL VASCULAR DISEASE (HCC): ICD-10-CM

## 2025-03-06 DIAGNOSIS — Z13.820 SCREENING FOR OSTEOPOROSIS: ICD-10-CM

## 2025-03-06 RX ORDER — POTASSIUM CHLORIDE 750 MG/1
10 TABLET, EXTENDED RELEASE ORAL
Qty: 90 TABLET | Refills: 3
Start: 2025-03-07

## 2025-03-06 ASSESSMENT — PATIENT HEALTH QUESTIONNAIRE - PHQ9
SUM OF ALL RESPONSES TO PHQ QUESTIONS 1-9: 0
SUM OF ALL RESPONSES TO PHQ QUESTIONS 1-9: 0
1. LITTLE INTEREST OR PLEASURE IN DOING THINGS: NOT AT ALL
SUM OF ALL RESPONSES TO PHQ QUESTIONS 1-9: 0
SUM OF ALL RESPONSES TO PHQ QUESTIONS 1-9: 0
2. FEELING DOWN, DEPRESSED OR HOPELESS: NOT AT ALL

## 2025-03-06 NOTE — PATIENT INSTRUCTIONS
immunizations, labs, imaging, and/or referrals for you.  A list of these orders (if applicable) as well as your Preventive Care list are included within your After Visit Summary for your review.

## 2025-03-06 NOTE — PROGRESS NOTES
Janeth Ford  Identified pt with two pt identifiers(name and ).  Chief Complaint   Patient presents with    Medicare AWV       1. Have you been to the ER, urgent care clinic since your last visit?  Hospitalized since your last visit? NO    2. Have you seen or consulted any other health care providers outside of the [facility] since your last visit?  Include any pap smears or colon screening. 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     Patient received paperwork for advance directive during previous visit but has not completed at this time     Reviewed record In preparation for visit, huddled with provider and have obtained necessary documentation      Health Maintenance Due   Topic    DEXA (modify frequency per FRAX score)     Diabetic retinal exam     Annual Wellness Visit (Medicare Advantage)     Diabetic Alb to Cr ratio (uACR) test     Diabetic foot exam        Wt Readings from Last 3 Encounters:   25 79.8 kg (176 lb)   25 78.8 kg (173 lb 12.8 oz)   25 77.8 kg (171 lb 8.3 oz)     Temp Readings from Last 3 Encounters:   25 98.1 °F (36.7 °C) (Oral)   25 98.7 °F (37.1 °C) (Oral)   25 98.2 °F (36.8 °C) (Oral)     BP Readings from Last 3 Encounters:   25 134/70   25 136/64   25 (!) 155/60     Pulse Readings from Last 3 Encounters:   25 73   25 72   25 77          No data to display                  Learning Assessment:  :         3/13/2024     8:30 AM   [facility] AMB LEARNING ASSESSMENT   Primary Learner Patient   level of education GRADUATED HIGH SCHOOL OR GED   Primary Language ENGLISH   Learning Preference DEMONSTRATION   Answered By Patient   Relationship to Learner SELF       Fall Risk Assessment:  :         3/6/2025     7:57 AM 2025     7:54 AM 2023     9:56 AM 10/6/2023     8:48 AM 2022     8:18 AM 
diabetes with Janumet XR 50/1000 twice daily and glipizide XL 5 mg once daily, taken at night. She reports that she has made dietary modifications, including reducing her sugar intake and using Equal as a sweetener.    She has not undergone a bone density study in several years and reports occasional knee pain.    She is not currently taking vitamin D supplements but does take potassium three times a week, typically on Mondays, Wednesdays, and Fridays.    Soc Hx  . Has 2 sons and 2 grandchildren. Works in [employer]. Former smoker; quit in 2010. Denies alcohol or recreational drugs.    Patient's complete Health Risk Assessment and screening values have been reviewed and are found in Flowsheets. The following problems were reviewed today and where indicated follow up appointments were made and/or referrals ordered.    Positive Risk Factor Screenings with Interventions:              Inactivity:  On average, how many days per week do you engage in moderate to strenuous exercise (like a brisk walk)?: 0 days (!) Abnormal  On average, how many minutes do you engage in exercise at this level?: 0 min  Interventions:  See AVS for additional education material     Abnormal BMI (obese):  Body mass index is 32.19 kg/m². (!) Abnormal  Interventions:  See AVS for additional education material      Dentist Screen:  Have you seen the dentist within the past year?: (!) No    Intervention:  Advised to schedule with their dentist        Advanced Directives:  Do you have a Living Will?: (!) No    Intervention:  has NO advanced directive - information provided             Objective   Vitals:    03/06/25 0753   BP: 134/70   Site: Left Upper Arm   Position: Sitting   Pulse: 73   Resp: 16   Temp: 98.1 °F (36.7 °C)   TempSrc: Oral   SpO2: 98%   Weight: 79.8 kg (176 lb)   Height: 1.575 m (5' 2\")      Body mass index is 32.19 kg/m².       General: Obese, no distress.   HEENT:  Head normocephalic/atraumatic, no scleral

## 2025-03-07 LAB
CREAT UR-MCNC: 110 MG/DL
MICROALBUMIN UR-MCNC: 0.68 MG/DL
MICROALBUMIN/CREAT UR-RTO: 6 MG/G (ref 0–30)
SPECIMEN HOLD: NORMAL

## 2025-03-08 ENCOUNTER — RESULTS FOLLOW-UP (OUTPATIENT)
Facility: CLINIC | Age: 65
End: 2025-03-08

## 2025-04-01 DIAGNOSIS — J45.40 MODERATE PERSISTENT ASTHMA WITHOUT COMPLICATION: ICD-10-CM

## 2025-04-01 RX ORDER — BUDESONIDE AND FORMOTEROL FUMARATE DIHYDRATE 160; 4.5 UG/1; UG/1
2 AEROSOL RESPIRATORY (INHALATION) 2 TIMES DAILY
Qty: 10.2 EACH | Refills: 3 | Status: SHIPPED | OUTPATIENT
Start: 2025-04-01

## 2025-04-01 NOTE — TELEPHONE ENCOUNTER
Future Appointments:  Future Appointments   Date Time Provider Department Center   4/10/2025 10:30 AM [facility]   5/7/2025  9:00 AM [facility] DEXA 1 [facility]   7/9/2025  9:10 AM Tali Danielle MD [facility] DEP        Last Appointment With Me:  3/6/2025     Requested Prescriptions     Pending Prescriptions Disp Refills    SYMBICORT 160-4.5 MCG/ACT AERO [Pharmacy Med Name: SYMBICORT 160-4.5 MCG INHALER] 10.2 each 3     Sig: INHALE 2 PUFFS INTO THE LUNGS TWICE A DAY

## 2025-04-10 ENCOUNTER — OFFICE VISIT (OUTPATIENT)
Facility: CLINIC | Age: 65
End: 2025-04-10
Payer: MEDICARE

## 2025-04-10 VITALS
RESPIRATION RATE: 16 BRPM | OXYGEN SATURATION: 98 % | WEIGHT: 175.2 LBS | TEMPERATURE: 98.4 F | DIASTOLIC BLOOD PRESSURE: 60 MMHG | HEART RATE: 77 BPM | SYSTOLIC BLOOD PRESSURE: 136 MMHG | BODY MASS INDEX: 32.24 KG/M2 | HEIGHT: 62 IN

## 2025-04-10 DIAGNOSIS — Z01.818 PRE-OP EVALUATION: Primary | ICD-10-CM

## 2025-04-10 DIAGNOSIS — H26.9 CATARACT OF BOTH EYES, UNSPECIFIED CATARACT TYPE: ICD-10-CM

## 2025-04-10 DIAGNOSIS — I10 PRIMARY HYPERTENSION: ICD-10-CM

## 2025-04-10 DIAGNOSIS — E11.9 TYPE 2 DIABETES MELLITUS WITHOUT COMPLICATION, WITHOUT LONG-TERM CURRENT USE OF INSULIN: ICD-10-CM

## 2025-04-10 PROBLEM — D24.1 FIBROADENOMA OF RIGHT BREAST: Status: RESOLVED | Noted: 2019-06-17 | Resolved: 2025-04-10

## 2025-04-10 PROBLEM — E27.9 ADRENAL NODULE: Status: RESOLVED | Noted: 2020-01-09 | Resolved: 2025-04-10

## 2025-04-10 PROBLEM — E78.00 HYPERCHOLESTEROLEMIA: Status: ACTIVE | Noted: 2025-04-10

## 2025-04-10 PROCEDURE — 99214 OFFICE O/P EST MOD 30 MIN: CPT | Performed by: INTERNAL MEDICINE

## 2025-04-10 PROCEDURE — G8417 CALC BMI ABV UP PARAM F/U: HCPCS | Performed by: INTERNAL MEDICINE

## 2025-04-10 PROCEDURE — 3017F COLORECTAL CA SCREEN DOC REV: CPT | Performed by: INTERNAL MEDICINE

## 2025-04-10 PROCEDURE — G8400 PT W/DXA NO RESULTS DOC: HCPCS | Performed by: INTERNAL MEDICINE

## 2025-04-10 PROCEDURE — 1036F TOBACCO NON-USER: CPT | Performed by: INTERNAL MEDICINE

## 2025-04-10 PROCEDURE — 1090F PRES/ABSN URINE INCON ASSESS: CPT | Performed by: INTERNAL MEDICINE

## 2025-04-10 PROCEDURE — 2022F DILAT RTA XM EVC RTNOPTHY: CPT | Performed by: INTERNAL MEDICINE

## 2025-04-10 PROCEDURE — 3044F HG A1C LEVEL LT 7.0%: CPT | Performed by: INTERNAL MEDICINE

## 2025-04-10 PROCEDURE — G8427 DOCREV CUR MEDS BY ELIG CLIN: HCPCS | Performed by: INTERNAL MEDICINE

## 2025-04-10 PROCEDURE — 3075F SYST BP GE 130 - 139MM HG: CPT | Performed by: INTERNAL MEDICINE

## 2025-04-10 PROCEDURE — 3078F DIAST BP <80 MM HG: CPT | Performed by: INTERNAL MEDICINE

## 2025-04-10 PROCEDURE — 1123F ACP DISCUSS/DSCN MKR DOCD: CPT | Performed by: INTERNAL MEDICINE

## 2025-04-10 NOTE — PROGRESS NOTES
8:18 AM 12/14/2021     6:59 PM 2/22/2021     9:14 AM   Amb Fall Risk Assessment and TUG Test   Do you feel unsteady or are you worried about falling?  no no no no      2 or more falls in past year? no no no no      Fall with injury in past year? no no no no      Fall in past 12 months?       1   Able to walk?       Yes   Total Score     0 0        Abuse Screening:  :          No data to display                ADL Screening:  :         3/13/2024     8:00 AM   ADL ASSESSMENT   Feeding yourself No Help Needed   Getting from bed to chair No Help Needed   Getting dressed No Help Needed   Bathing or showering No Help Needed   Walk across the room (includes cane/walker) No Help Needed   Using the telphone No Help Needed   Taking your medications No Help Needed   Preparing meals No Help Needed   Managing money (expenses/bills) No Help Needed   Moderately strenuous housework (laundry) No Help Needed   Shopping for personal items (toiletries/medicines) No Help Needed   Shopping for groceries No Help Needed   Driving No Help Needed   Climbing a flight of stairs No Help Needed   Getting to places beyond walking distances No Help Needed         Medication reconciliation up to date and corrected with patient at this time.       
hypertension  I10       4. Type 2 diabetes mellitus without complication, without long-term current use of insulin  E11.9          Diagnoses and all orders for this visit:    1. Pre-op evaluation  Based on ACC/AHA guidelines patient is at acceptable risk for scheduled level of surgery. ASA 81 mg is usually not stopped for cataract surgery, but okay to hold for 5 days prior to surgery if that is Dr. Rojas' protocol.     2. Cataract of both eyes, unspecified cataract type    3. Primary hypertension  Controlled.    4. Type 2 diabetes mellitus without complication, without long-term current use of insulin  Controlled. Last A1c 6.5% on 2/21/25.    Return if symptoms worsen or fail to improve, for Appointment on 7/9/24 for DM, foot exam.     I have discussed the diagnosis with the patient and the intended plan as seen in the above orders. Patient is in agreement.  The patient has received an after-visit summary and questions were answered concerning future plans.  I have discussed medication side effects and warnings with the patient as well.

## 2025-05-07 ENCOUNTER — HOSPITAL ENCOUNTER (OUTPATIENT)
Facility: HOSPITAL | Age: 65
Discharge: HOME | End: 2025-05-10
Payer: MEDICARE

## 2025-05-07 DIAGNOSIS — Z78.0 ASYMPTOMATIC POSTMENOPAUSAL STATUS: ICD-10-CM

## 2025-05-07 DIAGNOSIS — Z13.820 SCREENING FOR OSTEOPOROSIS: ICD-10-CM

## 2025-05-07 PROCEDURE — 77080 DXA BONE DENSITY AXIAL: CPT

## 2025-06-02 ENCOUNTER — HOSPITAL ENCOUNTER (OUTPATIENT)
Facility: HOSPITAL | Age: 65
Discharge: HOME | End: 2025-06-05
Payer: MEDICARE

## 2025-06-02 ENCOUNTER — TRANSCRIBE ORDERS (OUTPATIENT)
Facility: HOSPITAL | Age: 65
End: 2025-06-02

## 2025-06-02 DIAGNOSIS — R06.09 OTHER FORM OF DYSPNEA: Primary | ICD-10-CM

## 2025-06-02 DIAGNOSIS — R06.09 OTHER FORM OF DYSPNEA: ICD-10-CM

## 2025-06-02 PROCEDURE — 71046 X-RAY EXAM CHEST 2 VIEWS: CPT

## 2025-06-07 DIAGNOSIS — I10 PRIMARY HYPERTENSION: ICD-10-CM

## 2025-06-07 DIAGNOSIS — E11.42 TYPE 2 DIABETES MELLITUS WITH DIABETIC POLYNEUROPATHY (HCC): ICD-10-CM

## 2025-06-09 RX ORDER — TRIAMTERENE AND HYDROCHLOROTHIAZIDE 37.5; 25 MG/1; MG/1
1 TABLET ORAL EVERY MORNING
Qty: 90 TABLET | Refills: 3 | Status: SHIPPED | OUTPATIENT
Start: 2025-06-09 | End: 2025-06-12 | Stop reason: SDUPTHER

## 2025-06-09 RX ORDER — CARVEDILOL 6.25 MG/1
6.25 TABLET ORAL 2 TIMES DAILY
Qty: 180 TABLET | Refills: 3 | Status: SHIPPED | OUTPATIENT
Start: 2025-06-09 | End: 2025-06-12 | Stop reason: SDUPTHER

## 2025-06-09 RX ORDER — SITAGLIPTIN AND METFORMIN HYDROCHLORIDE 1000; 50 MG/1; MG/1
1 TABLET, FILM COATED, EXTENDED RELEASE ORAL 2 TIMES DAILY WITH MEALS
Qty: 180 TABLET | Refills: 3 | Status: SHIPPED | OUTPATIENT
Start: 2025-06-09 | End: 2025-06-12 | Stop reason: SDUPTHER

## 2025-06-12 DIAGNOSIS — E11.9 TYPE 2 DIABETES MELLITUS WITHOUT COMPLICATION, WITHOUT LONG-TERM CURRENT USE OF INSULIN (HCC): ICD-10-CM

## 2025-06-12 DIAGNOSIS — J45.40 MODERATE PERSISTENT ASTHMA WITHOUT COMPLICATION: ICD-10-CM

## 2025-06-12 DIAGNOSIS — E11.42 TYPE 2 DIABETES MELLITUS WITH DIABETIC POLYNEUROPATHY (HCC): ICD-10-CM

## 2025-06-12 DIAGNOSIS — E87.6 HYPOKALEMIA: ICD-10-CM

## 2025-06-12 DIAGNOSIS — I10 PRIMARY HYPERTENSION: ICD-10-CM

## 2025-06-12 RX ORDER — FLUTICASONE PROPIONATE 50 MCG
SPRAY, SUSPENSION (ML) NASAL
Qty: 48 G | Refills: 1 | Status: SHIPPED | OUTPATIENT
Start: 2025-06-12

## 2025-06-12 RX ORDER — SITAGLIPTIN AND METFORMIN HYDROCHLORIDE 1000; 50 MG/1; MG/1
1 TABLET, FILM COATED, EXTENDED RELEASE ORAL 2 TIMES DAILY WITH MEALS
Qty: 180 TABLET | Refills: 3 | Status: SHIPPED | OUTPATIENT
Start: 2025-06-12

## 2025-06-12 RX ORDER — AMLODIPINE BESYLATE 10 MG/1
10 TABLET ORAL DAILY
Qty: 90 TABLET | Refills: 3 | Status: SHIPPED | OUTPATIENT
Start: 2025-06-12

## 2025-06-12 RX ORDER — GLIPIZIDE 5 MG/1
5 TABLET, FILM COATED, EXTENDED RELEASE ORAL DAILY
Qty: 90 TABLET | Refills: 1 | Status: SHIPPED | OUTPATIENT
Start: 2025-06-12

## 2025-06-12 RX ORDER — BUDESONIDE AND FORMOTEROL FUMARATE DIHYDRATE 160; 4.5 UG/1; UG/1
2 AEROSOL RESPIRATORY (INHALATION) 2 TIMES DAILY
Qty: 10.2 EACH | Refills: 3 | Status: SHIPPED | OUTPATIENT
Start: 2025-06-12

## 2025-06-12 RX ORDER — ROSUVASTATIN CALCIUM 5 MG/1
5 TABLET, COATED ORAL NIGHTLY
Qty: 90 TABLET | Refills: 3 | Status: SHIPPED | OUTPATIENT
Start: 2025-06-12

## 2025-06-12 RX ORDER — POTASSIUM CHLORIDE 750 MG/1
10 TABLET, EXTENDED RELEASE ORAL
Qty: 90 TABLET | Refills: 3 | Status: SHIPPED | OUTPATIENT
Start: 2025-06-13

## 2025-06-12 RX ORDER — TRIAMTERENE AND HYDROCHLOROTHIAZIDE 37.5; 25 MG/1; MG/1
1 TABLET ORAL EVERY MORNING
Qty: 90 TABLET | Refills: 3 | Status: SHIPPED | OUTPATIENT
Start: 2025-06-12

## 2025-06-12 RX ORDER — CARVEDILOL 6.25 MG/1
6.25 TABLET ORAL 2 TIMES DAILY
Qty: 180 TABLET | Refills: 3 | Status: SHIPPED | OUTPATIENT
Start: 2025-06-12

## 2025-06-12 NOTE — TELEPHONE ENCOUNTER
PCP: Tali Danielle MD     Last appt:  4/10/2025      Future Appointments   Date Time Provider Department Center   7/9/2025  9:10 AM Tali Danielle MD [facility] DEP          Requested Prescriptions     Pending Prescriptions Disp Refills    amLODIPine (NORVASC) 10 MG tablet 90 tablet 3     Sig: Take 1 tablet by mouth daily    carvedilol (COREG) 6.25 MG tablet 180 tablet 3     Sig: Take 1 tablet by mouth 2 times daily    fluticasone (FLONASE) 50 MCG/ACT nasal spray 48 g 1     Sig: INHALE 2 SPRAYS IN EACH NOSTRIL ONCE A DAY. FOR INFLAMMATION OF THE NOSE DUE TO AN ALLERGY    glipiZIDE (GLUCOTROL XL) 5 MG extended release tablet 90 tablet 1     Sig: Take 1 tablet by mouth daily    SITagliptin-metFORMIN (JANUMET XR)  MG TB24 per extended release tablet 180 tablet 3     Sig: Take 1 tablet by mouth 2 times daily (with meals)    potassium chloride (KLOR-CON M10) 10 MEQ extended release tablet 90 tablet 3     Sig: Take 1 tablet by mouth three times a week (Mon, Wed, Fri)    rosuvastatin (CRESTOR) 5 MG tablet 90 tablet 3     Sig: Take 1 tablet by mouth nightly    budesonide-formoterol (SYMBICORT) 160-4.5 MCG/ACT AERO 10.2 each 3     Sig: Inhale 2 puffs into the lungs in the morning and at bedtime    triamterene-hydroCHLOROthiazide (MAXZIDE-25) 37.5-25 MG per tablet 90 tablet 3     Sig: Take 1 tablet by mouth every morning

## 2025-06-22 ENCOUNTER — HOSPITAL ENCOUNTER (EMERGENCY)
Facility: HOSPITAL | Age: 65
Discharge: HOME | End: 2025-06-22
Attending: EMERGENCY MEDICINE
Payer: MEDICARE

## 2025-06-22 VITALS
OXYGEN SATURATION: 99 % | RESPIRATION RATE: 16 BRPM | SYSTOLIC BLOOD PRESSURE: 158 MMHG | BODY MASS INDEX: 32.42 KG/M2 | HEIGHT: 62 IN | HEART RATE: 95 BPM | DIASTOLIC BLOOD PRESSURE: 70 MMHG | TEMPERATURE: 98.6 F | WEIGHT: 176.15 LBS

## 2025-06-22 DIAGNOSIS — R51.9 UNILATERAL OCCIPITAL HEADACHE: Primary | ICD-10-CM

## 2025-06-22 PROCEDURE — 20552 NJX 1/MLT TRIGGER POINT 1/2: CPT

## 2025-06-22 PROCEDURE — 6370000000 HC RX 637 (ALT 250 FOR IP): Performed by: EMERGENCY MEDICINE

## 2025-06-22 PROCEDURE — 6360000002 HC RX W HCPCS: Performed by: EMERGENCY MEDICINE

## 2025-06-22 PROCEDURE — 99283 EMERGENCY DEPT VISIT LOW MDM: CPT

## 2025-06-22 RX ORDER — BUPIVACAINE HYDROCHLORIDE 5 MG/ML
30 INJECTION, SOLUTION EPIDURAL; INTRACAUDAL; PERINEURAL
Status: COMPLETED | OUTPATIENT
Start: 2025-06-22 | End: 2025-06-22

## 2025-06-22 RX ORDER — CYCLOBENZAPRINE HCL 10 MG
10 TABLET ORAL 3 TIMES DAILY PRN
Qty: 21 TABLET | Refills: 0 | Status: SHIPPED | OUTPATIENT
Start: 2025-06-22 | End: 2025-07-02

## 2025-06-22 RX ORDER — CYCLOBENZAPRINE HCL 10 MG
10 TABLET ORAL ONCE
Status: COMPLETED | OUTPATIENT
Start: 2025-06-22 | End: 2025-06-22

## 2025-06-22 RX ORDER — LIDOCAINE HYDROCHLORIDE AND EPINEPHRINE 10; 10 MG/ML; UG/ML
20 INJECTION, SOLUTION INFILTRATION; PERINEURAL ONCE
Status: COMPLETED | OUTPATIENT
Start: 2025-06-22 | End: 2025-06-22

## 2025-06-22 RX ORDER — IBUPROFEN 600 MG/1
600 TABLET, FILM COATED ORAL EVERY 8 HOURS PRN
Qty: 21 TABLET | Refills: 0 | Status: SHIPPED | OUTPATIENT
Start: 2025-06-22 | End: 2025-06-29

## 2025-06-22 RX ADMIN — BUPIVACAINE HYDROCHLORIDE 150 MG: 5 INJECTION, SOLUTION EPIDURAL; INTRACAUDAL; PERINEURAL at 16:02

## 2025-06-22 RX ADMIN — CYCLOBENZAPRINE 10 MG: 10 TABLET, FILM COATED ORAL at 16:01

## 2025-06-22 RX ADMIN — LIDOCAINE HYDROCHLORIDE,EPINEPHRINE BITARTRATE 20 ML: 10; .01 INJECTION, SOLUTION INFILTRATION; PERINEURAL at 16:02

## 2025-06-22 ASSESSMENT — PAIN DESCRIPTION - ONSET: ONSET: GRADUAL

## 2025-06-22 ASSESSMENT — PAIN DESCRIPTION - LOCATION
LOCATION: HEAD

## 2025-06-22 ASSESSMENT — PAIN SCALES - GENERAL
PAINLEVEL_OUTOF10: 9
PAINLEVEL_OUTOF10: 7
PAINLEVEL_OUTOF10: 3

## 2025-06-22 ASSESSMENT — PAIN DESCRIPTION - ORIENTATION: ORIENTATION: LEFT

## 2025-06-22 ASSESSMENT — PAIN DESCRIPTION - FREQUENCY: FREQUENCY: CONTINUOUS

## 2025-06-22 ASSESSMENT — PAIN DESCRIPTION - PAIN TYPE: TYPE: ACUTE PAIN

## 2025-06-22 ASSESSMENT — PAIN DESCRIPTION - DESCRIPTORS: DESCRIPTORS: ACHING

## 2025-06-22 NOTE — DISCHARGE INSTRUCTIONS
You were seen in the emergency department today for headache over the left side of the back of your head.  Your symptoms are consistent with an occipital headache which is caused by a lot of tension from tight neck muscles pulling on the back of your head. We gave you muscle relaxers and injected the muscles with some pain medicine. We are sending you home with more muscle relaxers. We recommend you take it SCHEDULED along with ibuprofen for the next few days to make sure your symptoms resolve. Please follow up with your PCP.

## 2025-06-22 NOTE — ED PROVIDER NOTES
Labs:  No results found for this or any previous visit (from the past 12 hours).    EKG:.Not Applicable     Radiologic Studies:  Radiographic images are visualized and preliminarily interpreted by the ED Provider with the following findings: Not Applicable..     Interpretation per the Radiologist below, if available at the time of this note:  No orders to display        Records Reviewed: Not Applicable    MEDICAL DECISION MAKING and ED COURSE   5:16 PM Differential and Considerations of tests not ordered:     Patient is a 65-year-old female who presents emergency department with pain and tightness over the left posterior head that has been ongoing for a week.  Patient states that she has a lot of pain with movement of her head and with palpation of the area.  Tried multiple over-the-counter medications with no relief.  Denies any vision changes, weakness, numbness or tingling in her extremities.  No nausea or vomiting.  She has continued to work states the pain is getting too much.  Denies any trauma.    Pt with significant amount of hypertonicity and reproducible pain over her L suboccipital area with palpation.  Differentials include tension headache, occipital headache, muscle spasm, cervical strain.  Patient is already taking oral over-the-counter medications.  Will treat with muscle relaxers and offered her trigger point injection which she is agreeable to.  Considered but did not perform any imaging as patient has had no history of trauma and symptoms are more musculoskeletal in nature.     Vitals:    Vitals:    06/22/25 1445 06/22/25 1543   BP: (!) 158/73 (!) 158/70   Pulse: 95    Resp: 16    Temp: 98.6 °F (37 °C)    TempSrc: Oral    SpO2: 99%    Weight: 79.9 kg (176 lb 2.4 oz)    Height: 1.575 m (5' 2\")        ED COURSE   Patient with improvement of her symptoms after trigger point injection.  Will discharge home with muscle relaxers and have her follow-up with her primary care doctor.    Clinical

## 2025-06-25 ENCOUNTER — TELEPHONE (OUTPATIENT)
Facility: CLINIC | Age: 65
End: 2025-06-25

## 2025-06-25 DIAGNOSIS — R51.9 ACUTE NONINTRACTABLE HEADACHE, UNSPECIFIED HEADACHE TYPE: Primary | ICD-10-CM

## 2025-06-25 NOTE — TELEPHONE ENCOUNTER
Spoke to pt verified name and . Pt stated she has been having a h/a for the past 2 weeks, left side \"in brain\" to the back of head when touched. She was evaluated at the ER on 2025 and prescribed cyclobenzaprine. She states the medication is not helping, neither is ibuprofen or tylenol. No injury to the head known. Denies n/v but is having sensitivity to noise. She had cataract surgery a few months ago and recently received a new pair of glasses. She wanted to know what she could take or if there is anything else she can do?

## 2025-06-26 RX ORDER — BUTALBITAL, ACETAMINOPHEN AND CAFFEINE 50; 325; 40 MG/1; MG/1; MG/1
1 TABLET ORAL EVERY 8 HOURS PRN
Qty: 21 TABLET | Refills: 0 | Status: SHIPPED | OUTPATIENT
Start: 2025-06-26

## 2025-06-27 ENCOUNTER — TRANSCRIBE ORDERS (OUTPATIENT)
Facility: HOSPITAL | Age: 65
End: 2025-06-27

## 2025-06-27 DIAGNOSIS — J98.6 CHRONICALLY ELEVATED HEMIDIAPHRAGM: Primary | ICD-10-CM

## 2025-06-27 DIAGNOSIS — J98.6 DISORDERS OF DIAPHRAGM: Primary | ICD-10-CM

## 2025-06-30 NOTE — TELEPHONE ENCOUNTER
Spoke to pt verified name and . I let her know Dr. Danielle had sent in a medication for her headaches. She understood and had no further questions.

## 2025-07-08 ENCOUNTER — HOSPITAL ENCOUNTER (OUTPATIENT)
Facility: HOSPITAL | Age: 65
Discharge: HOME | End: 2025-07-11
Payer: MEDICARE

## 2025-07-08 DIAGNOSIS — J98.6 CHRONICALLY ELEVATED HEMIDIAPHRAGM: ICD-10-CM

## 2025-07-08 PROCEDURE — 71250 CT THORAX DX C-: CPT

## 2025-07-09 ENCOUNTER — OFFICE VISIT (OUTPATIENT)
Facility: CLINIC | Age: 65
End: 2025-07-09
Payer: MEDICARE

## 2025-07-09 VITALS
DIASTOLIC BLOOD PRESSURE: 78 MMHG | SYSTOLIC BLOOD PRESSURE: 138 MMHG | RESPIRATION RATE: 16 BRPM | WEIGHT: 172.8 LBS | HEIGHT: 62 IN | TEMPERATURE: 97.8 F | HEART RATE: 96 BPM | BODY MASS INDEX: 31.8 KG/M2 | OXYGEN SATURATION: 99 %

## 2025-07-09 DIAGNOSIS — I10 PRIMARY HYPERTENSION: ICD-10-CM

## 2025-07-09 DIAGNOSIS — E11.9 TYPE 2 DIABETES MELLITUS WITHOUT COMPLICATION, WITHOUT LONG-TERM CURRENT USE OF INSULIN (HCC): Primary | ICD-10-CM

## 2025-07-09 DIAGNOSIS — R51.9 OCCIPITAL HEADACHE: ICD-10-CM

## 2025-07-09 LAB — HBA1C MFR BLD: 7.5 %

## 2025-07-09 PROCEDURE — 3078F DIAST BP <80 MM HG: CPT | Performed by: INTERNAL MEDICINE

## 2025-07-09 PROCEDURE — 99214 OFFICE O/P EST MOD 30 MIN: CPT | Performed by: INTERNAL MEDICINE

## 2025-07-09 PROCEDURE — 1123F ACP DISCUSS/DSCN MKR DOCD: CPT | Performed by: INTERNAL MEDICINE

## 2025-07-09 PROCEDURE — 3051F HG A1C>EQUAL 7.0%<8.0%: CPT | Performed by: INTERNAL MEDICINE

## 2025-07-09 PROCEDURE — 3075F SYST BP GE 130 - 139MM HG: CPT | Performed by: INTERNAL MEDICINE

## 2025-07-09 PROCEDURE — 83036 HEMOGLOBIN GLYCOSYLATED A1C: CPT | Performed by: INTERNAL MEDICINE

## 2025-07-09 RX ORDER — BLOOD-GLUCOSE METER
KIT MISCELLANEOUS
Qty: 1 KIT | Refills: 0 | Status: SHIPPED | OUTPATIENT
Start: 2025-07-09

## 2025-07-09 RX ORDER — GLIPIZIDE 10 MG/1
10 TABLET, FILM COATED, EXTENDED RELEASE ORAL DAILY
Qty: 30 TABLET | Refills: 3 | Status: SHIPPED | OUTPATIENT
Start: 2025-07-09

## 2025-07-09 RX ORDER — CARVEDILOL 6.25 MG/1
12.5 TABLET ORAL 2 TIMES DAILY
Qty: 180 TABLET | Refills: 3
Start: 2025-07-09

## 2025-07-09 RX ORDER — AMLODIPINE BESYLATE 5 MG/1
5 TABLET ORAL DAILY
Qty: 90 TABLET | Refills: 1 | Status: SHIPPED | OUTPATIENT
Start: 2025-07-09

## 2025-07-09 NOTE — PROGRESS NOTES
Janeth Ford  Identified pt with two pt identifiers(name and ).  Chief Complaint   Patient presents with    Diabetes       1. Have you been to the ER, urgent care clinic since your last visit?  Hospitalized since your last visit? ER 2 weeks ago for h/a    2. Have you seen or consulted any other health care providers outside of the [facility] since your last visit?  Include any pap smears or colon screening. NO      Provider notified of reason for visit, vitals and flowsheets obtained on patients.     Patient received paperwork for advance directive during previous visit but has not completed at this time     Reviewed record In preparation for visit, huddled with provider and have obtained necessary documentation      Health Maintenance Due   Topic    Diabetic retinal exam     Diabetic foot exam        Wt Readings from Last 3 Encounters:   25 78.4 kg (172 lb 12.8 oz)   25 79.9 kg (176 lb 2.4 oz)   04/10/25 79.5 kg (175 lb 3.2 oz)     Temp Readings from Last 3 Encounters:   25 97.8 °F (36.6 °C) (Temporal)   25 98.6 °F (37 °C) (Oral)   04/10/25 98.4 °F (36.9 °C) (Oral)     BP Readings from Last 3 Encounters:   25 138/78   25 (!) 158/70   04/10/25 136/60     Pulse Readings from Last 3 Encounters:   25 96   25 95   04/10/25 77          No data to display                  Learning Assessment:  :         3/13/2024     8:30 AM   [facility] AMB LEARNING ASSESSMENT   Primary Learner Patient   level of education GRADUATED HIGH SCHOOL OR GED   Primary Language ENGLISH   Learning Preference DEMONSTRATION   Answered By Patient   Relationship to Learner SELF       Fall Risk Assessment:  :         3/6/2025     7:57 AM 2025     7:54 AM 2023     9:56 AM 10/6/2023     8:48 AM 2022     8:18 AM 2021     6:59 PM 2021     9:14 AM   Amb Fall Risk Assessment and TUG Test   Do you feel unsteady or are you worried about falling?  no no no no      2 or

## 2025-07-09 NOTE — PROGRESS NOTES
Chief Complaint   Patient presents with    Diabetes     History of Present Illness  The patient is a 65-year-old woman here for a 4-month follow-up of type 2 diabetes and a foot exam.    She was seen at [facility] ED on 06/22/2025 for an occipital headache and was discharged on cyclobenzaprine and ibuprofen. Her headaches resolved after stopping nighttime medications (amlodipine, aspirin, rosuvastatin) for 3 days.     Saw pulmonologist. A  chest X-ray and echocardiogram revealed a non-functioning right diaphragm muscle and a mass present since 2023. She had a CT chest yesterday and is currently awaiting CT scan results. She uses albuterol and Symbicort for asthma and has nebulizer solution at home but he nebulizer is in storage after moving. She has difficulty sleeping due to fear of an asthma attack. Lives with her 82 yr old sister-in-law who turns off the air conditioner at 11 pm to save money.    She is not monitoring her blood sugar due to the loss of her meter; had a One tough glucometer. She reports increased urination and constant thirst, but no hypoglycemia symptoms or peripheral neuropathy. She has a low appetite and consumes small meals and snacks. Her blood sugar previously dropped to 40 on a higher dose of glipizide taken in the morning, which is now administered at nighttime. She is currently on glipizide and Janumet 1000.    For hypertension, she has been off amlodipine for 3 days due to concerns about headaches. She takes triamterene-hydrochlorothiazide in the morning and carvedilol at night. She has no prior use of valsartan, but.she had a reaction to lisinopril that caused mouth and face swelling.    She reports knee pain due to extensive walking at her job and uses Voltaren gel. She also reports excessive daytime sleepiness and often falls asleep when sitting; not sleeping well at night.    Patient Active Problem List   Diagnosis    Moderate persistent asthma without complication    Primary

## 2025-07-11 DIAGNOSIS — E11.9 TYPE 2 DIABETES MELLITUS WITHOUT COMPLICATION, WITHOUT LONG-TERM CURRENT USE OF INSULIN (HCC): ICD-10-CM

## 2025-07-14 RX ORDER — GLIPIZIDE 10 MG/1
10 TABLET, FILM COATED, EXTENDED RELEASE ORAL DAILY
Qty: 90 TABLET | Refills: 1 | Status: SHIPPED | OUTPATIENT
Start: 2025-07-14

## 2025-07-14 NOTE — TELEPHONE ENCOUNTER
Caller requests Refill of:  Patient called she needs a one touch meter  And strips      Please send to:  [facility]/pharmacy #[id number] [place], [place] - [facility] [place] [phone] - F [phone]       Visit Appointment History:  Future Appointments:  Future Appointments   Date Time Provider Department Center   10/23/2025  7:50 AM Tali Danielle MD [facility] DEP         Last Appointment With Me:  7/9/2025         Caller confirmed instructions and dosages as correct.    Caller was advised that Meds will be refilled as soon as possible, however there can be a 48-72 business hour turn around on refill requests.

## 2025-07-15 RX ORDER — BLOOD-GLUCOSE METER
1 EACH MISCELLANEOUS DAILY
Qty: 1 KIT | Refills: 0 | Status: SHIPPED | OUTPATIENT
Start: 2025-07-15

## 2025-07-15 RX ORDER — GLUCOSAMINE HCL/CHONDROITIN SU 500-400 MG
CAPSULE ORAL
Qty: 200 STRIP | Refills: 2 | Status: SHIPPED | OUTPATIENT
Start: 2025-07-15

## 2025-07-15 NOTE — TELEPHONE ENCOUNTER
PCP: Tali Danielle MD     Last appt:  2025      Future Appointments   Date Time Provider Department Center   10/23/2025  7:50 AM Tali Danielle MD [facility] DEP          Requested Prescriptions     Pending Prescriptions Disp Refills    Blood Glucose Monitoring Suppl (ONE TOUCH ULTRA 2) w/Device KIT 1 kit 0     Si kit by Does not apply route daily Used to check blood sugar daily. DX: E11.51    blood glucose monitor strips 200 strip 2     Sig: Used to check blood sugar daily. DX: E11.51

## 2025-07-21 RX ORDER — IBUPROFEN 600 MG/1
600 TABLET, FILM COATED ORAL EVERY 8 HOURS PRN
Qty: 21 TABLET | Refills: 0 | Status: SHIPPED | OUTPATIENT
Start: 2025-07-21 | End: 2025-07-28

## 2025-07-21 NOTE — TELEPHONE ENCOUNTER
Caller requests Refill of:    ibuprofen (ADVIL;MOTRIN) 600 MG tablet     Please send to:    [facility]    Visit Appointment History:  Future Appointments:  Future Appointments   Date Time Provider Department Center   10/23/2025  7:50 AM Tali Danielle MD [facility] DEP         Last Appointment With Me:  7/9/2025         Caller confirmed instructions and dosages as correct.    Caller was advised that Meds will be refilled as soon as possible, however there can be a 48-72 business hour turn around on refill requests.

## 2025-07-21 NOTE — TELEPHONE ENCOUNTER
PCP: Tali Danielle MD     Last appt:  7/9/2025      Future Appointments   Date Time Provider Department Center   10/23/2025  7:50 AM Tali Danielle MD [facility] DEP          Requested Prescriptions     Pending Prescriptions Disp Refills    ibuprofen (ADVIL;MOTRIN) 600 MG tablet 21 tablet 0     Sig: Take 1 tablet by mouth every 8 hours as needed for Pain

## 2025-08-24 RX ORDER — BLOOD-GLUCOSE METER
EACH MISCELLANEOUS
Qty: 1 KIT | Refills: 0 | Status: SHIPPED | OUTPATIENT
Start: 2025-08-24

## 2025-09-04 ENCOUNTER — TELEPHONE (OUTPATIENT)
Facility: CLINIC | Age: 65
End: 2025-09-04

## 2025-09-04 ENCOUNTER — HOSPITAL ENCOUNTER (EMERGENCY)
Facility: HOSPITAL | Age: 65
Discharge: HOME | End: 2025-09-04
Attending: STUDENT IN AN ORGANIZED HEALTH CARE EDUCATION/TRAINING PROGRAM
Payer: MEDICARE

## 2025-09-04 ENCOUNTER — APPOINTMENT (OUTPATIENT)
Facility: HOSPITAL | Age: 65
End: 2025-09-04
Payer: MEDICARE

## 2025-09-04 VITALS
SYSTOLIC BLOOD PRESSURE: 170 MMHG | WEIGHT: 172.84 LBS | TEMPERATURE: 98.4 F | RESPIRATION RATE: 18 BRPM | DIASTOLIC BLOOD PRESSURE: 87 MMHG | BODY MASS INDEX: 31.61 KG/M2 | HEART RATE: 85 BPM | OXYGEN SATURATION: 99 %

## 2025-09-04 DIAGNOSIS — R51.9 ACUTE NONINTRACTABLE HEADACHE, UNSPECIFIED HEADACHE TYPE: Primary | ICD-10-CM

## 2025-09-04 PROCEDURE — 70450 CT HEAD/BRAIN W/O DYE: CPT

## 2025-09-04 PROCEDURE — 6370000000 HC RX 637 (ALT 250 FOR IP): Performed by: STUDENT IN AN ORGANIZED HEALTH CARE EDUCATION/TRAINING PROGRAM

## 2025-09-04 PROCEDURE — 99284 EMERGENCY DEPT VISIT MOD MDM: CPT

## 2025-09-04 RX ORDER — METHOCARBAMOL 750 MG/1
750 TABLET, FILM COATED ORAL 3 TIMES DAILY PRN
Qty: 60 TABLET | Refills: 0 | Status: SHIPPED | OUTPATIENT
Start: 2025-09-04 | End: 2025-09-07 | Stop reason: SDUPTHER

## 2025-09-04 RX ORDER — ACETAMINOPHEN 500 MG
1000 TABLET ORAL
Status: COMPLETED | OUTPATIENT
Start: 2025-09-04 | End: 2025-09-04

## 2025-09-04 RX ORDER — ONDANSETRON 4 MG/1
4 TABLET, ORALLY DISINTEGRATING ORAL ONCE
Status: COMPLETED | OUTPATIENT
Start: 2025-09-04 | End: 2025-09-04

## 2025-09-04 RX ORDER — METHOCARBAMOL 750 MG/1
750 TABLET, FILM COATED ORAL ONCE
Status: COMPLETED | OUTPATIENT
Start: 2025-09-04 | End: 2025-09-04

## 2025-09-04 RX ADMIN — METHOCARBAMOL 750 MG: 750 TABLET ORAL at 21:12

## 2025-09-04 RX ADMIN — ACETAMINOPHEN 1000 MG: 500 TABLET ORAL at 21:12

## 2025-09-04 RX ADMIN — ONDANSETRON 4 MG: 4 TABLET, ORALLY DISINTEGRATING ORAL at 21:12

## 2025-09-05 ENCOUNTER — OFFICE VISIT (OUTPATIENT)
Facility: CLINIC | Age: 65
End: 2025-09-05
Payer: MEDICARE

## 2025-09-05 VITALS
HEART RATE: 74 BPM | SYSTOLIC BLOOD PRESSURE: 138 MMHG | RESPIRATION RATE: 16 BRPM | WEIGHT: 172.8 LBS | DIASTOLIC BLOOD PRESSURE: 70 MMHG | TEMPERATURE: 98.1 F | HEIGHT: 62 IN | OXYGEN SATURATION: 97 % | BODY MASS INDEX: 31.8 KG/M2

## 2025-09-05 DIAGNOSIS — L50.9 URTICARIA, UNSPECIFIED: ICD-10-CM

## 2025-09-05 DIAGNOSIS — R51.9 ACUTE NONINTRACTABLE HEADACHE, UNSPECIFIED HEADACHE TYPE: Primary | ICD-10-CM

## 2025-09-05 DIAGNOSIS — I10 PRIMARY HYPERTENSION: ICD-10-CM

## 2025-09-05 PROCEDURE — 3078F DIAST BP <80 MM HG: CPT | Performed by: INTERNAL MEDICINE

## 2025-09-05 PROCEDURE — 1123F ACP DISCUSS/DSCN MKR DOCD: CPT | Performed by: INTERNAL MEDICINE

## 2025-09-05 PROCEDURE — 99214 OFFICE O/P EST MOD 30 MIN: CPT | Performed by: INTERNAL MEDICINE

## 2025-09-05 PROCEDURE — 3075F SYST BP GE 130 - 139MM HG: CPT | Performed by: INTERNAL MEDICINE

## 2025-09-05 RX ORDER — BUTALBITAL, ACETAMINOPHEN AND CAFFEINE 50; 325; 40 MG/1; MG/1; MG/1
1 TABLET ORAL EVERY 8 HOURS PRN
Qty: 21 TABLET | Refills: 1 | Status: SHIPPED | OUTPATIENT
Start: 2025-09-05

## 2025-09-05 RX ORDER — CLOBETASOL PROPIONATE 0.5 MG/G
CREAM TOPICAL
Qty: 30 G | Refills: 1 | Status: SHIPPED | OUTPATIENT
Start: 2025-09-05

## (undated) DEVICE — CONTAINER SPEC 20 ML LID NEUT BUFF FORMALIN 10 % POLYPR STS

## (undated) DEVICE — SYR 3ML LL TIP 1/10ML GRAD --

## (undated) DEVICE — NON-REM POLYHESIVE PATIENT RETURN ELECTRODE: Brand: VALLEYLAB

## (undated) DEVICE — SOLIDIFIER FLD 2OZ 1500CC N DISINF IN BTL DISP SAFESORB

## (undated) DEVICE — TOWEL 4 PLY TISS 19X30 SUE WHT

## (undated) DEVICE — SET ADMIN 16ML TBNG L100IN 2 Y INJ SITE IV PIGGY BK DISP

## (undated) DEVICE — SYR 10ML LUER LOK 1/5ML GRAD --

## (undated) DEVICE — BASIN EMSIS 16OZ GRAPHITE PLAS KID SHP MOLD GRAD FOR ORAL

## (undated) DEVICE — Device

## (undated) DEVICE — TRAP,MUCUS SPECIMEN, 80CC: Brand: MEDLINE

## (undated) DEVICE — ELECTRODE,RADIOTRANSLUCENT,FOAM,5PK: Brand: MEDLINE

## (undated) DEVICE — SOLIDIFIER MEDC 1200ML -- CONVERT TO 356117

## (undated) DEVICE — 1200 GUARD II KIT W/5MM TUBE W/O VAC TUBE: Brand: GUARDIAN

## (undated) DEVICE — CATH IV AUTOGRD BC PNK 20GA 25 -- INSYTE

## (undated) DEVICE — Z DISCONTINUED PER MEDLINE LINE GAS SAMPLING O2/CO2 LNG AD 13 FT NSL W/ TBNG FILTERLINE

## (undated) DEVICE — CUFF BLD PRSS AD CLTH SGL TB W/ BAYNT CONN ROUNDED CORNER

## (undated) DEVICE — STRAINER URIN CALC RNL MSH -- CONVERT TO ITEM 357634

## (undated) DEVICE — HYPODERMIC SAFETY NEEDLE: Brand: MAGELLAN

## (undated) DEVICE — KENDALL RADIOLUCENT FOAM MONITORING ELECTRODE RECTANGULAR SHAPE: Brand: KENDALL

## (undated) DEVICE — TRAP SUC MUCOUS 70ML -- MEDICHOICE MEDLINE

## (undated) DEVICE — SNARE ENDOSCP M L240CM W27MM SHTH DIA2.4MM CHN 2.8MM OVL

## (undated) DEVICE — FORCEPS BX L240CM JAW DIA2.8MM L CAP W/ NDL MIC MESH TOOTH

## (undated) DEVICE — NEONATAL-ADULT SPO2 SENSOR: Brand: NELLCOR

## (undated) DEVICE — NEEDLE HYPO 18GA L1.5IN PNK S STL HUB POLYPR SHLD REG BVL